# Patient Record
Sex: MALE | Race: WHITE | NOT HISPANIC OR LATINO | Employment: PART TIME | URBAN - METROPOLITAN AREA
[De-identification: names, ages, dates, MRNs, and addresses within clinical notes are randomized per-mention and may not be internally consistent; named-entity substitution may affect disease eponyms.]

---

## 2017-02-23 ENCOUNTER — PATIENT MESSAGE (OUTPATIENT)
Dept: FAMILY MEDICINE | Facility: CLINIC | Age: 67
End: 2017-02-23

## 2017-03-02 ENCOUNTER — LAB VISIT (OUTPATIENT)
Dept: LAB | Facility: HOSPITAL | Age: 67
End: 2017-03-02
Attending: FAMILY MEDICINE
Payer: MEDICARE

## 2017-03-02 DIAGNOSIS — E11.8 CONTROLLED TYPE 2 DIABETES MELLITUS WITH COMPLICATION, WITHOUT LONG-TERM CURRENT USE OF INSULIN: ICD-10-CM

## 2017-03-02 LAB
ANION GAP SERPL CALC-SCNC: 6 MMOL/L
BUN SERPL-MCNC: 20 MG/DL
CALCIUM SERPL-MCNC: 9.6 MG/DL
CHLORIDE SERPL-SCNC: 104 MMOL/L
CO2 SERPL-SCNC: 28 MMOL/L
CREAT SERPL-MCNC: 1.4 MG/DL
EST. GFR  (AFRICAN AMERICAN): 59.7 ML/MIN/1.73 M^2
EST. GFR  (NON AFRICAN AMERICAN): 51.6 ML/MIN/1.73 M^2
GLUCOSE SERPL-MCNC: 124 MG/DL
POTASSIUM SERPL-SCNC: 4.8 MMOL/L
SODIUM SERPL-SCNC: 138 MMOL/L

## 2017-03-02 PROCEDURE — 83036 HEMOGLOBIN GLYCOSYLATED A1C: CPT

## 2017-03-02 PROCEDURE — 80048 BASIC METABOLIC PNL TOTAL CA: CPT

## 2017-03-02 PROCEDURE — 36415 COLL VENOUS BLD VENIPUNCTURE: CPT | Mod: PO

## 2017-03-03 LAB
ESTIMATED AVG GLUCOSE: 131 MG/DL
HBA1C MFR BLD HPLC: 6.2 %

## 2017-03-06 ENCOUNTER — OFFICE VISIT (OUTPATIENT)
Dept: OPTOMETRY | Facility: CLINIC | Age: 67
End: 2017-03-06
Payer: MEDICARE

## 2017-03-06 DIAGNOSIS — E11.9 DIABETES TYPE 2, CONTROLLED: ICD-10-CM

## 2017-03-06 DIAGNOSIS — H40.1132 PRIMARY OPEN ANGLE GLAUCOMA OF BOTH EYES, MODERATE STAGE: Primary | ICD-10-CM

## 2017-03-06 PROCEDURE — 99212 OFFICE O/P EST SF 10 MIN: CPT | Mod: PBBFAC,PO | Performed by: OPTOMETRIST

## 2017-03-06 PROCEDURE — 92020 GONIOSCOPY: CPT | Mod: PBBFAC,PO | Performed by: OPTOMETRIST

## 2017-03-06 PROCEDURE — 99999 PR PBB SHADOW E&M-EST. PATIENT-LVL II: CPT | Mod: PBBFAC,,, | Performed by: OPTOMETRIST

## 2017-03-06 PROCEDURE — 92012 INTRM OPH EXAM EST PATIENT: CPT | Mod: S$PBB,,, | Performed by: OPTOMETRIST

## 2017-03-06 PROCEDURE — 92020 GONIOSCOPY: CPT | Mod: S$PBB,,, | Performed by: OPTOMETRIST

## 2017-03-06 RX ORDER — ATORVASTATIN CALCIUM 40 MG/1
40 TABLET, FILM COATED ORAL DAILY
Refills: 11 | COMMUNITY
Start: 2017-01-11

## 2017-03-06 RX ORDER — PIOGLITAZONEHYDROCHLORIDE 15 MG/1
TABLET ORAL
Qty: 90 TABLET | Refills: 0 | Status: SHIPPED | OUTPATIENT
Start: 2017-03-06 | End: 2017-06-03 | Stop reason: SDUPTHER

## 2017-03-06 RX ORDER — LATANOPROST 50 UG/ML
1 SOLUTION/ DROPS OPHTHALMIC NIGHTLY
Qty: 7.5 ML | Refills: 4 | Status: SHIPPED | OUTPATIENT
Start: 2017-03-06 | End: 2018-02-08 | Stop reason: SDUPTHER

## 2017-03-06 RX ORDER — VALSARTAN 320 MG/1
TABLET ORAL
Refills: 4 | COMMUNITY
Start: 2017-02-09 | End: 2018-12-26 | Stop reason: ALTCHOICE

## 2017-03-06 RX ORDER — MELOXICAM 15 MG/1
TABLET ORAL
Refills: 3 | Status: ON HOLD | COMMUNITY
Start: 2017-01-10 | End: 2018-03-06 | Stop reason: HOSPADM

## 2017-03-06 RX ORDER — BRINZOLAMIDE 10 MG/ML
1 SUSPENSION/ DROPS OPHTHALMIC 2 TIMES DAILY
Qty: 10 ML | Refills: 4 | Status: SHIPPED | OUTPATIENT
Start: 2017-03-06 | End: 2018-06-22 | Stop reason: SDUPTHER

## 2017-03-06 NOTE — MR AVS SNAPSHOT
[facility]  [address]  [facility] [address]  Phone: [phone]  Fax: [phone]                  Rajeev CARLIN Reece VASQUEZ   3/6/2017 8:30 AM   Office Visit    Description:  Male : 1950   Provider:  SHERMAN Smith, OD   Department:  [facility]           Reason for Visit     Glaucoma           Diagnoses this Visit        Comments    Primary open angle glaucoma of both eyes, moderate stage    -  Primary            To Do List           Future Appointments        Provider Department Dept Phone    3/22/2017 9:00 AM Swati Walton MD [facility] [phone]      Goals (5 Years of Data)     None      Follow-Up and Disposition     Return in about 5 months (around 2017) for Annual Eye Exam.       These Medications        Disp Refills Start End    brinzolamide (AZOPT) 1 % ophthalmic suspension 10 mL 4 3/6/2017 3/7/2018    Place 1 drop into both eyes 2 (two) times daily. - Both Eyes    Pharmacy: [facility] [address] [place] [address] [facility] Ph #: [phone]       Notes to Pharmacy: 90 day supply if desired    latanoprost 0.005 % ophthalmic solution 7.5 mL 4 3/6/2017 3/6/2018    Place 1 drop into both eyes nightly. - Both Eyes    Pharmacy: [facility] [id number] [place] [address] [facility] Ph #: [phone]       Notes to Pharmacy: **Patient requests 90 days supply**      [facility]     [facility] Nurse Care Line -  Assistance  Registered nurses in the [facility] provide clinical advisement, health education, appointment booking, and other advisory services.  Call for this free service at [phone].             Medications           Message regarding Medications     Verify the changes and/or additions to your medication regime listed below are the same as discussed with your clinician today.  If any of these changes or additions are  incorrect, please notify your healthcare provider.        CHANGE how you are taking these medications     Start Taking Instead of    brinzolamide (AZOPT) 1 % ophthalmic suspension brinzolamide (AZOPT) 1 % ophthalmic suspension    Dosage:  Place 1 drop into both eyes 2 (two) times daily. Dosage:  Place 1 drop into both eyes every morning.    Reason for Change:  Reorder       STOP taking these medications     celecoxib (CELEBREX) 200 MG capsule TAKE ONE CAPSULE BY MOUTH EVERY DAY WITH FOOD           Verify that the below list of medications is an accurate representation of the medications you are currently taking.  If none reported, the list may be blank. If incorrect, please contact your healthcare provider. Carry this list with you in case of emergency.           Current Medications     atorvastatin (LIPITOR) 40 MG tablet TK 1 T PO  QPM    brinzolamide (AZOPT) 1 % ophthalmic suspension Place 1 drop into both eyes 2 (two) times daily.    ezetimibe (ZETIA) 10 mg tablet     fexofenadine (ALLEGRA) 180 MG tablet Every day PRN    hydrocortisone (ANUSOL-HC) 25 mg suppository Place 1 suppository (25 mg total) rectally 2 (two) times daily as needed for Hemorrhoids. 1 Suppository(ies) Rectal PRN Twice a day.    latanoprost 0.005 % ophthalmic solution Place 1 drop into both eyes nightly.    meloxicam (MOBIC) 15 MG tablet TK 1 T PO  DAILY WF    mupirocin (BACTROBAN) 2 % ointment JAMI AA BID    pantoprazole (PROTONIX) 40 MG tablet Every day    pioglitazone (ACTOS) 15 MG tablet Take 1 tablet (15 mg total) by mouth once daily.    telmisartan (MICARDIS) 80 MG Tab     valsartan (DIOVAN) 320 MG tablet TAKE 1 TABLET BY MOUTH QD           Clinical Reference Information           Allergies as of 3/6/2017     Bactrim [Sulfamethoxazole-trimethoprim]    Darvon [Propoxyphene]      Immunizations Administered on Date of Encounter - 3/6/2017     None      Orders Placed During Today's Visit     Future Labs/Procedures Expected by Expires     "Cleaning Visual Field - OU - Extended - Both Eyes  As directed 3/6/2018    OCT - Optic Nerve  As directed 3/6/2018      Instructions    DRY EYES:  Use Over The Counter artificial tears as needed for dry eye symptoms.  Some common brands include:  Systane, Optive, and Refresh.  These drops can be used as frequently as desired, but may be most helpful use during long periods of concentrated work.  For example, reading / working at the computer.  Avoid drops that "get redness out", as these contain medication that may further irritate the eyes.    ALLERGY EYES / SYMPTOMS:    Over the counter medications include--Zaditor and Alaway  Use as directed 1-2 drops daily for symptoms of itching / watering eyes.  These drops will not help for dry eye or exposure symptoms.           Language Assistance Services     ATTENTION: Language assistance services are available, free of charge. Please call [phone].      ATENCIÓN: Si billla ronnell, tiene a carvajal disposición servicios gratuitos de asistencia lingüística. Llame al [phone].     RAQUEL Ý: N?u b?n nói Ti?ng Vi?t, có các d?ch v? h? tr? ngôn ng? mi?n phí dành cho b?n. G?i s? [phone].         [facility] complies with applicable Federal civil rights laws and does not discriminate on the basis of race, color, national origin, age, disability, or sex.        "

## 2017-03-06 NOTE — PROGRESS NOTES
HPI     Glaucoma    Additional comments: overdue IOP check, gonio --- good compliance w/   Azopt OU qam & Latanoprost OU qhs // pt needs new rx for both gtts           Comments   Agree above  6 months IOP  Recheck gonio  No new issues  Good gtt compliance         Last edited by SHERMAN Smith, OD on 3/6/2017  8:55 AM. (History)            Assessment /Plan     For exam results, see Encounter Report.    Primary open angle glaucoma of both eyes, moderate stage  -     brinzolamide (AZOPT) 1 % ophthalmic suspension; Place 1 drop into both eyes 2 (two) times daily.  Dispense: 10 mL; Refill: 4  -     latanoprost 0.005 % ophthalmic solution; Place 1 drop into both eyes nightly.  Dispense: 7.5 mL; Refill: 4  -     Cleaning Visual Field - OU - Extended - Both Eyes; Future  -     OCT - Optic Nerve; Future      IOP continues to elevate   ? Compliance with gtts, although pt assures is doing well with meds  Angles open on gonio today  Avg CCT      OU  Increase azopt to bid  Continue latanoprost qhs    RTC late July early August 2017---full exam / fields/ oct

## 2017-03-08 ENCOUNTER — PATIENT OUTREACH (OUTPATIENT)
Dept: ADMINISTRATIVE | Facility: HOSPITAL | Age: 67
End: 2017-03-08

## 2017-03-24 DIAGNOSIS — E11.9 TYPE 2 DIABETES MELLITUS WITHOUT COMPLICATION: ICD-10-CM

## 2017-04-12 ENCOUNTER — PATIENT OUTREACH (OUTPATIENT)
Dept: ADMINISTRATIVE | Facility: HOSPITAL | Age: 67
End: 2017-04-12

## 2017-04-12 NOTE — LETTER
April 18, 2017    Rajeev Newell III  [address]  [facility] [address]             [facility]  [address]  [facility] [address]  Phone: [phone] Dear Mr. Newell:    We have tried to reach you by mychart unsuccessfully.    [facility] is committed to your overall health.  To help you get the most out of each of your visits, we will review your information to make sure you are up to date on all of your recommended tests and/or procedures.       Dr. Swati Walton has found that you may be due for your fasting cholesterol labs, a urine test for your kidneys, your annual diabetic foot exam, colon cancer screening, and possibly a pneumonia immunization.     Medicare does not cover all immunizations to be given in the clinic.  Check your benefits to ensure that you do not need to receive your immunizations at the pharmacy.     If you have had any of the above done at another facility, please bring the records or information with you so that your record at [facility] will be complete.  If you would like to schedule any of these, please contact me.     If you are currently taking medication, please bring it with you to your appointment for review.     Also, if you have any type of Advanced Directives, please bring them with you to your office visit so we may scan them into your chart.     If you have any questions or concerns, please don't hesitate to call.    Thank you for letting us care for you,  Isa Moss LPN Clinical Care Coordinator  [facility]  [phone]

## 2017-05-30 ENCOUNTER — PATIENT MESSAGE (OUTPATIENT)
Dept: ADMINISTRATIVE | Facility: HOSPITAL | Age: 67
End: 2017-05-30

## 2017-05-30 ENCOUNTER — PATIENT OUTREACH (OUTPATIENT)
Dept: ADMINISTRATIVE | Facility: HOSPITAL | Age: 67
End: 2017-05-30

## 2017-05-30 DIAGNOSIS — E11.00 TYPE 2 DIABETES MELLITUS WITH HYPEROSMOLARITY WITHOUT COMA, UNSPECIFIED LONG TERM INSULIN USE STATUS: Primary | ICD-10-CM

## 2017-05-30 DIAGNOSIS — R35.0 URINARY FREQUENCY: ICD-10-CM

## 2017-06-03 DIAGNOSIS — E11.9 DIABETES TYPE 2, CONTROLLED: ICD-10-CM

## 2017-06-05 RX ORDER — PIOGLITAZONEHYDROCHLORIDE 15 MG/1
TABLET ORAL
Qty: 90 TABLET | Refills: 0 | Status: SHIPPED | OUTPATIENT
Start: 2017-06-05 | End: 2017-09-05 | Stop reason: SDUPTHER

## 2017-06-07 ENCOUNTER — LAB VISIT (OUTPATIENT)
Dept: LAB | Facility: HOSPITAL | Age: 67
End: 2017-06-07
Attending: FAMILY MEDICINE
Payer: MEDICARE

## 2017-06-07 DIAGNOSIS — R35.0 URINARY FREQUENCY: ICD-10-CM

## 2017-06-07 DIAGNOSIS — E11.00 TYPE 2 DIABETES MELLITUS WITH HYPEROSMOLARITY WITHOUT COMA, UNSPECIFIED LONG TERM INSULIN USE STATUS: ICD-10-CM

## 2017-06-07 DIAGNOSIS — E11.9 TYPE 2 DIABETES MELLITUS WITHOUT COMPLICATION: ICD-10-CM

## 2017-06-07 LAB
ALBUMIN SERPL BCP-MCNC: 4 G/DL
ALP SERPL-CCNC: 38 U/L
ALT SERPL W/O P-5'-P-CCNC: 28 U/L
ANION GAP SERPL CALC-SCNC: 10 MMOL/L
AST SERPL-CCNC: 23 U/L
BILIRUB SERPL-MCNC: 0.6 MG/DL
BUN SERPL-MCNC: 21 MG/DL
CALCIUM SERPL-MCNC: 9.7 MG/DL
CHLORIDE SERPL-SCNC: 107 MMOL/L
CHOLEST/HDLC SERPL: 3.1 {RATIO}
CO2 SERPL-SCNC: 23 MMOL/L
COMPLEXED PSA SERPL-MCNC: 1.1 NG/ML
CREAT SERPL-MCNC: 1.1 MG/DL
EST. GFR  (AFRICAN AMERICAN): >60 ML/MIN/1.73 M^2
EST. GFR  (NON AFRICAN AMERICAN): >60 ML/MIN/1.73 M^2
GLUCOSE SERPL-MCNC: 129 MG/DL
HDL/CHOLESTEROL RATIO: 32.7 %
HDLC SERPL-MCNC: 153 MG/DL
HDLC SERPL-MCNC: 50 MG/DL
LDLC SERPL CALC-MCNC: 81.2 MG/DL
NONHDLC SERPL-MCNC: 103 MG/DL
POTASSIUM SERPL-SCNC: 4.5 MMOL/L
PROT SERPL-MCNC: 7.6 G/DL
SODIUM SERPL-SCNC: 140 MMOL/L
TRIGL SERPL-MCNC: 109 MG/DL

## 2017-06-07 PROCEDURE — 80053 COMPREHEN METABOLIC PANEL: CPT

## 2017-06-07 PROCEDURE — 80061 LIPID PANEL: CPT

## 2017-06-07 PROCEDURE — 36415 COLL VENOUS BLD VENIPUNCTURE: CPT | Mod: PO

## 2017-06-07 PROCEDURE — 83036 HEMOGLOBIN GLYCOSYLATED A1C: CPT

## 2017-06-07 PROCEDURE — 84153 ASSAY OF PSA TOTAL: CPT

## 2017-06-08 LAB
ESTIMATED AVG GLUCOSE: 134 MG/DL
HBA1C MFR BLD HPLC: 6.3 %

## 2017-06-13 ENCOUNTER — OFFICE VISIT (OUTPATIENT)
Dept: FAMILY MEDICINE | Facility: CLINIC | Age: 67
End: 2017-06-13
Payer: MEDICARE

## 2017-06-13 VITALS
HEIGHT: 71 IN | SYSTOLIC BLOOD PRESSURE: 120 MMHG | HEART RATE: 65 BPM | BODY MASS INDEX: 39.81 KG/M2 | OXYGEN SATURATION: 96 % | WEIGHT: 284.38 LBS | TEMPERATURE: 99 F | DIASTOLIC BLOOD PRESSURE: 72 MMHG

## 2017-06-13 DIAGNOSIS — I10 ESSENTIAL HYPERTENSION: ICD-10-CM

## 2017-06-13 DIAGNOSIS — E11.8 CONTROLLED TYPE 2 DIABETES MELLITUS WITH COMPLICATION, WITHOUT LONG-TERM CURRENT USE OF INSULIN: ICD-10-CM

## 2017-06-13 DIAGNOSIS — Z23 IMMUNIZATION DUE: ICD-10-CM

## 2017-06-13 DIAGNOSIS — Z00.00 ROUTINE GENERAL MEDICAL EXAMINATION AT A HEALTH CARE FACILITY: Primary | ICD-10-CM

## 2017-06-13 PROCEDURE — 99397 PER PM REEVAL EST PAT 65+ YR: CPT | Mod: S$PBB,,, | Performed by: FAMILY MEDICINE

## 2017-06-13 PROCEDURE — 90732 PPSV23 VACC 2 YRS+ SUBQ/IM: CPT | Mod: PBBFAC,PO

## 2017-06-13 PROCEDURE — 99999 PR PBB SHADOW E&M-EST. PATIENT-LVL III: CPT | Mod: PBBFAC,,, | Performed by: FAMILY MEDICINE

## 2017-06-13 PROCEDURE — 99213 OFFICE O/P EST LOW 20 MIN: CPT | Mod: PBBFAC,PO | Performed by: FAMILY MEDICINE

## 2017-06-13 NOTE — PROGRESS NOTES
Subjective:       Patient ID: Rajeev Newell III is a 66 y.o. male.    Chief Complaint: Annual Exam    HPI     Mr. Newell is a 65 yo M with a PMH significant for T2DM (A1c 6.3)  who presents for an annual visit. Since his last visit he has had no complaints. He states that his current regimen has kept his sugars stable. He regularly sees opthalmology for glaucoma treatment, per opthal his eyes are currently stable. He denies any new neuropathy or wounds. He was advised to improve diet and exercise to aid in keeping his A1c stable.   Labs 6/2017 rev. Has urol and cards f/u later this year.    Review of Systems   Constitutional: Negative for activity change, fatigue and unexpected weight change.   HENT: Negative for rhinorrhea and trouble swallowing.    Eyes: Negative for discharge and visual disturbance.   Respiratory: Negative for cough, chest tightness, shortness of breath and wheezing.    Cardiovascular: Negative for chest pain and palpitations.   Gastrointestinal: Negative for abdominal pain, blood in stool, constipation, diarrhea and vomiting.   Endocrine: Negative for polydipsia and polyuria.   Genitourinary: Negative for difficulty urinating, hematuria and urgency.   Musculoskeletal: Negative for arthralgias, joint swelling and neck pain.   Skin: Negative for color change, rash and wound.   Neurological: Negative for weakness, numbness and headaches.   Psychiatric/Behavioral: Negative for confusion, dysphoric mood, sleep disturbance and suicidal ideas.       Objective:      Physical Exam   Constitutional: He is oriented to person, place, and time. He appears well-developed and well-nourished. No distress.   HENT:   Head: Normocephalic and atraumatic.   Right Ear: External ear normal.   Left Ear: External ear normal.   Mouth/Throat: Oropharynx is clear and moist.   Eyes: Conjunctivae and EOM are normal. No scleral icterus.   Neck: Neck supple. No thyromegaly present.   Cardiovascular: Normal rate, regular  rhythm, normal heart sounds and intact distal pulses.    No murmur heard.  Pulses:       Dorsalis pedis pulses are 2+ on the right side, and 2+ on the left side.        Posterior tibial pulses are 2+ on the right side, and 2+ on the left side.   Pulmonary/Chest: Effort normal and breath sounds normal. He has no wheezes.   Abdominal: Soft. Bowel sounds are normal. He exhibits no distension.   Exam limited by habitus.   Musculoskeletal: Normal range of motion. He exhibits no edema (mild, nonpitting edema).        Right foot: There is no deformity.        Left foot: There is no deformity.   Feet:   Right Foot:   Protective Sensation: 3 sites tested. 3 sites sensed.   Skin Integrity: Negative for ulcer, blister, skin breakdown, erythema, warmth, callus or dry skin.   Left Foot:   Protective Sensation: 3 sites tested. 3 sites sensed.   Skin Integrity: Negative for ulcer, blister, skin breakdown, erythema, warmth, callus or dry skin.   Lymphadenopathy:     He has no cervical adenopathy.   Neurological: He is alert and oriented to person, place, and time. He has normal reflexes.   Skin: Skin is warm and dry. Capillary refill takes less than 2 seconds.   Psychiatric: He has a normal mood and affect. His behavior is normal. Judgment and thought content normal.   Nursing note and vitals reviewed.        Routine general medical examination at a health care facility  Anticipatory guidance reviewed.  Immunization due  pneumo 23 in office today.  Controlled type 2 diabetes mellitus with complication, without long-term current use of insulin  -     Comprehensive metabolic panel; Future; Expected date: 06/13/2017  -     Hemoglobin A1c; Future; Expected date: 06/13/2017  Controlled, encouraged diet/exercise, recheck 6mos.  Other orders  -     (In Office Administered) Pneumococcal Polysaccharide Vaccine (23 Valent) (SQ/IM)  HTN  Controlled.

## 2017-06-29 ENCOUNTER — OFFICE VISIT (OUTPATIENT)
Dept: FAMILY MEDICINE | Facility: CLINIC | Age: 67
End: 2017-06-29
Payer: MEDICARE

## 2017-06-29 VITALS
HEIGHT: 71 IN | WEIGHT: 286.81 LBS | OXYGEN SATURATION: 95 % | SYSTOLIC BLOOD PRESSURE: 123 MMHG | TEMPERATURE: 99 F | HEART RATE: 65 BPM | DIASTOLIC BLOOD PRESSURE: 82 MMHG | BODY MASS INDEX: 40.15 KG/M2

## 2017-06-29 DIAGNOSIS — J01.00 ACUTE MAXILLARY SINUSITIS, RECURRENCE NOT SPECIFIED: ICD-10-CM

## 2017-06-29 DIAGNOSIS — J40 BRONCHITIS: Primary | ICD-10-CM

## 2017-06-29 PROCEDURE — 1126F AMNT PAIN NOTED NONE PRSNT: CPT | Mod: ,,, | Performed by: PHYSICIAN ASSISTANT

## 2017-06-29 PROCEDURE — 1159F MED LIST DOCD IN RCRD: CPT | Mod: ,,, | Performed by: PHYSICIAN ASSISTANT

## 2017-06-29 PROCEDURE — 99213 OFFICE O/P EST LOW 20 MIN: CPT | Mod: PBBFAC,PO | Performed by: PHYSICIAN ASSISTANT

## 2017-06-29 PROCEDURE — 99213 OFFICE O/P EST LOW 20 MIN: CPT | Mod: S$PBB,,, | Performed by: PHYSICIAN ASSISTANT

## 2017-06-29 PROCEDURE — 99999 PR PBB SHADOW E&M-EST. PATIENT-LVL III: CPT | Mod: PBBFAC,,, | Performed by: PHYSICIAN ASSISTANT

## 2017-06-29 RX ORDER — DOXYCYCLINE 100 MG/1
100 CAPSULE ORAL 2 TIMES DAILY
Qty: 20 CAPSULE | Refills: 0 | Status: SHIPPED | OUTPATIENT
Start: 2017-06-29 | End: 2017-07-09

## 2017-06-29 RX ORDER — ALBUTEROL SULFATE 90 UG/1
2 AEROSOL, METERED RESPIRATORY (INHALATION) EVERY 4 HOURS PRN
Qty: 1 INHALER | Refills: 5 | Status: SHIPPED | OUTPATIENT
Start: 2017-06-29 | End: 2018-02-06 | Stop reason: SDUPTHER

## 2017-06-29 NOTE — PROGRESS NOTES
Subjective:       Patient ID: Rajeev Newell III is a 66 y.o. male.    Chief Complaint: Cough (6 days) and Sore Throat    HPI   Cough, congestion, sore throat x 1 wk  Cloudy mucus  Review of Systems   Constitutional: Negative.  Negative for activity change, appetite change, chills, diaphoresis, fatigue, fever and unexpected weight change.   HENT: Positive for congestion, postnasal drip, sinus pressure and sore throat.    Eyes: Negative.    Respiratory: Positive for cough. Negative for shortness of breath and wheezing.    Cardiovascular: Negative.  Negative for chest pain and leg swelling.   Gastrointestinal: Negative.    Endocrine: Negative.    Genitourinary: Negative.    Musculoskeletal: Negative.    Skin: Negative.  Negative for rash.       Objective:      Physical Exam   Constitutional: He appears well-developed and well-nourished. No distress.   HENT:   Head: Normocephalic and atraumatic.   Right Ear: External ear normal.   Left Ear: External ear normal.   Nose: Nose normal.   Mouth/Throat: Oropharynx is clear and moist. No oropharyngeal exudate.   Max sinus tenderness  Cloudy mucus   Eyes: Conjunctivae are normal. No scleral icterus.   Neck: Normal range of motion. Neck supple. No tracheal deviation present. No thyromegaly present.   Cardiovascular: Normal rate, regular rhythm, normal heart sounds and intact distal pulses.  Exam reveals no gallop and no friction rub.    No murmur heard.  Pulmonary/Chest: Effort normal and breath sounds normal. No respiratory distress. He has no wheezes. He has no rales.   Musculoskeletal: He exhibits no edema.   Lymphadenopathy:     He has no cervical adenopathy.   Skin: Skin is warm and dry. No rash noted.   Vitals reviewed.      Assessment:       1. Bronchitis    2. Acute maxillary sinusitis, recurrence not specified        Plan:       Rajeev was seen today for cough and sore throat.    Diagnoses and all orders for this visit:    Bronchitis  -     doxycycline (MONODOX)  100 MG capsule; Take 1 capsule (100 mg total) by mouth 2 (two) times daily.  -     albuterol 90 mcg/actuation inhaler; Inhale 2 puffs into the lungs every 4 (four) hours as needed for Wheezing.    Acute maxillary sinusitis, recurrence not specified  -     doxycycline (MONODOX) 100 MG capsule; Take 1 capsule (100 mg total) by mouth 2 (two) times daily.  -     albuterol 90 mcg/actuation inhaler; Inhale 2 puffs into the lungs every 4 (four) hours as needed for Wheezing.    discussed otc's

## 2017-07-14 ENCOUNTER — CLINICAL SUPPORT (OUTPATIENT)
Dept: OPHTHALMOLOGY | Facility: CLINIC | Age: 67
End: 2017-07-14
Attending: OPTOMETRIST
Payer: MEDICARE

## 2017-07-14 ENCOUNTER — OFFICE VISIT (OUTPATIENT)
Dept: OPTOMETRY | Facility: CLINIC | Age: 67
End: 2017-07-14
Payer: MEDICARE

## 2017-07-14 DIAGNOSIS — H52.203 MYOPIA WITH ASTIGMATISM AND PRESBYOPIA, BILATERAL: ICD-10-CM

## 2017-07-14 DIAGNOSIS — H52.13 MYOPIA WITH ASTIGMATISM AND PRESBYOPIA, BILATERAL: ICD-10-CM

## 2017-07-14 DIAGNOSIS — H40.1132 PRIMARY OPEN ANGLE GLAUCOMA OF BOTH EYES, MODERATE STAGE: ICD-10-CM

## 2017-07-14 DIAGNOSIS — E11.9 DIABETES MELLITUS TYPE 2 WITHOUT RETINOPATHY: Primary | ICD-10-CM

## 2017-07-14 DIAGNOSIS — H43.393 VITREOUS FLOATERS, BILATERAL: ICD-10-CM

## 2017-07-14 DIAGNOSIS — H52.4 MYOPIA WITH ASTIGMATISM AND PRESBYOPIA, BILATERAL: ICD-10-CM

## 2017-07-14 DIAGNOSIS — H25.13 NUCLEAR SCLEROSIS, BILATERAL: ICD-10-CM

## 2017-07-14 PROCEDURE — 92083 EXTENDED VISUAL FIELD XM: CPT | Mod: 26,S$PBB,, | Performed by: OPTOMETRIST

## 2017-07-14 PROCEDURE — 92133 CPTRZD OPH DX IMG PST SGM ON: CPT | Mod: PBBFAC,PO

## 2017-07-14 PROCEDURE — 92014 COMPRE OPH EXAM EST PT 1/>: CPT | Mod: S$PBB,,, | Performed by: OPTOMETRIST

## 2017-07-14 PROCEDURE — 99999 PR PBB SHADOW E&M-EST. PATIENT-LVL II: CPT | Mod: PBBFAC,,, | Performed by: OPTOMETRIST

## 2017-07-14 PROCEDURE — 92133 CPTRZD OPH DX IMG PST SGM ON: CPT | Mod: 26,S$PBB,, | Performed by: OPTOMETRIST

## 2017-07-14 NOTE — PROGRESS NOTES
HPI     Annual Exam    Additional comments: DLE 3-17 (nichelle)    ocular health exam            Glaucoma    Additional comments: rev hvf & oct // +Azopt OU bid & Latanoprost OU qhs           Diabetic Eye Exam    Additional comments: BSL controlled           Blurred Vision    Additional comments: at distance -- pt lost specs, wearing old pair //   near [facility] good           Spots and/or Floaters    Additional comments: OU -- no light flashes           Comments   Hemoglobin A1C       Date                     Value               Ref Range             Status                06/07/2017               6.3 (H)             4.5 - 6.2 %           Final              Comment:    According to ADA guidelines, hemoglobin A1C <7.0% represents  optimal   control in non-pregnant diabetic patients.  Different  metrics may apply   to specific populations.   Standards of Medical Care in Diabetes -   2016.  For the purpose of screening for the presence of diabetes:  <5.7%       Consistent with the absence of diabetes  5.7-6.4%  Consistent with   increasing risk for diabetes   (prediabetes)  >or=6.5%  Consistent with   diabetes  Currently no consensus exists for use of hemoglobin A1C  for   diagnosis of diabetes for children.         03/02/2017               6.2                 4.5 - 6.2 %           Final              Comment:    According to ADA guidelines, hemoglobin A1C <7.0% represents  optimal   control in non-pregnant diabetic patients.  Different  metrics may apply   to specific populations.   Standards of Medical Care in Diabetes -   2016.  For the purpose of screening for the presence of diabetes:  <5.7%       Consistent with the absence of diabetes  5.7-6.4%  Consistent with   increasing risk for diabetes   (prediabetes)  >or=6.5%  Consistent with   diabetes  Currently no consensus exists for use of hemoglobin A1C  for   diagnosis of diabetes for children.         06/07/2016               6.2                 4.5 - 6.2 %           Final  "           ----------         Last edited by Deanne Wilson on 7/14/2017 10:39 AM. (History)        ROS     Positive for: Eyes    Negative for: Constitutional, Gastrointestinal, Neurological, Skin,   Genitourinary, Musculoskeletal, HENT, Endocrine, Cardiovascular,   Respiratory, Psychiatric, Allergic/Imm, Heme/Lymph    Last edited by SHERMAN Smith, OD on 7/14/2017 10:58 AM. (History)        Assessment /Plan     For exam results, see Encounter Report.    Diabetes mellitus type 2 without retinopathy    Primary open angle glaucoma of both eyes, moderate stage    Nuclear sclerosis, bilateral    Vitreous floaters, bilateral    Myopia with astigmatism and presbyopia, bilateral      1. No ret/ no csme, gave info, control glucose, annual DFE  2. IOP mid teens when compliant  Target not higher than mid teens, low teens ultimately better   Angles open--update gonio next IOP  Avg CCT  Updated fields/ oct   OCT  G 52 onl--defects G/NS/TS/T/TI  G 65 onl--defects G/TS/TI  Stable from previous    PSD  3.71 <0.5% onl with inf arc defect---very slight progression from previous  1.37 "wnl" slight depression in sup arc but no definitive progression  Repeat tests 1 year     Continue OU  Latanoprost qhs  Azopt bid    3. Early changes, not vis sig  4. RD precautions given  5. Updated specs rx gave copy    RTC 4 months IOP              "

## 2017-09-05 DIAGNOSIS — E11.9 DIABETES TYPE 2, CONTROLLED: ICD-10-CM

## 2017-09-06 RX ORDER — PIOGLITAZONEHYDROCHLORIDE 15 MG/1
TABLET ORAL
Qty: 90 TABLET | Refills: 0 | Status: SHIPPED | OUTPATIENT
Start: 2017-09-06 | End: 2017-12-07 | Stop reason: SDUPTHER

## 2017-10-10 ENCOUNTER — IMMUNIZATION (OUTPATIENT)
Dept: FAMILY MEDICINE | Facility: CLINIC | Age: 67
End: 2017-10-10
Payer: MEDICARE

## 2017-10-10 PROCEDURE — G0008 ADMIN INFLUENZA VIRUS VAC: HCPCS | Mod: PBBFAC,PO

## 2017-10-12 DIAGNOSIS — M25.561 PAIN IN BOTH KNEES, UNSPECIFIED CHRONICITY: Primary | ICD-10-CM

## 2017-10-12 DIAGNOSIS — M25.562 PAIN IN BOTH KNEES, UNSPECIFIED CHRONICITY: Primary | ICD-10-CM

## 2017-10-16 ENCOUNTER — HOSPITAL ENCOUNTER (OUTPATIENT)
Dept: RADIOLOGY | Facility: HOSPITAL | Age: 67
Discharge: HOME | End: 2017-10-16
Attending: ORTHOPAEDIC SURGERY
Payer: MEDICARE

## 2017-10-16 ENCOUNTER — OFFICE VISIT (OUTPATIENT)
Dept: ORTHOPEDICS | Facility: CLINIC | Age: 67
End: 2017-10-16
Payer: MEDICARE

## 2017-10-16 VITALS — HEIGHT: 71 IN | BODY MASS INDEX: 39.73 KG/M2 | WEIGHT: 283.75 LBS

## 2017-10-16 DIAGNOSIS — M21.162 ACQUIRED VARUS DEFORMITY KNEE, LEFT: ICD-10-CM

## 2017-10-16 DIAGNOSIS — M25.561 PAIN IN BOTH KNEES, UNSPECIFIED CHRONICITY: ICD-10-CM

## 2017-10-16 DIAGNOSIS — M17.12 PRIMARY OSTEOARTHRITIS OF LEFT KNEE: ICD-10-CM

## 2017-10-16 DIAGNOSIS — M17.11 PRIMARY OSTEOARTHRITIS OF RIGHT KNEE: Primary | ICD-10-CM

## 2017-10-16 DIAGNOSIS — M25.562 PAIN IN BOTH KNEES, UNSPECIFIED CHRONICITY: ICD-10-CM

## 2017-10-16 DIAGNOSIS — M21.161 ACQUIRED VARUS DEFORMITY KNEE, RIGHT: ICD-10-CM

## 2017-10-16 PROCEDURE — 99203 OFFICE O/P NEW LOW 30 MIN: CPT | Mod: S$PBB,,, | Performed by: ORTHOPAEDIC SURGERY

## 2017-10-16 PROCEDURE — 73562 X-RAY EXAM OF KNEE 3: CPT | Mod: TC,50

## 2017-10-16 PROCEDURE — 73562 X-RAY EXAM OF KNEE 3: CPT | Mod: 26,LT,, | Performed by: RADIOLOGY

## 2017-10-16 PROCEDURE — 99213 OFFICE O/P EST LOW 20 MIN: CPT | Mod: PBBFAC,25 | Performed by: ORTHOPAEDIC SURGERY

## 2017-10-16 PROCEDURE — 73562 X-RAY EXAM OF KNEE 3: CPT | Mod: 26,59,RT, | Performed by: RADIOLOGY

## 2017-10-16 PROCEDURE — 99999 PR PBB SHADOW E&M-EST. PATIENT-LVL III: CPT | Mod: PBBFAC,,, | Performed by: ORTHOPAEDIC SURGERY

## 2017-10-16 NOTE — PROGRESS NOTES
Subjective:      Patient ID: Rajeev Newell III is a 66 y.o. male.    Chief Complaint: Pain of the Left Knee and Pain of the Right Knee    HPI   Rajeev Newell III is a 66 year old male here with a several years history of bilateral knee osteoarthritis managed with daily meloxicam and synvisc injections x 4 years. The patient is a   and performs seated office work. He presents to clinic today to inquire about Klever unicompartmental knee arthroplasty. He reports his pain has been well controlled with Synvisc injections and daily meloxicam, no daily pain, he does report some weakness when his injections wear off. His knees do not interfere with his ADL. There was not a history of trauma.  The pain is well controlled. There is is not radiation.  There is not catching or locking.  . The patient has not had prior surgery. It is aggravated by walking. There is not numbness or tingling of the lower extremity.  There is not back pain.  He  has tried medications or injections. They have helped.  He does have difficulty getting in or out of a car, getting dressed, or going up or down stairs.  The patient does not use an assistive device.      Past Medical History:   Diagnosis Date    Cataract     OU--early    DM (diabetes mellitus) 10/29/2012    Glaucoma     POAG-OU    Hypertension     Sinusitis      Past Surgical History:   Procedure Laterality Date    CARPAL TUNNEL RELEASE      CIRCUMCISION      CYSTOSCOPY      HERNIA REPAIR Left 1994    L inguinal - [facility]     Family History   Problem Relation Age of Onset    Hypertension Mother     Stroke Mother     Diabetes Neg Hx     Cancer Neg Hx     Heart disease Neg Hx      Social History     Social History    Marital status:      Spouse name: N/A    Number of children: N/A    Years of education: N/A     Occupational History     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     Current Outpatient Prescriptions on File Prior to Visit   Medication Sig Dispense Refill    albuterol 90 mcg/actuation inhaler Inhale 2 puffs into the lungs every 4 (four) hours as needed for Wheezing. 1 Inhaler 5    atorvastatin (LIPITOR) 40 MG tablet TK 1 T PO  QPM  11    brinzolamide (AZOPT) 1 % ophthalmic suspension Place 1 drop into both eyes 2 (two) times daily. 10 mL 4    fexofenadine (ALLEGRA) 180 MG tablet Every day PRN      hydrocortisone (ANUSOL-HC) 25 mg suppository Place 1 suppository (25 mg total) rectally 2 (two) times daily as needed for Hemorrhoids. 1 Suppository(ies) Rectal PRN Twice a day. 12 suppository 1    latanoprost 0.005 % ophthalmic solution Place 1 drop into both eyes nightly. 7.5 mL 4    meloxicam (MOBIC) 15 MG tablet TK 1 T PO  DAILY WF  3    mupirocin (BACTROBAN) 2 % ointment JAMI AA BID  2    pantoprazole (PROTONIX) 40 MG tablet Every day      pioglitazone (ACTOS) 15 MG tablet TAKE 1 TABLET BY MOUTH DAILY 90 tablet 0    valsartan (DIOVAN) 320 MG tablet TAKE 1 TABLET BY MOUTH QD  4     No current facility-administered medications on file prior to visit.      Review of patient's allergies indicates:   Allergen Reactions    Bactrim [sulfamethoxazole-trimethoprim] Other (See Comments)     Abdominal Pain, Flush, Fever and Chills, Headache and Cough after Pt. took Bactrim    Darvon [propoxyphene]      Hallucinations       Review of Systems   Constitution: Negative for chills, fever and night sweats.   HENT: Negative for hearing loss.    Eyes: Negative for blurred vision and double vision.   Cardiovascular: Negative for chest pain, claudication and leg swelling.   Respiratory: Negative for shortness of breath.    Endocrine:  "Negative for polydipsia, polyphagia and polyuria.   Hematologic/Lymphatic: Negative for adenopathy and bleeding problem. Does not bruise/bleed easily.   Skin: Negative for poor wound healing.   Musculoskeletal: Positive for arthritis.   Gastrointestinal: Negative for diarrhea and heartburn.   Genitourinary: Negative for bladder incontinence.   Neurological: Negative for focal weakness, headaches, numbness, paresthesias and sensory change.   Psychiatric/Behavioral: The patient is not nervous/anxious.    Allergic/Immunologic: Negative for persistent infections.            Objective:      Body mass index is 39.57 kg/m².  Vitals:    10/16/17 1321   Weight: 128.7 kg (283 lb 11.7 oz)   Height: 5' 11" (1.803 m)         General    Constitutional: He is oriented to person, place, and time. He appears well-developed and well-nourished.   HENT:   Head: Normocephalic and atraumatic.   Eyes: EOM are normal.   Cardiovascular: Normal rate.    Pulmonary/Chest: Effort normal.   Neurological: He is alert and oriented to person, place, and time.   Psychiatric: He has a normal mood and affect. His behavior is normal.     General Musculoskeletal Exam   Gait: normal       Right Knee Exam     Inspection   Erythema: absent  Scars: absent  Swelling: present  Effusion: effusion  Deformity: deformity (varus)  Bruising: absent    Tenderness   The patient is tender to palpation of the pes anserinus.    Range of Motion   Extension: 0   Flexion: 130     Tests   Ligament Examination Lachman: normal (-1 to 2mm)   MCL - Valgus: normal (0 to 2mm)  LCL - Varus: normal  Patella   Passive Patellar Tilt: neutral  Patellar Grind: negative    Other   Sensation: normal    Left Knee Exam     Inspection   Erythema: absent  Scars: absent  Swelling: present  Effusion: absent  Deformity: present (varus)  Bruising: absent    Tenderness   The patient is experiencing no tenderness.         Range of Motion   Extension: 0   Flexion: 130     Tests   Stability Lachman: " normal (-1 to 2mm)   MCL - Valgus: normal (0 to 2mm)  LCL - Varus: normal (0 to 2mm)  Patella   Passive Patellar Tilt: neutral  Patellar Grind: negative    Other   Sensation: normal    Muscle Strength   Right Lower Extremity   Hip Abduction: 5/5   Quadriceps:  5/5   Hamstrin/5   Left Lower Extremity   Hip Abduction: 5/5   Quadriceps:  5/5   Hamstrin/5     Reflexes     Left Side  Quadriceps:  2+    Right Side   Quadriceps:  2+    Vascular Exam     Right Pulses  Dorsalis Pedis:      2+          Left Pulses  Dorsalis Pedis:      2+          Edema  Right Lower Leg: absent  Left Lower Leg: absent          Radiographs taken today and reviewed by me demonstrate severe arthritic change of the bilateral KNEE(s).There  is bone destruction.  There is not a fracture. The medial compartment is most involved.  There is a bilateral varus deformity.  The changes are tricompartmental.          Assessment:       Encounter Diagnoses   Name Primary?    Primary osteoarthritis of right knee Yes    Acquired varus deformity knee, right     Primary osteoarthritis of left knee     Acquired varus deformity knee, left           Plan:       Rajeev was seen today for pain and pain.    Diagnoses and all orders for this visit:    Primary osteoarthritis of right knee    Acquired varus deformity knee, right    Primary osteoarthritis of left knee    Acquired varus deformity knee, left      Discussed PACO TOTAL KNEE. Not a candidate for partial knee due to extent of arthritis.  F/U in early January to discuss further

## 2017-12-07 DIAGNOSIS — E11.9 DIABETES TYPE 2, CONTROLLED: ICD-10-CM

## 2017-12-07 RX ORDER — PIOGLITAZONEHYDROCHLORIDE 15 MG/1
15 TABLET ORAL DAILY
Qty: 90 TABLET | Refills: 1 | Status: SHIPPED | OUTPATIENT
Start: 2017-12-07 | End: 2018-06-22 | Stop reason: SDUPTHER

## 2017-12-07 NOTE — TELEPHONE ENCOUNTER
Message sent to pt rambo to ask him if he can go get his labs done before leaving to go out of town.

## 2017-12-11 ENCOUNTER — OFFICE VISIT (OUTPATIENT)
Dept: FAMILY MEDICINE | Facility: CLINIC | Age: 67
End: 2017-12-11
Payer: MEDICARE

## 2017-12-11 VITALS
HEIGHT: 71 IN | RESPIRATION RATE: 17 BRPM | BODY MASS INDEX: 40.25 KG/M2 | WEIGHT: 287.5 LBS | OXYGEN SATURATION: 95 % | HEART RATE: 74 BPM | DIASTOLIC BLOOD PRESSURE: 80 MMHG | TEMPERATURE: 98 F | SYSTOLIC BLOOD PRESSURE: 136 MMHG

## 2017-12-11 DIAGNOSIS — J01.00 SUBACUTE MAXILLARY SINUSITIS: Primary | ICD-10-CM

## 2017-12-11 PROCEDURE — 99214 OFFICE O/P EST MOD 30 MIN: CPT | Mod: PBBFAC,PO | Performed by: PHYSICIAN ASSISTANT

## 2017-12-11 PROCEDURE — 99999 PR PBB SHADOW E&M-EST. PATIENT-LVL IV: CPT | Mod: PBBFAC,,, | Performed by: PHYSICIAN ASSISTANT

## 2017-12-11 PROCEDURE — 99213 OFFICE O/P EST LOW 20 MIN: CPT | Mod: S$PBB,,, | Performed by: PHYSICIAN ASSISTANT

## 2017-12-11 RX ORDER — DOXYCYCLINE 100 MG/1
100 CAPSULE ORAL 2 TIMES DAILY
Qty: 20 CAPSULE | Refills: 0 | Status: SHIPPED | OUTPATIENT
Start: 2017-12-11 | End: 2017-12-21

## 2017-12-11 NOTE — PROGRESS NOTES
Subjective:       Patient ID: Rajeev Newell III is a 67 y.o. male.    Chief Complaint: Nasal Congestion    HPI   Sinus congestion with purulent mucus x 1 wk  Pt leaving town for 2 wks  Review of Systems   Constitutional: Negative.  Negative for activity change, appetite change, chills, diaphoresis, fatigue, fever and unexpected weight change.   HENT: Positive for congestion, postnasal drip, rhinorrhea and sinus pressure. Negative for hearing loss, sinus pain, sore throat and trouble swallowing.    Eyes: Negative.  Negative for discharge and visual disturbance.   Respiratory: Positive for cough. Negative for chest tightness and wheezing.    Cardiovascular: Negative.  Negative for chest pain and palpitations.   Gastrointestinal: Negative.  Negative for blood in stool, constipation, diarrhea and vomiting.   Endocrine: Negative.  Negative for polydipsia and polyuria.   Genitourinary: Negative.  Negative for difficulty urinating, hematuria and urgency.   Musculoskeletal: Negative.  Negative for arthralgias, joint swelling and neck pain.   Skin: Negative.  Negative for rash.   Neurological: Negative.  Negative for weakness and headaches.   Psychiatric/Behavioral: Negative.  Negative for confusion and dysphoric mood.       Objective:      Physical Exam   Constitutional: He appears well-developed and well-nourished. No distress.   HENT:   Head: Normocephalic and atraumatic.   Right Ear: External ear normal.   Left Ear: External ear normal.   Nose: Nose normal.   Mouth/Throat: Oropharynx is clear and moist. No oropharyngeal exudate.   Max sinus fullness and tenderness  Cloudy mucus   Eyes: Conjunctivae are normal. No scleral icterus.   Neck: Normal range of motion. Neck supple. No tracheal deviation present. No thyromegaly present.   Cardiovascular: Normal rate, regular rhythm, normal heart sounds and intact distal pulses.  Exam reveals no gallop and no friction rub.    No murmur heard.  Pulmonary/Chest: Effort normal  and breath sounds normal. No respiratory distress. He has no wheezes. He has no rales.   Musculoskeletal: He exhibits no edema.   Lymphadenopathy:     He has no cervical adenopathy.   Skin: Skin is warm and dry. No rash noted.   Vitals reviewed.      Assessment:       1. Subacute maxillary sinusitis        Plan:       Rajeev was seen today for nasal congestion.    Diagnoses and all orders for this visit:    Subacute maxillary sinusitis  -     doxycycline (MONODOX) 100 MG capsule; Take 1 capsule (100 mg total) by mouth 2 (two) times daily.    discussed otc's

## 2017-12-28 ENCOUNTER — LAB VISIT (OUTPATIENT)
Dept: LAB | Facility: HOSPITAL | Age: 67
End: 2017-12-28
Attending: FAMILY MEDICINE
Payer: MEDICARE

## 2017-12-28 DIAGNOSIS — E11.8 CONTROLLED TYPE 2 DIABETES MELLITUS WITH COMPLICATION, WITHOUT LONG-TERM CURRENT USE OF INSULIN: ICD-10-CM

## 2017-12-28 LAB
ALBUMIN SERPL BCP-MCNC: 3.8 G/DL
ALP SERPL-CCNC: 46 U/L
ALT SERPL W/O P-5'-P-CCNC: 21 U/L
ANION GAP SERPL CALC-SCNC: 8 MMOL/L
AST SERPL-CCNC: 17 U/L
BILIRUB SERPL-MCNC: 0.9 MG/DL
BUN SERPL-MCNC: 16 MG/DL
CALCIUM SERPL-MCNC: 9.3 MG/DL
CHLORIDE SERPL-SCNC: 105 MMOL/L
CO2 SERPL-SCNC: 26 MMOL/L
CREAT SERPL-MCNC: 1.2 MG/DL
EST. GFR  (AFRICAN AMERICAN): >60 ML/MIN/1.73 M^2
EST. GFR  (NON AFRICAN AMERICAN): >60 ML/MIN/1.73 M^2
ESTIMATED AVG GLUCOSE: 120 MG/DL
GLUCOSE SERPL-MCNC: 120 MG/DL
HBA1C MFR BLD HPLC: 5.8 %
POTASSIUM SERPL-SCNC: 4.5 MMOL/L
PROT SERPL-MCNC: 8.1 G/DL
SODIUM SERPL-SCNC: 139 MMOL/L

## 2017-12-28 PROCEDURE — 36415 COLL VENOUS BLD VENIPUNCTURE: CPT | Mod: PN

## 2017-12-28 PROCEDURE — 83036 HEMOGLOBIN GLYCOSYLATED A1C: CPT

## 2017-12-28 PROCEDURE — 80053 COMPREHEN METABOLIC PANEL: CPT

## 2017-12-29 ENCOUNTER — TELEPHONE (OUTPATIENT)
Dept: INTERNAL MEDICINE | Facility: CLINIC | Age: 67
End: 2017-12-29

## 2017-12-29 DIAGNOSIS — J11.1 FLU SYNDROME: Primary | ICD-10-CM

## 2017-12-29 RX ORDER — OSELTAMIVIR PHOSPHATE 75 MG/1
75 CAPSULE ORAL 2 TIMES DAILY
Qty: 10 CAPSULE | Refills: 0 | Status: SHIPPED | OUTPATIENT
Start: 2017-12-29 | End: 2018-01-03

## 2017-12-29 NOTE — TELEPHONE ENCOUNTER
----- Message from Ricky Joy sent at 12/29/2017 11:43 AM CST -----  Contact: Rajeev  Calling to get a new prescription of Tamiflu. States his entire family has the flu..please call [phone]    [facility] [address] [place] [address] AT [facility] [address]  [address] [address] [address]  Phone: [phone] Fax: [phone]    Thank you!

## 2018-01-10 ENCOUNTER — OFFICE VISIT (OUTPATIENT)
Dept: ORTHOPEDICS | Facility: CLINIC | Age: 68
End: 2018-01-10
Payer: MEDICARE

## 2018-01-10 VITALS — BODY MASS INDEX: 39.77 KG/M2 | WEIGHT: 284.06 LBS | HEIGHT: 71 IN

## 2018-01-10 DIAGNOSIS — M21.161 ACQUIRED VARUS DEFORMITY KNEE, RIGHT: ICD-10-CM

## 2018-01-10 DIAGNOSIS — M17.11 PRIMARY OSTEOARTHRITIS OF RIGHT KNEE: Primary | ICD-10-CM

## 2018-01-10 PROCEDURE — 99213 OFFICE O/P EST LOW 20 MIN: CPT | Mod: PBBFAC | Performed by: ORTHOPAEDIC SURGERY

## 2018-01-10 PROCEDURE — 99999 PR PBB SHADOW E&M-EST. PATIENT-LVL III: CPT | Mod: PBBFAC,,, | Performed by: ORTHOPAEDIC SURGERY

## 2018-01-10 PROCEDURE — 99213 OFFICE O/P EST LOW 20 MIN: CPT | Mod: S$PBB,,, | Performed by: ORTHOPAEDIC SURGERY

## 2018-01-10 NOTE — PROGRESS NOTES
"Subjective:      Patient ID: Rajeev Newell III is a 67 y.o. male.    Chief Complaint: Pain of the Left Knee; Pain of the Right Knee; and right knee (has no pain right now, pt states has strength problems, (stability))    HPI  Rajeev Newell III has right knee pain.  The pain is unchanged. The pain is located in the global aspect of the knee.  There  is not radiation. There is associated stiffness.   There is not catching and locking. The pain is described as achy. The pain is aggravated by standing, sitting, walking.  It is alleviated by rest on occassion.  There is associated back pain.  His history, medications and problem list were reviewed. The symptoms have worsened to the point where it is interfering with his activities of daily living.  He has difficulty with stairs, getting dressed and getting in an out of a car.        Review of Systems   Constitution: Negative for chills, fever and night sweats.   HENT: Negative for hearing loss.    Eyes: Negative for blurred vision and double vision.   Cardiovascular: Negative for chest pain, claudication and leg swelling.   Respiratory: Negative for shortness of breath.    Endocrine: Negative for polydipsia, polyphagia and polyuria.   Hematologic/Lymphatic: Negative for adenopathy and bleeding problem. Does not bruise/bleed easily.   Skin: Negative for poor wound healing.   Musculoskeletal: Positive for back pain and joint pain.   Gastrointestinal: Negative for diarrhea and heartburn.   Genitourinary: Negative for bladder incontinence.   Neurological: Negative for focal weakness, headaches, numbness, paresthesias and sensory change.   Psychiatric/Behavioral: The patient is not nervous/anxious.    Allergic/Immunologic: Negative for persistent infections.         Objective:      Body mass index is 39.62 kg/m².  Vitals:    01/10/18 0954   Weight: 128.8 kg (284 lb 1 oz)   Height: 5' 11" (1.803 m)           General    Constitutional: He is oriented to person, place, " and time. He appears well-developed and well-nourished.   HENT:   Head: Normocephalic and atraumatic.   Eyes: EOM are normal.   Cardiovascular: Normal rate.    Pulmonary/Chest: Effort normal.   Neurological: He is alert and oriented to person, place, and time.   Psychiatric: He has a normal mood and affect. His behavior is normal.     General Musculoskeletal Exam   Gait: normal       Right Knee Exam     Inspection   Erythema: absent  Scars: absent  Swelling: present  Effusion: effusion  Deformity: deformity (varus)  Bruising: absent    Tenderness   The patient is tender to palpation of the medial joint line.    Crepitus   The patient has crepitus of the patella.    Range of Motion   Extension: 0   Flexion: 120     Tests   Ligament Examination Lachman: normal (-1 to 2mm)   MCL - Valgus: normal (0 to 2mm)  LCL - Varus: normal  Patella   Passive Patellar Tilt: neutral    Other   Sensation: normal    Left Knee Exam     Inspection   Erythema: absent  Scars: absent  Swelling: absent  Effusion: absent  Deformity: deformity  Bruising: absent    Tenderness   The patient is experiencing no tenderness.         Range of Motion   Extension: 0   Flexion: 130     Tests   Stability Lachman: normal (-1 to 2mm)   MCL - Valgus: normal (0 to 2mm)  LCL - Varus: normal (0 to 2mm)  Patella   Passive Patellar Tilt: neutral    Other   Sensation: normal    Muscle Strength   Right Lower Extremity   Hip Abduction: 5/5   Quadriceps:  5/5   Hamstrin/5   Left Lower Extremity   Hip Abduction: 5/5   Quadriceps:  5/5   Hamstrin/5     Reflexes     Left Side  Quadriceps:  2+    Right Side   Quadriceps:  2+    Vascular Exam     Right Pulses  Dorsalis Pedis:      2+          Left Pulses  Dorsalis Pedis:      2+          Edema  Right Lower Leg: absent  Left Lower Leg: absent              Assessment:       Encounter Diagnoses   Name Primary?    Primary osteoarthritis of right knee Yes    Acquired varus deformity knee, right           Plan:        Rajeev was seen today for right knee, pain and pain.    Diagnoses and all orders for this visit:    Primary osteoarthritis of right knee    Acquired varus deformity knee, right      Treatment options were discussed. The surgical process of right knee replacement was discussed in detail with the patient including a detailed discussion of the procedure itself (including visual model, x-ray review, and literature review). The typical perioperative and post-operative course was discussed and perioperative risks were discussed to the patient's satisfaction.  Risks and complications discussed included but were not limited to the risks of anesthetic complications, infection, bleeding, wound healing complications, stiffness, aseptic loosening, instability, limb length inequality, neurologic dysfunction including numbness and weakness, additional surgery,  DVT, pulmonary embolism, perioperative medical risks (cardiac, pulmonary, renal, neurologic), and death and the patient elects to proceed.  The patient should get medically cleared and attend the joint seminar. Rajeev CARLIN Reece VASQUEZ is aware that morbid obesity carries increased risks with joint replacement surgery.  These include problems with wound healing, alignment of the prosthesis, higher chance of infection and other medical complications, as well as the potential for early implant failure. He is also aware of the risk associated with diabetes His is well controlled  ASA for DVT prophylaxis.  Due to his deformity and body habitus will proceed with manual TKA. Discussed this with him.  Pre-hab ordered.  Out pt PT post op

## 2018-01-11 ENCOUNTER — TELEPHONE (OUTPATIENT)
Dept: ORTHOPEDICS | Facility: CLINIC | Age: 68
End: 2018-01-11

## 2018-01-11 ENCOUNTER — OFFICE VISIT (OUTPATIENT)
Dept: FAMILY MEDICINE | Facility: CLINIC | Age: 68
End: 2018-01-11
Payer: MEDICARE

## 2018-01-11 VITALS
SYSTOLIC BLOOD PRESSURE: 120 MMHG | WEIGHT: 285.06 LBS | HEIGHT: 71 IN | BODY MASS INDEX: 39.91 KG/M2 | OXYGEN SATURATION: 97 % | DIASTOLIC BLOOD PRESSURE: 84 MMHG | HEART RATE: 70 BPM | TEMPERATURE: 99 F

## 2018-01-11 DIAGNOSIS — M17.11 PRIMARY OSTEOARTHRITIS OF RIGHT KNEE: Primary | ICD-10-CM

## 2018-01-11 DIAGNOSIS — I10 ESSENTIAL HYPERTENSION: ICD-10-CM

## 2018-01-11 DIAGNOSIS — R09.81 NASAL CONGESTION: ICD-10-CM

## 2018-01-11 DIAGNOSIS — E66.01 MORBID OBESITY: Primary | ICD-10-CM

## 2018-01-11 DIAGNOSIS — E11.8 TYPE 2 DIABETES MELLITUS WITH COMPLICATION, WITHOUT LONG-TERM CURRENT USE OF INSULIN: ICD-10-CM

## 2018-01-11 DIAGNOSIS — R35.0 URINARY FREQUENCY: ICD-10-CM

## 2018-01-11 PROCEDURE — 99999 PR PBB SHADOW E&M-EST. PATIENT-LVL III: CPT | Mod: PBBFAC,,, | Performed by: FAMILY MEDICINE

## 2018-01-11 PROCEDURE — 99213 OFFICE O/P EST LOW 20 MIN: CPT | Mod: PBBFAC,PN | Performed by: FAMILY MEDICINE

## 2018-01-11 PROCEDURE — 99214 OFFICE O/P EST MOD 30 MIN: CPT | Mod: S$PBB,,, | Performed by: FAMILY MEDICINE

## 2018-01-11 RX ORDER — ASPIRIN 81 MG/1
81 TABLET ORAL DAILY
Status: ON HOLD | COMMUNITY
End: 2018-03-06 | Stop reason: HOSPADM

## 2018-01-11 NOTE — TELEPHONE ENCOUNTER
Spoke to pt, informed pt Dr. Rosas will not be in on 3/8/18 due to a professional conference. Pt chose new sx date of 3/6/18. Scheduled pre-op/Joint and post-op appointments and mailed slips. Understanding verbalized.

## 2018-01-11 NOTE — PROGRESS NOTES
Subjective:       Patient ID: Rajeev Newell III is a 67 y.o. male.    Chief Complaint: Follow-up and Results    HPI   Patient in the office for f/u and lab review.  No primary complaints.   Planning R-TK with Chimento, scheduled for 3/8. Is having more pain, and having concerns that it will affect gait, activities, hips. On mobic daily for pain.  Labs 12/2017 rev.  Recalls remote hx of apnea testing.     Review of Systems   Constitutional: Negative for activity change, fatigue and unexpected weight change.   HENT: Positive for congestion. Negative for hearing loss, rhinorrhea and trouble swallowing.    Eyes: Negative for discharge and visual disturbance.   Respiratory: Negative for apnea, cough, chest tightness and wheezing.    Cardiovascular: Negative for chest pain and palpitations.   Gastrointestinal: Negative for blood in stool, constipation, diarrhea and vomiting.   Endocrine: Negative for polydipsia and polyuria.   Genitourinary: Negative for difficulty urinating, hematuria and urgency.   Musculoskeletal: Positive for arthralgias (R knee pain). Negative for joint swelling and neck pain.   Neurological: Negative for weakness and headaches.   Psychiatric/Behavioral: Negative for confusion, dysphoric mood and sleep disturbance.       Objective:      Physical Exam   Constitutional: He is oriented to person, place, and time. He appears well-developed and well-nourished. No distress.   HENT:   Head: Normocephalic and atraumatic.   Mouth/Throat: No oropharyngeal exudate.   Eyes: Conjunctivae are normal. Right eye exhibits no discharge. Left eye exhibits no discharge. No scleral icterus.   Neck: Normal range of motion. Neck supple. No thyromegaly present.   Cardiovascular: Normal rate and regular rhythm.    Pulmonary/Chest: Effort normal and breath sounds normal. No respiratory distress.   Abdominal: There is no guarding.   obese   Neurological: He is alert and oriented to person, place, and time. No cranial nerve  deficit.   Skin: Skin is warm and dry.   Psychiatric: He has a normal mood and affect. His behavior is normal.   Nursing note and vitals reviewed.        Morbid obesity  Goal weight reviewed as well as need for lifestyle modifications to incl caloric restriction, high protein/low carb, increased water intake, muscle-building exercises as well as cardio.    Type 2 diabetes mellitus with complication, without long-term current use of insulin  Controlled, cont regimen. Routine labs q6mos. Add low-dose asa. Discussed preop precautions with re: to nsaids and asa.  Essential hypertension  Controlled, cont regimen.  Start low-dose asa.  Nasal congestion  Reviewed antihistamine, mucinex, possible flonase if ok with ophtho.    Labs ordered for 6mo f/u.

## 2018-01-12 ENCOUNTER — ANESTHESIA EVENT (OUTPATIENT)
Dept: SURGERY | Facility: HOSPITAL | Age: 68
DRG: 470 | End: 2018-01-12
Payer: MEDICARE

## 2018-01-12 DIAGNOSIS — M79.606 PAIN OF LOWER EXTREMITY, UNSPECIFIED LATERALITY: Primary | ICD-10-CM

## 2018-01-12 DIAGNOSIS — E11.8 TYPE 2 DIABETES MELLITUS WITH COMPLICATION, WITHOUT LONG-TERM CURRENT USE OF INSULIN: ICD-10-CM

## 2018-01-12 NOTE — ANESTHESIA PREPROCEDURE EVALUATION
Anesthesia Assessment: Preoperative EQUATION    Planned Procedure: Procedure(s) (LRB):  REPLACEMENT-KNEE-TOTAL (Right)  Requested Anesthesia Type:Regional  Surgeon: Edmund Rosas MD  Service: Orthopedics  Known or anticipated Date of Surgery:3/6/2018    Other important co-morbidities:  HTN, DM, BPH, glaucoma     Tests already available:  CBC and CMP            Plan:    Testing:  A1C, PT/INR and EKG   Pre-anesthesia  visit       Visit focus: possible regional anesthesia and/or nerve block      Consultation:Patient's PCP for a statement of optimization         Cortney Reyes RN 01/12/2018 01/12/2018  Rajeev Newell III is a 67 y.o., male.    Anesthesia Evaluation         Review of Systems  Anesthesia Hx:  No problems with previous Anesthesia Hx carpal tunnel and hernia sx   Social:  Non-Smoker, Alcohol Use 1-2 beers a week   Hematology/Oncology:     Oncology Normal    -- Anemia:   Cardiovascular:   Hypertension Singh, fish, gym. Can climb a flight of stairs. Denies chest pain or sob   Pulmonary:   Recent URI Patient had Tamiflu and doxycycline at home and had taken that 1-2 weeks ago. Patient reports symptoms are much better.  Lungs clear on exam.     Renal/:  Renal/ Normal     Hepatic/GI:   GERD, well controlled Denies Liver Disease.    Musculoskeletal:  Musculoskeletal General/Symptoms: Functional capacity is ambulatory without assistance.  Joint Disease:  Arthritis, Osteoarthritis, knee    Neurological:   Denies CVA. Denies Seizures.  Pain , onset is chronic , location of knee , quality of aching/dull , severity is a 2 , precipitating factors are standing and walking , alleviating factors are sitting. Osteoarthritis, knee    Endocrine:   Diabetes, well controlled, type 2    Dermatological:   Per office notes -penile irritation   Psych:  Psychiatric Normal           Physical  Exam  General:  Morbid Obesity    Airway/Jaw/Neck:  Airway Findings: Mouth Opening: Normal Tongue: Normal  General Airway Assessment: Adult  Mallampati: III  TM Distance: Normal, at least 6 cm  Jaw/Neck Findings:  Neck ROM: Normal ROM  Neck Findings:  Girth Increased, Short Neck      Dental:  Dental Findings: molar caps   Chest/Lungs:  Chest/Lungs Findings: Clear to auscultation, Normal Respiratory Rate     Heart/Vascular:  Heart Findings: Rate: Normal  Rhythm: Regular Rhythm  Sounds: Quiet        Mental Status:  Mental Status Findings:  Cooperative, Alert and Oriented         Labs reviewed    Discharge disposition: wife Isis [phone]    Medical evaluation with NP, Adrianna 2/16/18    Cortney Reyes RN 02/15/2018            Anesthesia Plan  Type of Anesthesia, risks & benefits discussed:  Anesthesia Type:  CSE, spinal, general  Patient's Preference: Neuraxial vs GA  Intra-op Monitoring Plan: standard ASA monitors  Intra-op Monitoring Plan Comments:   Post Op Pain Control Plan: multimodal analgesia, IV/PO Opiods PRN and peripheral nerve block  Post Op Pain Control Plan Comments:   Induction:   IV  Beta Blocker:  Patient is not currently on a Beta-Blocker (No further documentation required).       Informed Consent: Patient understands risks and agrees with Anesthesia plan.  Questions answered. Anesthesia consent signed with patient.  ASA Score: 3     Day of Surgery Review of History & Physical:    H&P update referred to the surgeon.     Anesthesia Plan Notes: Discussed Risks/Benefits of anesthetic plan  PMH was reviewed and PE was performed  Will plan for neuraxial technique and convert to GA if needed        Ready For Surgery From Anesthesia Perspective.

## 2018-01-12 NOTE — PRE ADMISSION SCREENING
Anesthesia Assessment: Preoperative EQUATION    Planned Procedure: Procedure(s) (LRB):  REPLACEMENT-KNEE-TOTAL (Right)  Requested Anesthesia Type:Regional  Surgeon: Edmund Rosas MD  Service: Orthopedics  Known or anticipated Date of Surgery:3/6/2018    Other important co-morbidities:  HTN, DM, BPH, glaucoma     Tests already available:  CBC and CMP            Plan:    Testing:  A1C, PT/INR and EKG   Pre-anesthesia  visit       Visit focus: possible regional anesthesia and/or nerve block      Consultation:Patient's PCP for a statement of optimization         Cortney Reyes RN 01/12/2018

## 2018-01-15 ENCOUNTER — TELEPHONE (OUTPATIENT)
Dept: FAMILY MEDICINE | Facility: CLINIC | Age: 68
End: 2018-01-15

## 2018-01-15 NOTE — TELEPHONE ENCOUNTER
----- Message from Guillermina Lopes LPN sent at 1/15/2018 11:46 AM CST -----      ----- Message -----  From: Cortney Reyes RN  Sent: 1/12/2018   1:40 PM  To: [facility] Staff    Patient is having sx 3/6 with Dr. Rosas under spinal anesthesia.  Patient was just seen by you.   Can you clear the patient from that visit? If not, please schedule patient as indicated.  We will add an EKG, Pt/INR and another A1c per anesthesia protocol.        Thanks,  Cortney Reyes RN  [facility]  [id number]

## 2018-01-16 ENCOUNTER — CLINICAL SUPPORT (OUTPATIENT)
Dept: REHABILITATION | Facility: HOSPITAL | Age: 68
End: 2018-01-16
Attending: ORTHOPAEDIC SURGERY
Payer: MEDICARE

## 2018-01-16 DIAGNOSIS — R26.2 DIFFICULTY WALKING: ICD-10-CM

## 2018-01-16 DIAGNOSIS — M25.561 CHRONIC PAIN OF RIGHT KNEE: ICD-10-CM

## 2018-01-16 DIAGNOSIS — G89.29 CHRONIC PAIN OF RIGHT KNEE: ICD-10-CM

## 2018-01-16 DIAGNOSIS — R53.1 WEAKNESS: ICD-10-CM

## 2018-01-16 DIAGNOSIS — Z78.9 DIFFICULTY NAVIGATING STAIRS: ICD-10-CM

## 2018-01-16 PROCEDURE — 97110 THERAPEUTIC EXERCISES: CPT | Mod: PO | Performed by: PHYSICAL THERAPIST

## 2018-01-16 PROCEDURE — G8979 MOBILITY GOAL STATUS: HCPCS | Mod: CK,PO | Performed by: PHYSICAL THERAPIST

## 2018-01-16 PROCEDURE — 97161 PT EVAL LOW COMPLEX 20 MIN: CPT | Mod: PO | Performed by: PHYSICAL THERAPIST

## 2018-01-16 PROCEDURE — G8978 MOBILITY CURRENT STATUS: HCPCS | Mod: CK,PO | Performed by: PHYSICAL THERAPIST

## 2018-01-16 NOTE — PATIENT INSTRUCTIONS
"Danuta Flynn, PT, DPT    [facility] Information:  [phone]  [address] [facility], [place] [address]  [email or web address]  Or via [email or web address]        Home Exercise Program: 1/16/18    SLR flex 2x10 bilat  SLR abd 2x10 bilat  Clams 2x10 bilat  Tandem stance 3x30" bilat  "

## 2018-01-16 NOTE — PLAN OF CARE
Patient: Rajeev Newell [facility] #:  [id number]    Date of treatment: 01/16/2018   Time in: 10:00  Time out: 10:50  Billable time: 50 min  # Visits: 1/4  Auth: ([email or web address]) 12/31/18  POC expiration: 2/16/18    Outpatient Physical Therapy   Initial Evaluation    Rajeev is a 67 y.o. male evaluated on 01/16/2018    Physician:  Edmund Rosas MD   Diagnosis:   Encounter Diagnoses   Name Primary?    Weakness     Difficulty walking     Difficulty navigating stairs     Chronic pain of right knee         Treatment ordered: Physical Therapy     History     Medical History:   Past Medical History:   Diagnosis Date    Cataract     OU--early    DM (diabetes mellitus) 10/29/2012    Glaucoma     POAG-OU    Hypertension     Sinusitis         Surgical History:   Past Surgical History:   Procedure Laterality Date    CARPAL TUNNEL RELEASE      CIRCUMCISION      CYSTOSCOPY      HERNIA REPAIR Left 1994    L inguinal - [facility]        Medications:   Current Outpatient Prescriptions   Medication Sig    albuterol 90 mcg/actuation inhaler Inhale 2 puffs into the lungs every 4 (four) hours as needed for Wheezing.    aspirin (ECOTRIN) 81 MG EC tablet Take 81 mg by mouth once daily.    atorvastatin (LIPITOR) 40 MG tablet TK 1 T PO  QPM    brinzolamide (AZOPT) 1 % ophthalmic suspension Place 1 drop into both eyes 2 (two) times daily.    fexofenadine (ALLEGRA) 180 MG tablet Every day PRN    hydrocortisone (ANUSOL-HC) 25 mg suppository Place 1 suppository (25 mg total) rectally 2 (two) times daily as needed for Hemorrhoids. 1 Suppository(ies) Rectal PRN Twice a day.    latanoprost 0.005 % ophthalmic solution Place 1 drop into both eyes nightly.    meloxicam (MOBIC) 15 MG tablet TK 1 T PO  DAILY WF    mupirocin (BACTROBAN) 2 % ointment JAMI AA BID    pantoprazole (PROTONIX) 40 MG tablet Every day    pioglitazone (ACTOS) 15 MG tablet Take 1 tablet (15 mg total) by mouth once daily.    valsartan (DIOVAN) 320 MG tablet TAKE 1  TABLET BY MOUTH QD     No current facility-administered medications for this visit.        Allergies:   Review of patient's allergies indicates:   Allergen Reactions    Bactrim [sulfamethoxazole-trimethoprim] Other (See Comments)     Abdominal Pain, Flush, Fever and Chills, Headache and Cough after Pt. took Bactrim    Darvon [propoxyphene]      Hallucinations        Precautions: universal     Subjective     BRADLEY: Pt with long hx of R knee pain and dysfunction. He is very active - hunting, fishing. Pt feels weak and unstable.     X-ray and MRI was taken and revealed There is moderate to severe tricompartment osteoarthrosis of each knee with severe loss of joint space in the tibiofemoral compartments resulting in bilateral varus angulation. I detect no fracture, dislocation, radiopaque retained foreign body, lytic or blastic lesion, erosion or chondrocalcinosis..     Previous treatment included medical management. No PT this calendar year.      Pain  Current: 0/10 with medication  Worst: 3/10  Best: 0/10  Increases with walking, standing  Decreases with sitting    Pt has a decrease ability to perform ADLs/iADLs such as self care, recreational activities, work duties, home chores.     Occupation: Pt works as a  and job related duties include prolonged sitting, standing, stairs. Pt has to navigate 1 flight of stairs to access office.     Home Environment: Pt lives in a 1 story home with 1 steps to enter and has none AD/medical equipment.     PLOF: Pt was independent with all ADLs and iADLs without pain, ambulating without pain or deviation, driving independently.    Patient Goals: prepare for Sx    Objective     Posture: flexed    SFMA Screen  Squat: DNA  SLS: DNA  Ambulation: glute med gait R LE, increased varus angle R>L, dec heel strike, near equal step length bilat     Strength and Range of Motion (degrees)   Right LE Left LE   Hip flexion 4+/5           4/5             Hip extension DNA DNA   Gluteus  medius 3/5 4-/5   Hip abduction 3+/5           4-/5             Hip adduction 4/5           4/5             Knee flexion 4+/5          114 5/5          118   Knee extension 4+/5          -13 5/5          -5   Extensor digitorum 5/5           5/5            Peroneals 5/5          5/5             Tibialis anterior 4+/5           5/5             Tibialis posterior 5/5          5/5            Great toe extension 5/5         5/5                Reflexes  Patellar: 1+ bilat  Achilles: 1+ bilat  Babinski: DNA    Special Tests  - Varus/Valgus: increased laxity both bilat    Flexibility  - Hip flex: restricted bilat, pt unable to receive true extension   - Hamstring: WFL      Joint Mobility: firm vs hard end feel in ext and flex bilat knee        Functional Limitations Reports - LEFS  Score: 44/80  Current: 45%  Goal: 44/80   <45%  Category: Mobility     G-Code Modifiers  CH  0% Impaired, limited, or restricted    CI  19% - 1%  Impaired, limited, or restricted    CJ  20% - 39% Impaired, limited, or restricted    CK  40% - 59% Impaired, limited, or restricted    CL  60% - 79% Impaired, limited, or restricted    CM  80% - 99% Impaired, limited, or restricted    CN  100% Impaired, limited, or restricted            Education: Instructed on general anatomy/physiology; discussed plan of care with patient; instructed in purpose of physical therapy and the benefits/risks of treatment; instructed in risks of refusing treatment. Pt agreed to treatment plan. Pt demonstrated and verbalized understanding of all instruction and was provided with a handout of HEP (see Patient Instructions).    Assessment     This is a 67 y.o. male referred to outpatient physical therapy and presents with a medical diagnosis of primary OA and acquired varus angle R knee and demonstrates limitations as described in the problem list. Pt presented today with weakness of hip abd/ERs and difficulty walking. Pt will benefit from physcial therapy services in order  to maximize pain free functional independence. Pt's spiritual, cultural and educational needs considered, and pt agreeable to plan of care and goals as stated below.      Prognosis: good    Educational/Spiritual/Cultural needs: none  Barriers to Learning: none  Environmental Barriers: none noted  Abuse/Neglect: no signs  Nutritional Status: obese   Fall Risk: present    Medical necessity is demonstrated by the following IMPAIRMENTS/PROBLEMS     History  Co-morbidities and personal factors that may impact the plan of care Examination  Body Structures and Functions, activity limitations and participation restrictions that may impact the   plan of care   Clinical Presentation Decision Making/ Complexity Score   Co-morbidities:   glaucoma, DM, abdominal Sx, high BMI,    Personal Factors:   none Body Regions/Systems/Functions:              1) Pain limiting function   2) Decreased ROM   3) Weakness   4) Improper joint mobility   5) Decreased motor control/coordination   6) Decreased balance   7) Difficulty navigating stairs   8) Difficulty ambulating    Activity Limitations:  Hunting, fishing, accessing work building    Participation Restrictions:  Work duties, recreational activties   stable   low       GOALS     Short Term Goals:  4 weeks  1. Pt to demonstrate independence with HEP in preparation for R TKA.  2. Pt to verbalize understanding of post-op PT expectations and outcomes with no surgery complications.     Plan     Frequency: 1 time(s) every 1-2 weeks  Duration: 4 weeks    Physical therapy may include patient education, HEP, therapeutic exercises, neuromuscular re-education, therapeutic activity, gait training, manual therapy, and modalities PRN to achieve established goals.     Therapist: Miri Flynn, PT  1/16/2018

## 2018-01-16 NOTE — PROGRESS NOTES
"  Physical Therapy Treatment Note   Name: Rajeev Newell [facility]  Clinic Number: [id number]    Date of start of care: 1/16/18  Date of treatment: 01/16/2018   Time in: 10:00  Time out: 10:50  Billable time: 50 min  # Visits: 1/4  Auth: (20) 12/31/18  POC expiration: 2/16/18    Diagnosis:   Encounter Diagnoses   Name Primary?    Weakness     Difficulty walking     Difficulty navigating stairs     Chronic pain of right knee      Physician: Edmund Rosas MD  Treatment Orders: PT Eval and Treat      Precautions: universal    Subjective     See POC    Pain: Current: 0/10 R knee    Objective     TREATMENT  Therapeutic Exercise x10 min  SLR flex x10 bilat  SLR abd x10 R only  Clams x10 R only    Neuromuscular Re-education x2 min  Tandem stance 1x30" bilat        Education: Discussed progression of plan of care with patient; educated pt in activity modification.     Written Home Exercises Provided: Patient educated to continue with previously issued HEP. Exercises were reviewed and Rajeev was able to demonstrate them prior to the end of the session. Pt was provided with a handout of HEP (see Patient Instructions). Rajeev demonstrated good  understanding of the education provided.     Assessment     Pt tolerated treatment well with no visible adverse affects. See POC.    Rajeev is progressing well towards his goals. Will benefit from con't skilled care.    Anticipated barriers to physical therapy: none  Pt's spiritual, cultural and educational needs considered and pt agreeable to plan of care and goals.    Plan     Continue with established POC, working toward PT goals.       Miri Flynn, PT                "

## 2018-01-24 NOTE — TELEPHONE ENCOUNTER
----- Message from Cortney Reyes RN sent at 1/12/2018  1:40 PM CST -----  Patient is having sx 3/6 with Dr. Rosas under spinal anesthesia.  Patient was just seen by you.   Can you clear the patient from that visit? If not, please schedule patient as indicated.  We will add an EKG, Pt/INR and another A1c per anesthesia protocol.        Thanks,  Cortney Reyes RN  [place] [phone]

## 2018-01-29 ENCOUNTER — TELEPHONE (OUTPATIENT)
Dept: REHABILITATION | Facility: HOSPITAL | Age: 68
End: 2018-01-29

## 2018-02-02 ENCOUNTER — OFFICE VISIT (OUTPATIENT)
Dept: FAMILY MEDICINE | Facility: CLINIC | Age: 68
End: 2018-02-02
Payer: MEDICARE

## 2018-02-02 VITALS
HEART RATE: 82 BPM | HEIGHT: 71 IN | BODY MASS INDEX: 39.94 KG/M2 | TEMPERATURE: 98 F | DIASTOLIC BLOOD PRESSURE: 72 MMHG | WEIGHT: 285.25 LBS | SYSTOLIC BLOOD PRESSURE: 124 MMHG

## 2018-02-02 DIAGNOSIS — J06.9 VIRAL URI WITH COUGH: Primary | ICD-10-CM

## 2018-02-02 PROCEDURE — 99213 OFFICE O/P EST LOW 20 MIN: CPT | Mod: PBBFAC,PN | Performed by: NURSE PRACTITIONER

## 2018-02-02 PROCEDURE — 99213 OFFICE O/P EST LOW 20 MIN: CPT | Mod: S$PBB,,, | Performed by: NURSE PRACTITIONER

## 2018-02-02 PROCEDURE — 1126F AMNT PAIN NOTED NONE PRSNT: CPT | Mod: ,,, | Performed by: NURSE PRACTITIONER

## 2018-02-02 PROCEDURE — 1159F MED LIST DOCD IN RCRD: CPT | Mod: ,,, | Performed by: NURSE PRACTITIONER

## 2018-02-02 PROCEDURE — 99999 PR PBB SHADOW E&M-EST. PATIENT-LVL III: CPT | Mod: PBBFAC,,, | Performed by: NURSE PRACTITIONER

## 2018-02-02 NOTE — PROGRESS NOTES
Subjective:       Patient ID: Rajeev Newell III is a 67 y.o. male.    Chief Complaint: Cough (started about two days ago); Nasal Congestion; and Sore Throat    HPI     Pt presents to clinic with complaints of a cough, nasal congestion, sore throat x 2 days.   Endorses night sweats last night, but is unsure of fever.   He has been taking a prior prescription of Tamiflu and feels as though his sx have started to improve.   Denies hx of asthma, smoking, COPD.     Review of Systems   Constitutional: Positive for diaphoresis. Negative for chills and fever.   HENT: Positive for congestion, postnasal drip, rhinorrhea, sinus pain, sinus pressure and sneezing.    Respiratory: Positive for cough (occasionally productive with clear sputum.). Negative for shortness of breath and wheezing.    Gastrointestinal: Negative for diarrhea, nausea and vomiting.   Musculoskeletal: Positive for arthralgias and myalgias.   Neurological: Positive for headaches. Negative for dizziness and light-headedness.       Objective:      Physical Exam   Constitutional: He is oriented to person, place, and time. He appears well-developed and well-nourished.   HENT:   Head: Normocephalic and atraumatic.   Right Ear: Tympanic membrane is not erythematous. A middle ear effusion is present.   Left Ear: Tympanic membrane is not erythematous. A middle ear effusion is present.   Nose: Mucosal edema and rhinorrhea present. Right sinus exhibits no maxillary sinus tenderness and no frontal sinus tenderness. Left sinus exhibits no maxillary sinus tenderness and no frontal sinus tenderness.   Mouth/Throat: Uvula is midline and mucous membranes are normal. No oropharyngeal exudate or posterior oropharyngeal erythema.   Eyes: Pupils are equal, round, and reactive to light. Right eye exhibits no discharge. Left eye exhibits no discharge.   Neck: Normal range of motion. Neck supple.   Cardiovascular: Normal rate, regular rhythm and normal heart sounds.     Pulmonary/Chest: Effort normal and breath sounds normal. No respiratory distress. He has no wheezes. He has no rales.   Musculoskeletal: Normal range of motion. He exhibits no edema.   Neurological: He is alert and oriented to person, place, and time. No cranial nerve deficit. Coordination normal.   Skin: Skin is warm and dry. No rash noted. No erythema.   Psychiatric: He has a normal mood and affect. His behavior is normal.   Nursing note and vitals reviewed.      Assessment:       1. Viral URI with cough        Plan:   Rajeev was seen today for cough, nasal congestion and sore throat.    Diagnoses and all orders for this visit:    Viral URI with cough  Suspicious for influenza - continue Tamiflu.   Side effects and precautions of medication use reviewed with patient, expressed understanding. No questions or concerns.  Expectant management reviewed: increase fluid intake, otc analgesics prn, mucinex and antihistamines for sx relief. Call if sx persist or worsen.

## 2018-02-05 ENCOUNTER — PATIENT MESSAGE (OUTPATIENT)
Dept: FAMILY MEDICINE | Facility: CLINIC | Age: 68
End: 2018-02-05

## 2018-02-05 ENCOUNTER — TELEPHONE (OUTPATIENT)
Dept: ORTHOPEDICS | Facility: CLINIC | Age: 68
End: 2018-02-05

## 2018-02-05 NOTE — TELEPHONE ENCOUNTER
----- Message from Belkis Martins MA sent at 2/5/2018 10:23 AM CST -----  Contact: pt       ----- Message -----  From: Melissa Alejandro  Sent: 2/5/2018  10:11 AM  To: Alison PETERSON Staff    Pt would like to be called back regarding rescheduling Pre Admit appt with Cortney Reyes RN    Pt can be reached at  [phone]

## 2018-02-05 NOTE — TELEPHONE ENCOUNTER
Spoke to pt, provided pre-op center number and instructed the pt to call and speak with Cortney to reschedule the appointment. Pt verbalized understanding.

## 2018-02-06 ENCOUNTER — PATIENT MESSAGE (OUTPATIENT)
Dept: ORTHOPEDICS | Facility: CLINIC | Age: 68
End: 2018-02-06

## 2018-02-06 ENCOUNTER — HOSPITAL ENCOUNTER (OUTPATIENT)
Dept: PREADMISSION TESTING | Facility: HOSPITAL | Age: 68
Discharge: HOME | End: 2018-02-06

## 2018-02-06 DIAGNOSIS — J40 BRONCHITIS: ICD-10-CM

## 2018-02-06 DIAGNOSIS — J01.00 ACUTE MAXILLARY SINUSITIS, RECURRENCE NOT SPECIFIED: ICD-10-CM

## 2018-02-06 RX ORDER — PROMETHAZINE HYDROCHLORIDE AND DEXTROMETHORPHAN HYDROBROMIDE 6.25; 15 MG/5ML; MG/5ML
5 SYRUP ORAL EVERY 6 HOURS PRN
Qty: 118 ML | Refills: 0 | Status: SHIPPED | OUTPATIENT
Start: 2018-02-06 | End: 2018-02-16

## 2018-02-06 RX ORDER — ALBUTEROL SULFATE 90 UG/1
2 AEROSOL, METERED RESPIRATORY (INHALATION) EVERY 4 HOURS PRN
Qty: 1 INHALER | Refills: 5 | Status: SHIPPED | OUTPATIENT
Start: 2018-02-06 | End: 2021-03-11

## 2018-02-06 NOTE — TELEPHONE ENCOUNTER
Will send cough medication.   Hopefully sx will improve, but he should not undergo an elective procedure with an active infection.   Is he is still having fever or his cough is worsening, I recommend a f/u appt.

## 2018-02-07 ENCOUNTER — PATIENT MESSAGE (OUTPATIENT)
Dept: REHABILITATION | Facility: HOSPITAL | Age: 68
End: 2018-02-07

## 2018-02-07 ENCOUNTER — TELEPHONE (OUTPATIENT)
Dept: ORTHOPEDICS | Facility: CLINIC | Age: 68
End: 2018-02-07

## 2018-02-07 NOTE — TELEPHONE ENCOUNTER
Spoke to pt, pt states he would like to have surgery, he realized he will be recovered from the flu. Informed pt he may have original date back 3/6/18. Rescheduled appointments. Pt pleased and verbalized understanding.

## 2018-02-08 ENCOUNTER — HOSPITAL ENCOUNTER (OUTPATIENT)
Dept: RADIOLOGY | Facility: HOSPITAL | Age: 68
Discharge: HOME | End: 2018-02-08
Attending: NURSE PRACTITIONER
Payer: MEDICARE

## 2018-02-08 ENCOUNTER — OFFICE VISIT (OUTPATIENT)
Dept: FAMILY MEDICINE | Facility: CLINIC | Age: 68
End: 2018-02-08
Payer: MEDICARE

## 2018-02-08 ENCOUNTER — CLINICAL SUPPORT (OUTPATIENT)
Dept: REHABILITATION | Facility: HOSPITAL | Age: 68
DRG: 470 | End: 2018-02-08
Attending: FAMILY MEDICINE
Payer: MEDICARE

## 2018-02-08 ENCOUNTER — OFFICE VISIT (OUTPATIENT)
Dept: OPTOMETRY | Facility: CLINIC | Age: 68
End: 2018-02-08
Payer: MEDICARE

## 2018-02-08 VITALS
SYSTOLIC BLOOD PRESSURE: 120 MMHG | BODY MASS INDEX: 38.98 KG/M2 | HEIGHT: 71 IN | WEIGHT: 278.44 LBS | DIASTOLIC BLOOD PRESSURE: 76 MMHG | OXYGEN SATURATION: 98 % | HEART RATE: 73 BPM | TEMPERATURE: 98 F

## 2018-02-08 DIAGNOSIS — R26.2 DIFFICULTY WALKING: ICD-10-CM

## 2018-02-08 DIAGNOSIS — H40.1132 PRIMARY OPEN ANGLE GLAUCOMA OF BOTH EYES, MODERATE STAGE: ICD-10-CM

## 2018-02-08 DIAGNOSIS — Z01.818 PRE-OP EVALUATION: Primary | ICD-10-CM

## 2018-02-08 DIAGNOSIS — M17.11 PRIMARY OSTEOARTHRITIS OF RIGHT KNEE: ICD-10-CM

## 2018-02-08 DIAGNOSIS — Z78.9 DIFFICULTY NAVIGATING STAIRS: ICD-10-CM

## 2018-02-08 DIAGNOSIS — R53.1 WEAKNESS: ICD-10-CM

## 2018-02-08 DIAGNOSIS — G89.29 CHRONIC PAIN OF RIGHT KNEE: ICD-10-CM

## 2018-02-08 DIAGNOSIS — Z01.818 PRE-OP EVALUATION: ICD-10-CM

## 2018-02-08 DIAGNOSIS — E11.9 DIABETES MELLITUS WITHOUT COMPLICATION: ICD-10-CM

## 2018-02-08 DIAGNOSIS — B35.6 TINEA CRURIS: ICD-10-CM

## 2018-02-08 DIAGNOSIS — I10 BENIGN ESSENTIAL HTN: ICD-10-CM

## 2018-02-08 DIAGNOSIS — M25.561 CHRONIC PAIN OF RIGHT KNEE: ICD-10-CM

## 2018-02-08 PROCEDURE — 99999 PR PBB SHADOW E&M-EST. PATIENT-LVL IV: CPT | Mod: PBBFAC,,, | Performed by: NURSE PRACTITIONER

## 2018-02-08 PROCEDURE — 71046 X-RAY EXAM CHEST 2 VIEWS: CPT | Mod: TC,FY,PO

## 2018-02-08 PROCEDURE — 99214 OFFICE O/P EST MOD 30 MIN: CPT | Mod: S$PBB,,, | Performed by: NURSE PRACTITIONER

## 2018-02-08 PROCEDURE — 1159F MED LIST DOCD IN RCRD: CPT | Mod: ,,, | Performed by: NURSE PRACTITIONER

## 2018-02-08 PROCEDURE — G8979 MOBILITY GOAL STATUS: HCPCS | Mod: CK | Performed by: PHYSICAL THERAPIST

## 2018-02-08 PROCEDURE — 99212 OFFICE O/P EST SF 10 MIN: CPT | Mod: PBBFAC,PO | Performed by: OPTOMETRIST

## 2018-02-08 PROCEDURE — 97110 THERAPEUTIC EXERCISES: CPT | Performed by: PHYSICAL THERAPIST

## 2018-02-08 PROCEDURE — 71046 X-RAY EXAM CHEST 2 VIEWS: CPT | Mod: 26,,, | Performed by: RADIOLOGY

## 2018-02-08 PROCEDURE — G8978 MOBILITY CURRENT STATUS: HCPCS | Mod: CK | Performed by: PHYSICAL THERAPIST

## 2018-02-08 PROCEDURE — 92020 GONIOSCOPY: CPT | Mod: PBBFAC,PO | Performed by: OPTOMETRIST

## 2018-02-08 PROCEDURE — 92012 INTRM OPH EXAM EST PATIENT: CPT | Mod: S$PBB,,, | Performed by: OPTOMETRIST

## 2018-02-08 PROCEDURE — 1126F AMNT PAIN NOTED NONE PRSNT: CPT | Mod: ,,, | Performed by: NURSE PRACTITIONER

## 2018-02-08 PROCEDURE — 92020 GONIOSCOPY: CPT | Mod: S$PBB,,, | Performed by: OPTOMETRIST

## 2018-02-08 PROCEDURE — 99999 PR PBB SHADOW E&M-EST. PATIENT-LVL II: CPT | Mod: PBBFAC,,, | Performed by: OPTOMETRIST

## 2018-02-08 PROCEDURE — 99214 OFFICE O/P EST MOD 30 MIN: CPT | Mod: PBBFAC,25,27,PN | Performed by: NURSE PRACTITIONER

## 2018-02-08 RX ORDER — DOXYCYCLINE 100 MG/1
100 CAPSULE ORAL 2 TIMES DAILY
COMMUNITY
End: 2018-02-15

## 2018-02-08 RX ORDER — LATANOPROST 50 UG/ML
1 SOLUTION/ DROPS OPHTHALMIC NIGHTLY
Qty: 7.5 ML | Refills: 4 | Status: SHIPPED | OUTPATIENT
Start: 2018-02-08 | End: 2018-06-22 | Stop reason: SDUPTHER

## 2018-02-08 RX ORDER — CLOTRIMAZOLE 1 %
CREAM (GRAM) TOPICAL 2 TIMES DAILY
Qty: 12 G | Refills: 0 | Status: SHIPPED | OUTPATIENT
Start: 2018-02-08 | End: 2023-03-02

## 2018-02-08 NOTE — PROGRESS NOTES
"  Physical Therapy Treatment Note   Name: Rajeev Newell [facility]  Clinic Number: [id number]    Date of start of care: 1/16/18 (PN 2/8/18)  Date of treatment: 02/08/2018   Time in: 3:00  Time out: 3:40  Billable time: 35 min  # Visits: 2/4  Auth: (20) 12/31/18  POC expiration: 3/8/18    Diagnosis:   Encounter Diagnoses   Name Primary?    Weakness     Difficulty walking     Difficulty navigating stairs     Chronic pain of right knee      Physician: Edmund Rosas MD  Treatment Orders: PT Eval and Treat      Precautions: universal  Sx scheduled 3/6/18 R TKA    Subjective     Pt reports he was sick for a few weeks with the flu and wasn't able to do his exercises much. Because of the illness, he originally cancelled his Sx, which is why he hasn't been to PT. Then he had a flare up of his R knee OA pain, that has caused him to walk with a single point cane the past couple weeks. He has rescheduled his Sx and is eagerly awaiting it. He still anticipates the L TKA quickly after completing the R TKA rehab. No questions regarding HEP.     Pain: Current: 4/10 R knee    Objective     TREATMENT  Therapeutic Exercise x35 min  SLR flex 3x10 bilat (no rest during set)  SLR abd 2x10 bilat - tactile cues for technique  Clams YTB 2x10 bilat  Ball roll ins 3x10  Mini bridge 3x10  Donkey kick leaning on table 2x10 R only  Tandem stance 3x45" R in front only  Supine hip flex stretch 1x45" bilat      Education: Discussed progression of plan of care with patient; educated pt in activity modification.     Written Home Exercises Provided: Patient educated to continue with previously issued HEP. Exercises were reviewed and Rajeev was able to demonstrate them prior to the end of the session. Pt was provided with a handout of HEP (see Patient Instructions). Rajeev demonstrated good  understanding of the education provided. Pt provided with YTB for compliance with HEP.     Assessment     Pt tolerated treatment well with no visible adverse " affects. Pt demonstrated understanding of entire HEP. Pt is eager to have Sx. No questions or issues in preparation for Sx. Instructed pt to complete full HEP daily in anticipation for Sx. Pt instructed to d/c HEP post-op as he will return to PT for an updated HEP. Will leave pt's POC available for 1 more month in case pt has further questions or issues regarding HEP. Pt verbalized understanding that he is to call to schedule, should he need an appointment with PT.     Rajeev is progressing well towards his goals. Will benefit from con't skilled care.    Anticipated barriers to physical therapy: none  Pt's spiritual, cultural and educational needs considered and pt agreeable to plan of care and goals.     Plan     Continue with established POC, working toward PT goals.       Miri Flynn, PT

## 2018-02-08 NOTE — PROGRESS NOTES
Subjective:       Patient ID: Rajeev Newell III is a 67 y.o. male.    Chief Complaint: Pre-op Exam (knee replacement 3/6-forrest) and penis irritation    HPI     Pt presents to clinic for a pre-op eval. He is scheduled for a knee replacement with Dr. Rosas on 03/06/2018.  Hx of htn - maintained on Diovan 320 mg daily. B/p is well controlled on current regimen. HLD - maintained on Lipitor. Denies complaints of chest pain, SOB, palpitations, hx of heart disease, CVA.   DM - maintained on Actos - most recent A1C (01/2018) was 6.2. Denies any episodes of hypoglycemia.   He does not have a hx of COPD, asthma, smoking, liver disease, kidney disease.   He does not have a personal or family hx of adverse effects to anesthesia.   He routinely goes up 2 flights of stairs for work without complaints. His knee pain has limited his abilities to participate in routine exercise.   EKG (01/2018) was normal.   He was recently treated for influenza last week. He has since started an old Rx of oral doxycyline; he reports significant improvement in sx.     He, also, complains of irritation to the distal portion of his penis. He endorses erythema, tenderness. Denies urinary sx. Sx have improved with otc hydrocortisone.     Review of Systems   Constitutional: Negative for activity change, chills, fever and unexpected weight change.   HENT: Negative for hearing loss, rhinorrhea and trouble swallowing.    Eyes: Negative for discharge and visual disturbance.   Respiratory: Positive for cough (occasional - greatly improved since LOV). Negative for chest tightness, shortness of breath and wheezing.    Cardiovascular: Negative for chest pain, palpitations and leg swelling.   Gastrointestinal: Negative for blood in stool, constipation, diarrhea and vomiting.   Endocrine: Negative for polydipsia and polyuria.   Genitourinary: Positive for penile pain. Negative for difficulty urinating, discharge, dysuria, hematuria, penile swelling,  scrotal swelling, testicular pain and urgency.   Musculoskeletal: Positive for arthralgias. Negative for joint swelling, neck pain and neck stiffness.   Skin: Negative for rash and wound.   Neurological: Negative for dizziness, seizures, syncope, weakness, light-headedness and headaches.   Psychiatric/Behavioral: Negative for confusion, dysphoric mood and sleep disturbance. The patient is not nervous/anxious.        Objective:      Physical Exam   Constitutional: He is oriented to person, place, and time. He appears well-developed and well-nourished.   HENT:   Head: Normocephalic and atraumatic.   Right Ear: External ear normal.   Left Ear: External ear normal.   Nose: Nose normal.   Mouth/Throat: Oropharynx is clear and moist. No oropharyngeal exudate.   Eyes: Conjunctivae and EOM are normal. Pupils are equal, round, and reactive to light. Right eye exhibits no discharge. Left eye exhibits no discharge.   Neck: Normal range of motion. Neck supple. No thyromegaly present.   Cardiovascular: Normal rate, regular rhythm, normal heart sounds and intact distal pulses.    No murmur heard.  Pulmonary/Chest: Effort normal and breath sounds normal. No respiratory distress. He has no wheezes. He has no rales.   Genitourinary:         Musculoskeletal: Normal range of motion. He exhibits no edema, tenderness or deformity.   Lymphadenopathy:     He has no cervical adenopathy.   Neurological: He is alert and oriented to person, place, and time. No cranial nerve deficit.   Skin: Skin is warm and dry. Capillary refill takes less than 2 seconds.   Psychiatric: He has a normal mood and affect. His behavior is normal.   Nursing note and vitals reviewed.      Assessment:       1. Pre-op evaluation    2. Primary osteoarthritis of right knee    3. Tinea cruris    4. Benign essential HTN    5. Diabetes mellitus without complication        Plan:   Rajeev was seen today for pre-op exam and penis irritation.    Diagnoses and all orders for  this visit:    Pre-op evaluation  Primary osteoarthritis of right knee  Patient is considered low risk by [facility]CR[facility].   Patient is optimized for surgery.     Tinea cruris  -     clotrimazole (LOTRIMIN) 1 % cream; Apply topically 2 (two) times daily.  Side effects and precautions of medication use reviewed with patient, expressed understanding. No questions or concerns.    Benign essential HTN  Stable. Continue current regimen. Routine f/u.     Diabetes mellitus without complication  Stable. Continue current regimen. Routine f/u.

## 2018-02-08 NOTE — PATIENT INSTRUCTIONS
STANDING DONKEY KICK      Stand, leaning over something so that your chest is resting on a surface and you are bent at your hips (ie. a bed, a table, a counter top). Keep your hips flat on the resting surface. Slowly kick foot toward ceiling keeping knee bent.     Do not arch your back or straighten your knee. Don't let your hips roll up as you kick. You should feel this more in your buttocks, not in the back of your legs.     Repeat 10 times, 3 sets, once per day      TANDEM STANCE     front of a chair, table or counter top for support. Then place your feet so they are heel-to-toe (one foot in front of the other). Maintain your balance in this position while trying not to hold on.   Switch your feet so the other foot is in front.    Hold for 45 seconds. Repeat 3 times with each foot in front (6 total), 1 set, 1 time per day.        CLAM + BAND    Place the band around your thighs just above the knees. Lay on your side with your knees bent, feet stacked on top of each other (do not prop self up as pictured). Rotate the top knee up while keeping your feet together.    Do not let your pelvis roll back during the lifting movement. Hips stay still!    Repeat 10 times, 3 sets, once per day      SUPINE HIP FLEXOR STRETCH        Lie on your back and let one leg hang off the side of a table/bed. You should feel a stretch in the front of your hip/thigh. (Don't pull your foot back as pictured).    Hold 45 seconds, Repeat 2 times, once per day

## 2018-02-08 NOTE — Clinical Note
Schedule full exam / fields/ oct on a Friday early am---late June or early July, mail appt slip to pt

## 2018-02-12 ENCOUNTER — LAB VISIT (OUTPATIENT)
Dept: LAB | Facility: HOSPITAL | Age: 68
End: 2018-02-12
Attending: PHYSICIAN ASSISTANT
Payer: MEDICARE

## 2018-02-12 ENCOUNTER — OFFICE VISIT (OUTPATIENT)
Dept: FAMILY MEDICINE | Facility: CLINIC | Age: 68
End: 2018-02-12
Payer: MEDICARE

## 2018-02-12 VITALS
SYSTOLIC BLOOD PRESSURE: 122 MMHG | TEMPERATURE: 98 F | RESPIRATION RATE: 18 BRPM | HEIGHT: 71 IN | DIASTOLIC BLOOD PRESSURE: 76 MMHG | WEIGHT: 283.31 LBS | HEART RATE: 78 BPM | OXYGEN SATURATION: 96 % | BODY MASS INDEX: 39.66 KG/M2

## 2018-02-12 DIAGNOSIS — R21 PENILE RASH: ICD-10-CM

## 2018-02-12 DIAGNOSIS — Z20.2 STD EXPOSURE: ICD-10-CM

## 2018-02-12 DIAGNOSIS — R21 PENILE RASH: Primary | ICD-10-CM

## 2018-02-12 LAB
C TRACH DNA SPEC QL NAA+PROBE: NOT DETECTED
N GONORRHOEA DNA SPEC QL NAA+PROBE: NOT DETECTED

## 2018-02-12 PROCEDURE — 99214 OFFICE O/P EST MOD 30 MIN: CPT | Mod: PBBFAC,PO | Performed by: PHYSICIAN ASSISTANT

## 2018-02-12 PROCEDURE — 99999 PR PBB SHADOW E&M-EST. PATIENT-LVL IV: CPT | Mod: PBBFAC,,, | Performed by: PHYSICIAN ASSISTANT

## 2018-02-12 PROCEDURE — 36415 COLL VENOUS BLD VENIPUNCTURE: CPT | Mod: PO

## 2018-02-12 PROCEDURE — 87491 CHLMYD TRACH DNA AMP PROBE: CPT

## 2018-02-12 PROCEDURE — 99213 OFFICE O/P EST LOW 20 MIN: CPT | Mod: S$PBB,,, | Performed by: PHYSICIAN ASSISTANT

## 2018-02-12 PROCEDURE — 86592 SYPHILIS TEST NON-TREP QUAL: CPT

## 2018-02-12 PROCEDURE — 87252 VIRUS INOCULATION TISSUE: CPT

## 2018-02-12 PROCEDURE — 1159F MED LIST DOCD IN RCRD: CPT | Mod: ,,, | Performed by: PHYSICIAN ASSISTANT

## 2018-02-12 PROCEDURE — 1126F AMNT PAIN NOTED NONE PRSNT: CPT | Mod: ,,, | Performed by: PHYSICIAN ASSISTANT

## 2018-02-12 RX ORDER — VALACYCLOVIR HYDROCHLORIDE 1 G/1
1000 TABLET, FILM COATED ORAL 2 TIMES DAILY
Qty: 20 TABLET | Refills: 1 | Status: SHIPPED | OUTPATIENT
Start: 2018-02-12 | End: 2023-03-02

## 2018-02-12 RX ORDER — ACYCLOVIR 50 MG/G
OINTMENT TOPICAL
Qty: 1 TUBE | Refills: 1 | Status: SHIPPED | OUTPATIENT
Start: 2018-02-12 | End: 2019-09-17 | Stop reason: SDUPTHER

## 2018-02-12 NOTE — PROGRESS NOTES
Subjective:       Patient ID: Rajeev Newell III is a 67 y.o. male.    Chief Complaint: Rash    HPI   sensitive rash on tip of penis x 5 or 6 days  Pt had sexual contact 10 days prior to outbreak  Review of Systems   Constitutional: Negative.  Negative for activity change, appetite change, chills, diaphoresis, fatigue, fever and unexpected weight change.   HENT: Negative.  Negative for hearing loss, rhinorrhea and trouble swallowing.    Eyes: Negative for discharge and visual disturbance.   Respiratory: Negative.  Negative for chest tightness and wheezing.    Cardiovascular: Negative.  Negative for chest pain and palpitations.   Gastrointestinal: Negative.  Negative for blood in stool, constipation, diarrhea and vomiting.   Endocrine: Negative.  Negative for polydipsia and polyuria.   Genitourinary: Positive for genital sores and penile pain. Negative for decreased urine volume, difficulty urinating, discharge, dysuria, enuresis, flank pain, frequency, hematuria, penile swelling, scrotal swelling, testicular pain and urgency.   Musculoskeletal: Negative.  Negative for arthralgias, joint swelling and neck pain.   Skin: Positive for rash and wound.   Neurological: Negative.  Negative for weakness and headaches.   Psychiatric/Behavioral: Negative.  Negative for confusion and dysphoric mood.       Objective:      Physical Exam   Constitutional: He appears well-developed and well-nourished. No distress.   HENT:   Head: Normocephalic and atraumatic.   Eyes: Conjunctivae are normal. No scleral icterus.   Neck: Normal range of motion. Neck supple. No tracheal deviation present. No thyromegaly present.   Genitourinary: Penile tenderness present.   Genitourinary Comments: Tender ulcerative lesion near meatus of penis   Musculoskeletal: He exhibits no edema.   Lymphadenopathy:     He has no cervical adenopathy.   Skin: Skin is warm and dry. No rash noted.   Vitals reviewed.      Assessment:       1. Penile rash    2. STD  exposure        Plan:       Rajeev was seen today for rash.    Diagnoses and all orders for this visit:    Penile rash  -     Herpes simplex virus culture [facility]; Skin  -     C. trachomatis/N. gonorrhoeae by AMP DNA Urine; Future  -     RPR; Future  -     valACYclovir (VALTREX) 1000 MG tablet; Take 1 tablet (1,000 mg total) by mouth 2 (two) times daily.  -     acyclovir 5% (ZOVIRAX) 5 % ointment; Apply topically every 3 (three) hours.  -     Herpes simplex virus culture [facility]; Skin    STD exposure  -     Herpes simplex virus culture [facility]; Skin  -     C. trachomatis/N. gonorrhoeae by AMP DNA Urine; Future  -     RPR; Future  -     Herpes simplex virus culture [facility]; Skin    Other orders  -     Cancel: Viral Culture [facility]; Skin    obtained viral culture from penile lesion

## 2018-02-14 LAB — RPR SER QL: NORMAL

## 2018-02-15 ENCOUNTER — HOSPITAL ENCOUNTER (OUTPATIENT)
Dept: PREADMISSION TESTING | Facility: HOSPITAL | Age: 68
Discharge: HOME | End: 2018-02-15
Attending: ANESTHESIOLOGY
Payer: MEDICARE

## 2018-02-15 VITALS
TEMPERATURE: 98 F | HEIGHT: 71 IN | RESPIRATION RATE: 18 BRPM | BODY MASS INDEX: 39.65 KG/M2 | OXYGEN SATURATION: 99 % | SYSTOLIC BLOOD PRESSURE: 119 MMHG | WEIGHT: 283.19 LBS | HEART RATE: 73 BPM | DIASTOLIC BLOOD PRESSURE: 71 MMHG

## 2018-02-15 DIAGNOSIS — M79.606 PAIN OF LOWER EXTREMITY, UNSPECIFIED LATERALITY: Primary | ICD-10-CM

## 2018-02-15 NOTE — DISCHARGE INSTRUCTIONS
Your surgery has been scheduled for:__________________________________________    You should report to:  ____[facility], located on the [place] side of the first floor of the           [facility] ([phone])  ____[facility], located on the [facility] side of the            Second floor of the [facility] ([phone])  ____3rd Floor [facility] located on the [facility] side of the [facility] [phone]  Please Note   - Tell your doctor if you take Aspirin, products containing Aspirin, herbal medications  or blood thinners, such as Coumadin, Ticlid, or Plavix.  (Consult your provider regarding holding or stopping before surgery).  - Arrange for someone to drive you home following surgery.  You will not be allowed to leave the surgical facility alone or drive yourself home following sedation and anesthesia.  Before Surgery  - Stop taking all herbal medications 14days prior to surgery  - No Motrin/Advil (Ibuprofen) 7 days before surgery  - No Aleve (Naproxen) 7 days before surgery  - Stop Taking Asprin, products containing Asprin _____days before surgery  - Stop taking blood thinners_______days before surgery  - Refrain from drinking alcoholic beverages for 24hours before and after surgery  - Stop or limit smoking _________days before surgery  Night before Surgery  - DO NOT EAT OR DRINK ANYTHING AFTER MIDNIGHT, INCLUDING GUM, HARD CANDY, MINTS, OR CHEWING TOBACCO.  - Take a shower or bath (shower is recommended).  Bathe with Hibiclens soap or an antibacterial soap from the neck down.  If not supplied by your surgeon, hibiclens soap will need to be purchased over the counter in pharmacy.  Rinse soap off thoroughly.  - Shampoo your hair with your regular shampoo  The Day of Surgery  - Take another bath or shower with hibiclens or any antibacterial soap, to reduce the chance of infection.  - Take heart and blood  pressure medications with a small sip of water, as advised by the perioperative team.  - Do not take fluid pills  - You may brush your teeth and rinse your mouth, but do not swall any additional water.   - Do not apply perfumes, powder, body lotions or deodorant on the day of surgery.  - Nail polish should be removed.  - Do not wear makeup or moisturizer  - Wear comfortable clothes, such as a button front shirt and loose fitting pants.  - Leave all jewelry, including body piercings, and valuables at home.    - Bring any devices you will neeed after surgery such as crutches or canes.  - If you have sleep apnea, please bring your CPAP machine  In the event that your physical condition changes including the onset of a cold or respiratory illness, or if you have to delay or cancel your surgery, please notify your surgeon.  Anesthesia: Regional Anesthesia  Youre scheduled for surgery. During surgery, youll receive medication called anesthesia to keep you comfortable and pain-free. Your surgeon has decided that youll receive regional anesthesia. This sheet tells you what to expect with this type of anesthesia.  What Is Regional Anesthesia?  Regional anesthesia numbs one region of your body. The anesthesia may be given around nerves or into veins in your arms, neck, or legs (nerve block or [place] block). Or it may be sent into the spinal fluid (spinal anesthesia) or into the space just outside the spinal fluid (epidural anesthesia). Sedatives may also be given to relax you.  Nerve Block or [place] Block  A small area of the body, such as an arm or leg, can be numbed using a nerve block or [place] block.  · Nerve block: During a nerve block, your skin is numbed. A needle is then inserted near nerves that serve the area to be numbed. Anesthetic is sent through the needle.  · IV regional or [place] block: For this type of block, an IV line is put into a vein. The blood flow to the area to be numbed is blocked for a short time.  Anesthetic is sent through the IV.  Spinal Anesthesia  Spinal anesthesia numbs your body from about the waist down.  · Anesthetic is injected into the spinal fluid. This is a substance that surrounds the spinal cord in your spinal column. The anesthetic blocks pain traveling from the body to the brain.  · To receive the anesthetic, your skin is numbed at the injection site.  · A needle is then inserted into the spinal fluid. Anesthetic is sent through the needle.  Anesthesia Tools and Medications  · Local anesthetic given through a needle numbs one region of your body.  · Electrocardiography leads (electrodes) record your heart rate and rhythm.  · A blood pressure cuff monitors your blood pressure.  · A pulse oximeter on the end of the finger measures your blood oxygen level.  · Sedatives may be given through an IV to relax you and keep you comfortable. You may stay awake or sleep slightly.  · Oxygen may be given to you through a facemask.  Risks and Possible Complications  Regional anesthesia carries some risks. These include:  · Nausea and vomiting  · Headache  · Backache  · Decreased blood pressure  · Allergic reaction to the anesthetic  · Ongoing numbness (rare)  · Irregular heartbeat (rare)  · Cardiac arrest (rare)   © 8939-2067 [employer] [facility], [address] [place] [address], [place] [address]. All rights reserved. This information is not intended as a substitute for professional medical care. Always follow your healthcare professional's instructions

## 2018-02-21 ENCOUNTER — PATIENT MESSAGE (OUTPATIENT)
Dept: SURGERY | Facility: HOSPITAL | Age: 68
End: 2018-02-21

## 2018-02-21 ENCOUNTER — HOSPITAL ENCOUNTER (OUTPATIENT)
Dept: RADIOLOGY | Facility: HOSPITAL | Age: 68
Discharge: HOME | End: 2018-02-21
Attending: NURSE PRACTITIONER
Payer: MEDICARE

## 2018-02-21 ENCOUNTER — OFFICE VISIT (OUTPATIENT)
Dept: ORTHOPEDICS | Facility: CLINIC | Age: 68
End: 2018-02-21
Payer: MEDICARE

## 2018-02-21 VITALS — WEIGHT: 287.25 LBS | HEIGHT: 71 IN | BODY MASS INDEX: 40.21 KG/M2

## 2018-02-21 DIAGNOSIS — M25.561 RIGHT KNEE PAIN, UNSPECIFIED CHRONICITY: ICD-10-CM

## 2018-02-21 DIAGNOSIS — Z01.818 PREOP EXAMINATION: ICD-10-CM

## 2018-02-21 DIAGNOSIS — M17.11 PRIMARY OSTEOARTHRITIS OF RIGHT KNEE: ICD-10-CM

## 2018-02-21 DIAGNOSIS — M25.561 RIGHT KNEE PAIN, UNSPECIFIED CHRONICITY: Primary | ICD-10-CM

## 2018-02-21 PROCEDURE — 99999 PR PBB SHADOW E&M-EST. PATIENT-LVL III: CPT | Mod: PBBFAC,,, | Performed by: NURSE PRACTITIONER

## 2018-02-21 PROCEDURE — 73560 X-RAY EXAM OF KNEE 1 OR 2: CPT | Mod: TC,RT

## 2018-02-21 PROCEDURE — 99499 UNLISTED E&M SERVICE: CPT | Mod: S$PBB,,, | Performed by: NURSE PRACTITIONER

## 2018-02-21 PROCEDURE — 73560 X-RAY EXAM OF KNEE 1 OR 2: CPT | Mod: 26,RT,, | Performed by: RADIOLOGY

## 2018-02-21 PROCEDURE — 99213 OFFICE O/P EST LOW 20 MIN: CPT | Mod: PBBFAC,25 | Performed by: NURSE PRACTITIONER

## 2018-02-22 RX ORDER — SODIUM CHLORIDE 0.9 % (FLUSH) 0.9 %
3 SYRINGE (ML) INJECTION EVERY 8 HOURS PRN
Status: CANCELLED | OUTPATIENT
Start: 2018-02-22

## 2018-02-22 RX ORDER — BISACODYL 10 MG
10 SUPPOSITORY, RECTAL RECTAL EVERY 12 HOURS PRN
Status: CANCELLED | OUTPATIENT
Start: 2018-02-22

## 2018-02-22 RX ORDER — PREGABALIN 25 MG/1
75 CAPSULE ORAL NIGHTLY
Status: CANCELLED | OUTPATIENT
Start: 2018-02-22

## 2018-02-22 RX ORDER — OXYCODONE HYDROCHLORIDE 5 MG/1
5 TABLET ORAL
Status: CANCELLED | OUTPATIENT
Start: 2018-02-22

## 2018-02-22 RX ORDER — NALOXONE HCL 0.4 MG/ML
0.02 VIAL (ML) INJECTION
Status: CANCELLED | OUTPATIENT
Start: 2018-02-22

## 2018-02-22 RX ORDER — ASPIRIN 325 MG
325 TABLET, DELAYED RELEASE (ENTERIC COATED) ORAL 2 TIMES DAILY
Status: CANCELLED | OUTPATIENT
Start: 2018-02-22

## 2018-02-22 RX ORDER — ROPIVACAINE HYDROCHLORIDE 2 MG/ML
8 INJECTION, SOLUTION EPIDURAL; INFILTRATION; PERINEURAL CONTINUOUS
Status: CANCELLED | OUTPATIENT
Start: 2018-03-06

## 2018-02-22 RX ORDER — MORPHINE SULFATE 10 MG/ML
2 INJECTION, SOLUTION INTRAMUSCULAR; INTRAVENOUS
Status: CANCELLED | OUTPATIENT
Start: 2018-02-22

## 2018-02-22 RX ORDER — POLYETHYLENE GLYCOL 3350 17 G/17G
17 POWDER, FOR SOLUTION ORAL DAILY
Status: CANCELLED | OUTPATIENT
Start: 2018-02-23

## 2018-02-22 RX ORDER — MIDAZOLAM HYDROCHLORIDE 5 MG/ML
1 INJECTION INTRAMUSCULAR; INTRAVENOUS EVERY 5 MIN PRN
Status: CANCELLED | OUTPATIENT
Start: 2018-02-22

## 2018-02-22 RX ORDER — OXYCODONE HYDROCHLORIDE 5 MG/1
10 TABLET ORAL
Status: CANCELLED | OUTPATIENT
Start: 2018-02-22

## 2018-02-22 RX ORDER — LIDOCAINE HYDROCHLORIDE 10 MG/ML
1 INJECTION, SOLUTION EPIDURAL; INFILTRATION; INTRACAUDAL; PERINEURAL
Status: CANCELLED | OUTPATIENT
Start: 2018-02-22

## 2018-02-22 RX ORDER — ACETAMINOPHEN 500 MG
1000 TABLET ORAL EVERY 6 HOURS
Status: CANCELLED | OUTPATIENT
Start: 2018-02-23 | End: 2018-02-24

## 2018-02-22 RX ORDER — FAMOTIDINE 20 MG/1
20 TABLET, FILM COATED ORAL 2 TIMES DAILY
Status: CANCELLED | OUTPATIENT
Start: 2018-02-22

## 2018-02-22 RX ORDER — OXYCODONE HYDROCHLORIDE 5 MG/1
15 TABLET ORAL
Status: CANCELLED | OUTPATIENT
Start: 2018-02-22

## 2018-02-22 RX ORDER — SODIUM CHLORIDE 9 MG/ML
INJECTION, SOLUTION INTRAVENOUS
Status: CANCELLED | OUTPATIENT
Start: 2018-02-22

## 2018-02-22 RX ORDER — ACETAMINOPHEN 10 MG/ML
1000 INJECTION, SOLUTION INTRAVENOUS ONCE
Status: CANCELLED | OUTPATIENT
Start: 2018-02-22 | End: 2018-02-22

## 2018-02-22 RX ORDER — CELECOXIB 100 MG/1
400 CAPSULE ORAL
Status: CANCELLED | OUTPATIENT
Start: 2018-02-22

## 2018-02-22 RX ORDER — AMOXICILLIN 250 MG
1 CAPSULE ORAL 2 TIMES DAILY
Status: CANCELLED | OUTPATIENT
Start: 2018-02-22

## 2018-02-22 RX ORDER — PREGABALIN 25 MG/1
75 CAPSULE ORAL
Status: CANCELLED | OUTPATIENT
Start: 2018-02-22

## 2018-02-22 RX ORDER — FENTANYL CITRATE 50 UG/ML
25 INJECTION, SOLUTION INTRAMUSCULAR; INTRAVENOUS EVERY 5 MIN PRN
Status: CANCELLED | OUTPATIENT
Start: 2018-02-22

## 2018-02-22 RX ORDER — ONDANSETRON 2 MG/ML
4 INJECTION INTRAMUSCULAR; INTRAVENOUS EVERY 8 HOURS PRN
Status: CANCELLED | OUTPATIENT
Start: 2018-02-22

## 2018-02-22 RX ORDER — RAMELTEON 8 MG/1
8 TABLET ORAL NIGHTLY PRN
Status: CANCELLED | OUTPATIENT
Start: 2018-02-22

## 2018-02-22 RX ORDER — ONDANSETRON 2 MG/ML
4 INJECTION INTRAMUSCULAR; INTRAVENOUS EVERY 12 HOURS PRN
Status: CANCELLED | OUTPATIENT
Start: 2018-02-22

## 2018-02-22 RX ORDER — MUPIROCIN 20 MG/G
1 OINTMENT TOPICAL
Status: CANCELLED | OUTPATIENT
Start: 2018-02-22

## 2018-02-22 RX ORDER — SODIUM CHLORIDE 9 MG/ML
INJECTION, SOLUTION INTRAVENOUS CONTINUOUS
Status: CANCELLED | OUTPATIENT
Start: 2018-02-22 | End: 2018-02-23

## 2018-02-22 RX ORDER — CELECOXIB 100 MG/1
200 CAPSULE ORAL DAILY
Status: CANCELLED | OUTPATIENT
Start: 2018-02-23

## 2018-02-23 NOTE — H&P
CC: Right knee pain    Rajeev Newell III is a 67 y.o. male with a several year history of Right knee pain. Pain is worse with activity and weight bearing.  Patient has experienced interference of activities of daily living due to decreased range of motion and an increase in joint pain and swelling.  Patient has failed non-operative treatment including NSAIDs, corticosteroid injections, viscosupplement injections, and activity modification.  Rajeev Newell III currently ambulates independently.     Past Medical History:   Diagnosis Date    Cataract     OU--early    DM (diabetes mellitus) 10/29/2012    Glaucoma     POAG-OU    Hypertension     Sinusitis        Past Surgical History:   Procedure Laterality Date    CARPAL TUNNEL RELEASE      CIRCUMCISION      CYSTOSCOPY      HERNIA REPAIR Left 1994    L inguinal - [facility]       Family History   Problem Relation Age of Onset    Hypertension Mother     Stroke Mother     Diabetes Neg Hx     Cancer Neg Hx     Heart disease Neg Hx        Review of patient's allergies indicates:   Allergen Reactions    Bactrim [sulfamethoxazole-trimethoprim] Other (See Comments)     Abdominal Pain, Flush, Fever and Chills, Headache and Cough after Pt. took Bactrim    Darvon [propoxyphene]      Hallucinations         Current Outpatient Prescriptions:     acyclovir 5% (ZOVIRAX) 5 % ointment, Apply topically every 3 (three) hours., Disp: 1 Tube, Rfl: 1    albuterol 90 mcg/actuation inhaler, Inhale 2 puffs into the lungs every 4 (four) hours as needed for Wheezing., Disp: 1 Inhaler, Rfl: 5    aspirin (ECOTRIN) 81 MG EC tablet, Take 81 mg by mouth once daily., Disp: , Rfl:     atorvastatin (LIPITOR) 40 MG tablet, TK 1 T PO  QPM, Disp: , Rfl: 11    brinzolamide (AZOPT) 1 % ophthalmic suspension, Place 1 drop into both eyes 2 (two) times daily., Disp: 10 mL, Rfl: 4    clotrimazole (LOTRIMIN) 1 % cream, Apply topically 2 (two) times daily., Disp: 12 g, Rfl: 0    fexofenadine  "(ALLEGRA) 180 MG tablet, Every day PRN, Disp: , Rfl:     hydrocortisone (ANUSOL-HC) 25 mg suppository, Place 1 suppository (25 mg total) rectally 2 (two) times daily as needed for Hemorrhoids. 1 Suppository(ies) Rectal PRN Twice a day., Disp: 12 suppository, Rfl: 1    latanoprost 0.005 % ophthalmic solution, Place 1 drop into both eyes nightly., Disp: 7.5 mL, Rfl: 4    meloxicam (MOBIC) 15 MG tablet, TK 1 T PO  DAILY WF, Disp: , Rfl: 3    pantoprazole (PROTONIX) 40 MG tablet, Every day, Disp: , Rfl:     pioglitazone (ACTOS) 15 MG tablet, Take 1 tablet (15 mg total) by mouth once daily., Disp: 90 tablet, Rfl: 1    valACYclovir (VALTREX) 1000 MG tablet, Take 1 tablet (1,000 mg total) by mouth 2 (two) times daily., Disp: 20 tablet, Rfl: 1    valsartan (DIOVAN) 320 MG tablet, TAKE 1 TABLET BY MOUTH QD, Disp: , Rfl: 4    Review of Systems:  Constitutional: no fever or chills  Eyes: no visual changes  ENT: no nasal congestion or sore throat  Respiratory: no cough or shortness of breath  Cardiovascular: no chest pain or palpitations  Gastrointestinal: no nausea or vomiting, tolerating diet  Genitourinary: no hematuria or dysuria  Integument/Breast: no rash or pruritis  Hematologic/Lymphatic: no easy bruising or lymphadenopathy  Musculoskeletal: positive for c/o right knee pain worse with activity  Neurological: no seizures or tremors  Behavioral/Psych: no auditory or visual hallucinations  Endocrine: no heat or cold intolerance    PE:  Ht 5' 11" (1.803 m)   Wt 130.3 kg (287 lb 4.2 oz)   BMI 40.06 kg/m²   General: Pleasant, cooperative, NAD   Gait: antalgic  HEENT: NCAT, sclera nonicteric   Lungs: Respirations clear bilaterally; equal and unlabored.   CV: S1S2; 2+ bilateral upper and lower extremity pulses.   Skin: Intact throughout with no rashes, erythema, or lesions  Extremities: No LE edema,  no erythema or warmth of the skin in either lower extremity.    Right knee exam:  Knee Range of Motion:normal, pain with " passive range of motion  Effusion:none  Condition of skin:intact  Location of tenderness:medial joint line   Strength:normal  Stability: stable to testing    Hip Examination:normal    Radiographs: Radiographs reveal There is moderate to severe tricompartment osteoarthrosis of each knee with severe loss of joint space in the tibiofemoral compartments resulting in bilateral varus angulation..     Knee Alignment:  Moderate varus    Diagnosis: osteoarthritis Right knee    Plan: Right total knee arthroplasty    Due to the serious nature of total joint infection and the high prevalence of community acquired MRSA, vancomycin will be used perioperatively.

## 2018-02-23 NOTE — PROGRESS NOTES
Rajeev Newell III is a 67 y.o. year old here today for a pre-operative visit in preparation for a Right total knee arthroplasty to be performed by  Dr. Rosas on 3/6/18.  he was last seen and treated in the clinic on 1/10/2018. he will be medically optimized by the pre op center. There has been no significant change in medical status since last visit. No fever, chills, malaise, or unexplained weight change.      Allergies, Medications, past medical and surgical history reviewed.    Focused examination performed.    Patient declined to see Dr. Rosas today in clinic. All questions answered. Patient encouraged to call with questions. Contact information given.     Pre, jameson, and post operative procedures and expectations discussed. Questions were answered. Rajeev Newell III has been educated and is ready to proceed with surgery. Approximately 30 minutes was spent discussing surgical outcomes, plans, procedures pre, jameson, and post operative expections and care.  Surgical consent signed.    Rajeev Newell III will contact us if there are any questions, concerns, or changes in medical status prior to surgery.

## 2018-03-05 ENCOUNTER — TELEPHONE (OUTPATIENT)
Dept: ORTHOPEDICS | Facility: CLINIC | Age: 68
End: 2018-03-05

## 2018-03-05 NOTE — TELEPHONE ENCOUNTER
Attempted to contact pt 3 times left detailed message on patient voicemail informing him that the arrival time for his procedure is 0500 at the second floor sx center and no food or drinks after midnight tonight. Also left contact info in case patient has further questions.   FREE:[LAST:[your],FIRST:[PCP],PHONE:[(   )    -],FAX:[(   )    -]]

## 2018-03-06 ENCOUNTER — ANESTHESIA (OUTPATIENT)
Dept: SURGERY | Facility: HOSPITAL | Age: 68
DRG: 470 | End: 2018-03-06
Payer: MEDICARE

## 2018-03-06 ENCOUNTER — HOSPITAL ENCOUNTER (INPATIENT)
Facility: HOSPITAL | Age: 68
LOS: 1 days | Discharge: HOME | DRG: 470 | End: 2018-03-07
Attending: ORTHOPAEDIC SURGERY | Admitting: ORTHOPAEDIC SURGERY
Payer: MEDICARE

## 2018-03-06 DIAGNOSIS — M17.11 PRIMARY OSTEOARTHRITIS OF RIGHT KNEE: ICD-10-CM

## 2018-03-06 DIAGNOSIS — Z01.818 PREOP EXAMINATION: ICD-10-CM

## 2018-03-06 LAB
ANION GAP SERPL CALC-SCNC: 7 MMOL/L
BUN SERPL-MCNC: 17 MG/DL
CALCIUM SERPL-MCNC: 8.6 MG/DL
CHLORIDE SERPL-SCNC: 108 MMOL/L
CO2 SERPL-SCNC: 22 MMOL/L
CREAT SERPL-MCNC: 1 MG/DL
EST. GFR  (AFRICAN AMERICAN): >60 ML/MIN/1.73 M^2
EST. GFR  (NON AFRICAN AMERICAN): >60 ML/MIN/1.73 M^2
GLUCOSE SERPL-MCNC: 133 MG/DL
POCT GLUCOSE: 126 MG/DL (ref 70–110)
POCT GLUCOSE: 126 MG/DL (ref 70–110)
POCT GLUCOSE: 128 MG/DL (ref 70–110)
POTASSIUM SERPL-SCNC: 5 MMOL/L
SODIUM SERPL-SCNC: 137 MMOL/L

## 2018-03-06 PROCEDURE — 27000221 HC OXYGEN, UP TO 24 HOURS

## 2018-03-06 PROCEDURE — 25000003 PHARM REV CODE 250: Performed by: NURSE PRACTITIONER

## 2018-03-06 PROCEDURE — 25000003 PHARM REV CODE 250: Performed by: NURSE ANESTHETIST, CERTIFIED REGISTERED

## 2018-03-06 PROCEDURE — D9220A PRA ANESTHESIA: Mod: ANES,,, | Performed by: ANESTHESIOLOGY

## 2018-03-06 PROCEDURE — 27201423 OPTIME MED/SURG SUP & DEVICES STERILE SUPPLY: Performed by: ORTHOPAEDIC SURGERY

## 2018-03-06 PROCEDURE — 25000003 PHARM REV CODE 250: Performed by: STUDENT IN AN ORGANIZED HEALTH CARE EDUCATION/TRAINING PROGRAM

## 2018-03-06 PROCEDURE — 0SRC0J9 REPLACEMENT OF RIGHT KNEE JOINT WITH SYNTHETIC SUBSTITUTE, CEMENTED, OPEN APPROACH: ICD-10-PCS | Performed by: ORTHOPAEDIC SURGERY

## 2018-03-06 PROCEDURE — 97161 PT EVAL LOW COMPLEX 20 MIN: CPT

## 2018-03-06 PROCEDURE — G8987 SELF CARE CURRENT STATUS: HCPCS | Mod: CJ

## 2018-03-06 PROCEDURE — 71000033 HC RECOVERY, INTIAL HOUR: Performed by: ORTHOPAEDIC SURGERY

## 2018-03-06 PROCEDURE — 88311 DECALCIFY TISSUE: CPT | Mod: 26,,, | Performed by: PATHOLOGY

## 2018-03-06 PROCEDURE — 97165 OT EVAL LOW COMPLEX 30 MIN: CPT

## 2018-03-06 PROCEDURE — G8988 SELF CARE GOAL STATUS: HCPCS | Mod: CH

## 2018-03-06 PROCEDURE — 63600175 PHARM REV CODE 636 W HCPCS: Performed by: NURSE PRACTITIONER

## 2018-03-06 PROCEDURE — 63600175 PHARM REV CODE 636 W HCPCS: Performed by: NURSE ANESTHETIST, CERTIFIED REGISTERED

## 2018-03-06 PROCEDURE — 64450 NJX AA&/STRD OTHER PN/BRANCH: CPT | Performed by: ANESTHESIOLOGY

## 2018-03-06 PROCEDURE — C1776 JOINT DEVICE (IMPLANTABLE): HCPCS | Performed by: ORTHOPAEDIC SURGERY

## 2018-03-06 PROCEDURE — 71000039 HC RECOVERY, EACH ADD'L HOUR: Performed by: ORTHOPAEDIC SURGERY

## 2018-03-06 PROCEDURE — 11000001 HC ACUTE MED/SURG PRIVATE ROOM

## 2018-03-06 PROCEDURE — 94799 UNLISTED PULMONARY SVC/PX: CPT

## 2018-03-06 PROCEDURE — 82962 GLUCOSE BLOOD TEST: CPT | Performed by: ORTHOPAEDIC SURGERY

## 2018-03-06 PROCEDURE — G8979 MOBILITY GOAL STATUS: HCPCS | Mod: CI

## 2018-03-06 PROCEDURE — D9220A PRA ANESTHESIA: Mod: CRNA,,, | Performed by: NURSE ANESTHETIST, CERTIFIED REGISTERED

## 2018-03-06 PROCEDURE — 37000008 HC ANESTHESIA 1ST 15 MINUTES: Performed by: ORTHOPAEDIC SURGERY

## 2018-03-06 PROCEDURE — 94761 N-INVAS EAR/PLS OXIMETRY MLT: CPT

## 2018-03-06 PROCEDURE — G8978 MOBILITY CURRENT STATUS: HCPCS | Mod: CK

## 2018-03-06 PROCEDURE — 37000009 HC ANESTHESIA EA ADD 15 MINS: Performed by: ORTHOPAEDIC SURGERY

## 2018-03-06 PROCEDURE — 36415 COLL VENOUS BLD VENIPUNCTURE: CPT

## 2018-03-06 PROCEDURE — 36000710: Performed by: ORTHOPAEDIC SURGERY

## 2018-03-06 PROCEDURE — 64448 NJX AA&/STRD FEM NRV NFS IMG: CPT | Mod: 59,RT,, | Performed by: ANESTHESIOLOGY

## 2018-03-06 PROCEDURE — 27447 TOTAL KNEE ARTHROPLASTY: CPT | Mod: RT,GC,, | Performed by: ORTHOPAEDIC SURGERY

## 2018-03-06 PROCEDURE — 97530 THERAPEUTIC ACTIVITIES: CPT

## 2018-03-06 PROCEDURE — 76942 ECHO GUIDE FOR BIOPSY: CPT | Mod: 26,,, | Performed by: ANESTHESIOLOGY

## 2018-03-06 PROCEDURE — 88305 TISSUE EXAM BY PATHOLOGIST: CPT | Performed by: PATHOLOGY

## 2018-03-06 PROCEDURE — C1713 ANCHOR/SCREW BN/BN,TIS/BN: HCPCS | Performed by: ORTHOPAEDIC SURGERY

## 2018-03-06 PROCEDURE — 25000003 PHARM REV CODE 250: Performed by: ORTHOPAEDIC SURGERY

## 2018-03-06 PROCEDURE — 80048 BASIC METABOLIC PNL TOTAL CA: CPT

## 2018-03-06 PROCEDURE — 36000711: Performed by: ORTHOPAEDIC SURGERY

## 2018-03-06 PROCEDURE — 88305 TISSUE EXAM BY PATHOLOGIST: CPT | Mod: 26,,, | Performed by: PATHOLOGY

## 2018-03-06 DEVICE — COMP FEM POST STAB CEM SZ 6 RT: Type: IMPLANTABLE DEVICE | Site: KNEE | Status: FUNCTIONAL

## 2018-03-06 DEVICE — PATELLA TRIATHLON 31X9 SYMTRC: Type: IMPLANTABLE DEVICE | Site: KNEE | Status: FUNCTIONAL

## 2018-03-06 DEVICE — CEMENT BONE SMPLX HV GENTMYCN: Type: IMPLANTABLE DEVICE | Site: KNEE | Status: FUNCTIONAL

## 2018-03-06 DEVICE — BASEPLATE TIB CEM PRIM SZ 6: Type: IMPLANTABLE DEVICE | Site: KNEE | Status: FUNCTIONAL

## 2018-03-06 DEVICE — IMPLANTABLE DEVICE: Type: IMPLANTABLE DEVICE | Site: KNEE | Status: FUNCTIONAL

## 2018-03-06 RX ORDER — NALOXONE HCL 0.4 MG/ML
0.02 VIAL (ML) INJECTION
Status: DISCONTINUED | OUTPATIENT
Start: 2018-03-06 | End: 2018-03-07 | Stop reason: HOSPADM

## 2018-03-06 RX ORDER — VALSARTAN 160 MG/1
320 TABLET ORAL DAILY
Status: DISCONTINUED | OUTPATIENT
Start: 2018-03-07 | End: 2018-03-07 | Stop reason: HOSPADM

## 2018-03-06 RX ORDER — ASPIRIN 325 MG
325 TABLET, DELAYED RELEASE (ENTERIC COATED) ORAL 2 TIMES DAILY
Qty: 60 TABLET | Refills: 0 | Status: SHIPPED | OUTPATIENT
Start: 2018-03-06 | End: 2018-05-31

## 2018-03-06 RX ORDER — IBUPROFEN 200 MG
24 TABLET ORAL
Status: DISCONTINUED | OUTPATIENT
Start: 2018-03-06 | End: 2018-03-07 | Stop reason: HOSPADM

## 2018-03-06 RX ORDER — ONDANSETRON HYDROCHLORIDE 8 MG/1
8 TABLET, FILM COATED ORAL EVERY 8 HOURS PRN
Qty: 30 TABLET | Refills: 0 | Status: SHIPPED | OUTPATIENT
Start: 2018-03-06 | End: 2018-05-28

## 2018-03-06 RX ORDER — OXYCODONE HYDROCHLORIDE 5 MG/1
15 TABLET ORAL
Status: DISCONTINUED | OUTPATIENT
Start: 2018-03-06 | End: 2018-03-07 | Stop reason: HOSPADM

## 2018-03-06 RX ORDER — HYDROCORTISONE ACETATE 25 MG/1
25 SUPPOSITORY RECTAL 2 TIMES DAILY PRN
Status: DISCONTINUED | OUTPATIENT
Start: 2018-03-06 | End: 2018-03-07 | Stop reason: HOSPADM

## 2018-03-06 RX ORDER — FENTANYL CITRATE 50 UG/ML
25 INJECTION, SOLUTION INTRAMUSCULAR; INTRAVENOUS EVERY 5 MIN PRN
Status: DISCONTINUED | OUTPATIENT
Start: 2018-03-06 | End: 2018-03-06 | Stop reason: HOSPADM

## 2018-03-06 RX ORDER — IBUPROFEN 200 MG
16 TABLET ORAL
Status: DISCONTINUED | OUTPATIENT
Start: 2018-03-06 | End: 2018-03-07 | Stop reason: HOSPADM

## 2018-03-06 RX ORDER — SODIUM CHLORIDE 0.9 % (FLUSH) 0.9 %
3 SYRINGE (ML) INJECTION
Status: DISCONTINUED | OUTPATIENT
Start: 2018-03-06 | End: 2018-03-06 | Stop reason: HOSPADM

## 2018-03-06 RX ORDER — ONDANSETRON 2 MG/ML
4 INJECTION INTRAMUSCULAR; INTRAVENOUS EVERY 8 HOURS PRN
Status: DISCONTINUED | OUTPATIENT
Start: 2018-03-06 | End: 2018-03-06 | Stop reason: HOSPADM

## 2018-03-06 RX ORDER — LIDOCAINE HYDROCHLORIDE 10 MG/ML
1 INJECTION, SOLUTION EPIDURAL; INFILTRATION; INTRACAUDAL; PERINEURAL
Status: COMPLETED | OUTPATIENT
Start: 2018-03-06 | End: 2018-03-06

## 2018-03-06 RX ORDER — BISACODYL 10 MG
10 SUPPOSITORY, RECTAL RECTAL EVERY 12 HOURS PRN
Status: DISCONTINUED | OUTPATIENT
Start: 2018-03-06 | End: 2018-03-07 | Stop reason: HOSPADM

## 2018-03-06 RX ORDER — MORPHINE SULFATE 2 MG/ML
2 INJECTION, SOLUTION INTRAMUSCULAR; INTRAVENOUS
Status: DISCONTINUED | OUTPATIENT
Start: 2018-03-06 | End: 2018-03-07 | Stop reason: HOSPADM

## 2018-03-06 RX ORDER — DOCUSATE SODIUM 100 MG/1
100 CAPSULE, LIQUID FILLED ORAL 2 TIMES DAILY
Qty: 60 CAPSULE | Refills: 0 | Status: SHIPPED | OUTPATIENT
Start: 2018-03-06 | End: 2018-05-31

## 2018-03-06 RX ORDER — OXYCODONE AND ACETAMINOPHEN 10; 325 MG/1; MG/1
1 TABLET ORAL EVERY 4 HOURS PRN
Qty: 90 TABLET | Refills: 0 | Status: SHIPPED | OUTPATIENT
Start: 2018-03-06 | End: 2018-05-28

## 2018-03-06 RX ORDER — DEXAMETHASONE SODIUM PHOSPHATE 4 MG/ML
INJECTION, SOLUTION INTRA-ARTICULAR; INTRALESIONAL; INTRAMUSCULAR; INTRAVENOUS; SOFT TISSUE
Status: DISCONTINUED | OUTPATIENT
Start: 2018-03-06 | End: 2018-03-06

## 2018-03-06 RX ORDER — MUPIROCIN 20 MG/G
1 OINTMENT TOPICAL
Status: COMPLETED | OUTPATIENT
Start: 2018-03-06 | End: 2018-03-06

## 2018-03-06 RX ORDER — OXYCODONE HYDROCHLORIDE 5 MG/1
5 TABLET ORAL
Status: DISCONTINUED | OUTPATIENT
Start: 2018-03-06 | End: 2018-03-07 | Stop reason: HOSPADM

## 2018-03-06 RX ORDER — SODIUM CHLORIDE 9 MG/ML
INJECTION, SOLUTION INTRAVENOUS CONTINUOUS PRN
Status: DISCONTINUED | OUTPATIENT
Start: 2018-03-06 | End: 2018-03-06

## 2018-03-06 RX ORDER — SODIUM CHLORIDE 9 MG/ML
INJECTION, SOLUTION INTRAVENOUS CONTINUOUS
Status: ACTIVE | OUTPATIENT
Start: 2018-03-06 | End: 2018-03-07

## 2018-03-06 RX ORDER — SODIUM CHLORIDE 9 MG/ML
INJECTION, SOLUTION INTRAVENOUS
Status: COMPLETED | OUTPATIENT
Start: 2018-03-06 | End: 2018-03-06

## 2018-03-06 RX ORDER — AMOXICILLIN 250 MG
1 CAPSULE ORAL 2 TIMES DAILY
Status: DISCONTINUED | OUTPATIENT
Start: 2018-03-06 | End: 2018-03-07 | Stop reason: HOSPADM

## 2018-03-06 RX ORDER — PANTOPRAZOLE SODIUM 40 MG/1
40 TABLET, DELAYED RELEASE ORAL DAILY
Status: DISCONTINUED | OUTPATIENT
Start: 2018-03-06 | End: 2018-03-07 | Stop reason: HOSPADM

## 2018-03-06 RX ORDER — CELECOXIB 200 MG/1
400 CAPSULE ORAL
Status: COMPLETED | OUTPATIENT
Start: 2018-03-06 | End: 2018-03-06

## 2018-03-06 RX ORDER — KETAMINE HCL IN 0.9 % NACL 50 MG/5 ML
SYRINGE (ML) INTRAVENOUS
Status: DISCONTINUED | OUTPATIENT
Start: 2018-03-06 | End: 2018-03-06

## 2018-03-06 RX ORDER — ALBUTEROL SULFATE 90 UG/1
2 AEROSOL, METERED RESPIRATORY (INHALATION) EVERY 4 HOURS PRN
Status: DISCONTINUED | OUTPATIENT
Start: 2018-03-06 | End: 2018-03-07 | Stop reason: HOSPADM

## 2018-03-06 RX ORDER — ASPIRIN 325 MG
325 TABLET, DELAYED RELEASE (ENTERIC COATED) ORAL 2 TIMES DAILY
Status: DISCONTINUED | OUTPATIENT
Start: 2018-03-06 | End: 2018-03-07 | Stop reason: HOSPADM

## 2018-03-06 RX ORDER — LIDOCAINE HCL/PF 100 MG/5ML
SYRINGE (ML) INTRAVENOUS
Status: DISCONTINUED | OUTPATIENT
Start: 2018-03-06 | End: 2018-03-06

## 2018-03-06 RX ORDER — RAMELTEON 8 MG/1
8 TABLET ORAL NIGHTLY PRN
Status: DISCONTINUED | OUTPATIENT
Start: 2018-03-06 | End: 2018-03-07 | Stop reason: HOSPADM

## 2018-03-06 RX ORDER — SODIUM CHLORIDE 0.9 % (FLUSH) 0.9 %
3 SYRINGE (ML) INJECTION EVERY 8 HOURS PRN
Status: DISCONTINUED | OUTPATIENT
Start: 2018-03-06 | End: 2018-03-07 | Stop reason: HOSPADM

## 2018-03-06 RX ORDER — VALACYCLOVIR HYDROCHLORIDE 500 MG/1
1000 TABLET, FILM COATED ORAL 2 TIMES DAILY
Status: DISCONTINUED | OUTPATIENT
Start: 2018-03-06 | End: 2018-03-06

## 2018-03-06 RX ORDER — ATORVASTATIN CALCIUM 10 MG/1
40 TABLET, FILM COATED ORAL DAILY
Status: DISCONTINUED | OUTPATIENT
Start: 2018-03-06 | End: 2018-03-07 | Stop reason: HOSPADM

## 2018-03-06 RX ORDER — CETIRIZINE HYDROCHLORIDE 10 MG/1
10 TABLET ORAL DAILY PRN
Status: DISCONTINUED | OUTPATIENT
Start: 2018-03-06 | End: 2018-03-07 | Stop reason: HOSPADM

## 2018-03-06 RX ORDER — MIDAZOLAM HYDROCHLORIDE 1 MG/ML
1 INJECTION INTRAMUSCULAR; INTRAVENOUS EVERY 5 MIN PRN
Status: DISCONTINUED | OUTPATIENT
Start: 2018-03-06 | End: 2018-03-06 | Stop reason: HOSPADM

## 2018-03-06 RX ORDER — OXYCODONE HYDROCHLORIDE 5 MG/1
10 TABLET ORAL
Status: DISCONTINUED | OUTPATIENT
Start: 2018-03-06 | End: 2018-03-07 | Stop reason: HOSPADM

## 2018-03-06 RX ORDER — ACETAMINOPHEN 10 MG/ML
1000 INJECTION, SOLUTION INTRAVENOUS ONCE
Status: DISCONTINUED | OUTPATIENT
Start: 2018-03-06 | End: 2018-03-07 | Stop reason: HOSPADM

## 2018-03-06 RX ORDER — GLUCAGON 1 MG
1 KIT INJECTION
Status: DISCONTINUED | OUTPATIENT
Start: 2018-03-06 | End: 2018-03-07 | Stop reason: HOSPADM

## 2018-03-06 RX ORDER — ACETAMINOPHEN 10 MG/ML
INJECTION, SOLUTION INTRAVENOUS
Status: DISCONTINUED | OUTPATIENT
Start: 2018-03-06 | End: 2018-03-06

## 2018-03-06 RX ORDER — ROPIVACAINE HYDROCHLORIDE 2 MG/ML
8 INJECTION, SOLUTION EPIDURAL; INFILTRATION; PERINEURAL CONTINUOUS
Status: DISCONTINUED | OUTPATIENT
Start: 2018-03-06 | End: 2018-03-07 | Stop reason: HOSPADM

## 2018-03-06 RX ORDER — PROPOFOL 10 MG/ML
VIAL (ML) INTRAVENOUS CONTINUOUS PRN
Status: DISCONTINUED | OUTPATIENT
Start: 2018-03-06 | End: 2018-03-06

## 2018-03-06 RX ORDER — INSULIN ASPART 100 [IU]/ML
0-5 INJECTION, SOLUTION INTRAVENOUS; SUBCUTANEOUS
Status: DISCONTINUED | OUTPATIENT
Start: 2018-03-06 | End: 2018-03-07 | Stop reason: HOSPADM

## 2018-03-06 RX ORDER — LATANOPROST 50 UG/ML
1 SOLUTION/ DROPS OPHTHALMIC NIGHTLY
Status: DISCONTINUED | OUTPATIENT
Start: 2018-03-06 | End: 2018-03-07 | Stop reason: HOSPADM

## 2018-03-06 RX ORDER — FAMOTIDINE 20 MG/1
20 TABLET, FILM COATED ORAL 2 TIMES DAILY
Status: DISCONTINUED | OUTPATIENT
Start: 2018-03-06 | End: 2018-03-06

## 2018-03-06 RX ORDER — MELOXICAM 7.5 MG/1
15 TABLET ORAL DAILY
Status: DISCONTINUED | OUTPATIENT
Start: 2018-03-07 | End: 2018-03-07 | Stop reason: HOSPADM

## 2018-03-06 RX ORDER — PROPOFOL 10 MG/ML
VIAL (ML) INTRAVENOUS
Status: DISCONTINUED | OUTPATIENT
Start: 2018-03-06 | End: 2018-03-06

## 2018-03-06 RX ORDER — ONDANSETRON 2 MG/ML
4 INJECTION INTRAMUSCULAR; INTRAVENOUS EVERY 8 HOURS PRN
Status: DISCONTINUED | OUTPATIENT
Start: 2018-03-06 | End: 2018-03-07 | Stop reason: HOSPADM

## 2018-03-06 RX ORDER — BRINZOLAMIDE 10 MG/ML
1 SUSPENSION/ DROPS OPHTHALMIC 2 TIMES DAILY
Status: DISCONTINUED | OUTPATIENT
Start: 2018-03-06 | End: 2018-03-07 | Stop reason: HOSPADM

## 2018-03-06 RX ORDER — POLYETHYLENE GLYCOL 3350 17 G/17G
17 POWDER, FOR SOLUTION ORAL DAILY
Status: DISCONTINUED | OUTPATIENT
Start: 2018-03-06 | End: 2018-03-07 | Stop reason: HOSPADM

## 2018-03-06 RX ORDER — MIDAZOLAM HYDROCHLORIDE 1 MG/ML
0.5 INJECTION INTRAMUSCULAR; INTRAVENOUS EVERY 5 MIN PRN
Status: DISCONTINUED | OUTPATIENT
Start: 2018-03-06 | End: 2018-03-06 | Stop reason: HOSPADM

## 2018-03-06 RX ORDER — CEFAZOLIN SODIUM 1 G/3ML
2 INJECTION, POWDER, FOR SOLUTION INTRAMUSCULAR; INTRAVENOUS
Status: COMPLETED | OUTPATIENT
Start: 2018-03-06 | End: 2018-03-07

## 2018-03-06 RX ORDER — PREGABALIN 75 MG/1
75 CAPSULE ORAL
Status: COMPLETED | OUTPATIENT
Start: 2018-03-06 | End: 2018-03-06

## 2018-03-06 RX ORDER — CELECOXIB 200 MG/1
200 CAPSULE ORAL DAILY
Status: DISCONTINUED | OUTPATIENT
Start: 2018-03-07 | End: 2018-03-07 | Stop reason: HOSPADM

## 2018-03-06 RX ORDER — ONDANSETRON 2 MG/ML
4 INJECTION INTRAMUSCULAR; INTRAVENOUS EVERY 12 HOURS PRN
Status: DISCONTINUED | OUTPATIENT
Start: 2018-03-06 | End: 2018-03-06

## 2018-03-06 RX ORDER — PANTOPRAZOLE SODIUM 40 MG/1
40 TABLET, DELAYED RELEASE ORAL 2 TIMES DAILY
Status: DISCONTINUED | OUTPATIENT
Start: 2018-03-06 | End: 2018-03-06

## 2018-03-06 RX ORDER — ACETAMINOPHEN 500 MG
1000 TABLET ORAL EVERY 6 HOURS
Status: DISCONTINUED | OUTPATIENT
Start: 2018-03-06 | End: 2018-03-07 | Stop reason: HOSPADM

## 2018-03-06 RX ORDER — PREGABALIN 150 MG/1
150 CAPSULE ORAL NIGHTLY
Status: DISCONTINUED | OUTPATIENT
Start: 2018-03-06 | End: 2018-03-07 | Stop reason: HOSPADM

## 2018-03-06 RX ADMIN — ASPIRIN 325 MG: 325 TABLET, DELAYED RELEASE ORAL at 05:03

## 2018-03-06 RX ADMIN — STANDARDIZED SENNA CONCENTRATE AND DOCUSATE SODIUM 1 TABLET: 8.6; 5 TABLET, FILM COATED ORAL at 09:03

## 2018-03-06 RX ADMIN — MUPIROCIN 1 G: 20 OINTMENT TOPICAL at 05:03

## 2018-03-06 RX ADMIN — ACETAMINOPHEN 1000 MG: 10 INJECTION, SOLUTION INTRAVENOUS at 07:03

## 2018-03-06 RX ADMIN — PREGABALIN 150 MG: 150 CAPSULE ORAL at 09:03

## 2018-03-06 RX ADMIN — CEFAZOLIN 2 G: 330 INJECTION, POWDER, FOR SOLUTION INTRAMUSCULAR; INTRAVENOUS at 01:03

## 2018-03-06 RX ADMIN — DEXAMETHASONE SODIUM PHOSPHATE 8 MG: 4 INJECTION, SOLUTION INTRAMUSCULAR; INTRAVENOUS at 07:03

## 2018-03-06 RX ADMIN — ROPIVACAINE HYDROCHLORIDE 8 ML/HR: 2 INJECTION, SOLUTION EPIDURAL; INFILTRATION at 09:03

## 2018-03-06 RX ADMIN — DEXTROSE 3 G: 50 INJECTION, SOLUTION INTRAVENOUS at 07:03

## 2018-03-06 RX ADMIN — OXYCODONE HYDROCHLORIDE 15 MG: 5 TABLET ORAL at 09:03

## 2018-03-06 RX ADMIN — SODIUM CHLORIDE: 0.9 INJECTION, SOLUTION INTRAVENOUS at 06:03

## 2018-03-06 RX ADMIN — VANCOMYCIN HYDROCHLORIDE 1.5 G: 1 INJECTION, POWDER, LYOPHILIZED, FOR SOLUTION INTRAVENOUS at 07:03

## 2018-03-06 RX ADMIN — SODIUM CHLORIDE, SODIUM GLUCONATE, SODIUM ACETATE, POTASSIUM CHLORIDE, MAGNESIUM CHLORIDE, SODIUM PHOSPHATE, DIBASIC, AND POTASSIUM PHOSPHATE: .53; .5; .37; .037; .03; .012; .00082 INJECTION, SOLUTION INTRAVENOUS at 08:03

## 2018-03-06 RX ADMIN — MIDAZOLAM HYDROCHLORIDE 2 MG: 1 INJECTION, SOLUTION INTRAMUSCULAR; INTRAVENOUS at 06:03

## 2018-03-06 RX ADMIN — LIDOCAINE HYDROCHLORIDE 20 MG: 10 INJECTION, SOLUTION EPIDURAL; INFILTRATION; INTRACAUDAL; PERINEURAL at 06:03

## 2018-03-06 RX ADMIN — PROPOFOL 150 MCG/KG/MIN: 10 INJECTION, EMULSION INTRAVENOUS at 07:03

## 2018-03-06 RX ADMIN — PANTOPRAZOLE SODIUM 40 MG: 40 TABLET, DELAYED RELEASE ORAL at 11:03

## 2018-03-06 RX ADMIN — LIDOCAINE HYDROCHLORIDE 75 MG: 20 INJECTION, SOLUTION INTRAVENOUS at 07:03

## 2018-03-06 RX ADMIN — ATORVASTATIN CALCIUM 40 MG: 10 TABLET, FILM COATED ORAL at 09:03

## 2018-03-06 RX ADMIN — PREGABALIN 75 MG: 75 CAPSULE ORAL at 06:03

## 2018-03-06 RX ADMIN — FENTANYL CITRATE 50 MCG: 50 INJECTION, SOLUTION INTRAMUSCULAR; INTRAVENOUS at 06:03

## 2018-03-06 RX ADMIN — CEFAZOLIN 2 G: 330 INJECTION, POWDER, FOR SOLUTION INTRAMUSCULAR; INTRAVENOUS at 09:03

## 2018-03-06 RX ADMIN — VANCOMYCIN HYDROCHLORIDE 1500 MG: 1 INJECTION, POWDER, LYOPHILIZED, FOR SOLUTION INTRAVENOUS at 06:03

## 2018-03-06 RX ADMIN — CELECOXIB 400 MG: 200 CAPSULE ORAL at 06:03

## 2018-03-06 RX ADMIN — SODIUM CHLORIDE: 0.9 INJECTION, SOLUTION INTRAVENOUS at 09:03

## 2018-03-06 RX ADMIN — PROPOFOL 80 MG: 10 INJECTION, EMULSION INTRAVENOUS at 07:03

## 2018-03-06 RX ADMIN — Medication 30 MG: at 07:03

## 2018-03-06 RX ADMIN — ACETAMINOPHEN 1000 MG: 500 TABLET ORAL at 09:03

## 2018-03-06 RX ADMIN — ONDANSETRON 4 MG: 2 INJECTION INTRAMUSCULAR; INTRAVENOUS at 09:03

## 2018-03-06 NOTE — ADDENDUM NOTE
Addendum  created 03/06/18 1143 by Sarah Martino RN    Diagnosis association updated, Order list changed

## 2018-03-06 NOTE — PT/OT/SLP EVAL
Occupational Therapy   Evaluation    Name: Rajeev Newell III  MRN: [id number]  Admitting Diagnosis:  Primary osteoarthritis of right knee Day of Surgery    Recommendations:     Discharge Recommendations: home health OT  Discharge Equipment Recommendations:  bedside commode, shower chair, walker, rolling  Barriers to discharge:  None    History:     Occupational Profile:  Living Environment: Pt lives in a one story house c no KIM.  Previous level of function: I PTA  Roles and Routines: Retired  Equipment Owned:  none  Assistance upon Discharge: Wife    Past Medical History:   Diagnosis Date    Cataract     OU--early    DM (diabetes mellitus) 10/29/2012    Glaucoma     POAG-OU    Hypertension     Sinusitis        Past Surgical History:   Procedure Laterality Date    CARPAL TUNNEL RELEASE      CIRCUMCISION      CYSTOSCOPY      HERNIA REPAIR Left 1994    L inguinal - [facility]       Subjective     Chief Complaint: Pt is s/p R TKA and is WBAT R LE   Patient/Family stated goals: To get better.  Communicated with: RN prior to session.  Pain/Comfort:  · Pain Rating 1: 2/10    Patients cultural, spiritual, Buddhist conflicts given the current situation: None    Objective:     Patient found with: blood pressure cuff, peripheral IV, perineural catheter, pulse ox (continuous), telemetry (Polar ice)    General Precautions: Standard, fall   Orthopedic Precautions:RLE weight bearing as tolerated   Braces: N/A     Occupational Performance:    Bed Mobility:    · Patient completed Supine to Sit with stand by assistance    Functional Mobility/Transfers:  · Patient completed Sit <> Stand Transfer with contact guard assistance  with  standard walker   · Functional Mobility: Pt was able to take 3 steps forward and then 3 steps back in PACU c CGA and SW.    Activities of Daily Living:  · UB Dressing: maximal assistance to don hospital gown 2* IV lines.    Cognitive/Visual Perceptual:  Cognitive/Psychosocial Skills:     -        "Oriented to: Person, Place, Time and Situation   -       Follows Commands/attention:Follows multistep  commands    Physical Exam:  Upper Extremity Range of Motion:  -       Right Upper Extremity: WFL  -       Left Upper Extremity: WFL  Upper Extremity Strength:    -       Right Upper Extremity: WFL  -       Left Upper Extremity: WFL    Patient left supine with all lines intact, call button in reach and RN notified    [facility] 6 Click:  [facility] Total Score: 20      Education:    Assessment:     Rajeev Newell III is a 67 y.o. male with a medical diagnosis of Primary osteoarthritis of right knee.  He was able to perform supine/sit, sit/stand and take steps in PACU c SBA/CGA and SW.  B UE are WFL.  Able to perform UB dressing c max A and set-up.  Performance deficits affecting function are impaired functional mobilty, impaired self care skills, orthopedic precautions.      Rehab Prognosis:  Good; patient would benefit from acute skilled OT services to address these deficits and reach maximum level of function.         Clinical Decision Makin.  OT Low:  "Pt evaluation falls under low complexity for evaluation coding due to performance deficits noted in 1-3 areas as stated above and 0 co-morbities affecting current functional status. Data obtained from problem focused assessments. No modifications or assistance was required for completion of evaluation. Only brief occupational profile and history review completed."     Plan:     Patient to be seen daily to address the above listed problems via self-care/home management, therapeutic activities, therapeutic exercises  · Plan of Care Expires: 18  · Plan of Care Reviewed with: patient    This Plan of care has been discussed with the patient who was involved in its development and understands and is in agreement with the identified goals and treatment plan    GOALS:    Occupational Therapy Goals        Problem: Occupational Therapy Goal    Goal Priority " Disciplines Outcome Interventions   Occupational Therapy Goal     OT, PT/OT     Description:  Goals to be met by: 3/13/18     Patient will increase functional independence with ADLs by performing:    UE Dressing with Modified [place].  LE Dressing with Modified [place] and Assistive Devices as needed.  Grooming while standing at sink with Modified [place].  Toileting from bedside commode with Modified [place] for hygiene and clothing management.   Bathing from  edge of bed with Modified [place].  Toilet transfer to bedside commode with Modified [place].  Increased functional strength to WFL for B UE.  Upper extremity exercise program x15 reps per handout, with independence.                      Time Tracking:     OT Date of Treatment: 03/06/18  OT Start Time: 1125  OT Stop Time: 1140  OT Total Time (min): 15 min    Billable Minutes:Evaluation 15    JESIKA Shirley  3/6/2018

## 2018-03-06 NOTE — INTERVAL H&P NOTE
Rajeev Newell III was interviewed, examined and the H and P reviewed.  There has been no interval change in his History and Physical.

## 2018-03-06 NOTE — ANESTHESIA PROCEDURE NOTES
IPACK Single Injection Block    Patient location during procedure: pre-op   Block not for primary anesthetic.  Reason for block: at surgeon's request and post-op pain management   Post-op Pain Location: Right knee pain  Start time: 3/6/2018 6:35 AM  Timeout: 3/6/2018 6:32 AM   End time: 3/6/2018 6:58 AM  Staffing  Anesthesiologist: BECKY FELICIANO  Resident/CRNA: JENNY MEDINA  Performed: resident/CRNA   Preanesthetic Checklist  Completed: patient identified, site marked, surgical consent, pre-op evaluation, timeout performed, IV checked, risks and benefits discussed and monitors and equipment checked  Peripheral Block  Patient position: supine  Prep: ChloraPrep  Patient monitoring: heart rate, cardiac monitor, continuous pulse ox, continuous capnometry and frequent blood pressure checks  Block type: I PACK  Laterality: right  Injection technique: single shot  Needle  Needle type: Stimuplex   Needle gauge: 21 G  Needle length: 4 in  Needle localization: anatomical landmarks and ultrasound guidance   -ultrasound image captured on disc.  Assessment  Injection assessment: negative aspiration, negative parasthesia and local visualized surrounding nerve  Paresthesia pain: none  Heart rate change: no  Slow fractionated injection: yes  Medications:  Bolus administered: 20 mL of 0.25 ropivacaine  Epinephrine added: 3.75 mcg/mL (1/300,000)  Additional Notes  VSS.  [facility] RN monitoring vitals throughout procedure.  Patient tolerated procedure well.

## 2018-03-06 NOTE — ANESTHESIA PROCEDURE NOTES
Adductor Canal Catheter    Patient location during procedure: pre-op   Block not for primary anesthetic.  Reason for block: at surgeon's request and post-op pain management   Post-op Pain Location: Right knee pain  Start time: 3/6/2018 6:35 AM  Timeout: 3/6/2018 6:32 AM   End time: 3/6/2018 6:58 AM  Staffing  Anesthesiologist: BECKY FELICIANO  Resident/CRNA: JENNY MEDINA  Performed: resident/CRNA   Preanesthetic Checklist  Completed: patient identified, site marked, surgical consent, pre-op evaluation, timeout performed, IV checked, risks and benefits discussed and monitors and equipment checked  Peripheral Block  Patient position: supine  Prep: ChloraPrep and site prepped and draped  Patient monitoring: heart rate, cardiac monitor, continuous pulse ox, continuous capnometry and frequent blood pressure checks  Block type: adductor canal  Laterality: right  Injection technique: continuous  Needle  Needle type: Tuohy   Needle gauge: 17 G  Needle length: 3.5 in  Needle localization: anatomical landmarks and ultrasound guidance  Catheter type: spring wound  Catheter size: 19 G  Test dose: lidocaine 1.5% with Epi 1-to-200,000 and negative   -ultrasound image captured on disc.  Assessment  Injection assessment: negative aspiration, negative parasthesia and local visualized surrounding nerve  Paresthesia pain: none  Heart rate change: no  Slow fractionated injection: yes  Medications:  Bolus administered: 20 mL of 0.25 ropivacaine  Epinephrine added: 3.75 mcg/mL (1/300,000)  Additional Notes  VSS.  [facility] RN monitoring vitals throughout procedure.  Patient tolerated procedure well.

## 2018-03-06 NOTE — NURSING TRANSFER
Nursing Transfer Note      3/6/2018     Transfer To: [id number]    Transfer via stretcher    Transfer with none    Transported by pct    Medicines sent: iv fluids    Chart send with patient: Yes    Notified: spouse    Patient reassessed at: 3/6/18 see flowsheets

## 2018-03-06 NOTE — PLAN OF CARE
Problem: Occupational Therapy Goal  Goal: Occupational Therapy Goal  Goals to be met by: 3/13/18     Patient will increase functional independence with ADLs by performing:    UE Dressing with Modified [place].  LE Dressing with Modified Standard and Assistive Devices as needed.  Grooming while standing at sink with Modified [place].  Toileting from bedside commode with Modified Standard for hygiene and clothing management.   Bathing from  edge of bed with Modified [place].  Toilet transfer to bedside commode with Modified Standard.  Increased functional strength to WFL for B UE.  Upper extremity exercise program x15 reps per handout, with independence.    POC initiated.

## 2018-03-06 NOTE — BRIEF OP NOTE
Comfortable  Vitals Stable  Dressing Dry/Intact  Bilateral lower extremities: Palpable DP, good capillary refill  Motor sensation intact  xrays taken today demonstrate a well fixed and positioned TKA.  Labs reviewed  S/P TKA  Continue current treatment.

## 2018-03-06 NOTE — PT/OT/SLP EVAL
Physical Therapy Evaluation    Patient Name:  Rajeev Newell III   MRN:  [id number]    Recommendations:     Discharge recommendations: Outpatient PT  Barriers to discharge: None  Equipment recommendations: rolling walker, bedside commode and shower chair    Assessment:     Rajeev Newell III is a 67 y.o. male admitted with a medical diagnosis of R TKA.  He presents with the below impairments/functional limitations.  Pt tolerated evaluation well today and is motivated to do well with recent joint replacement.  Pt is a good candidate for skilled PT services to address the below deficits and to increase functional independence.      Rehab Prognosis: good; patient would benefit from acute skilled PT services to address these deficits and reach maximum level of function.      Rehab Identified impairments/functional limitations:   weakness, impaired endurance, impaired sensation, impaired self care skills, impaired functional mobilty, gait instability, impaired balance, decreased coordination, decreased lower extremity function, decreased safety awareness, pain, decreased ROM, impaired skin, edema, orthopedic precautions    Recent Surgery: Procedure(s) (LRB):  REPLACEMENT-KNEE-TOTAL (Right) Day of Surgery    Plan:     During this hospitalization, patient to be seen BID to address the above listed problems via gait training, therapeutic activity, therapeutic exercise, and neuromuscular re-education   Plan of Care Reviewed with: patient    Subjective     Communicated with RN prior to session.  Patient found supine upon PT entry, agreeable to evaluation.      Chief Complaint: none  Patient comments/goals: to return home safely    Pain/Comfort:  Pain level prior: 2/10 in R knee  Pain level post: 2/10 in R knee    Patients cultural, spiritual, Sabianism conflicts given the current situation: none stated    Living Environment:  Pt lives with his with in a 1 story home with 0 KIM.  (I) PTA.      Patient has the following  equipment: none      Objective:     Patient found supine in bed with blood pressure cuff, telemetry, perineural catheter, peripheral IV, pulse ox, and FCD.     General Precautions: Standard, fall  Orthopedic Precautions:  RLE weight bearing as tolerated  Braces: none     Physical Exam:  Postural examination/scapula alignment: WFL    Skin integrity: Visible skin intact  Edema: Mild RLE    Sensation:   Intact    Lower Extremity Range of Motion:  Right Lower Extremity: WFL except knee  Left Lower Extremity: WFL     Lower Extremity Strength:  Right Lower Extremity: WFL except knee  Left Lower Extremity: WFL     Functional Mobility:    Bed Mobility:    Supine to sit: CGA  Sit to supine: CGA    Transfers:    Sit<>Stand: CGA with SW    Ambulation:    Pt ambulated 3 steps forward and backward and 1-2 sidesteps with SW and CGA.  Pt educated on 3 point gait pattern.  Pt able to verbalize and demonstrate understanding.      AM-PAC 6 CLICK MOBILITY  Total Score: 18     Therapeutic Activities and Exercises:  PT evaluation performed.  Pt educated on role of PT, safety with functional mobility.     Patient left supine in bed with all lines intact and RN notified.    GOALS:    Physical Therapy Goals        Problem: Physical Therapy Goal    Goal Priority Disciplines Outcome Goal Variances Interventions   Physical Therapy Goal     PT/OT, PT Ongoing (interventions implemented as appropriate)     Description:  Goals to be met by: 3/18/18     Patient will increase functional independence with mobility by performin. Supine to sit with Supervision  2. Sit to supine with Supervision  3. Sit to stand transfer with Stand-by Assistance  4. Gait  x 150 feet with Stand-by Assistance using Rolling Walker.   5. Lower extremity exercise program x 30 reps per handout, with independence                      History:     Past Medical History:   Diagnosis Date    Cataract     OU--early    DM (diabetes mellitus) 10/29/2012    Glaucoma      POAG-OU    Hypertension     Sinusitis        Past Surgical History:   Procedure Laterality Date    CARPAL TUNNEL RELEASE      CIRCUMCISION      CYSTOSCOPY      HERNIA REPAIR Left 1994    L inguinal - [facility]       Time Tracking:     PT Received On: 03/06/18  PT Start Time: 1124     PT Stop Time: 1140  PT Total Time (min): 16 min     Billable Minutes: Evaluation 8; Therapeutic Activity 8      Sim Reardon, PT, DPT  3/6/2018   [phone]

## 2018-03-06 NOTE — OP NOTE
DATE OF PROCEDURE: 3/6/18  PREOPERATIVE DIAGNOSIS: Arthritis, Right knee. Morbid Obesity, BMI 40.  POSTOPERATIVE DIAGNOSIS: Arthritis, Right knee. Morbid Obesity, BMI 40.  PROCEDURES PERFORMED: Right total knee arthroplasty.   SURGEON: Edmund Rosas M.D.   ASSISTANT: GIANNA Solis,  ANESTHESIA: Regional.   COMPLICATIONS: None.   COUNTS: Correct.   DISPOSITION: Recovery Room, stable.   SPECIMENS: Bone and cartilage.   FINDINGS: Tricompartmental degenerative change.   FLUIDS: 2000 mL.   BLOOD LOSS: Less than 50 mL.   IMPLANTS: Nelida Triathlon, size 6 Right posterior stabilized   cemented femoral component with a 6 cemented tibial tray, a 31 mm 3-peg   patella and a size 6, 13 mm posterior stabilized polyethylene insert.   INDICATIONS FOR PROCEDURE: Rajeev Newell III is a 67-year-old male who has   severe arthritis in the Right knee . He is having continued pain in the   Right knee and did not respond to nonoperative measures, decided to undergo   Right knee replacement. He is aware of reasonable treatment options as   well as risks and benefits. {He did not respond to NSAIDS or injections. He received a course or pre-operative physical therapy.  PROCEDURE IN DETAIL: After appropriate consent was obtained, the patient   Was brought in the Operating Room, anesthesia was administered. He received   antibiotic prophylaxis.  Cast   padding and tourniquet was applied to the proximal Right thigh. Right  lower extremity was prepped and draped in usual sterile fashion. Limb was   elevated, tourniquet was inflated. The knee was flexed. An incision was   made from the tibial tubercle just proximal to the superior pole of the   patella. It was taken down through the skin and retinacular. A medial   parapatellar arthrotomy was performed followed by a standard medial   release. Patellar fat pad was partially excised. ACL was resected.   Femoral punch was used to make the guide hole for the femoral drill. The   distal  cutting guide was set at 6 degrees valgus right. I opted to take 2   extra mm off the distal femur secondary to preoperative flexion   contracture. Distal femoral cut was made.We were pleased with the alignment and quality of the cut.  The PCL retractor was used to bring   the tibia into the field. Meniscal remnants were resected. The   extramedullary tibial guide was pinned in place using the medial side, as most   defective, 2 mm bone was taken off of this. We checked the alignment in   both planes both before and after making the cut. We were satisfied with the   alignment of the cut and the amount of bone removed.The femur was then  sized, found to be a size 6. A size 6 cutting guide was pinned in 3   degrees of external rotation. This was based off the posterior condyle,   checked the transepicondylar axis. Anterior, posterior and chamfer cuts   were made. The box guide was pinned in position. Femoral box cut was   made. Bone fragments were removed. Lamina spreaders were   then used to open up posteriorly. The PCL was resected and some soft   tissue debris was removed.  A 13 mm spacer block gave excellent flexion-extension gap balance.   I was also satisfied with the medial and lateral stability. At this   point, the femoral trial was placed. The tibia was sized, found to be a   Size 6. A size 6 tibial trial was pinned in appropriate alignment and   rotation. This was based on the medial one third of the tibial tubercle.  A stem extension hole was drilled. A keel hole was punched and a   trial reduction was performed with a size 6, 13 mm insert. He  achieved full   extension. There was no recurvatum. He easily had 125 degrees of flexion.   The femoral component ranged symmetrically in the tibial tray. There was   no lift off. There was no instability of full extension, 30 degrees of   flexion, mid flexion and full flexion. Patella was measured and found to   be 24 mm thick, 8 mm of bone removed. The 3-peg holes  were drilled 31 mm   3-peg trial was placed. The knee was brought through range of motion. The   patella tracked extremely well. Therefore, at this point, we were   satisfied with knee range of motion and stability, component position,   sizing and alignment as well as patella tracking. All trial components   were removed. Bone was prepared for cementing by pulsatile lavage and   drying. Components were cemented into place, femur followed by tibia.   Meticulous care was taken to remove excess cement. Tibial insert was   firmly seated in the tibial tray. The knee was inspected. There was no   loose body, foreign body or soft tissue interposition. Knee was then   reduced, brought into extension. Patella button was cemented in place.   Excess cement was removed. The wound was then copiously irrigated with   antibiotic-impregnated solution. Once the cement was dried, tranexamic   acid was applied. The arthrotomy was closed with #1 Vicryl. Once the   arthrotomy was closed, the knee was brought through range of motion, stable   as previously described. Patella tracked very well. Retinacular was   closed with 0 Vicryl, subcutaneous layer with 2-0 Vicryl, skin with   monocryl and dermabond. Sterile dressing was applied. Tourniquet was let down.  He was   transferred to a stretcher, brought to Recovery Room in stable condition,   tolerated the procedure well, no known complications.

## 2018-03-06 NOTE — ANESTHESIA POSTPROCEDURE EVALUATION
"Anesthesia Post Evaluation    Patient: Rajeev Newell III    Procedure(s) Performed: Procedure(s) (LRB):  REPLACEMENT-KNEE-TOTAL (Right)    Final Anesthesia Type: spinal  Patient location during evaluation: PACU  Patient participation: Yes- Able to Participate  Level of consciousness: awake and alert  Post-procedure vital signs: reviewed and stable  Pain management: adequate  Airway patency: patent  PONV status at discharge: No PONV  Anesthetic complications: no      Cardiovascular status: blood pressure returned to baseline  Respiratory status: unassisted, spontaneous ventilation and room air  Hydration status: euvolemic  Follow-up not needed.  Comments: Moving BLE        Visit Vitals  BP (!) 145/82   Pulse 61   Temp 36.8 °C (98.2 °F) (Oral)   Resp 19   Ht 5' 11" (1.803 m)   Wt 130.2 kg (287 lb)   SpO2 98%   BMI 40.03 kg/m²       Pain/Cecy Score: Pain Assessment Performed: Yes (3/6/2018 10:00 AM)  Presence of Pain: complains of pain/discomfort (3/6/2018  9:46 AM)  Pain Rating Prior to Med Admin: 4 (3/6/2018  9:46 AM)  Pain Rating Post Med Admin: 3 (3/6/2018 10:15 AM)  Cecy Score: 10 (3/6/2018 10:00 AM)      "

## 2018-03-06 NOTE — TRANSFER OF CARE
"Anesthesia Transfer of Care Note    Patient: Rajeev Newell III    Procedure(s) Performed: Procedure(s) (LRB):  REPLACEMENT-KNEE-TOTAL (Right)    Patient location: PACU    Anesthesia Type: general    Transport from OR: Transported from OR on 6-10 L/min O2 by face mask with adequate spontaneous ventilation    Post pain: adequate analgesia    Post assessment: no apparent anesthetic complications    Post vital signs: stable    Level of consciousness: awake    Nausea/Vomiting: no nausea/vomiting    Complications: none    Transfer of care protocol was followed      Last vitals:   Visit Vitals  /62 (BP Location: Right arm, Patient Position: Lying)   Pulse 72   Temp 36.5 °C (97.7 °F) (Temporal)   Resp 16   Ht 5' 11" (1.803 m)   Wt 130.2 kg (287 lb)   SpO2 98%   BMI 40.03 kg/m²     "

## 2018-03-06 NOTE — ANESTHESIA PROCEDURE NOTES
Spinal    Diagnosis: Right knee OA  Patient location during procedure: OR  Start time: 3/6/2018 7:13 AM  Timeout: 3/6/2018 7:11 AM  End time: 3/6/2018 7:15 AM  Staffing  Anesthesiologist: SANDY PETERS  Performed: anesthesiologist   Preanesthetic Checklist  Completed: patient identified, site marked, surgical consent, pre-op evaluation, timeout performed, IV checked, risks and benefits discussed and monitors and equipment checked  Spinal Block  Patient position: sitting  Prep: ChloraPrep  Patient monitoring: heart rate and continuous pulse ox  Approach: midline  Location: L3-4  Injection technique: single shot  CSF Fluid: clear free-flowing CSF  Needle  Needle type: Suraj   Needle gauge: 25 G  Needle length: 5 in  Additional Documentation: negative aspiration for heme and left transient paresthesia  Needle localization: anatomical landmarks  Assessment  Sensory level: T10   Dermatomal levels determined by pinch or prick  Ease of block: easy  Patient's tolerance of the procedure: comfortable throughout block  Medications:  Bolus administered: 3 mL of 2.0 mepivacaine  Additional Notes  VSS  Transient left paresthesia upon inserting spinal needle.  No complaints on injection  17g T used as introducer

## 2018-03-06 NOTE — PLAN OF CARE
Problem: Physical Therapy Goal  Goal: Physical Therapy Goal  Goals to be met by: 3/18/18     Patient will increase functional independence with mobility by performin. Supine to sit with Supervision  2. Sit to supine with Supervision  3. Sit to stand transfer with Stand-by Assistance  4. Gait  x 150 feet with Stand-by Assistance using Rolling Walker.   5. Lower extremity exercise program x 30 reps per handout, with independence    Outcome: Ongoing (interventions implemented as appropriate)  PT eval complete and POC initiated.

## 2018-03-06 NOTE — TELEPHONE ENCOUNTER
----- Message from Isaiah Rodriguez sent at 3/5/2018  4:19 PM CST -----  Contact: Pt  Pt would like to be called back regarding chair equipment and pt states he will need it after having surgery tomorrow.    Pt can be reached at [phone].      Thank You.

## 2018-03-07 VITALS
RESPIRATION RATE: 15 BRPM | WEIGHT: 287 LBS | DIASTOLIC BLOOD PRESSURE: 69 MMHG | BODY MASS INDEX: 40.18 KG/M2 | TEMPERATURE: 98 F | HEART RATE: 84 BPM | HEIGHT: 71 IN | OXYGEN SATURATION: 97 % | SYSTOLIC BLOOD PRESSURE: 144 MMHG

## 2018-03-07 LAB
POCT GLUCOSE: 115 MG/DL (ref 70–110)
POCT GLUCOSE: 117 MG/DL (ref 70–110)
POCT GLUCOSE: 129 MG/DL (ref 70–110)

## 2018-03-07 PROCEDURE — 63600175 PHARM REV CODE 636 W HCPCS: Performed by: NURSE PRACTITIONER

## 2018-03-07 PROCEDURE — G8989 SELF CARE D/C STATUS: HCPCS | Mod: CJ

## 2018-03-07 PROCEDURE — 25000003 PHARM REV CODE 250: Performed by: STUDENT IN AN ORGANIZED HEALTH CARE EDUCATION/TRAINING PROGRAM

## 2018-03-07 PROCEDURE — 25000003 PHARM REV CODE 250: Performed by: NURSE PRACTITIONER

## 2018-03-07 PROCEDURE — G8988 SELF CARE GOAL STATUS: HCPCS | Mod: CH

## 2018-03-07 PROCEDURE — 97116 GAIT TRAINING THERAPY: CPT | Mod: 59

## 2018-03-07 PROCEDURE — 99231 SBSQ HOSP IP/OBS SF/LOW 25: CPT | Mod: GC,,, | Performed by: ANESTHESIOLOGY

## 2018-03-07 PROCEDURE — 97110 THERAPEUTIC EXERCISES: CPT

## 2018-03-07 PROCEDURE — G8979 MOBILITY GOAL STATUS: HCPCS | Mod: CI

## 2018-03-07 PROCEDURE — 97535 SELF CARE MNGMENT TRAINING: CPT

## 2018-03-07 PROCEDURE — 97530 THERAPEUTIC ACTIVITIES: CPT

## 2018-03-07 PROCEDURE — G8980 MOBILITY D/C STATUS: HCPCS | Mod: CJ

## 2018-03-07 RX ADMIN — OXYCODONE HYDROCHLORIDE 15 MG: 5 TABLET ORAL at 06:03

## 2018-03-07 RX ADMIN — OXYCODONE HYDROCHLORIDE 15 MG: 5 TABLET ORAL at 01:03

## 2018-03-07 RX ADMIN — MELOXICAM 15 MG: 7.5 TABLET ORAL at 08:03

## 2018-03-07 RX ADMIN — CELECOXIB 200 MG: 200 CAPSULE ORAL at 08:03

## 2018-03-07 RX ADMIN — PANTOPRAZOLE SODIUM 40 MG: 40 TABLET, DELAYED RELEASE ORAL at 08:03

## 2018-03-07 RX ADMIN — POLYETHYLENE GLYCOL 3350 17 G: 17 POWDER, FOR SOLUTION ORAL at 08:03

## 2018-03-07 RX ADMIN — ATORVASTATIN CALCIUM 40 MG: 10 TABLET, FILM COATED ORAL at 08:03

## 2018-03-07 RX ADMIN — ACETAMINOPHEN 1000 MG: 500 TABLET ORAL at 05:03

## 2018-03-07 RX ADMIN — ASPIRIN 325 MG: 325 TABLET, DELAYED RELEASE ORAL at 08:03

## 2018-03-07 RX ADMIN — OXYCODONE HYDROCHLORIDE 10 MG: 5 TABLET ORAL at 11:03

## 2018-03-07 RX ADMIN — STANDARDIZED SENNA CONCENTRATE AND DOCUSATE SODIUM 1 TABLET: 8.6; 5 TABLET, FILM COATED ORAL at 08:03

## 2018-03-07 RX ADMIN — VALSARTAN 320 MG: 160 TABLET ORAL at 08:03

## 2018-03-07 RX ADMIN — ROPIVACAINE HYDROCHLORIDE 8 ML/HR: 2 INJECTION, SOLUTION EPIDURAL; INFILTRATION at 04:03

## 2018-03-07 RX ADMIN — CEFAZOLIN 2 G: 330 INJECTION, POWDER, FOR SOLUTION INTRAMUSCULAR; INTRAVENOUS at 02:03

## 2018-03-07 RX ADMIN — ACETAMINOPHEN 1000 MG: 500 TABLET ORAL at 11:03

## 2018-03-07 NOTE — PROGRESS NOTES
Perioperative Surgical Home Progress Note      Surgery:  Procedure(s) (LRB):  REPLACEMENT-KNEE-TOTAL (Right)    Post Op Day #: 1     HPI  Rajeev Newell III is a 67 y.o. male w/ a significant PMHx of T2DM, HTN, and OA.    Interval Hx: NAEON. Patient working with PT this AM. Reports pain is well controlled. Currently a 3/10.    Catheter type: Perineural Adductor Canal    Infusion type: Ropivacaine 0.2% 8 ml/hr basal    Problem List:  Active Hospital Problems    Diagnosis  POA    *Primary osteoarthritis of right knee [M17.11]  Yes    HBP (high blood pressure) [I10]  Yes    DM (diabetes mellitus) [E11.9]  Yes      Resolved Hospital Problems    Diagnosis Date Resolved POA   No resolved problems to display.       Subjective:  General appearance of alert, oriented, no complaints  Pain with rest: 3    Numbers  Pain with movement: 3    Numbers  Side Effects:   1. Pruritis: No   2. Nausea: No   3. Motor Blockade: No, 0=Ability to raise lower extremities off bed   4. Sedation: No, 1=awake and alert    Schedule Medications:    acetaminophen  1,000 mg Intravenous Once    Followed by    acetaminophen  1,000 mg Oral Q6H    aspirin  325 mg Oral BID    atorvastatin  40 mg Oral Daily    brinzolamide  1 drop Both Eyes BID    celecoxib  200 mg Oral Daily    latanoprost  1 drop Both Eyes Nightly    meloxicam  15 mg Oral Daily    pantoprazole  40 mg Oral Daily    polyethylene glycol  17 g Oral Daily    pregabalin  150 mg Oral QHS    senna-docusate 8.6-50 mg  1 tablet Oral BID    valsartan  320 mg Oral Daily        Continuous Infusions:   sodium chloride 0.9% 150 mL/hr at 03/06/18 0930    ropivacaine (PF) 2 mg/ml (0.2%) 8 mL/hr (03/07/18 0417)    ropivacaine          PRN Medications:  albuterol, bisacodyl, cetirizine, dextrose 50%, dextrose 50%, glucagon (human recombinant), glucose, glucose, hydrocortisone, insulin aspart U-100, morphine, morphine, morphine, naloxone, ondansetron, oxyCODONE, oxyCODONE, oxyCODONE,  ramelteon, ropivacaine, sodium chloride 0.9%       Antibiotics:  Antibiotics     None           Objective:    Catheter site: clean, dry, intact    Vital Signs (Most Recent):  Temp: 36.3 °C (97.3 °F) (03/07/18 0757)  Pulse: 81 (03/07/18 0757)  Resp: 15 (03/07/18 0757)  BP: (!) 140/67 (03/07/18 0757)  SpO2: (!) 93 % (03/07/18 0757) Vital Signs Range (Last 24H):  Temp:  [35.7 °C (96.3 °F)-36.9 °C (98.5 °F)]   Pulse:  [61-94]   Resp:  [15-25]   BP: (109-170)/(62-94)   SpO2:  [93 %-100 %]      I & O (Last 24H):  Intake/Output Summary (Last 24 hours) at 03/07/18 0827  Last data filed at 03/07/18 0400   Gross per 24 hour   Intake          6072.18 ml   Output             2700 ml   Net          3372.18 ml       Physical Exam:  GA: Alert, comfortable, no acute distress.   Pulmonary: Clear to auscultation A/P/L. No wheezing, crackles, or rhonchi.  Cardiac: RRR S1 & S2 w/o rubs/murmurs/gallops.   Abdominal:Bowel sounds present. No tenderness to palpation or distension. No appreciable hepatosplenomegaly.   Skin: No jaundice, rashes, or visible lesions.    Laboratory:  CBC: No results for input(s): WBC, RBC, HGB, HCT, PLT, MCV, MCH, MCHC in the last 72 hours.    BMP: Recent Labs      03/06/18   0920   NA  137   K  5.0   CO2  22*   CL  108   BUN  17   CREATININE  1.0   GLU  133*   CALCIUM  8.6*       No results for input(s): PT, INR, PROTIME, APTT in the last 72 hours.    Anticoagulation:  mg PO BID    Assessment:    Pain control: adequate    Plan:  - Pain: Adductor canal perineural catheter in place and infusing. Good level. Multimodal pain regimen with acetaminophen, Celebrex, Lyrica, and PRN oxycodone given. Will continue to monitor.   - Plan to D/C perineural catheter in AM or home with On-Q if patient discharged today.  - HTN well controlled w/ home BP meds  - POCT glucose checks w/ low dose SSI  - FEN/GI: tolerating full diet, diabetic    Dispo: Pt working well with PT/OT. Case management and SW for placement. Plan  for D/C tomorrow morning or possibly today if patient works well with PT and meets goals.        Krishan Johnson MD  CA-1  [phone]

## 2018-03-07 NOTE — PLAN OF CARE
CM met with patient to discuss DME. Patient requested a rolling walker, bedside commode and shower chair. Patient verbalized understanding of out of pocket costs associated with shower chair.    CM ordered DME from [facility] with [facility]. DME for bedside delivery.    Christine Irwin RN, CM  [facility]  [phone]

## 2018-03-07 NOTE — ADDENDUM NOTE
Addendum  created 03/07/18 0934 by Jamie Fitzpatrick MD    Charge Capture section accepted, Cosign clinical note with attestation

## 2018-03-07 NOTE — SUBJECTIVE & OBJECTIVE
Principal Problem:Primary osteoarthritis of right knee    Principal Orthopedic Problem: same    Interval History: Patient seen and examined at bedside.  No acute events overnight.  Pain controlled.      Review of patient's allergies indicates:   Allergen Reactions    Bactrim [sulfamethoxazole-trimethoprim] Other (See Comments)     Abdominal Pain, Flush, Fever and Chills, Headache and Cough after Pt. took Bactrim    Darvon [propoxyphene]      Hallucinations       Current Facility-Administered Medications   Medication    0.9%  NaCl infusion    acetaminophen (10 mg/mL) injection 1,000 mg    Followed by    acetaminophen tablet 1,000 mg    albuterol inhaler 2 puff    aspirin EC tablet 325 mg    atorvastatin tablet 40 mg    bisacodyl suppository 10 mg    brinzolamide 1 % ophthalmic suspension 1 drop    celecoxib capsule 200 mg    cetirizine tablet 10 mg    dextrose 50% injection 12.5 g    dextrose 50% injection 25 g    glucagon (human recombinant) injection 1 mg    glucose chewable tablet 16 g    glucose chewable tablet 24 g    hydrocortisone suppository 25 mg    insulin aspart U-100 pen 0-5 Units    latanoprost 0.005 % ophthalmic solution 1 drop    meloxicam tablet 15 mg    morphine injection 2 mg    morphine injection 2 mg    morphine injection 2 mg    naloxone 0.4 mg/mL injection 0.02 mg    ondansetron injection 4 mg    oxyCODONE immediate release tablet 10 mg    oxyCODONE immediate release tablet 15 mg    oxyCODONE immediate release tablet 5 mg    pantoprazole EC tablet 40 mg    polyethylene glycol packet 17 g    pregabalin capsule 150 mg    ramelteon tablet 8 mg    ropivacaine (PF) 2 mg/ml (0.2%) infusion    ropivacaine 0.2% ON-Q C-BLOC 400 ML (SELECT A FLOW)    senna-docusate 8.6-50 mg per tablet 1 tablet    sodium chloride 0.9% flush 3 mL    valsartan tablet 320 mg     Objective:     Vital Signs (Most Recent):  Temp: 97 °F (36.1 °C) (03/07/18 0411)  Pulse: 75 (03/07/18  "0411)  Resp: 17 (03/07/18 0411)  BP: 129/71 (03/07/18 0411)  SpO2: 96 % (03/07/18 0411) Vital Signs (24h Range):  Temp:  [96.3 °F (35.7 °C)-98.5 °F (36.9 °C)] 97 °F (36.1 °C)  Pulse:  [61-94] 75  Resp:  [16-25] 17  SpO2:  [95 %-100 %] 96 %  BP: (109-170)/(62-99) 129/71     Weight: 130.2 kg (287 lb)  Height: 5' 11" (180.3 cm)  Body mass index is 40.03 kg/m².      Intake/Output Summary (Last 24 hours) at 03/07/18 0540  Last data filed at 03/07/18 0400   Gross per 24 hour   Intake          6872.18 ml   Output             2700 ml   Net          4172.18 ml       Ortho/SPM Exam  AAOx4  NAD  RRR  No increased WOB  RLE:  Aquacel c/d/i  Polar ice in place  SILT and motor intact T/SP/DP  WWP extremities  FCDs in place and functioning      Significant Labs: All pertinent labs within the past 24 hours have been reviewed.    Significant Imaging: X-Ray: I have reviewed all pertinent results/findings and my personal findings are:  stable appearing prosthesis  "

## 2018-03-07 NOTE — PLAN OF CARE
POD 1 s/p right TKA. PT/OT recommended outpatient rehab and DME. Plans to discharge patient home today with family support and plans for outpatient rehab. Patient verbalized understanding. All questions and concerns addressed. SW and CM will continue to follow for any additional needs. Discharge and follow-up instructions to be completed by the bedside nurse.    Future Appointments  Date Time Provider Department Center   3/20/2018 9:15 AM Janine Talbot NP [facility] [place] [facility]   6/22/2018 9:00 AM VISUAL FIELDS [facility] [place]   6/22/2018 9:30 AM VISUAL FIELDS [facility] [place]   6/22/2018 10:15 AM SHERMAN Smith OD [facility] [place]      03/07/18 1047   Final Note   Assessment Type Final Discharge Note   Discharge Disposition Home  (outpatient rehab)   What phone number can be called within the next 1-3 days to see how you are doing after discharge? ([phone])   Hospital Follow Up  Appt(s) scheduled? Yes   Discharge plans and expectations educations in teach back method with documentation complete? Yes

## 2018-03-07 NOTE — HPI
Rajeev Newell III is a 67 y.o. male with a several year history of Right knee pain. Pain is worse with activity and weight bearing.  Patient has experienced interference of activities of daily living due to decreased range of motion and an increase in joint pain and swelling.  Patient has failed non-operative treatment including NSAIDs, corticosteroid injections, viscosupplement injections, and activity modification.  Rajeev Newell III currently ambulates independently.

## 2018-03-07 NOTE — PT/OT/SLP PROGRESS
Physical Therapy Treatment    Patient Name:  Rajeev Newell III   MRN:  [id number]    Recommendations:     Discharge Recommendations: Outpatient Physical Therapy   Discharge Equipment Recommendations: bedside commode, shower chair, walker, rolling   Barriers to discharge: None    Assessment:     Rajeev Newell III is a 67 y.o. male admitted with a medical diagnosis of Primary osteoarthritis of right knee.  He presents with the following impairments/functional limitations:  weakness, impaired self care skills, impaired functional mobilty, gait instability, impaired balance, pain, decreased ROM, decreased lower extremity function. Patient tolerated increase distance with gait training without difficulty. Patient progressing well toward goals.  Patient would benefit from further IP therapy.    .    Rehab Prognosis:  Good; patient would benefit from acute skilled PT services to address these deficits and reach maximum level of function.      Recent Surgery: Procedure(s) (LRB):  REPLACEMENT-KNEE-TOTAL (Right) 1 Day Post-Op    Plan:     During this hospitalization, patient to be seen BID to address the above listed problems via therapeutic activities, therapeutic exercises, neuromuscular re-education  · Plan of Care Expires:      Plan of Care Reviewed with: patient    Subjective     Communicated with NSG prior to session.  Patient found supine upon PT entry to room, agreeable to treatment.      Chief Complaint: minimal R knee pain  Patient comments/goals: I want to go to OP  Pain/Comfort:  · Pain Rating 1: 3/10  · Location - Side 1: Right  · Location 1: knee    Patients cultural, spiritual, Quaker conflicts given the current situation:      Objective:     Patient found with: perineural catheter, FCD (Polar Ice)     General Precautions: Standard, fall   Orthopedic Precautions:RLE weight bearing as tolerated   Braces:       Functional Mobility:  · Bed Mobility:     · Supine to Sit: supervision  · Sit to Supine:  supervision  · Transfers:     · Sit to Stand:  stand by assistance and contact guard assistance with rolling walker  · Gait: amb 75 feet and 105 feet with RW CGA. No LOB, VC's for walker mgmnt, sequence and safety  Stairs:  Ascend and descend 7inch curb step x2 trials with RW CGA/min aqssistance. VC's for technique and sequence.               Ascend and descend 7inch curb step ( BWD approach) with RW CGA. VC's for technique and sequence.      ·         AM-PAC 6 CLICK MOBILITY  Turning over in bed (including adjusting bedclothes, sheets and blankets)?: 4  Sitting down on and standing up from a chair with arms (e.g., wheelchair, bedside commode, etc.): 4  Moving from lying on back to sitting on the side of the bed?: 4  Moving to and from a bed to a chair (including a wheelchair)?: 4  Need to walk in hospital room?: 3  Climbing 3-5 steps with a railing?: 3  Total Score: 22       Therapeutic Activities and Exercises:  Patient performed TKR protocol exercises x15-20 reps   R knee PROM 93 degrees flexion  Patient educated on car transfers.            Patient left up in chair with all lines intact and call button in reach..    GOALS:    Physical Therapy Goals        Problem: Physical Therapy Goal    Goal Priority Disciplines Outcome Goal Variances Interventions   Physical Therapy Goal     PT/OT, PT Ongoing (interventions implemented as appropriate)     Description:  Goals to be met by: 3/18/18     Patient will increase functional independence with mobility by performin. Supine to sit with Supervision  2. Sit to supine with Supervision  3. Sit to stand transfer with Stand-by Assistance  4. Gait  x 150 feet with Stand-by Assistance using Rolling Walker.   5. Lower extremity exercise program x 30 reps per handout, with independence                      Time Tracking:     PT Received On: 18  PT Start Time: 0650     PT Stop Time: 07  PT Total Time (min): 48 min     Billable Minutes: Gait Training 25, Therapeutic  Activity 15 and Therapeutic Exercise 8    Treatment Type: Treatment  PT/PTA: PTA     PTA Visit Number: 1     Jeyson Rosario II, PTA  03/07/2018

## 2018-03-07 NOTE — PLAN OF CARE
Problem: Physical Therapy Goal  Goal: Physical Therapy Goal  Goals to be met by: 3/18/18     Patient will increase functional independence with mobility by performin. Supine to sit with Supervision met  2. Sit to supine with Supervision met  3. Sit to stand transfer with Stand-by Assistance met  4. Gait  x 150 feet with Stand-by Assistance using Rolling Walker.   5. Lower extremity exercise program x 30 reps per handout, with independence     Patient goals remain appropriate.  Patient will continue to benefit from further PT services.  Jeyson Rosario, PTA

## 2018-03-07 NOTE — PT/OT/SLP PROGRESS
Physical Therapy Treatment    Patient Name:  Rajeev Newell III   MRN:  [id number]    Recommendations:     Discharge Recommendations:  home health PT   Discharge Equipment Recommendations: bedside commode, shower chair, walker, rolling   Barriers to discharge: None    Assessment:     Rajeev Newell III is a 67 y.o. male admitted with a medical diagnosis of Primary osteoarthritis of right knee.  He presents with the following impairments/functional limitations:  weakness, impaired self care skills, impaired functional mobilty, gait instability, impaired balance, decreased lower extremity function, pain, decreased ROM, orthopedic precautions decrease strength, mobility, balance and ROM.  Patient demonstrated good R LE stability with gait.  Patient required less assistance with mobility and transfers.  Patient will require family assistance upon discharge from hospital.  .    Rehab Prognosis:  Good; patient would benefit from acute skilled PT services to address these deficits and reach maximum level of function.      Recent Surgery: Procedure(s) (LRB):  REPLACEMENT-KNEE-TOTAL (Right) 1 Day Post-Op    Plan:     During this hospitalization, patient to be seen BID to address the above listed problems via gait training, therapeutic activities, therapeutic exercises, neuromuscular re-education  · Plan of Care Expires:      Plan of Care Reviewed with: patient, spouse    Subjective     Communicated with NSG prior to session.  Patient found seated in BS chair upon PT entry to room, agreeable to treatment.      Chief Complaint: Minimal R knee pain  Patient comments/goals: Patient agreeable to therapy  Pain/Comfort:  · Pain Rating 1: 3/10  · Location - Side 1: Right  · Location 1: knee    Patients cultural, spiritual, Adventist conflicts given the current situation:      Objective:     Patient found with: perineural catheter, FCD     General Precautions: Standard, fall   Orthopedic Precautions:RLE weight bearing as  tolerated   Braces:       Functional Mobility:  · Bed Mobility:     · Supine to Sit: supervision  · Sit to Supine: supervision  · Transfers:     · Sit to Stand:  stand by assistance with rolling walker  · Gait: amb 80 feet, 40 feet and 20 feet with RW CGA/SBA.  No LOB, occasional VC for walker mgmnt  Stairs:  Ascend and descend 7inch curb step ( FWD and BWD approach) with RW CGA. VC's for technique and sequence.        AM-PAC 6 CLICK MOBILITY  Turning over in bed (including adjusting bedclothes, sheets and blankets)?: 4  Sitting down on and standing up from a chair with arms (e.g., wheelchair, bedside commode, etc.): 4  Moving from lying on back to sitting on the side of the bed?: 4  Moving to and from a bed to a chair (including a wheelchair)?: 4  Need to walk in hospital room?: 4  Climbing 3-5 steps with a railing?: 3  Total Score: 23       Therapeutic Activities and Exercises:   Patient performed TKR protocol exercises x15-20 reps  Patient given another copy of HEP  Patient and Patient's spouse educated on car transfers.  Patient's wife educated on transfers, gait, bed mobility, curb step, HEP and safety with OOB mobility  Patient stated he felt comfortable going home and had no concerns            Patient left up in chair with all lines intact, call button in reach and spouse present..    GOALS:    Physical Therapy Goals        Problem: Physical Therapy Goal    Goal Priority Disciplines Outcome Goal Variances Interventions   Physical Therapy Goal     PT/OT, PT Ongoing (interventions implemented as appropriate)     Description:  Goals to be met by: 3/18/18     Patient will increase functional independence with mobility by performin. Supine to sit with Supervision met  2. Sit to supine with Supervision met  3. Sit to stand transfer with Stand-by Assistance met  4. Gait  x 150 feet with Stand-by Assistance using Rolling Walker.   5. Lower extremity exercise program x 30 reps per handout, with independence                        Time Tracking:     PT Received On: 03/07/18  PT Start Time: 1300     PT Stop Time: 1347  PT Total Time (min): 47 min     Billable Minutes: Gait Training 25, Therapeutic Activity 12 and Therapeutic Exercise 10    Treatment Type: Treatment  PT/PTA: PTA     PTA Visit Number: 2     Jeyson Rosario II, PTA  03/07/2018

## 2018-03-07 NOTE — PLAN OF CARE
Problem: Occupational Therapy Goal  Goal: Occupational Therapy Goal  Goals to be met by: 3/13/18     Patient will increase functional independence with ADLs by performing:    UE Dressing with Modified [place].  LE Dressing with Modified [place] and Assistive Devices as needed.  Grooming while standing at sink with Modified [place].  Toileting from bedside commode with Modified [place] for hygiene and clothing management.   Bathing from  edge of bed with Modified [place].  Toilet transfer to bedside commode with Modified [place].  Increased functional strength to WFL for B UE.  Upper extremity exercise program x15 reps per handout, with independence.     Cont. POC

## 2018-03-07 NOTE — PROGRESS NOTES
Pt and family present, consent to converting adductor PNC to On Q.  Site CDI.  Educated regarding continued pain management, fall risk, signs of complications, continued monitoring, as well as discontinuing catheter on 3/9.  Two numbers obtained for APS to follow up.  Understanding verbalized.

## 2018-03-07 NOTE — PT/OT/SLP PROGRESS
Occupational Therapy   Treatment    Name: Rajeev Newell III  MRN: [id number]  Admitting Diagnosis:  Primary osteoarthritis of right knee  1 Day Post-Op    Recommendations:     Discharge Recommendations: home health OT  Discharge Equipment Recommendations:  bedside commode, shower chair, walker, rolling  Barriers to discharge:  None    Subjective     Communicated with: RN prior to session.  Pain/Comfort:  · Pain Rating 1: 3/10    Patients cultural, spiritual, Scientology conflicts given the current situation: None    Objective:     Patient found with: FCD, perineural catheter    General Precautions: Standard, fall   Orthopedic Precautions:RLE weight bearing as tolerated   Braces: N/A     Occupational Performance:    Bed Mobility:    · Pt found UIC upon arrival.    Functional Mobility/Transfers:  · Patient completed Sit <> Stand Transfer with stand by assistance  with  rolling walker   · Patient completed Toilet Transfer Stand Pivot technique with stand by assistance with  rolling walker  · Patient completed  Shower Transfer Stand Pivot technique with stand by assistance with rolling walker  · Functional Mobility: Pt was able to walk to bathroom c SBA and RW.    Activities of Daily Living:  · UB Dressing: modified independence to don shirt.  · LB Dressing: modified independence to don underwear, shorts, socks, and min A to don shoe on L foot.    Patient left up in chair with all lines intact, call button in reach and RN notified    AMPAC 6 Click:  AMPAC Total Score: 24    Treatment & Education:  Educated pt on bEducation:  athing and car T/F's.  Assessment:     Rajeev Newell III is a 67 y.o. male with a medical diagnosis of Primary osteoarthritis of right knee.  He was able to perform supine/sit, sit/stand and walk to bathroom c SBA and RW.  Able to perform UB/LB dressing c SBA x for R shoe which was min A.  Pt is progressing well.  Performance deficits affecting function are impaired self care skills, impaired  functional mobilty, orthopedic precautions.      Rehab Prognosis:  Good; patient would benefit from acute skilled OT services to address these deficits and reach maximum level of function.       Plan:     Patient to be seen daily to address the above listed problems via self-care/home management, therapeutic activities, therapeutic exercises  · Plan of Care Expires: 03/13/18  · Plan of Care Reviewed with: patient    This Plan of care has been discussed with the patient who was involved in its development and understands and is in agreement with the identified goals and treatment plan    GOALS:    Occupational Therapy Goals        Problem: Occupational Therapy Goal    Goal Priority Disciplines Outcome Interventions   Occupational Therapy Goal     OT, PT/OT     Description:  Goals to be met by: 3/13/18     Patient will increase functional independence with ADLs by performing:    UE Dressing with Modified [place].  LE Dressing with Modified [place] and Assistive Devices as needed.  Grooming while standing at sink with Modified [place].  Toileting from bedside commode with Modified [place] for hygiene and clothing management.   Bathing from  edge of bed with Modified [place].  Toilet transfer to bedside commode with Modified [place].  Increased functional strength to WFL for B UE.  Upper extremity exercise program x15 reps per handout, with independence.                      Time Tracking:     OT Date of Treatment: 03/07/18  OT Start Time: 0950  OT Stop Time: 1015  OT Total Time (min): 25 min    Billable Minutes:Self Care/Home Management JESIKA Arzate  3/7/2018

## 2018-03-07 NOTE — HOSPITAL COURSE
On 3/6/2018, the patient arrived to the [facility] for proper pre-operative management.  Upon completion of pre-operative preparation, the patient was taken back to the operative theatre.  A right TKA was performed without complication and the patient was transported to the post anesthesia care unit in stable condition.  After appropriate recovery from the anaesthetic agents used during the surgery, the patient was then transported to the hospital inpatient floor.  The interim of the hospital stay from arrival on the floor up to discharge has been uncomplicated. The patient has experienced minimal electrolyte imbalances that have been repleated accordingly.  The patient's diet has progressed to a regular diet with no nausea or vomiting.  The patient's pain has been controlled using a multimodal approach with the help of the anesthesia pain service. Currently, the patient's pain is well controlled on an oral regimen.  The patient has been voiding without difficulty ever since surgery. The patient began participation in physical therapy on post-operative day one and has progressed throughout the entire hospital stay.  Currently the patient is ambulating with moderate assistance and the physical therapy team feels that the patient's progress is sufficient to allow the patient to be discharged home safely.  The patient agrees with this assessment and desires a discharge to home today.

## 2018-03-07 NOTE — PLAN OF CARE
Medicare discharge appeals right discussed with patient. IMM signed and placed in patient's chart. Patient verbalized understanding of IMM rights.     03/07/18 0750   Medicare Message   Important Message from Medicare regarding Discharge Appeal Rights Given to patient/caregiver;Explained to patient/caregiver;Signed/date by patient/caregiver   Date IMM was signed 03/07/18   Time IMM was signed 0750

## 2018-03-07 NOTE — PROGRESS NOTES
Pt discharged to home via wheelchair. Pt denies pain at the present time. Condition stable. Follows commands. Pt given prescriptions and copy of discharge home care instructions. Pt verbalized understanding of activity limitations and s/s of when to call the Dr. Pt also given information on followup appointment. All belongings with the pt. Pt verbalized understanding of all discharge instructions.     Iv removed and catheter intact.     Pt waiting for transport.

## 2018-03-07 NOTE — ASSESSMENT & PLAN NOTE
67 y.o. male POD1 s/p R TKA    Pain control: multimodal  PT/OT: WBAT RLE  DVT PPx:  BID, FCDs at all times when not ambulating  Abx: postop Ancef    Dispo: f/u PT recs, likely home today

## 2018-03-07 NOTE — PROGRESS NOTES
[facility]  Orthopedics  Progress Note    Patient Name: Rajeev Newell III  MRN: [id number]  Admission Date: 3/6/2018  Hospital Length of Stay: 1 days  Attending Provider: Edmund Rosas MD  Primary Care Provider: Swati Walton MD  Follow-up For: Procedure(s) (LRB):  REPLACEMENT-KNEE-TOTAL (Right)    Post-Operative Day: 1 Day Post-Op  Subjective:     Principal Problem:Primary osteoarthritis of right knee    Principal Orthopedic Problem: same    Interval History: Patient seen and examined at bedside.  No acute events overnight.  Pain controlled.      Review of patient's allergies indicates:   Allergen Reactions    Bactrim [sulfamethoxazole-trimethoprim] Other (See Comments)     Abdominal Pain, Flush, Fever and Chills, Headache and Cough after Pt. took Bactrim    Darvon [propoxyphene]      Hallucinations       Current Facility-Administered Medications   Medication    0.9%  NaCl infusion    acetaminophen (10 mg/mL) injection 1,000 mg    Followed by    acetaminophen tablet 1,000 mg    albuterol inhaler 2 puff    aspirin EC tablet 325 mg    atorvastatin tablet 40 mg    bisacodyl suppository 10 mg    brinzolamide 1 % ophthalmic suspension 1 drop    celecoxib capsule 200 mg    cetirizine tablet 10 mg    dextrose 50% injection 12.5 g    dextrose 50% injection 25 g    glucagon (human recombinant) injection 1 mg    glucose chewable tablet 16 g    glucose chewable tablet 24 g    hydrocortisone suppository 25 mg    insulin aspart U-100 pen 0-5 Units    latanoprost 0.005 % ophthalmic solution 1 drop    meloxicam tablet 15 mg    morphine injection 2 mg    morphine injection 2 mg    morphine injection 2 mg    naloxone 0.4 mg/mL injection 0.02 mg    ondansetron injection 4 mg    oxyCODONE immediate release tablet 10 mg    oxyCODONE immediate release tablet 15 mg    oxyCODONE immediate release tablet 5 mg    pantoprazole EC tablet 40 mg    polyethylene glycol packet 17 g     "pregabalin capsule 150 mg    ramelteon tablet 8 mg    ropivacaine (PF) 2 mg/ml (0.2%) infusion    ropivacaine 0.2% ON-Q C-BLOC 400 ML (SELECT A FLOW)    senna-docusate 8.6-50 mg per tablet 1 tablet    sodium chloride 0.9% flush 3 mL    valsartan tablet 320 mg     Objective:     Vital Signs (Most Recent):  Temp: 97 °F (36.1 °C) (03/07/18 0411)  Pulse: 75 (03/07/18 0411)  Resp: 17 (03/07/18 0411)  BP: 129/71 (03/07/18 0411)  SpO2: 96 % (03/07/18 0411) Vital Signs (24h Range):  Temp:  [96.3 °F (35.7 °C)-98.5 °F (36.9 °C)] 97 °F (36.1 °C)  Pulse:  [61-94] 75  Resp:  [16-25] 17  SpO2:  [95 %-100 %] 96 %  BP: (109-170)/(62-99) 129/71     Weight: 130.2 kg (287 lb)  Height: 5' 11" (180.3 cm)  Body mass index is 40.03 kg/m².      Intake/Output Summary (Last 24 hours) at 03/07/18 0540  Last data filed at 03/07/18 0400   Gross per 24 hour   Intake          6872.18 ml   Output             2700 ml   Net          4172.18 ml       Ortho/SPM Exam  AAOx4  NAD  RRR  No increased WOB  RLE:  Aquacel c/d/i  Polar ice in place  SILT and motor intact T/SP/DP  WWP extremities  FCDs in place and functioning      Significant Labs: All pertinent labs within the past 24 hours have been reviewed.    Significant Imaging: X-Ray: I have reviewed all pertinent results/findings and my personal findings are:  stable appearing prosthesis    Assessment/Plan:     * Primary osteoarthritis of right knee    67 y.o. male POD1 s/p R TKA    Pain control: multimodal  PT/OT: WBAT RLE  DVT PPx:  BID, FCDs at all times when not ambulating  Abx: postop Ancef    Dispo: f/u PT recs, likely home today          HBP (high blood pressure)    Home meds        DM (diabetes mellitus)    Sliding scale insulin              Lalito E Summarriva, MD  Orthopedics  [facility]  "

## 2018-03-07 NOTE — PLAN OF CARE
Problem: Patient Care Overview  Goal: Plan of Care Review  Outcome: Ongoing (interventions implemented as appropriate)  Pt AAOX4, VSS. Surgical site CDI - ace/ice intact. PNC infusing. No family at bedside. Pt is resting quietly. No s/s infection, skin breakdown/pressure ulcers - pt moves independently well. Pain managed with PRN medications. Deep breathing encouraged. FCD's on and functioning. Blood glucose monitoring ordered and carried out. Fall precautions maintained. Will monitor

## 2018-03-07 NOTE — DISCHARGE SUMMARY
[facility]  Orthopedics  Discharge Summary      Patient Name: Rajeev Newell III  MRN: [id number]  Admission Date: 3/6/2018  Hospital Length of Stay: 1 days  Discharge Date and Time:  03/07/2018  Attending Physician: Edmund Rosas MD   Discharging Provider: Lalito Ramey MD  Primary Care Provider: Swati Walton MD    HPI:   Rajeev Newell III is a 67 y.o. male with a several year history of Right knee pain. Pain is worse with activity and weight bearing.  Patient has experienced interference of activities of daily living due to decreased range of motion and an increase in joint pain and swelling.  Patient has failed non-operative treatment including NSAIDs, corticosteroid injections, viscosupplement injections, and activity modification.  Rajeev Newell III currently ambulates independently.     Procedure(s) (LRB):  REPLACEMENT-KNEE-TOTAL (Right)      Hospital Course:  On 3/6/2018, the patient arrived to the [facility] for proper pre-operative management.  Upon completion of pre-operative preparation, the patient was taken back to the operative theatre.  A right TKA was performed without complication and the patient was transported to the post anesthesia care unit in stable condition.  After appropriate recovery from the anaesthetic agents used during the surgery, the patient was then transported to the hospital inpatient floor.  The interim of the hospital stay from arrival on the floor up to discharge has been uncomplicated. The patient has experienced minimal electrolyte imbalances that have been repleated accordingly.  The patient's diet has progressed to a regular diet with no nausea or vomiting.  The patient's pain has been controlled using a multimodal approach with the help of the anesthesia pain service. Currently, the patient's pain is well controlled on an oral regimen.  The patient has been voiding without difficulty ever since surgery. The patient  "began participation in physical therapy on post-operative day one and has progressed throughout the entire hospital stay.  Currently the patient is ambulating with moderate assistance and the physical therapy team feels that the patient's progress is sufficient to allow the patient to be discharged home safely.  The patient agrees with this assessment and desires a discharge to home today.        Consults         Status Ordering Provider     Inpatient consult to Pain Management  Once     Provider:  NURSE, PAIN MANAGEMENT    Acknowledged MARK KEN     Inpatient consult to Respiratory Care  Once     Provider:  (Not yet assigned)    MARK Strong     Inpatient consult to Social Work  Once     Provider:  (Not yet assigned)    MARK Strong          Significant Diagnostic Studies: Labs:   BMP:   Recent Labs  Lab 03/06/18  0920   *      K 5.0      CO2 22*   BUN 17   CREATININE 1.0   CALCIUM 8.6*     Radiology: X-Ray: right knee    Pending Diagnostic Studies:     None        Final Active Diagnoses:    Diagnosis Date Noted POA    PRINCIPAL PROBLEM:  Primary osteoarthritis of right knee [M17.11] 03/06/2018 Yes    HBP (high blood pressure) [I10] 09/18/2014 Yes    DM (diabetes mellitus) [E11.9] 10/29/2012 Yes      Problems Resolved During this Admission:    Diagnosis Date Noted Date Resolved POA      Discharged Condition: stable    Disposition: Home or Self Care    Follow Up:  Follow-up Information     Mark Ken NP. Go on 3/20/2018.    Specialty:  Orthopedic Surgery  Why:  For wound re-check  Contact information:  [address]  [place] [address]  [phone]                 Patient Instructions:     3 IN 1 COMMODE FOR HOME USE   Order Specific Question Answer Comments   Type: Standard    Height: 5' 11" (1.803 m)    Weight: 130.3 kg (287 lb 4.2 oz)    Does patient have medical equipment at home? none    Length of need (1-99 months): 99    Vendor: " "Other (use comments) Disregard   Expected Date of Delivery: 3/7/2018      TRANSFER TUB BENCH FOR HOME USE   Order Specific Question Answer Comments   Type of Transfer Tub Bench: Unpadded    Height: 5' 11" (1.803 m)    Weight: 130.3 kg (287 lb 4.2 oz)    Does patient have medical equipment at home? none    Length of need (1-99 months): 99    Vendor: Other (use comments) Disregard   Expected Date of Delivery: 3/7/2018      WALKER FOR HOME USE   Order Specific Question Answer Comments   Type of Walker: Adult (5'4"-6'6")    With wheels? No    Height: 5' 11" (1.803 m)    Weight: 130.3 kg (287 lb 4.2 oz)    Length of need (1-99 months): 99    Does patient have medical equipment at home? none    Please check all that apply: Walker will be used for gait training.    Vendor: Other (use comments) Disregard   Expected Date of Delivery: 3/7/2018      WALKER FOR HOME USE   Order Specific Question Answer Comments   Type of Walker: Adult (5'4"-6'6")    With wheels? Yes    Height: 5' 11" (1.803 m)    Weight: 130.2 kg (287 lb)    Length of need (1-99 months): 99    Does patient have medical equipment at home? none    Please check all that apply: Patient is unable to safely ambulate without equipment.    Please check all that apply: Walker will be used for gait training.    Vendor: Other (use comments) [employer]    Expected Date of Delivery: 3/7/2018      COMMODE FOR HOME USE   Order Specific Question Answer Comments   Type: Standard    Height: 5' 11" (1.803 m)    Weight: 130.2 kg (287 lb)    Does patient have medical equipment at home? none    Length of need (1-99 months): 99    Vendor: Other (use comments) [employer]   Expected Date of Delivery: 3/7/2018      BATH/SHOWER CHAIR FOR HOME USE   Order Specific Question Answer Comments   Height: 5' 11" (1.803 m)    Weight: 130.2 kg (287 lb)    Does patient have medical equipment at home? none    Length of need (1-99 months): 99    Type: Without back    Vendor: Other (use " comments) [employer]   Expected Date of Delivery: 3/7/2018      Ambulatory Referral to Physical/Occupational Therapy   Referral Priority: Emergency Referral Type: Physical Medicine   Referral Reason: Specialty Services Required    Number of Visits Requested: 1      Activity as tolerated     Call MD for:  difficulty breathing, headache or visual disturbances     Call MD for:  extreme fatigue     Call MD for:  hives     Call MD for:  persistent dizziness or light-headedness     Call MD for:  persistent nausea and vomiting     Call MD for:  redness, tenderness, or signs of infection (pain, swelling, redness, odor or green/yellow discharge around incision site)     Call MD for:  severe uncontrolled pain     Call MD for:  temperature >100.4     Keep surgical extremity elevated     Leave dressing on - Keep it clean, dry, and intact until clinic visit   Order Comments: Do not remove surgical dressing for 2 weeks post-op. This will be done only by MD at initial post-op visit. If dressing is completely saturated, replace with identical dressing - silver-impregnated hydrocolloid dressing.     Do not get dressings wet. Do not shower.     If dressing continues to be saturated or there are signs of infection, please call [facility] [phone] for further instructions and to make appt to be seen.     Lifting restrictions   Order Comments: No strenuous exercise or lifting of > 10 lbs     No driving, operating heavy equipment or signing legal documents while taking pain medication     Sponge bath only until clinic visit     Weight bearing as tolerated       Medications:  Reconciled Home Medications:   Discharge Medication List as of 3/7/2018  2:03 PM      START taking these medications    Details   docusate sodium (COLACE) 100 MG capsule Take 1 capsule (100 mg total) by mouth 2 (two) times daily., Starting Tue 3/6/2018, Print      ondansetron (ZOFRAN) 8 MG tablet Take 1 tablet (8 mg total) by mouth every 8 (eight) hours  as needed for Nausea., Starting Tue 3/6/2018, Print      oxyCODONE-acetaminophen (PERCOCET)  mg per tablet Take 1 tablet by mouth every 4 (four) hours as needed for Pain., Starting Tue 3/6/2018, Print         CONTINUE these medications which have CHANGED    Details   aspirin (ECOTRIN) 325 MG EC tablet Take 1 tablet (325 mg total) by mouth 2 (two) times daily., Starting Tue 3/6/2018, Until Thu 4/5/2018, Print         CONTINUE these medications which have NOT CHANGED    Details   acyclovir 5% (ZOVIRAX) 5 % ointment Apply topically every 3 (three) hours., Starting Mon 2/12/2018, Normal      albuterol 90 mcg/actuation inhaler Inhale 2 puffs into the lungs every 4 (four) hours as needed for Wheezing., Starting Tue 2/6/2018, Normal      atorvastatin (LIPITOR) 40 MG tablet TK 1 T PO  QPM, Historical Med      brinzolamide (AZOPT) 1 % ophthalmic suspension Place 1 drop into both eyes 2 (two) times daily., Starting 3/6/2017, Until Wed 3/7/18, Normal      clotrimazole (LOTRIMIN) 1 % cream Apply topically 2 (two) times daily., Starting Thu 2/8/2018, Normal      fexofenadine (ALLEGRA) 180 MG tablet Every day PRN, Starting 1/12/2012, Until Discontinued, Historical Med      hydrocortisone (ANUSOL-HC) 25 mg suppository Place 1 suppository (25 mg total) rectally 2 (two) times daily as needed for Hemorrhoids. 1 Suppository(ies) Rectal PRN Twice a day., Starting 5/24/2016, Until Discontinued, Normal      latanoprost 0.005 % ophthalmic solution Place 1 drop into both eyes nightly., Starting Thu 2/8/2018, Until Fri 2/8/2019, Normal      pantoprazole (PROTONIX) 40 MG tablet Every day, Starting 1/12/2012, Until Discontinued, Historical Med      pioglitazone (ACTOS) 15 MG tablet Take 1 tablet (15 mg total) by mouth once daily., Starting Thu 12/7/2017, Normal      valACYclovir (VALTREX) 1000 MG tablet Take 1 tablet (1,000 mg total) by mouth 2 (two) times daily., Starting Mon 2/12/2018, Until Tue 2/12/2019, Normal      valsartan  (DIOVAN) 320 MG tablet TAKE 1 TABLET BY MOUTH QD, Historical Med         STOP taking these medications       meloxicam (MOBIC) 15 MG tablet Comments:   Reason for Stopping:               Lalito Ramey MD  Orthopedics  [facility]

## 2018-03-07 NOTE — ADDENDUM NOTE
Addendum  created 03/07/18 2038 by Sarah Martino RN    Sign clinical note, Visit Navigator SmartForm Flowsheet section accepted

## 2018-03-07 NOTE — PLAN OF CARE
Problem: Physical Therapy Goal  Goal: Physical Therapy Goal  Goals to be met by: 3/18/18     Patient will increase functional independence with mobility by performin. Supine to sit with Supervision  2. Sit to supine with Supervision  3. Sit to stand transfer with Stand-by Assistance  4. Gait  x 150 feet with Stand-by Assistance using Rolling Walker.   5. Lower extremity exercise program x 30 reps per handout, with independence     Patient goals remain appropriate.  Patient will continue to benefit from further PT services.  Jeyson Rosario, PTA

## 2018-03-07 NOTE — PLAN OF CARE
POD 1 s/p right TKA. PT/OT ordered to eval and treat. PT/OT recommended outpatient rehab and DME. Patient currently lives with his spouse. Patient has good family support. CM completed discharge assessment and planning with patient. Patient verbalized understanding. All questions and concerns addressed. SW and CM will continue to follow for any additional needs. Plan A to discharge home with family support and plans for outpatient rehab as soon as medically stable. Plan B to discharge home with home health.     [facility].    PCP: Swati Walton MD    Pharmacy:   [facility] [address] [place] [address] AT [facility]  [address] [address] [address]  Phone: [phone] Fax: [phone]    Payor: MEDICARE / Plan: MEDICARE PART A & B / Product Type: [facility] /      03/07/18 0750   Discharge Assessment   Assessment Type Discharge Planning Assessment   Confirmed/corrected address and phone number on facesheet? Yes   Assessment information obtained from? Patient;Medical Record   Expected Length of Stay (days) 2   Communicated expected length of stay with patient/caregiver yes   Prior to hospitilization cognitive status: Alert/Oriented   Prior to hospitalization functional status: Independent   Current cognitive status: Alert/Oriented   Current Functional Status: Assistive Equipment   Lives With spouse   Able to Return to Prior Arrangements yes   Is patient able to care for self after discharge? Yes   Who are your caregiver(s) and their phone number(s)? spouse- Isis Newell [phone]   Patient's perception of discharge disposition other (comments)  (outpatient rehab)   Readmission Within The Last 30 Days no previous admission in last 30 days   Patient currently being followed by outpatient case management? No   Patient currently receives any other outside agency services? No   Equipment Currently Used at Home none   Do  you have any problems affording any of your prescribed medications? No   Is the patient taking medications as prescribed? yes   Does the patient have transportation home? Yes   Transportation Available family or friend will provide   Does the patient receive services at the [facility]? No   Discharge Plan A Home with family;Other  (outpatient rehab)   Discharge Plan B Home Health;Home with family   Patient/Family In Agreement With Plan yes

## 2018-03-08 ENCOUNTER — CLINICAL SUPPORT (OUTPATIENT)
Dept: REHABILITATION | Facility: HOSPITAL | Age: 68
End: 2018-03-08
Attending: ORTHOPAEDIC SURGERY
Payer: MEDICARE

## 2018-03-08 DIAGNOSIS — M17.11 PRIMARY OSTEOARTHRITIS OF RIGHT KNEE: Primary | ICD-10-CM

## 2018-03-08 PROBLEM — R53.1 WEAKNESS: Status: RESOLVED | Noted: 2018-01-16 | Resolved: 2018-03-08

## 2018-03-08 PROBLEM — M25.561 CHRONIC PAIN OF RIGHT KNEE: Status: RESOLVED | Noted: 2018-01-16 | Resolved: 2018-03-08

## 2018-03-08 PROBLEM — G89.29 CHRONIC PAIN OF RIGHT KNEE: Status: RESOLVED | Noted: 2018-01-16 | Resolved: 2018-03-08

## 2018-03-08 PROBLEM — R26.2 DIFFICULTY WALKING: Status: RESOLVED | Noted: 2018-01-16 | Resolved: 2018-03-08

## 2018-03-08 PROBLEM — Z78.9 DIFFICULTY NAVIGATING STAIRS: Status: RESOLVED | Noted: 2018-01-16 | Resolved: 2018-03-08

## 2018-03-08 PROCEDURE — 97161 PT EVAL LOW COMPLEX 20 MIN: CPT | Mod: PO | Performed by: PHYSICAL THERAPIST

## 2018-03-08 PROCEDURE — 97110 THERAPEUTIC EXERCISES: CPT | Mod: PO | Performed by: PHYSICAL THERAPIST

## 2018-03-08 PROCEDURE — G8979 MOBILITY GOAL STATUS: HCPCS | Mod: CJ,PO | Performed by: PHYSICAL THERAPIST

## 2018-03-08 PROCEDURE — G8978 MOBILITY CURRENT STATUS: HCPCS | Mod: CL,PO | Performed by: PHYSICAL THERAPIST

## 2018-03-08 NOTE — PROGRESS NOTES
Pt called to report a fever of >102. Denies any other s/s of infection at PNC site, at joint replacement site. Denies worsening erythema or increased drainage. Denies SOB, calf pain or swelling. Denies CP. States pain is still well controlled. Advised pt to take tylenol 650mg q6-8 for his fevers and that if they persisted or he developed any of the additional above mentioned symptoms to contact the ortho clinic in the AM. All other questions answered.    Geoff Cervantes, CA-2

## 2018-03-08 NOTE — PLAN OF CARE
"  TIME RECORD    Date: 03/08/2018    Start Time:  1215  Stop Time:  100        OUTPATIENT PHYSICAL THERAPY   PATIENT EVALUATION  Onset Date: DOS 3/7/18  Primary Diagnosis:   1. Primary osteoarthritis of right knee       Treatment Diagnosis: R knee pain s/p TKA  Past Medical History:   Diagnosis Date    Cataract     OU--early    DM (diabetes mellitus) 10/29/2012    Glaucoma     POAG-OU    Hypertension     Sinusitis      Precautions: fall risk, DM, HTN  Prior Therapy: yes, came here for prehab  Medications: Rajeev Newell III has a current medication list which includes the following prescription(s): acyclovir 5%, albuterol, aspirin, atorvastatin, brinzolamide, clotrimazole, docusate sodium, fexofenadine, hydrocortisone, latanoprost, ondansetron, oxycodone-acetaminophen, pantoprazole, pioglitazone, valacyclovir, and valsartan.  Nutrition:  Obese  History of Present Illness: Pt presents 2 days s/p R TKA. Wife, Isis, is present during the evaluation. Pt reports minimal pain since surgery, however nerve block still in place. Pt does report no bowel movement in 3 days. No falls, no parasthesia, no fever since surgery.   Prior Level of Function: Independent, however did occasionally walk with cane due to pain  Social History: lives at home with wife, works as an . Hobbies include, but not limited to, hunting.  Place of Residence (Steps/Adaptations): No concerns, no stairs. Pt does have to negotiate stairs at home.   Functional Deficits Leading to Referral/Nature of Injury: was unable to go to his camp due to stairs and ambulation on uneven ground, difficulty with walking  Patient Therapy Goals: go back to work in 2-4 weeks, hunting    Subjective     Rajeev Newell III states "I'm feeling pretty good".    Pain:  Location: knee, right   Description: Tight  Activities Which Increase Pain: bending too far, but really cannot feel that right now  Activities Which Decrease Pain: rest  Pain Scale: 0/10 at best " 0/10 now  NA/10 at worst    Objective     Posture: genu varus B  Palpation: TTP post knee at hamstring tendons. No tenderness in gastroc/soleus, post-knee. TTP medial knee, palpable edema.  Sensation: Typical post-op parasthesia    Range of Motion/Strength:   Knee  Right   Left  Pain/Dysfunction with Movement    AROM PROM MMT AROM PROM MMT    Flexion 82* 86* * NT 5/5    Extension -10* -5* NT -5* NT 5/5      MMT RIGHT LEFT   Hip flex 4+/5 5/5   Hip ext 4+/5 5/5   Hip abd 5/5 5/5   Hip add 5/5 5/5        Flexibility: mod restriction in R hamstrings and R gastroc/soleus  Gait: With AD.  Device Used -  Rolling walker  Analysis: SBA - Cuing for heel strike  Bed Mobility:Independent  Transfers: Independent  Special Tests: Neg Thomas's RLE  Other: LEFS: 20/80  (75% disability, Gcodes current CL, goal CJ)   CH 0 percent impaired, limited or restricted  CI At least 1 percent but less than 20 percent impaired, limited or restricted  CJ At least 20 percent but less than 40 percent impaired, limited or restricted  CK At least 40 percent but less than 60 percent impaired, limited or restricted  CL At least 60 percent but less than 80 percent impaired, limited or restricted  CM At least 80 percent but less than 100 percent impaired, limited or restricted   percent impaired, limited or restricted    Treatment: Instruction in therapeutic exercises for HEP including HSS seated 3x30s, gastroc st with strap in long sitting, 3x30s and heel slides 20x. Ice x 10 min to ant and post knee.     Assessment       Initial Assessment (Pertinent finding, problem list and factors affecting outcome): Pt is a 68 y/o male who presents s/p R TKA x 2 days ago. Nerve block is still in place, therefore pain is well under control at this time. Pt demonstrates typical post-op impairments of edema, decreased AROM and decreased strength of the RLE, leading to gait, transfer deficits. Pt will benefit from PT to address his impairments to progress  pt back to ambulation without AD and help him resume his PLOF and activity.   Rehab Potiential: good    Short Term Goals (4 Weeks):   1. Pt will be able to walk 500 feet without AD, demonstrating minimal antalgic gait pattern.  2. Pt will demonstrate R knee AROM -5*-110* to improve ability to perform transfers and ambulation with minimal difficulty.  Long Term Goals (8 Weeks):   1. Pt will be able to ascend/descend one flight of stairs with U UE support, reciprocal gait pattern.   2. Pt will demonstrate normalized gait pattern on various surfaces.  3. Pt will demonstrate R knee flexion of >/=117* to improve tolerance for squatting and sitting activities.    History  Co-morbidities and personal factors that may impact the plan of care Examination  Body Structures and Functions, activity limitations and participation restrictions that may impact the plan of care    Clinical Presentation   Co-morbidities:   advanced age and obesity        Personal Factors:   age Body Regions:   lower extremities    Body Systems:    ROM  strength  balance  gait  transfers            Participation Restrictions:   Walking, stairs     Activity limitations:   Learning and applying knowledge  no deficits    General Tasks and Commands  no deficits    Communication  no deficits    Mobility  walking    Self care  no deficits    Domestic Life  no deficits    Interactions/Relationships  no deficits    Life Areas  employment    Community and Social Life  no deficits         stable and uncomplicated                      low   low  moderate Decision Making/ Complexity Score:  low             Plan     Certification Period: 3/8/18 to 5/3/18  Recommended Treatment Plan: 2-3 times per week for 8 weeks: Electrical Stimulation IFC for pain control, Russian stim to improve quad recruitment, Gait Training, Manual Therapy, Moist Heat/ Ice, Neuromuscular Re-ed, Therapeutic Activites and Therapeutic Exercise  Other Recommendations: linda      Therapist: Gracie  J Bruno, PT    I CERTIFY THE NEED FOR THESE SERVICES FURNISHED UNDER THIS PLAN OF TREATMENT AND WHILE UNDER MY CARE    Physician's comments: ________________________________________________________________________________________________________________________________________________      Physician's Name: ___________________________________

## 2018-03-09 NOTE — PROGRESS NOTES
Contacted patient at home regarding their OnQ ball. Patient reports tolerable pain level, well controlled w/ supplemental PO meds. Discussed date and time of removal of OnQ ball and reinforced instructions regarding removal. Will continue to follow while OnQ ball remains in place. All questions answered, all concerns addressed during telephone conversation.     Discussed patient's fever during phone conversation. Patient states his most recent temperature was 99.1F. Continues to deny any additional symptoms. Reinforced instructions to call the Anesthesia number provided or Dr. Rosas's office if patient continued to spike fevers.      Krishan Johnson MD  CA-5 [phone]

## 2018-03-12 ENCOUNTER — CLINICAL SUPPORT (OUTPATIENT)
Dept: REHABILITATION | Facility: HOSPITAL | Age: 68
End: 2018-03-12
Attending: ORTHOPAEDIC SURGERY
Payer: MEDICARE

## 2018-03-12 DIAGNOSIS — M17.11 PRIMARY OSTEOARTHRITIS OF RIGHT KNEE: ICD-10-CM

## 2018-03-12 PROCEDURE — 97110 THERAPEUTIC EXERCISES: CPT | Mod: PO | Performed by: PHYSICAL THERAPIST

## 2018-03-12 PROCEDURE — 97112 NEUROMUSCULAR REEDUCATION: CPT | Mod: PO | Performed by: PHYSICAL THERAPIST

## 2018-03-12 NOTE — PROGRESS NOTES
"                                                    Physical Therapy Daily Note   Name: Rajeev Newell [facility]  Clinic Number: [id number]    3/12/2018  In 1106  Out 1204    Diagnosis:   Encounter Diagnosis   Name Primary?    Primary osteoarthritis of right knee      Physician: Edmund Rosas MD  Treatment Orders: PT Eval and Treat    Subjective   Pt reports: He has not had to take a pain pill Since Friday, but did take one this morning before coming to PT. At rest, he has no pain. Does c/o "pressure".    Pain Scale:  0/10      Objective     Instruction in therapeutic exercises to improve strength, ROM, flexibility x 35 min:    Bike, 1/2 revolutions, 10 min, to improve ROM  HSS, seated, 3x30s  Calf stretch with strap, 3x30s  Heel slides,   SLR, with PT assist,     Russian stim during quad sets, 10 sec on/off, to improve MM recruitment    Ice pack to post and ant knee x 10 min at end of session.    AROM R Knee -5*-107*    Written Home Exercises Provided: Patient educated to continue with previously issued HEP.  Exercises were reviewed and Jared was able to demonstrate them prior to the end of the session. Pt received a written copy of exercises to perform at home. Jared demonstrated good  understanding of the education provided.     Assessment   Pt demonstrates antalgic limp when attempting to walk short distances without AD.   Jared is progressing well towards his goals and no updates to goals at this time. Will benefit from con't skilled care.    Anticipated barriers to physical therapy: none  Pt's spiritual, cultural and educational needs considered and pt agreeable to plan of care and goals.    Plan   Continue with established Plan of Care towards Physical Therapy goals.       Gracie Carr, PT    "

## 2018-03-13 LAB
SPECIMEN SOURCE: NORMAL
VIRUS SPEC CULT: NORMAL

## 2018-03-15 ENCOUNTER — CLINICAL SUPPORT (OUTPATIENT)
Dept: REHABILITATION | Facility: HOSPITAL | Age: 68
End: 2018-03-15
Attending: ORTHOPAEDIC SURGERY
Payer: MEDICARE

## 2018-03-15 DIAGNOSIS — M17.11 PRIMARY OSTEOARTHRITIS OF RIGHT KNEE: ICD-10-CM

## 2018-03-15 PROCEDURE — 97110 THERAPEUTIC EXERCISES: CPT | Mod: PO | Performed by: PHYSICAL THERAPIST

## 2018-03-15 PROCEDURE — 97112 NEUROMUSCULAR REEDUCATION: CPT | Mod: PO | Performed by: PHYSICAL THERAPIST

## 2018-03-15 PROCEDURE — 97140 MANUAL THERAPY 1/> REGIONS: CPT | Mod: PO | Performed by: PHYSICAL THERAPIST

## 2018-03-15 NOTE — PROGRESS NOTES
"                                                    Physical Therapy Daily Note   Name: Rajeev Newell [facility]  Clinic Number: [id number]    3/15/2018  In 1108  Out 1210    Diagnosis:   Encounter Diagnosis   Name Primary?    Primary osteoarthritis of right knee      Physician: Edmund Rosas MD  Treatment Orders: PT Eval and Treat    Subjective   Pt reports: He has not had to take a pain pill Since Friday, but did take one this morning before coming to PT. At rest, he has no pain. Does c/o "pressure".    Pain Scale:  0/10      Objective     Instruction in therapeutic exercises to improve strength, ROM, flexibility x 35 min:    Bike, 1/2 revolutions, 10 min, to improve ROM  HSS, seated, 3x30s  Standing TKE, no resistance, 20x5s  Calf stretch incline board, 3x30s  Heel slides, 10x  SLR, 3x10  Standing hip abd, 2x10, L/R    Manual therapy of tibiofemoral AP glides to improve extension, grade III, and patellar mobilizations all planes, grade IV, x 8 min.     Russian stim during quad sets, 10 sec on/off, to improve MM recruitment    Ice pack to post and ant knee x 10 min at end of session.    AROM R Knee -5*-107*    Written Home Exercises Provided: Patient educated to continue with previously issued HEP.  Exercises were reviewed and Jared was able to demonstrate them prior to the end of the session. Pt received a written copy of exercises to perform at home. Jared demonstrated good  understanding of the education provided.     Assessment   Pt able to accept full weight on RLE without knee buckling, without UE support, but is only able to maintain x 1 second due to pain and balance deficits. Pt progressing nicely.     Jared is progressing well towards his goals and no updates to goals at this time. Will benefit from con't skilled care.    Anticipated barriers to physical therapy: none  Pt's spiritual, cultural and educational needs considered and pt agreeable to plan of care and goals.    Plan   Continue with " established Plan of Care towards Physical Therapy goals.       Gracie Carr, PT

## 2018-03-16 ENCOUNTER — CLINICAL SUPPORT (OUTPATIENT)
Dept: REHABILITATION | Facility: HOSPITAL | Age: 68
End: 2018-03-16
Attending: ORTHOPAEDIC SURGERY
Payer: MEDICARE

## 2018-03-16 DIAGNOSIS — M17.11 PRIMARY OSTEOARTHRITIS OF RIGHT KNEE: ICD-10-CM

## 2018-03-16 PROCEDURE — 97110 THERAPEUTIC EXERCISES: CPT | Mod: PO | Performed by: PHYSICAL THERAPIST

## 2018-03-16 PROCEDURE — 97112 NEUROMUSCULAR REEDUCATION: CPT | Mod: PO | Performed by: PHYSICAL THERAPIST

## 2018-03-16 NOTE — PROGRESS NOTES
Physical Therapy Daily Note   Name: Rajeev Newell [facility]  Clinic Number: [id number]    3/16/2018  In 1005  Out 1110    Diagnosis:   Encounter Diagnosis   Name Primary?    Primary osteoarthritis of right knee      Physician: Edmund Rosas MD  Treatment Orders: PT Eval and Treat    Subjective   Pt reports: He continues to have mild pain at the medial knee joint with some motions. Mostly feels it at night in bed.     Pain Scale:  0/10      Objective     Instruction in therapeutic exercises to improve strength, ROM, flexibility x 43 min:    Bike, 1/2 revolutions, 10 min, to improve ROM  HSS, seated, 3x30s  Incline board stretch, 3x30s  Knee flex stretch on 2nd step, 10x5s  Standing hip abd, 2x10  SLR, 3x10  PROM extension stretch with overpressure, 3 min    Gait training with SPC, 8 min, focusing on neutral hip and heel/toe    Russian stim during quad sets, 10 sec on/off, to improve MM recruitment    Ice pack to post and ant knee x 10 min at end of session.    A/PROM R Knee -2*/0*-107*/110*    Written Home Exercises Provided: Patient educated to continue with previously issued HEP.  Exercises were reviewed and Jared was able to demonstrate them prior to the end of the session. Pt received a written copy of exercises to perform at home. Jared demonstrated good  understanding of the education provided.     Assessment   Pt demonstrates safe gait pattern with use of SPC. Has been advised to begin using the SPC in home, continue with walker as needed for outdoor ambulation.  Main factor limiting knee flexion ROM at this time appears to be post-op dressing, which is not to be removed until pt sees MD on next Tuesday.    Jared is progressing well towards his goals and no updates to goals at this time. Will benefit from con't skilled care.    Anticipated barriers to physical therapy: none  Pt's spiritual, cultural and educational needs considered and pt agreeable to plan  of care and goals.    Plan   Continue with established Plan of Care towards Physical Therapy goals.       Gracie Carr, PT

## 2018-03-19 ENCOUNTER — CLINICAL SUPPORT (OUTPATIENT)
Dept: REHABILITATION | Facility: HOSPITAL | Age: 68
End: 2018-03-19
Attending: ORTHOPAEDIC SURGERY
Payer: MEDICARE

## 2018-03-19 DIAGNOSIS — M17.11 PRIMARY OSTEOARTHRITIS OF RIGHT KNEE: ICD-10-CM

## 2018-03-19 PROCEDURE — 97140 MANUAL THERAPY 1/> REGIONS: CPT | Mod: PO | Performed by: PHYSICAL THERAPIST

## 2018-03-19 PROCEDURE — 97110 THERAPEUTIC EXERCISES: CPT | Mod: PO | Performed by: PHYSICAL THERAPIST

## 2018-03-19 PROCEDURE — 97112 NEUROMUSCULAR REEDUCATION: CPT | Mod: PO | Performed by: PHYSICAL THERAPIST

## 2018-03-19 NOTE — PROGRESS NOTES
"                                                    Physical Therapy Daily Note   Name: Rajeev Newell [facility]  Clinic Number: [id number]    3/19/2018  In 1208  Out 115    Diagnosis:   Encounter Diagnosis   Name Primary?    Primary osteoarthritis of right knee      Physician: Edmund Rosas MD  Treatment Orders: PT Eval and Treat    Subjective   Pt reports: He has been having some increased pain in the L knee for the last few days, so "took it easy" over the weekend. Sees Dr. Rosas for 2 week f//u tomorrow.     Pain Scale:  0/10      Objective     Instruction in therapeutic exercises to improve strength, ROM, flexibility x 36 min:    Bike, 1/2 revolutions to full, 10 min, to improve ROM  HSS, seated, 3x30s  Incline board stretch, 3x30s  Knee flex stretch on 2nd step, 10x10s  Standing hip abd, 3x10  Standing TKE, 20x5s  SLR, 3x10  Walk x 1 around gym with SPC, to improve endurance for ambulation with SPC    Russian stim during quad sets, 10 sec on/off, to improve MM recruitment    STM to hamstrings x 8 min    Ice pack to post and ant knee x 10 min at end of session.    A/PROM R Knee -2*/0*-102*/110*  Circumfrence at mid-joint line 49.7 cm    Written Home Exercises Provided: Patient educated to continue with previously issued HEP.  Exercises were reviewed and Jared was able to demonstrate them prior to the end of the session. Pt received a written copy of exercises to perform at home. Jared demonstrated good  understanding of the education provided.     Assessment   Stance time with ambulation on RLE is becoming more equal to LLE, and demonstrates good form with use of SPC. Continues to demonstrate some extension lag with SLR RLE.     Jared is progressing well towards his goals and no updates to goals at this time. Will benefit from con't skilled care.    Anticipated barriers to physical therapy: none  Pt's spiritual, cultural and educational needs considered and pt agreeable to plan of care and goals.    Plan "   Continue with established Plan of Care towards Physical Therapy goals.       Gracie Carr, PT

## 2018-03-20 ENCOUNTER — CLINICAL SUPPORT (OUTPATIENT)
Dept: REHABILITATION | Facility: HOSPITAL | Age: 68
End: 2018-03-20
Attending: ORTHOPAEDIC SURGERY
Payer: MEDICARE

## 2018-03-20 ENCOUNTER — OFFICE VISIT (OUTPATIENT)
Dept: ORTHOPEDICS | Facility: CLINIC | Age: 68
End: 2018-03-20
Payer: MEDICARE

## 2018-03-20 VITALS
BODY MASS INDEX: 38.7 KG/M2 | HEIGHT: 71 IN | HEART RATE: 78 BPM | DIASTOLIC BLOOD PRESSURE: 70 MMHG | WEIGHT: 276.44 LBS | TEMPERATURE: 98 F | SYSTOLIC BLOOD PRESSURE: 112 MMHG

## 2018-03-20 DIAGNOSIS — Z96.659 STATUS POST KNEE REPLACEMENT, UNSPECIFIED LATERALITY: Primary | ICD-10-CM

## 2018-03-20 DIAGNOSIS — M17.11 PRIMARY OSTEOARTHRITIS OF RIGHT KNEE: ICD-10-CM

## 2018-03-20 PROCEDURE — 99214 OFFICE O/P EST MOD 30 MIN: CPT | Mod: PBBFAC | Performed by: NURSE PRACTITIONER

## 2018-03-20 PROCEDURE — 99024 POSTOP FOLLOW-UP VISIT: CPT | Mod: POP,,, | Performed by: NURSE PRACTITIONER

## 2018-03-20 PROCEDURE — 97110 THERAPEUTIC EXERCISES: CPT | Mod: PO | Performed by: PHYSICAL THERAPIST

## 2018-03-20 PROCEDURE — 99999 PR PBB SHADOW E&M-EST. PATIENT-LVL IV: CPT | Mod: PBBFAC,,, | Performed by: NURSE PRACTITIONER

## 2018-03-20 PROCEDURE — 97140 MANUAL THERAPY 1/> REGIONS: CPT | Mod: PO | Performed by: PHYSICAL THERAPIST

## 2018-03-20 NOTE — PROGRESS NOTES
"Rajeev Newell III presents for initial post-operative visit following a right total knee arthroplasty performed by Dr. Rosas on 3/6/2018. Tolerating pain medication well.      Exam:   Blood pressure 112/70, pulse 78, temperature 97.7 °F (36.5 °C), height 5' 11" (1.803 m), weight 125.4 kg (276 lb 7.3 oz).   Ambulating well with assistive device.  Incision is clean and dry without drainage or erythema. ROM:0-110    Initial post-operative radiographs reviewed today revealing a well fixed and aligned prosthesis.    A/P:  2 weeks s/p right total knee replacement  - The patient was advised to keep the incision clean and dry for the next 24 hours after which he may wash the area with antibacterial soap in the shower. Will not submerge until the incision is completely healed.   - Outpatient PT: [facility]  - Continue aspirin for 1 month from surgery.  - Pain medication: no longer taking  - Follow up in 4 weeks with me. Pt will call clinic with problems/concerns.     "

## 2018-03-23 ENCOUNTER — CLINICAL SUPPORT (OUTPATIENT)
Dept: REHABILITATION | Facility: HOSPITAL | Age: 68
End: 2018-03-23
Attending: ORTHOPAEDIC SURGERY
Payer: MEDICARE

## 2018-03-23 DIAGNOSIS — M17.11 PRIMARY OSTEOARTHRITIS OF RIGHT KNEE: ICD-10-CM

## 2018-03-23 PROCEDURE — 97110 THERAPEUTIC EXERCISES: CPT | Mod: PO | Performed by: PHYSICAL THERAPIST

## 2018-03-23 NOTE — PROGRESS NOTES
Physical Therapy Daily Note   Name: Rajeev Newell [facility]  Clinic Number: [id number]    3/20/2018  In 205  Out 310    Diagnosis:   Encounter Diagnosis   Name Primary?    Primary osteoarthritis of right knee      Physician: Edmund Rosas MD  Treatment Orders: PT Eval and Treat    Subjective   Pt reports: He saw Janine at Dr. Rosas's office, who told him incision looks good and that he does not need to push flexion anymore that what it currently is and that 90* flexion is adequate for this time post-op.    Pain Scale:  0/10      Objective     Instruction in therapeutic exercises to improve strength, ROM, flexibility x 40 min:    Bike, 1/2 revolutions to full, 10 min, to improve ROM  HSS, seated, 3x30s  Incline board stretch, 3x30s  Knee flex stretch on 2nd step, 10x10s  Standing hip abd, 3x10  Standing TKE, 30x5s  SLR, 3x10  HSC 2x10  SAQ, 2x10    STM to soft tissues at medial joint x 8 min to reduce pain/tension with flexion activities    Ice pack to post and ant knee x 10 min at end of session.    PROM R Knee 0*-112*    Written Home Exercises Provided: Patient educated to continue with previously issued HEP.  Exercises were reviewed and Jared was able to demonstrate them prior to the end of the session. Pt received a written copy of exercises to perform at home. Jared demonstrated good  understanding of the education provided.     Assessment   Wound is well approximately and shows no signs of infection or loss of integrity/opening. Jared is progressing very well with current POC.     Jared is progressing well towards his goals and no updates to goals at this time. Will benefit from con't skilled care.    Anticipated barriers to physical therapy: none  Pt's spiritual, cultural and educational needs considered and pt agreeable to plan of care and goals.    Plan   Continue with established Plan of Care towards Physical Therapy goals.       Gracie Carr,  PT

## 2018-03-23 NOTE — PROGRESS NOTES
Physical Therapy Daily Note   Name: Rajeev Newell [facility]  Clinic Number: [id number]    3/23/2018  In 1003  Out 1106    Diagnosis:   Encounter Diagnosis   Name Primary?    Primary osteoarthritis of right knee      Physician: Edmund Rosas MD  Treatment Orders: PT Eval and Treat    Subjective   Pt reports: His knee is feeling good today.     Pain Scale: 1/10      Objective     Instruction in therapeutic exercises to improve strength, ROM, flexibility x 53 min:    Bike, 1/2 revolutions to full, 10 min, to improve ROM  HSS, seated, 3x30s  Incline board stretch, 3x30s  Knee flex stretch on 2nd step, 10x10s  Standing hip abd, 3x10  Standing TKE, 30x5s  SLR, 3x10  HSC 2x10  SAQ, 2x10  Minisquats, 2x10  HR, 2x10      Ice pack to post and ant knee x 10 min at end of session.    PROM R Knee 0*-113*    Written Home Exercises Provided: Patient educated to continue with previously issued HEP.  Exercises were reviewed and Jared was able to demonstrate them prior to the end of the session. Pt received a written copy of exercises to perform at home. Jared demonstrated good  understanding of the education provided.     Assessment   Pt tolerated progression of closed chained exercises well without c/o increased pain. Demonstrates excessive pronation in R foot, would likely benefit from arch support (pt states he has one at home that he will insert in shoe).     Jared is progressing well towards his goals and no updates to goals at this time. Will benefit from con't skilled care.    Anticipated barriers to physical therapy: none  Pt's spiritual, cultural and educational needs considered and pt agreeable to plan of care and goals.    Plan   Continue with established Plan of Care towards Physical Therapy goals.       Gracie Carr, PT

## 2018-03-26 ENCOUNTER — CLINICAL SUPPORT (OUTPATIENT)
Dept: REHABILITATION | Facility: HOSPITAL | Age: 68
End: 2018-03-26
Attending: ORTHOPAEDIC SURGERY
Payer: MEDICARE

## 2018-03-26 DIAGNOSIS — M17.11 PRIMARY OSTEOARTHRITIS OF RIGHT KNEE: ICD-10-CM

## 2018-03-26 PROCEDURE — 97110 THERAPEUTIC EXERCISES: CPT | Mod: PO | Performed by: PHYSICAL THERAPIST

## 2018-03-26 NOTE — PROGRESS NOTES
Physical Therapy Daily Note   Name: Rajeev Newell [facility]  Clinic Number: [id number]    3/26/2018  In 1200  Out 100    Diagnosis:   Encounter Diagnosis   Name Primary?    Primary osteoarthritis of right knee      Physician: Edmund Rosas MD  Treatment Orders: PT Eval and Treat    Subjective   Pt reports: Increased soreness and swelling today, thinks he overdid it by driving to/from his camp this weekend.   Pain Scale: 3/10      Objective     Instruction in therapeutic exercises to improve strength, ROM, flexibility x 50 min:    Bike, 1/2 revolutions to full, 10 min, to improve ROM  HSS, seated, 3x30s  Incline board stretch, 3x30s  Knee flex stretch on 2nd step, 10x10s  SLR, 3x10  HSC 2x10  SAQ, 2x10      Ice pack to post and ant knee x 10 min at end of session.    PROM R Knee 0*-113*    Did not perform today:  Standing hip abd, 3x10  Standing TKE, 30x5s  Minisquats, 2x10  HR, 2x10    Written Home Exercises Provided: Patient educated to continue with previously issued HEP.  Exercises were reviewed and Jared was able to demonstrate them prior to the end of the session. Pt received a written copy of exercises to perform at home. Jared demonstrated good  understanding of the education provided.     Assessment   Several exercises held today due to increased pain and exacerbation over weekend, but did tolerate session well. Was able to walk approximately 10 feet without AD with minimal antalgic gait.     Jared is progressing well towards his goals and no updates to goals at this time. Will benefit from con't skilled care.    Anticipated barriers to physical therapy: none  Pt's spiritual, cultural and educational needs considered and pt agreeable to plan of care and goals.    Plan   Continue with established Plan of Care towards Physical Therapy goals.       Gracie Carr, PT

## 2018-03-28 ENCOUNTER — CLINICAL SUPPORT (OUTPATIENT)
Dept: REHABILITATION | Facility: HOSPITAL | Age: 68
End: 2018-03-28
Attending: ORTHOPAEDIC SURGERY
Payer: MEDICARE

## 2018-03-28 DIAGNOSIS — M17.11 PRIMARY OSTEOARTHRITIS OF RIGHT KNEE: ICD-10-CM

## 2018-03-28 PROCEDURE — 97110 THERAPEUTIC EXERCISES: CPT | Mod: PO

## 2018-03-28 NOTE — PROGRESS NOTES
Physical Therapy Daily Note   Name: Rajeev Newell [facility]  Clinic Number: [id number]    3/28/2018  Time In: 11:05  Time Out: 12:15    Diagnosis:   Encounter Diagnosis   Name Primary?    Primary osteoarthritis of right knee      Physician: Edmund Rosas MD  Treatment Orders: PT Eval and Treat    Subjective   Pt reports: states he has been doing his home exercises, still discomfort at night with sleeping. Has not been attempting and yard or house work.   Pain Scale: 1/10, more stiffness than pain      Objective     Instruction in therapeutic exercises to improve strength, ROM, flexibility x 55 min:    Bike, full  revolutions, 10 min, to improve ROM  HSS, seated, 3x30s  Incline board stretch, 3x30s  Knee flex stretch on 2nd step, 10x10s  Heel slides with strap 10 x 5s   Quad sets with towel roll under heel and 4# weight on distal thigh 20 x 5s  SLR, 3x10  HSC 3x10  SAQ, 3x10 (verbal cues for emphasis on full knee extension)    Pt received manual techniques including patellar and scar mobilizations to the R knee to improve R knee extension.     Ice pack to post and ant knee x 10 min at end of session.    PROM R Knee 0*-115*    Did not perform today:  Standing hip abd, 3x10  Standing TKE, 30x5s  Minisquats, 2x10  HR, 2x10    Written Home Exercises Provided: Patient educated to continue with previously issued HEP.  Exercises were reviewed and Jared was able to demonstrate them prior to the end of the session. Pt received a written copy of exercises to perform at home. Jared demonstrated good  understanding of the education provided.     Assessment   Pt tolerated treatment well this date with decreased pain and edema in the R LE. Improved R knee extension and flexion this date post tx however HS tightness continues. Mild limitation of quad activation, improved following manual techniques. Pt will benefit from continued treatment.  Jared is progressing well towards his  goals and no updates to goals at this time. Will benefit from con't skilled care.    Anticipated barriers to physical therapy: none  Pt's spiritual, cultural and educational needs considered and pt agreeable to plan of care and goals.    Plan   Continue with established Plan of Care towards Physical Therapy goals.       Leah Fajardo, PTA

## 2018-03-29 ENCOUNTER — CLINICAL SUPPORT (OUTPATIENT)
Dept: REHABILITATION | Facility: HOSPITAL | Age: 68
End: 2018-03-29
Attending: ORTHOPAEDIC SURGERY
Payer: MEDICARE

## 2018-03-29 DIAGNOSIS — M17.11 PRIMARY OSTEOARTHRITIS OF RIGHT KNEE: ICD-10-CM

## 2018-03-29 PROCEDURE — 97110 THERAPEUTIC EXERCISES: CPT | Mod: PO | Performed by: PHYSICAL THERAPIST

## 2018-03-29 NOTE — PROGRESS NOTES
Physical Therapy Daily Note   Name: Rajeev Newell [facility]  Clinic Number: [id number]    3/29/2018  Time In: 1210  Time Out: 100    Diagnosis:   Encounter Diagnosis   Name Primary?    Primary osteoarthritis of right knee      Physician: Edmund Rosas MD  Treatment Orders: PT Eval and Treat    Subjective   Pt reports: states he has been trying to walk more at home without the cane. Has returned to work, where he must go up/down 2 flights of stairs.  Pain Scale: 2/10      Objective     Instruction in therapeutic exercises to improve strength, ROM, flexibility x 40 min:    Bike, full  revolutions, 10 min, to improve ROM  HSS, seated, 3x30s  Incline board stretch, 3x30s  Knee flex stretch on 2nd step, 10x10s  Heel slides with strap 5 x 5s   Minisquats, 2x10  HR, 2x10  SLR, 3x10  HSC 3x10  SAQ, 3x10 (verbal cues for emphasis on full knee extension)      Ice pack to post and ant knee x 10 min at end of session.    AROM R Knee -5*-115*    Did not perform today:  Quad sets with towel roll under heel and 4# weight on distal thigh 20 x 5s  Standing hip abd, 3x10  Standing TKE, 30x5s      Written Home Exercises Provided: Patient educated to continue with previously issued HEP.  Exercises were reviewed and Jared was able to demonstrate them prior to the end of the session. Pt received a written copy of exercises to perform at home. Jared demonstrated good  understanding of the education provided.     Assessment   Jared demonstrated minimal antalgic limp during ambulation without AD today. I have advised him to wean himself off of cane at home and focus on heel toe gait pattern.     Jared is progressing well towards his goals and no updates to goals at this time. Will benefit from con't skilled care.    Anticipated barriers to physical therapy: none  Pt's spiritual, cultural and educational needs considered and pt agreeable to plan of care and goals.    Plan   Continue with  established Plan of Care towards Physical Therapy goals.       Gracie Carr, PT

## 2018-04-02 ENCOUNTER — CLINICAL SUPPORT (OUTPATIENT)
Dept: REHABILITATION | Facility: HOSPITAL | Age: 68
End: 2018-04-02
Attending: ORTHOPAEDIC SURGERY
Payer: MEDICARE

## 2018-04-02 DIAGNOSIS — M17.11 PRIMARY OSTEOARTHRITIS OF RIGHT KNEE: ICD-10-CM

## 2018-04-02 PROCEDURE — 97110 THERAPEUTIC EXERCISES: CPT | Mod: PO

## 2018-04-02 NOTE — PROGRESS NOTES
Physical Therapy Daily Note   Name: Rajeev Newell [facility]  Clinic Number: [id number]    4/2/2018  Time In: 11:00  Time Out: 12:10    Diagnosis:   Encounter Diagnosis   Name Primary?    Primary osteoarthritis of right knee      Physician: Edmund Rosas MD  Treatment Orders: PT Eval and Treat    Subjective   Pt reports: states he has been getting better at going up and down stairs and was able to go step-over-step using a rail.  Pain Scale: 2/10      Objective     Instruction in therapeutic exercises to improve strength, ROM, flexibility x 55 min:    Bike, full  revolutions, 10 min, to improve ROM  Incline board stretch, 3x30s  Knee flex stretch on 2nd step, 10x10s  HSS, seated, 3x30s  Heel slides with strap 2x5 x 5s   SLR, 3x10  SAQ, 3x10 (verbal cues for emphasis on full knee extension)  HSC 3x10  HR, 2x10  Minisquats, 2x10     Ice pack to post and ant knee x 10 min at end of session.    AROM R Knee -5*-118*    Did not perform today:  Quad sets with towel roll under heel and 4# weight on distal thigh 20 x 5s  Standing hip abd, 3x10  Standing TKE, 30x5s      Written Home Exercises Provided: Patient educated to continue with previously issued HEP.  Exercises were reviewed and Jared was able to demonstrate them prior to the end of the session. Pt received a written copy of exercises to perform at home. Jared demonstrated good  understanding of the education provided.     Assessment   Jared demonstrated minimal antalgic limp during ambulation without AD today. I have advised him to wean himself off of cane at home and focus on heel toe gait pattern. Was able to achieve 118 flexion after 2 sets of heel slides today.    Jared is progressing well towards his goals and no updates to goals at this time. Will benefit from con't skilled care.    Anticipated barriers to physical therapy: none  Pt's spiritual, cultural and educational needs considered and pt agreeable to  plan of care and goals.    Plan   Continue with established Plan of Care towards Physical Therapy goals.       Bethany Dumont, PT

## 2018-04-04 ENCOUNTER — CLINICAL SUPPORT (OUTPATIENT)
Dept: REHABILITATION | Facility: HOSPITAL | Age: 68
End: 2018-04-04
Attending: ORTHOPAEDIC SURGERY
Payer: MEDICARE

## 2018-04-04 DIAGNOSIS — M17.11 PRIMARY OSTEOARTHRITIS OF RIGHT KNEE: ICD-10-CM

## 2018-04-04 PROCEDURE — 97110 THERAPEUTIC EXERCISES: CPT | Mod: PO | Performed by: PHYSICAL THERAPIST

## 2018-04-04 NOTE — PROGRESS NOTES
"                                                    Physical Therapy Daily Treatment Note   Name: Rajeev Newell [facility]  Clinic Number: [id number]     Evaluation Date: 3/8/18    Diagnosis:   Encounter Diagnosis   Name Primary?    Primary osteoarthritis of right knee      Physician: Edmund Rosas MD  Treatment Orders: PT Eval and Treat    Subjective   Pt reports: feeling better with < discomfort. Has been using cane intermittently. No falls. Patient reported he will be having his other knee procedure later on in the year.    Pain Scale:  1/10       Objective     Patient received individual therapy to increase strength, endurance, mobility, gait, balance with 0 patients with activities as follows:     Bike, full  revolutions, 10 min, to improve ROM  Incline board stretch, 3x30s  Knee flex stretch on 2nd step, 5x10s  Right knee extension stretch with ankle towel roll, strap: 5/30"  SLR, 2x10  [facility]one walking x 3 with emphasis on knee extension, ankle mobility    HSS, seated, 3x30s  Heel slides with strap 2x5 x 5s    Demonstration on prone knee hangs (HEP)    Minisquats, 2x10   Shuttle: bilateral 3B 3/10    Previous exercises  SAQ, 3x10 (verbal cues for emphasis on full knee extension)   HSC 3x10  HR, 2x10    Ice pack to post and ant knee x 10 min at end of session.     AROM R Knee extension: -3 degrees after stretch and prone knee hang     Written Home Exercises Provided: Patient educated to continue with previously issued HEP. Added prone knee hang 5-10 minutes.  Exercises were reviewed and Jared was able to demonstrate them prior to the end of the session. Pt received a written copy of exercises to perform at home. Jared demonstrated good  understanding of the education provided.     Assessment   Patient given verbal cueing on quad activation. No increase in s/s.  Jared is progressing well towards his goals and no updates to goals at this time. Will benefit from con't skilled care.    Anticipated barriers to " physical therapy: none  Pt's spiritual, cultural and educational needs considered and pt agreeable to plan of care and goals.    Plan   Continue with established Plan of Care towards Physical Therapy goals.       Raudel Juarez, PT

## 2018-04-04 NOTE — PROGRESS NOTES
Physical Therapy Daily Note   Name: Rajeev Newell [facility]  Clinic Number: [id number]    4/4/2018  Time In: 11:00  Time Out: 12:10    Diagnosis:   No diagnosis found.  Physician: Edmund Rosas MD  Treatment Orders: PT Eval and Treat    Subjective   Pt reports: states he has been getting better at going up and down stairs and was able to go step-over-step using a rail.  Pain Scale: 2/10      Objective     Instruction in therapeutic exercises to improve strength, ROM, flexibility x 55 min:    Bike, full  revolutions, 10 min, to improve ROM  Incline board stretch, 3x30s  Knee flex stretch on 2nd step, 10x10s  HSS, seated, 3x30s  Heel slides with strap 2x5 x 5s   SLR, 3x10  SAQ, 3x10 (verbal cues for emphasis on full knee extension)  HSC 3x10  HR, 2x10  Minisquats, 2x10     Ice pack to post and ant knee x 10 min at end of session.    AROM R Knee -5*-118*    Did not perform today:  Quad sets with towel roll under heel and 4# weight on distal thigh 20 x 5s  Standing hip abd, 3x10  Standing TKE, 30x5s      Written Home Exercises Provided: Patient educated to continue with previously issued HEP.  Exercises were reviewed and Jared was able to demonstrate them prior to the end of the session. Pt received a written copy of exercises to perform at home. Jared demonstrated good  understanding of the education provided.     Assessment   Jared demonstrated minimal antalgic limp during ambulation without AD today. I have advised him to wean himself off of cane at home and focus on heel toe gait pattern. Was able to achieve 118 flexion after 2 sets of heel slides today.    Jared is progressing well towards his goals and no updates to goals at this time. Will benefit from con't skilled care.    Anticipated barriers to physical therapy: none  Pt's spiritual, cultural and educational needs considered and pt agreeable to plan of care and goals.    Plan   Continue with established  Plan of Care towards Physical Therapy goals.       Bethany Dumont, PT

## 2018-04-06 ENCOUNTER — CLINICAL SUPPORT (OUTPATIENT)
Dept: REHABILITATION | Facility: HOSPITAL | Age: 68
End: 2018-04-06
Attending: ORTHOPAEDIC SURGERY
Payer: MEDICARE

## 2018-04-06 DIAGNOSIS — M17.11 PRIMARY OSTEOARTHRITIS OF RIGHT KNEE: ICD-10-CM

## 2018-04-06 PROCEDURE — 97110 THERAPEUTIC EXERCISES: CPT | Mod: PO

## 2018-04-06 NOTE — PROGRESS NOTES
"                                                    Physical Therapy Daily Treatment Note   Name: Rajeev Newell [facility]  Clinic Number: [id number]     Evaluation Date: 3/8/18    Diagnosis:   Encounter Diagnosis   Name Primary?    Primary osteoarthritis of right knee      Physician: Edmund Rosas MD  Treatment Orders: PT Eval and Treat    Subjective   Pt reports: felt ok after last session, but getting pain in his left knee lately. Wants to stay away from squatting due to pain.    Pain Scale:  1/10       Objective     Patient received individual therapy to increase strength, endurance, mobility, gait, balance with 1 patients with activities as follows:     Bike, full  revolutions, 10 min, to improve ROM  Incline board stretch, 3x30s  Knee flex stretch on 2nd step, 5x10s  Right knee extension stretch with ankle towel roll, strap: 5/30"  SLR, 2x10  Cone walking x 3 with emphasis on knee extension, ankle mobility    HSS, seated, 3x30s  Heel slides with strap 2x5 x 5s      Shuttle: RLE 32.5# 2x10, and 1x5    Kinesiotaping applied to inside of R knee to decrease swelling in the pes anserine.    Ice pack to post and ant knee x 10 min at end of session.     AROM R Knee extension: -3 degrees after stretch and prone knee hang     Written Home Exercises Provided: Patient educated to continue with previously issued HEP. Added prone knee hang 5-10 minutes.  Exercises were reviewed and Jared was able to demonstrate them prior to the end of the session. Pt received a written copy of exercises to perform at home. Jared demonstrated good  understanding of the education provided.     Assessment   Patient given verbal cueing on quad activation. No increase in s/s.  Jared is progressing well towards his goals and no updates to goals at this time. Will benefit from con't skilled care.    Anticipated barriers to physical therapy: none  Pt's spiritual, cultural and educational needs considered and pt agreeable to plan of care and " goals.    Plan   Continue with established Plan of Care towards Physical Therapy goals.       Bethany Dumont, PT

## 2018-04-10 ENCOUNTER — PATIENT MESSAGE (OUTPATIENT)
Dept: ORTHOPEDICS | Facility: CLINIC | Age: 68
End: 2018-04-10

## 2018-04-11 ENCOUNTER — CLINICAL SUPPORT (OUTPATIENT)
Dept: REHABILITATION | Facility: HOSPITAL | Age: 68
End: 2018-04-11
Attending: ORTHOPAEDIC SURGERY
Payer: MEDICARE

## 2018-04-11 DIAGNOSIS — M17.12 PRIMARY OSTEOARTHRITIS OF LEFT KNEE: Primary | ICD-10-CM

## 2018-04-11 DIAGNOSIS — M17.11 PRIMARY OSTEOARTHRITIS OF RIGHT KNEE: ICD-10-CM

## 2018-04-11 PROCEDURE — 97140 MANUAL THERAPY 1/> REGIONS: CPT | Mod: PO

## 2018-04-11 PROCEDURE — 97110 THERAPEUTIC EXERCISES: CPT | Mod: PO

## 2018-04-11 NOTE — PROGRESS NOTES
"                                                    Physical Therapy Daily Treatment Note   Name: Rajeev Newell [facility]  Clinic Number: [id number]     Evaluation Date: 3/8/18    Diagnosis:   Encounter Diagnosis   Name Primary?    Primary osteoarthritis of right knee      Physician: Edmund Rosas MD  Treatment Orders: PT Eval and Treat    Subjective   Pt reports: felt ok after last session, but getting pain in his left knee lately. Wants to stay away from squatting due to pain.    Pain Scale:  1/10       Objective     Patient received individual therapy to increase strength, endurance, mobility, gait, balance with 1 patients with activities as follows:     Bike, full  revolutions, 10 min, to improve ROM  Incline board stretch, 3x30s  Knee flex stretch on 2nd step, 5x10s  Right knee extension stretch with ankle towel roll, strap: 5/30"  SLR, 2x10  Cone walking x 3 with emphasis on knee extension, ankle mobility    HSS, seated, 3x30s  Heel slides with strap 2x5 x 5s      Shuttle: RLE 32.5# 2x10, and 1x5    Kinesiotaping applied to inside of R knee to decrease swelling in the pes anserine.    Ice pack to post and ant knee x 10 min at end of session.     AROM R Knee extension: -3 degrees after stretch and prone knee hang     Written Home Exercises Provided: Patient educated to continue with previously issued HEP. Added prone knee hang 5-10 minutes.  Exercises were reviewed and Jared was able to demonstrate them prior to the end of the session. Pt received a written copy of exercises to perform at home. Jared demonstrated good  understanding of the education provided.     Assessment   Patient given verbal cueing on quad activation. No increase in s/s.  Jared is progressing well towards his goals and no updates to goals at this time. Will benefit from con't skilled care.    Anticipated barriers to physical therapy: none  Pt's spiritual, cultural and educational needs considered and pt agreeable to plan of care and " goals.    Plan   Continue with established Plan of Care towards Physical Therapy goals.       Gracie Carr, PT

## 2018-04-11 NOTE — PATIENT INSTRUCTIONS
Abduction: Clam (Eccentric) - Side-Lying        Lie on side with knees bent. Lift top knee, keeping feet together. Keep trunk steady. Slowly lower for 3-5 seconds. 10 reps per set, 2 sets per day, 7 days per week. Perform on both sides.  Add ___ lbs when you achieve ___ repetitions.   https://[employer].us/64   Copyright © [employer]. All rights reserved.     HIP: Flexion / KNEE: Extension, Straight Leg Raise        Lay on back with leg straight. Tighten quad and raise leg, no higher than opposite leg. Do not allow knee to bend. Perform slowly. 10 reps per set, 2 sets per day, 7 days per week. Perform on both legs.  Copyright © [employer]. All rights reserved.     Abduction        Lay on side with pillow under head for support. Tighten quad to keep knee straight and lift leg up toward ceiling and slightly backward squeezing buttocks. Return to start, repeat. Perform on both sides.  Repeat 10 times each leg. Do 2 sessions per day.   https://[employer]/385   Copyright © [employer]. All rights reserved.

## 2018-04-11 NOTE — PROGRESS NOTES
"                                                    Physical Therapy Daily Treatment Note   Name: Rajeev Newell [facility]  Clinic Number: [id number]     Evaluation Date: 3/8/18    Diagnosis:   Encounter Diagnosis   Name Primary?    Primary osteoarthritis of right knee      Physician: Edmund Rosas MD  Treatment Orders: PT Eval and Treat    Start Time: 9:00  End Time: 10:07  Total Billable Time: 55    Subjective     Pt reports: continued improvement of the R knee but has continued difficulty sleeping occasionally. States the L knee has been acting up a little if he squats or bends the L knee too much.    Pain Scale:  1/10     Objective     Patient received individual therapy to increase strength, endurance, mobility, gait, balance for 45 minutes with activities as follows:     Bike, full  revolutions, 10 min, to improve ROM  Incline board stretch, 3x30s  Knee flex stretch on 2nd step, 5x10s   Fwd step up with contralateral hip flexion x 15 (cues for R knee ext)   Quad stretch with towel roll under ankle 20 x 5 sec  Right knee extension stretch with ankle towel roll, strap: 5/30"  SLR, 2x10   SL clamshells 2x10 each side  Cone walking x 3 with emphasis on knee extension, ankle mobility    HSS, seated, 3x30s  Heel slides with strap 2x5 x 5s      Shuttle: RLE 32.5# 2x15    Not performed - Kinesiotaping applied to inside of R knee to decrease swelling in the pes anserine.  Patient received manual techniques to the R knee for 10 minutes including: STM to medial R distal hamstring and pes anserine area, scar mobilization, and patellar mobilization gr II    Ice pack to post and ant knee x 10 min at end of session.     AROM R Knee extension: -1 degrees after stretch and prone knee hang     Written Home Exercises Provided: Patient to continue with current written HEP; given additional SL clamshells SL hip abd for HEP to strengthen B LEs, emphasis on heel props, knee hangs, and HSS to improve end range knee " extension.  Exercises were reviewed and Jared was able to demonstrate them prior to the end of the session. Pt received a written copy of exercises to perform at home. Jared demonstrated good  understanding of the education provided.     Assessment   Patient demonstrated overall good tolerance to treatment this date without onset of L knee pain. Decreased R hamstring tightness following manual techniques this date which improved R knee extension. Good understanding of rationale to increase hamstring stretches at home to reduce muscle tightness and improve R knee extension. R LE strength and mobility continues to improve. Mild stiffness to the R knee reported post tx.  Jared is progressing well towards his goals and no updates to goals at this time. Will benefit from con't skilled care.    Anticipated barriers to physical therapy: none  Pt's spiritual, cultural and educational needs considered and pt agreeable to plan of care and goals.    Plan   Continue with established Plan of Care towards Physical Therapy goals.       Leah Fajardo, PTA

## 2018-04-11 NOTE — PROGRESS NOTES
Pt had right knee replacement 6 weeks ago and is ready to restart the left knee synvisc injections. Orders entered as requested.

## 2018-04-13 ENCOUNTER — CLINICAL SUPPORT (OUTPATIENT)
Dept: REHABILITATION | Facility: HOSPITAL | Age: 68
End: 2018-04-13
Attending: ORTHOPAEDIC SURGERY
Payer: MEDICARE

## 2018-04-13 DIAGNOSIS — M17.11 PRIMARY OSTEOARTHRITIS OF RIGHT KNEE: ICD-10-CM

## 2018-04-13 PROCEDURE — 97110 THERAPEUTIC EXERCISES: CPT | Mod: PO | Performed by: PHYSICAL THERAPIST

## 2018-04-13 NOTE — PROGRESS NOTES
Physical Therapy Daily Treatment Note   Name: Rajeev Newell [facility]  Clinic Number: [id number]     Evaluation Date: 3/8/18    Diagnosis:   Encounter Diagnosis   Name Primary?    Primary osteoarthritis of right knee      Physician: Edmund Rosas MD  Treatment Orders: PT Eval and Treat    Start Time: 1100  End Time: 1200  Total Billable Time: 30    Subjective     Pt reports: R knee has been feeling good. Has been consistently going for 15 minute walks. Doing well with stairs at work.     Pain Scale:  1/10     Objective     Patient received individual therapy to increase strength, endurance, mobility, gait, balance for 45 minutes with activities as follows:     Bike, full  revolutions, 10 min, to improve ROM  Incline board stretch, 3x30s  Knee flex stretch on 2nd step, 5x10s  PRecor LE ext, 2x10, 30#  Precor HSC, 2x10, 30#  SLR, 3x10 B  SL clamshells 3x10 each side  Cone walking x 3 with emphasis on knee extension, ankle mobility  HSS, seated, 3x30s  Shuttle: RLE 37# 3x10, HR at 37# B, 3x10        Ice pack to post and ant knee x 10 min at end of session.        Written Home Exercises Provided: Patient to continue with current written HEP; given additional SL clamshells SL hip abd for HEP to strengthen B LEs, emphasis on heel props, knee hangs, and HSS to improve end range knee extension.  Exercises were reviewed and Jared was able to demonstrate them prior to the end of the session. Pt received a written copy of exercises to perform at home. Jared demonstrated good  understanding of the education provided.     Assessment   Pt demonstrated significant lateral LOB during cone walking initially, however with practice demonstrated good control without compensation or antalgic limp.     Jared is progressing well towards his goals and no updates to goals at this time. Will benefit from con't skilled care.    Anticipated barriers to physical therapy: none  Pt's  spiritual, cultural and educational needs considered and pt agreeable to plan of care and goals.    Plan   Continue with established Plan of Care towards Physical Therapy goals.       Gracie Carr, PT

## 2018-04-16 ENCOUNTER — CLINICAL SUPPORT (OUTPATIENT)
Dept: REHABILITATION | Facility: HOSPITAL | Age: 68
End: 2018-04-16
Attending: ORTHOPAEDIC SURGERY
Payer: MEDICARE

## 2018-04-16 DIAGNOSIS — M17.11 PRIMARY OSTEOARTHRITIS OF RIGHT KNEE: ICD-10-CM

## 2018-04-16 PROCEDURE — 97110 THERAPEUTIC EXERCISES: CPT | Mod: PO | Performed by: PHYSICAL THERAPIST

## 2018-04-16 NOTE — PROGRESS NOTES
Physical Therapy Daily Treatment Note   Name: Rajeev Newell III  Clinic Number: [id number]     Evaluation Date: 3/8/18    Diagnosis:   Encounter Diagnosis   Name Primary?    Primary osteoarthritis of right knee      Physician: Edmund Rosas MD  Treatment Orders: PT Eval and Treat    Start Time: 10:10  End Time: 11:10  Total Billable Time: 50     Subjective     Pt reports: R knee has been feeling good. He feels like he's almost ready for d/c and feels comfortable continuing his exercises at the [facility].     Pain Scale:  1/10     Objective     TREATMENT  Therapeutic Exercise x50 min  Recumbent bike, full revolutions x10 min  Incline board stretch, 3x30s  Knee flex stretch on 2nd step, 5x10s  Precor LE ext, 3x10, 30#  SLR, 3x10 B  HS ball roll ins 3x10  SL clamshells 3x10 each side  Cone walking 15 feet x5 with emphasis on knee extension & ankle mobility  HSS, seated, 3x30s  Shuttle RLE single leg squat 37# 3x10  Shuttle heel raise 37# 3x10  Lateral walking 40 feet x1 bilat    Did not perform:  Precor HSC, 2x10, 30# - unable to adjust machine appropriately to accommodate pt, pt knows to perform independently at [facility]      Modalities x10 min  Ice pack to post and ant knee in long sitting x10 min at end of session.        Written Home Exercises Provided: Patient to continue with current written HEP. Recommended pt amb in front of mirror at gym to correct poor gait technique habits.   Exercises were reviewed and Jared was able to demonstrate them prior to the end of the session. Pt did not receive a written copy of exercises to perform at home. Jared demonstrated good  understanding of the education provided.     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 Discussed the importance of HEP compliance and continued exercise at the [facility] to improve pt's strength and maintain the success of his Sx. Pt verbalized understanding. Pt requesting full updated HEP  at time of d/c to continue independently at the gym.     Jared is progressing well towards his goals and no updates to goals at this time. Will benefit from con't skilled care.    Anticipated barriers to physical therapy: none  Pt's spiritual, cultural and educational needs considered and pt agreeable to plan of care and goals.    Plan   Continue with established Plan of Care towards Physical Therapy goals.       Miri Flynn, PT

## 2018-04-17 ENCOUNTER — HOSPITAL ENCOUNTER (OUTPATIENT)
Dept: RADIOLOGY | Facility: HOSPITAL | Age: 68
Discharge: HOME | End: 2018-04-17
Attending: NURSE PRACTITIONER
Payer: MEDICARE

## 2018-04-17 ENCOUNTER — OFFICE VISIT (OUTPATIENT)
Dept: ORTHOPEDICS | Facility: CLINIC | Age: 68
End: 2018-04-17
Payer: MEDICARE

## 2018-04-17 VITALS
BODY MASS INDEX: 38.89 KG/M2 | SYSTOLIC BLOOD PRESSURE: 137 MMHG | HEART RATE: 70 BPM | HEIGHT: 71 IN | DIASTOLIC BLOOD PRESSURE: 91 MMHG | WEIGHT: 277.75 LBS

## 2018-04-17 DIAGNOSIS — M25.561 RIGHT KNEE PAIN, UNSPECIFIED CHRONICITY: ICD-10-CM

## 2018-04-17 DIAGNOSIS — M25.561 RIGHT KNEE PAIN, UNSPECIFIED CHRONICITY: Primary | ICD-10-CM

## 2018-04-17 DIAGNOSIS — M17.12 PRIMARY OSTEOARTHRITIS OF LEFT KNEE: ICD-10-CM

## 2018-04-17 DIAGNOSIS — Z96.651 STATUS POST RIGHT KNEE REPLACEMENT: ICD-10-CM

## 2018-04-17 PROCEDURE — 73562 X-RAY EXAM OF KNEE 3: CPT | Mod: 26,RT,, | Performed by: RADIOLOGY

## 2018-04-17 PROCEDURE — 99214 OFFICE O/P EST MOD 30 MIN: CPT | Mod: PBBFAC,25 | Performed by: NURSE PRACTITIONER

## 2018-04-17 PROCEDURE — 73560 X-RAY EXAM OF KNEE 1 OR 2: CPT | Mod: 26,59,LT, | Performed by: RADIOLOGY

## 2018-04-17 PROCEDURE — 73562 X-RAY EXAM OF KNEE 3: CPT | Mod: TC,RT

## 2018-04-17 PROCEDURE — 99024 POSTOP FOLLOW-UP VISIT: CPT | Mod: POP,,, | Performed by: NURSE PRACTITIONER

## 2018-04-17 PROCEDURE — 99999 PR PBB SHADOW E&M-EST. PATIENT-LVL IV: CPT | Mod: PBBFAC,,, | Performed by: NURSE PRACTITIONER

## 2018-04-17 PROCEDURE — 20610 DRAIN/INJ JOINT/BURSA W/O US: CPT | Mod: 79,S$PBB,LT, | Performed by: NURSE PRACTITIONER

## 2018-04-17 PROCEDURE — 73560 X-RAY EXAM OF KNEE 1 OR 2: CPT | Mod: TC,LT,59

## 2018-04-17 PROCEDURE — 20610 DRAIN/INJ JOINT/BURSA W/O US: CPT | Mod: PBBFAC | Performed by: NURSE PRACTITIONER

## 2018-04-17 RX ADMIN — Medication 16 MG: at 09:04

## 2018-04-17 NOTE — PROGRESS NOTES
"Rajeev Newell III presents for 6 week post-operative visit following a right total knee arthroplasty performed by Dr. Rosas on 3/6/2018. Tolerating pain medication well.      Exam:   Blood pressure (!) 137/91, pulse 70, height 5' 11" (1.803 m), weight 126 kg (277 lb 12.5 oz).   Ambulating well without assistive device.  Incision is completely healed. ROM:0-120    Initial post-operative radiographs reviewed today revealing a well fixed and aligned prosthesis.    A/P:  6 weeks s/p right total knee replacement.   - Outpatient PT: completed  - Synvisc 1st one left knee today  - Follow up in 6 weeks with Dr. Rosas. Pt will call clinic with problems/concerns.    Rajeev Newell III presents to clinic today for the first left knee Synvisc injection.    Exam demonstrates the no effusion in the left knee, and the skin is intact.    Diagnosis: osteoarthritis left knee    After time out was performed and patient ID, side, and site were verified, the right knee and the left knee were sterilly prepped in the standard fashion.  A 22-gauge needle was introduced into the left knee joint from an johnna-lateral site without complication. The left knee was then injected with 2 ml of Synvisc. Sterile dressing was applied.  The patient was instructed to resume activities as tolerated and to call with any problems.     We will see Rajeev Crawleyura PEDRO back next week for the second injection.         "

## 2018-04-18 ENCOUNTER — CLINICAL SUPPORT (OUTPATIENT)
Dept: REHABILITATION | Facility: HOSPITAL | Age: 68
End: 2018-04-18
Attending: ORTHOPAEDIC SURGERY
Payer: MEDICARE

## 2018-04-18 DIAGNOSIS — M17.11 PRIMARY OSTEOARTHRITIS OF RIGHT KNEE: ICD-10-CM

## 2018-04-18 PROCEDURE — 97110 THERAPEUTIC EXERCISES: CPT | Mod: PO | Performed by: PHYSICAL THERAPIST

## 2018-04-18 NOTE — PROGRESS NOTES
Physical Therapy Daily Treatment Note   Name: Rajeev Newell [facility]  Clinic Number: [id number]     Evaluation Date: 3/8/18    Diagnosis:   Encounter Diagnosis   Name Primary?    Primary osteoarthritis of right knee      Physician: Edmund Rosas MD  Treatment Orders: PT Eval and Treat    Start Time: 11:00  End Time: 11:55  Total Billable Time: 45 min    Subjective     Pt reports: he got injections in his non-surgical knee yesterday so he is stiff and sore all over today. It usually takes a few days for this to wear off.     Pain Scale:  1/10     Objective     TREATMENT  Therapeutic Exercise x50 min  Recumbent bike seat 19 L3 x10 min  Incline board stretch, 3x30s  Knee flex stretch on 2nd step, 5x10s  Precor LE ext, 3x10, 30#  SLR, 3x10 B   HS ball roll ins 3x10  SL clamshells 3x10 each side  Cone walking 25 feet x5 with emphasis on knee extension & ankle mobility  HSS, seated, 3x30s  Shuttle RLE single leg squat 1.5 bands (37#) 3x10  Shuttle DL heel raises 1.5 bands (37#) 3x10  Lateral walking OTB 30 feet x2 bilat    Did not perform:  Precor HSC, 2x10, 30# - unable to adjust machine appropriately to accommodate pt, pt knows to perform independently at [facility] system      Modalities x10 min  Ice pack to post and ant knee in long sitting x10 min at end of session.        Written Home Exercises Provided: Patient to continue with current written HEP.  Exercises were reviewed and Jared was able to demonstrate them prior to the end of the session. Pt did not receive a written copy of exercises to perform at home. Jared demonstrated good  understanding of the education provided.     Assessment   Attempted to increase resistance on shuttle SL squat, however pt unable to prevent hip IR and valgus knee angle, so weight was decreased. Pt requesting full updated HEP at time of d/c per primary PT to continue independently at the gym.     Jared is progressing well towards his goals and  no updates to goals at this time. Will benefit from con't skilled care.    Anticipated barriers to physical therapy: none  Pt's spiritual, cultural and educational needs considered and pt agreeable to plan of care and goals.    Plan   Continue with established Plan of Care towards Physical Therapy goals.       Miri Flynn, PT

## 2018-04-20 ENCOUNTER — CLINICAL SUPPORT (OUTPATIENT)
Dept: REHABILITATION | Facility: HOSPITAL | Age: 68
End: 2018-04-20
Attending: ORTHOPAEDIC SURGERY
Payer: MEDICARE

## 2018-04-20 DIAGNOSIS — M17.11 PRIMARY OSTEOARTHRITIS OF RIGHT KNEE: ICD-10-CM

## 2018-04-20 PROCEDURE — G8979 MOBILITY GOAL STATUS: HCPCS | Mod: CJ,PO | Performed by: PHYSICAL THERAPIST

## 2018-04-20 PROCEDURE — G8978 MOBILITY CURRENT STATUS: HCPCS | Mod: CJ,PO | Performed by: PHYSICAL THERAPIST

## 2018-04-20 PROCEDURE — 97110 THERAPEUTIC EXERCISES: CPT | Mod: PO | Performed by: PHYSICAL THERAPIST

## 2018-04-20 PROCEDURE — G8980 MOBILITY D/C STATUS: HCPCS | Mod: CJ,PO | Performed by: PHYSICAL THERAPIST

## 2018-04-20 NOTE — PROGRESS NOTES
Physical Therapy Daily Treatment Note   Name: Rajeev Newell [facility]  Clinic Number: [id number]     Evaluation Date: 3/8/18    Diagnosis:   Encounter Diagnosis   Name Primary?    Primary osteoarthritis of right knee      Physician: Edmund Rosas MD  Treatment Orders: PT Eval and Treat    4/20/2018    Start Time: 1055  End Time: 1155  Total Billable Time: 30 min    Subjective     Pt reports: he is still a bit sore from injection on Tuesday. Feel confident in progress of right knee and is ready for DC to HEP.     Pain Scale:  1/10     Objective     TREATMENT  Therapeutic Exercise x 50 min  Recumbent bike seat 19 L3 x10 min  Incline board stretch, 3x30s  Knee flex stretch on 2nd step, 5x10s  Precor LE ext, 3x10, 30#  SLR, 3x10 B   SL clamshells 3x10 each side  Cone walking 25 feet x5 with emphasis on knee extension & ankle mobility  HSS, seated, 3x30s  Shuttle RLE single leg squat 1.5 bands (37#) 3x10  Shuttle DL heel raises 1.5 bands (37#) 3x10  Lateral walking OTB 30 feet x2 bilat    Modalities x 10 min  Ice pack to post and ant knee in long sitting x10 min at end of session.        Written Home Exercises Provided: Patient given additional HEP.   Exercises were reviewed and Jared was able to demonstrate them prior to the end of the session. Pt did not receive a written copy of exercises to perform at home. Jared demonstrated good  understanding of the education provided.     Assessment   See DC    Plan   DC      Gracie Carr, PT

## 2018-04-24 ENCOUNTER — OFFICE VISIT (OUTPATIENT)
Dept: ORTHOPEDICS | Facility: CLINIC | Age: 68
End: 2018-04-24
Payer: MEDICARE

## 2018-04-24 VITALS — BODY MASS INDEX: 38.74 KG/M2 | WEIGHT: 277.75 LBS

## 2018-04-24 DIAGNOSIS — M17.12 PRIMARY OSTEOARTHRITIS OF LEFT KNEE: Primary | ICD-10-CM

## 2018-04-24 PROCEDURE — 99999 PR PBB SHADOW E&M-EST. PATIENT-LVL III: CPT | Mod: PBBFAC,,, | Performed by: NURSE PRACTITIONER

## 2018-04-24 PROCEDURE — 20610 DRAIN/INJ JOINT/BURSA W/O US: CPT | Mod: PBBFAC | Performed by: NURSE PRACTITIONER

## 2018-04-24 PROCEDURE — 99213 OFFICE O/P EST LOW 20 MIN: CPT | Mod: PBBFAC,25 | Performed by: NURSE PRACTITIONER

## 2018-04-24 PROCEDURE — 99499 UNLISTED E&M SERVICE: CPT | Mod: S$PBB,,, | Performed by: NURSE PRACTITIONER

## 2018-04-24 PROCEDURE — 20610 DRAIN/INJ JOINT/BURSA W/O US: CPT | Mod: 79,S$PBB,LT, | Performed by: NURSE PRACTITIONER

## 2018-04-24 RX ADMIN — Medication 16 MG: at 04:04

## 2018-04-24 NOTE — PROGRESS NOTES
Rajeev CARLIN Newell PEDRO presents to clinic today for the second left knee Synvisc injection.    Exam demonstrates the no effusion in the left knee, and the skin is intact.    Diagnosis: osteoarthritis left knee    After time out was performed and patient ID, side, and site were verified, the left knee was sterilly prepped in the standard fashion.  A 22-gauge needle was introduced into the left knee joint from an johnna-lateral site without complication. The left knee was then injected with 2 ml of Synvisc. Sterile dressing was applied.  The patient was instructed to resume activities as tolerated and to call with any problems.     We will see Rajeev CARLIN Reece VASQUEZ back next week for the third injection.

## 2018-04-30 ENCOUNTER — PATIENT MESSAGE (OUTPATIENT)
Dept: RESEARCH | Facility: HOSPITAL | Age: 68
End: 2018-04-30

## 2018-05-01 ENCOUNTER — OFFICE VISIT (OUTPATIENT)
Dept: ORTHOPEDICS | Facility: CLINIC | Age: 68
End: 2018-05-01
Payer: MEDICARE

## 2018-05-01 VITALS
BODY MASS INDEX: 39.2 KG/M2 | SYSTOLIC BLOOD PRESSURE: 119 MMHG | HEIGHT: 71 IN | WEIGHT: 280 LBS | HEART RATE: 73 BPM | DIASTOLIC BLOOD PRESSURE: 78 MMHG

## 2018-05-01 DIAGNOSIS — M17.12 PRIMARY OSTEOARTHRITIS OF LEFT KNEE: ICD-10-CM

## 2018-05-01 DIAGNOSIS — M17.12 PRIMARY OSTEOARTHRITIS OF LEFT KNEE: Primary | ICD-10-CM

## 2018-05-01 PROCEDURE — 20610 DRAIN/INJ JOINT/BURSA W/O US: CPT | Mod: PBBFAC | Performed by: NURSE PRACTITIONER

## 2018-05-01 PROCEDURE — 20610 DRAIN/INJ JOINT/BURSA W/O US: CPT | Mod: S$PBB,58,LT, | Performed by: NURSE PRACTITIONER

## 2018-05-01 PROCEDURE — 99999 PR PBB SHADOW E&M-EST. PATIENT-LVL IV: CPT | Mod: PBBFAC,,, | Performed by: NURSE PRACTITIONER

## 2018-05-01 PROCEDURE — 99499 UNLISTED E&M SERVICE: CPT | Mod: S$PBB,,, | Performed by: NURSE PRACTITIONER

## 2018-05-01 PROCEDURE — 99214 OFFICE O/P EST MOD 30 MIN: CPT | Mod: PBBFAC,25 | Performed by: NURSE PRACTITIONER

## 2018-05-01 RX ORDER — FLUOCINONIDE 1 MG/G
CREAM TOPICAL
COMMUNITY
End: 2021-03-11

## 2018-05-01 RX ORDER — MELOXICAM 15 MG/1
15 TABLET ORAL DAILY
Qty: 30 TABLET | Refills: 3 | Status: SHIPPED | OUTPATIENT
Start: 2018-05-01 | End: 2018-05-01 | Stop reason: SDUPTHER

## 2018-05-01 RX ORDER — MELOXICAM 15 MG/1
TABLET ORAL
Qty: 90 TABLET | Refills: 3 | Status: ON HOLD | OUTPATIENT
Start: 2018-05-01 | End: 2019-03-21 | Stop reason: HOSPADM

## 2018-05-01 RX ADMIN — Medication 16 MG: at 04:05

## 2018-05-01 NOTE — PROGRESS NOTES
Rajeev Newell III presents to clinic today for the third left knee Synvisc injection.    Exam demonstrates the no effusion in the left knee, and the skin is intact.    Diagnosis: osteoarthritis knee    After time out was performed and patient ID, side, and site were verified, the left knee was sterilly prepped in the standard fashion.  A 22-gauge needle was introduced into theleft knee joint from an johnna-lateral site without complication. The left knee was then injected with 2 ml of Synvisc. Sterile dressing was applied.  The patient was instructed to resume activities as tolerated and to call with any problems.     He reports good relief and will f/u as needed.

## 2018-05-28 ENCOUNTER — OFFICE VISIT (OUTPATIENT)
Dept: PRIMARY CARE CLINIC | Facility: CLINIC | Age: 68
End: 2018-05-28
Payer: MEDICARE

## 2018-05-28 ENCOUNTER — HOSPITAL ENCOUNTER (EMERGENCY)
Facility: HOSPITAL | Age: 68
Discharge: HOME | End: 2018-05-28
Attending: EMERGENCY MEDICINE
Payer: MEDICARE

## 2018-05-28 VITALS
TEMPERATURE: 99 F | HEIGHT: 71 IN | HEART RATE: 79 BPM | BODY MASS INDEX: 38.5 KG/M2 | SYSTOLIC BLOOD PRESSURE: 134 MMHG | OXYGEN SATURATION: 96 % | RESPIRATION RATE: 18 BRPM | WEIGHT: 275 LBS | DIASTOLIC BLOOD PRESSURE: 76 MMHG

## 2018-05-28 VITALS
HEART RATE: 88 BPM | TEMPERATURE: 98 F | SYSTOLIC BLOOD PRESSURE: 120 MMHG | BODY MASS INDEX: 38.88 KG/M2 | DIASTOLIC BLOOD PRESSURE: 82 MMHG | HEIGHT: 71 IN | WEIGHT: 277.69 LBS

## 2018-05-28 DIAGNOSIS — S81.811A LACERATION OF RIGHT LOWER EXTREMITY, INITIAL ENCOUNTER: Primary | ICD-10-CM

## 2018-05-28 DIAGNOSIS — S81.011A LACERATION OF RIGHT KNEE, INITIAL ENCOUNTER: Primary | ICD-10-CM

## 2018-05-28 DIAGNOSIS — Z96.651 S/P TKR (TOTAL KNEE REPLACEMENT), RIGHT: ICD-10-CM

## 2018-05-28 PROCEDURE — 99213 OFFICE O/P EST LOW 20 MIN: CPT | Mod: S$PBB,,, | Performed by: NURSE PRACTITIONER

## 2018-05-28 PROCEDURE — 99283 EMERGENCY DEPT VISIT LOW MDM: CPT | Mod: ,,, | Performed by: PHYSICIAN ASSISTANT

## 2018-05-28 PROCEDURE — 99215 OFFICE O/P EST HI 40 MIN: CPT | Mod: PBBFAC,PO | Performed by: NURSE PRACTITIONER

## 2018-05-28 PROCEDURE — 12002 RPR S/N/AX/GEN/TRNK2.6-7.5CM: CPT

## 2018-05-28 PROCEDURE — 99283 EMERGENCY DEPT VISIT LOW MDM: CPT | Mod: 27

## 2018-05-28 PROCEDURE — 25000003 PHARM REV CODE 250: Performed by: STUDENT IN AN ORGANIZED HEALTH CARE EDUCATION/TRAINING PROGRAM

## 2018-05-28 PROCEDURE — 99999 PR PBB SHADOW E&M-EST. PATIENT-LVL V: CPT | Mod: PBBFAC,,, | Performed by: NURSE PRACTITIONER

## 2018-05-28 RX ORDER — LIDOCAINE HYDROCHLORIDE 10 MG/ML
5 INJECTION, SOLUTION EPIDURAL; INFILTRATION; INTRACAUDAL; PERINEURAL
Status: DISCONTINUED | OUTPATIENT
Start: 2018-05-28 | End: 2018-05-28

## 2018-05-28 RX ORDER — CEPHALEXIN 500 MG/1
500 CAPSULE ORAL 4 TIMES DAILY
Qty: 20 CAPSULE | Refills: 0 | Status: SHIPPED | OUTPATIENT
Start: 2018-05-28 | End: 2018-06-02

## 2018-05-28 RX ORDER — LIDOCAINE HYDROCHLORIDE 10 MG/ML
20 INJECTION INFILTRATION; PERINEURAL ONCE
Status: COMPLETED | OUTPATIENT
Start: 2018-05-28 | End: 2018-05-28

## 2018-05-28 RX ADMIN — LIDOCAINE HYDROCHLORIDE 20 ML: 10 INJECTION, SOLUTION INFILTRATION; PERINEURAL at 09:05

## 2018-05-28 NOTE — PROGRESS NOTES
Rajeev Newell III is a 67 y.o. male patient of Swati Walton MD who presents to the clinic today for   Chief Complaint   Patient presents with    Knee Injury     cut to right knee   .    HPI    Pt, who is not known to me, reports a new problem to me:  Laceration to the right knee when he moved his barbeque grill approx 40 minutes ago.   He cleaned the wound with hydrogen peroxide and bandaged it  He had a knee replacement on that side approx 12 weeks ago by Dr. Rosas and is due for f/u soon.  He is UTD on his tetanus shot. .    These symptoms began 40 minutes ago and status is unchanged.     Pt denies the following symptoms:  Severe pain    Aggravating factors include laceration with metal grill .    Relieving factors include taping tightly to control the bleeding .    OTC Medications tried are hydrogen peroxide.    Prescription medications taken for symptoms are none.    Pertinent medical history:  Nearly 12 weeks ago he had a knee replacement by Dr. Rosas in the affected knee    ROS    Skin:  See chief complaint/HPI.    MS:  Recent knee replacement in the affected knee.    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       FAMILY HISTORY:  Family History   Problem Relation Age of Onset    Hypertension Mother     Stroke Mother     Diabetes Neg Hx     Cancer Neg Hx     Heart disease Neg Hx        ALLERGIES AND MEDICATIONS: updated and reviewed.  Review of patient's allergies indicates:   Allergen Reactions    Bactrim [sulfamethoxazole-trimethoprim] Other (See Comments)     Abdominal Pain, Flush, Fever and Chills, Headache and Cough after Pt. took Bactrim    Darvon [propoxyphene]      Hallucinations     Current Outpatient Prescriptions   Medication Sig Dispense Refill    acyclovir 5% (ZOVIRAX) 5 % ointment Apply topically every 3 (three) hours. 1 Tube 1    albuterol 90 mcg/actuation inhaler Inhale 2 puffs into the lungs every 4 (four) hours as needed for Wheezing. 1 Inhaler 5    aspirin (ECOTRIN) 325 MG EC tablet Take 1 tablet (325 mg total) by mouth 2 (two) times daily. 60 tablet 0    atorvastatin (LIPITOR) 40 MG tablet TK 1 T PO  QPM  11    brinzolamide (AZOPT) 1 % ophthalmic suspension Place 1 drop into both eyes 2 (two) times daily. 10 mL 4    clotrimazole (LOTRIMIN) 1 % cream Apply topically 2 (two) times daily. 12 g 0    docusate sodium (COLACE) 100 MG capsule Take 1 capsule (100 mg total) by mouth 2 (two) times daily. 60 capsule 0    fexofenadine (ALLEGRA) 180 MG tablet Every day PRN      fluocinonide 0.1 % Crea fluocinonide 0.1 % topical cream      hydrocortisone (ANUSOL-HC) 25 mg suppository Place 1 suppository (25 mg total) rectally 2 (two) times daily as needed for Hemorrhoids. 1 Suppository(ies) Rectal PRN Twice a day. 12 suppository 1    latanoprost 0.005 % ophthalmic solution Place 1 drop into both eyes nightly. 7.5 mL 4    meloxicam (MOBIC) 15 MG tablet TAKE 1 TABLET BY MOUTH DAILY 90 tablet 3    pantoprazole (PROTONIX) 40 MG tablet Every day      pioglitazone (ACTOS) 15 MG  tablet Take 1 tablet (15 mg total) by mouth once daily. 90 tablet 1    valACYclovir (VALTREX) 1000 MG tablet Take 1 tablet (1,000 mg total) by mouth 2 (two) times daily. 20 tablet 1    valsartan (DIOVAN) 320 MG tablet TAKE 1 TABLET BY MOUTH QD  4     No current facility-administered medications for this visit.          PHYSICAL EXAM    Alert, coop 67 y.o. male patient in no acute distress, is not ill-appearing.    Vitals:    05/28/18 1652   BP: 120/82   Pulse: 88   Temp: 98.4 °F (36.9 °C)     VS reviewed.  VS stable.  CC, nursing note, medications & PMH all reviewed today.    Head:  Normocephalic, atraumatic.    EENT:  Ext nose/ears normal.               Eyes with normal lids, not injected.     Resp:  Respirations even, unlabored    Heart:  Regular rate.    MS:  Ambulates holding the right knee straight to prevent the wound from opening and bleeding.     NEURO:  Alert and oriented x 4.  Responds appropriately during interaction.          Skin:  Warm, dry, color good.            A/an 4.5 cm gaping horizontal lac is noted across knee below the level of the patella.             Minima swelling is present.           No pustules or vesicles noted.    Psych:  Responds appropriately throughout the visit.               Relaxed.  Well-groomed    Laceration of right knee, initial encounter    S/P TKR (total knee replacement), right  Comments:  3/6/18      Pt today presents with lac of the right knee, in which he had a total knee replacement on 3/6/18, on the BBQ grill approx 40 min prior to arrival.  There is a deep would below the level of the patella across the surgical incision that is healing well..    This is a new problem to me.  No work up is planned.        The pt is advised to go to the ER for evaluation and treatment.    Explained exam findings, diagnosis and treatment plan to patient.  Questions answered and patient states understanding.

## 2018-05-28 NOTE — Clinical Note
Swati Walton MD,  I saw your patient today in the [facility].  If you have any questions, please do not hesitate to contact me.  Thank you!  TAMARA Beltran

## 2018-05-29 ENCOUNTER — TELEPHONE (OUTPATIENT)
Dept: ORTHOPEDICS | Facility: CLINIC | Age: 68
End: 2018-05-29

## 2018-05-29 NOTE — ED NOTES
HEENT: WDL  Cardiac: WDL, apical pulse regular. Denies chest pain.   Respiratory: Respirations even and unlabored, denies SOB.   Skin: Warm and dry, approximate 3in horizontal laceration to right knee, no bleeding currently.   Neuro: AAOx4, no abnormalities noted.   Musculoskeletal: No mobility deficits, active ROM in all extremities, denies pain.   GI: Abdomen soft and nontender, denies GI symptoms.   : Voids spontaneously without difficulty.   Psychosocial: No abnormalities noted in behavior, speech, thought process and appearance.

## 2018-05-29 NOTE — DISCHARGE INSTRUCTIONS
Take antibiotic as prescribed. Follow up with orthopedic surgeon as indicated for 12-week appointment. Return to the ED immediately if knee pain worsens, incision becomes warm, red or drains purulent fluid, or if you develop fever or chills.    Our goal in the emergency department is to always give you outstanding care and exceptional service. You may receive a survey by mail or e-mail in the next week regarding your experience in our ED. We would greatly appreciate your completing and returning the survey. Your feedback provides us with a way to recognize our staff who give very good care and it helps us learn how to improve when your experience was below our aspiration of excellence.

## 2018-05-29 NOTE — TELEPHONE ENCOUNTER
Spoke to pt, he states he had an accident/fall at his house yesterday, sustained a laceration to his surgical leg across his incision below the knee cap. He went to [facility] ER, Ortho saw him, cleaned and stitched it up. They prescribed Keflex for 7 days. He has a previously scheduled f/u appointment tomorrow with Dr. Rosas. Pt states he is doing well today. Message forwarded to Dr. Rosas to notify. Understanding verbalized.

## 2018-05-29 NOTE — ED PROVIDER NOTES
Encounter Date: 5/28/2018       History     Chief Complaint   Patient presents with    Laceration     Right knee injury today over knee replacement scar; pt went to urgent clinic on [facility] and told to come here.     Mr Newell is a 67YOWM who presents with right knee laceration from tripping over grill earlier today, pertinent PHMx R TKA 03/2018, DM 2, HTN. Patient was seen in [facility] and was instructed to return to [facility] for surgeon evaluation of laceration. Horizontal laceration present over R knee inferior to patella, with hemostasis achieved upon arrival to ED. Laceration crosses well-healed surgical incision. Patient is concerned for infectious risk d/t recent surgery. Denies syncope, dizziness, decreased ROM in knee, trouble with ambulation, paresthesias in RLE. Patient UTD on Tdap.          Review of patient's allergies indicates:   Allergen Reactions    Bactrim [sulfamethoxazole-trimethoprim] Other (See Comments)     Abdominal Pain, Flush, Fever and Chills, Headache and Cough after Pt. took Bactrim    Darvon [propoxyphene]      Hallucinations     Past Medical History:   Diagnosis Date    Cataract     OU--early    DM (diabetes mellitus) 10/29/2012    Glaucoma     POAG-OU    Hypertension     Sinusitis      Past Surgical History:   Procedure Laterality Date    CARPAL TUNNEL RELEASE      CIRCUMCISION      CYSTOSCOPY      HERNIA REPAIR Left 1994    [facility] inguinal - [facility]    KNEE SURGERY       Family History   Problem Relation Age of Onset    Hypertension Mother     Stroke Mother     Diabetes Neg Hx     Cancer Neg Hx     Heart disease Neg Hx      Social History   Substance Use Topics    Smoking status: Never Smoker    Smokeless tobacco: Never Used    Alcohol use Yes      Comment: occasionally     Review of Systems   Skin: Positive for wound (R knee inferior to patella).   All other systems reviewed and are negative.      Physical Exam     Initial Vitals [05/28/18 1920]   BP Pulse Resp  Temp SpO2   (!) 157/76 77 18 98.6 °F (37 °C) 95 %      MAP       103         Physical Exam    Nursing note and vitals reviewed.  Constitutional: He appears well-developed and well-nourished. He is not diaphoretic. No distress.   Well-appearing male in NAD, VSS, afebrile.    HENT:   Head: Normocephalic and atraumatic.   Right Ear: External ear normal.   Left Ear: External ear normal.   Mouth/Throat: Oropharynx is clear and moist. No oropharyngeal exudate.   Eyes: Conjunctivae and EOM are normal. Pupils are equal, round, and reactive to light. No scleral icterus.   Neck: Normal range of motion. Neck supple.   Cardiovascular: Normal rate, regular rhythm, normal heart sounds and intact distal pulses.   RLE pedal pulse intact.   Pulmonary/Chest: Breath sounds normal. No respiratory distress. He has no wheezes.   Abdominal: Soft. There is no tenderness.   Musculoskeletal: He exhibits no edema or tenderness.   R knee ROM as expected for 12-week s/p TKA. No pain with AROM of knee.   Neurological: He is alert and oriented to person, place, and time. He has normal strength. No cranial nerve deficit or sensory deficit.   Skin: Skin is warm and dry. Capillary refill takes less than 2 seconds. Laceration noted.        Approx 4 cm horizontal laceration that intersects well-healed surgical incision. Hemostasis achieved upon presentation to ED. Laceration is linear with isolated dermal involvement suspected. No foreign body appreciated on rikki exam. No TTP over patella, proximal tibia, nor patellar tendon.    Psychiatric: He has a normal mood and affect. His behavior is normal. Thought content normal.         ED Course   Procedures  Labs Reviewed - No data to display          Medical Decision Making:   Initial Assessment:   Patient presents with knee laceration from mechanical fall, he is 11 weeks s/p R TKA. Laceration intersects well-healed surgical incision. Patient reports he was sent here to have surgeon evaluate lac. RLE  neurovascularly intact. Full ROM in R knee.  Differential Diagnosis:   DDX laceration with dermal involvement. Physical exam and history taking decrease clinical suspicion for patellar tendon rupture, patellar fracture, wound dehiscence, vascular injury.   ED Management:  Ortho consulted and will see patient in ED. Plain films of knee ordered. Patient care resumed by Swati Berry PA-C at shift change.    Ani Burgos PA-C  05/28/2018          I discussed the following case, diagnosis and plan of care with attending physician.                        Clinical Impression:   The encounter diagnosis was Laceration of right lower extremity, initial encounter.    Disposition:   Disposition: Discharged  Condition: Stable                        Ani Burgos PA-C  05/31/18 0016

## 2018-05-29 NOTE — TELEPHONE ENCOUNTER
----- Message from Crystla Bernardo sent at 5/28/2018  5:54 PM CDT -----  Contact: pt  Pt would like to be called back regarding care on his way to ER, was advised to contact Dr. Don to get knee stitch     Pt can be reached at [phone]

## 2018-05-29 NOTE — ED TRIAGE NOTES
Horizontal approximate 2 in laceration across right knee on top of vertical knee replacement scar from surgery on March 6th, happened around 1630 when pt fell on his barbeque, no other injuries. Bleeding controlled, no issues with surgical site.

## 2018-05-30 ENCOUNTER — OFFICE VISIT (OUTPATIENT)
Dept: ORTHOPEDICS | Facility: CLINIC | Age: 68
End: 2018-05-30
Payer: MEDICARE

## 2018-05-30 VITALS
WEIGHT: 277.25 LBS | HEART RATE: 80 BPM | DIASTOLIC BLOOD PRESSURE: 78 MMHG | HEIGHT: 71 IN | SYSTOLIC BLOOD PRESSURE: 132 MMHG | BODY MASS INDEX: 38.81 KG/M2

## 2018-05-30 DIAGNOSIS — S81.011S KNEE LACERATION, RIGHT, SEQUELA: ICD-10-CM

## 2018-05-30 DIAGNOSIS — Z96.651 STATUS POST RIGHT KNEE REPLACEMENT: Primary | ICD-10-CM

## 2018-05-30 PROCEDURE — 99213 OFFICE O/P EST LOW 20 MIN: CPT | Mod: PBBFAC | Performed by: ORTHOPAEDIC SURGERY

## 2018-05-30 PROCEDURE — 99024 POSTOP FOLLOW-UP VISIT: CPT | Mod: POP,,, | Performed by: ORTHOPAEDIC SURGERY

## 2018-05-30 PROCEDURE — 99999 PR PBB SHADOW E&M-EST. PATIENT-LVL III: CPT | Mod: PBBFAC,,, | Performed by: ORTHOPAEDIC SURGERY

## 2018-05-30 NOTE — PROGRESS NOTES
Rajeev CARLIN Reece III is in for 3 month follow up for a  rightTKA.  He is doing  well.  Minimal pain in the knee.  He has resumed activities of daily living. He sustained a laceration at the base of his incision from a barbeque grill  Exam demonstrates  A well developed male in no distress.  Alert and oriented.  Mood and affect are appropriate.    Knee incision is well healed. Transverswe laceration with sutures.  Distal aspect incision. Sutures intact.  No drainage, erythema.  ROM is 0-120.  The patella tracks well and there is no instability. The extremity is neurovascularly intact.    Xrays demonstrate a well fixed and positioned prosthesis.      Imp:Doing well    F/u in one week for suture removal with JAMI.  6 weeks with me.

## 2018-05-31 ENCOUNTER — OFFICE VISIT (OUTPATIENT)
Dept: UROLOGY | Facility: CLINIC | Age: 68
End: 2018-05-31
Payer: MEDICARE

## 2018-05-31 VITALS
BODY MASS INDEX: 39.1 KG/M2 | DIASTOLIC BLOOD PRESSURE: 80 MMHG | SYSTOLIC BLOOD PRESSURE: 117 MMHG | WEIGHT: 279.31 LBS | HEART RATE: 71 BPM | HEIGHT: 71 IN

## 2018-05-31 DIAGNOSIS — R35.1 NOCTURIA: ICD-10-CM

## 2018-05-31 DIAGNOSIS — N40.0 BENIGN PROSTATIC HYPERPLASIA WITHOUT LOWER URINARY TRACT SYMPTOMS: Primary | ICD-10-CM

## 2018-05-31 LAB
BILIRUB SERPL-MCNC: NORMAL MG/DL
BLOOD URINE, POC: NORMAL
COLOR, POC UA: YELLOW
GLUCOSE UR QL STRIP: NORMAL
KETONES UR QL STRIP: NORMAL
LEUKOCYTE ESTERASE URINE, POC: NORMAL
NITRITE, POC UA: NORMAL
PH, POC UA: 5.5
PROTEIN, POC: NORMAL
SPECIFIC GRAVITY, POC UA: 1.01
UROBILINOGEN, POC UA: NORMAL

## 2018-05-31 PROCEDURE — 99999 PR PBB SHADOW E&M-EST. PATIENT-LVL III: CPT | Mod: PBBFAC,,, | Performed by: UROLOGY

## 2018-05-31 PROCEDURE — 81002 URINALYSIS NONAUTO W/O SCOPE: CPT | Mod: PBBFAC,PO | Performed by: UROLOGY

## 2018-05-31 PROCEDURE — 99214 OFFICE O/P EST MOD 30 MIN: CPT | Mod: S$PBB,,, | Performed by: UROLOGY

## 2018-05-31 PROCEDURE — 99213 OFFICE O/P EST LOW 20 MIN: CPT | Mod: PBBFAC,PO | Performed by: UROLOGY

## 2018-05-31 NOTE — PROGRESS NOTES
UROLOGY [place]  5 31 18    Cc yearly check     Age 67, has no new complaints. Voids well, with no pains or burning. Has nocturia x 0-1, no pain or burning. Says he drinks a lot of fluid in the evening. Has some urgency but has not had incontinence.     psa was 1.1 one year ago           PMH     Surgical:  has past surgical history that includes Cystoscopy; Circumcision; Carpal tunnel release; and Hernia repair (Left, 1994).  Medical:  has a past medical history of Cataract; Glaucoma; Hypertension; DM (diabetes mellitus); and Sinusitis.  Familial: no fh of renal disease  Social:  in oil rigs, , lives in [place]            Current Outpatient Prescriptions on File Prior to Visit   Medication Sig Dispense Refill    celecoxib (CELEBREX) 200 MG capsule Take 200 mg by mouth daily as needed.         ezetimibe (ZETIA) 10 mg tablet          fexofenadine (ALLEGRA) 180 MG tablet Every day PRN        hydrocortisone (ANUSOL-HC) 25 mg suppository Place 1 suppository (25 mg total) rectally 2 ( 12 suppository 1    latanoprost 0.005 % ophthalmic solution PLACE 1 DROP IN BOTH EYES E 7.5 mL 0    mupirocin (BACTROBAN) 2 % JAMI AA BID   2    pantoprazole (PROTONIX) Every day        pioglitazone (ACTOS) 15 MG tablet Take 1 tablet (15 mg total) by mout 90 tablet 3    telmisartan (MICARDIS)      0    brinzolamide (AZOPT) 1 % ophthalmic suspension Place 1 drop into both eyes every morning. 10 mL 4   ROS:  Denies malaise, headaches, eye symptoms, difficulty swallowing or breathing problems.   No chest pains or palpitations.   No change in bowel habits, no tarry stools or hematochezia. Has acid reflux.  No genital lesions.  No bleeding disorders, no seizures.  Psych normal affect, normal mood     Pt alert, oriented, no distress  HEENT:  wnl.  Neck: supple, no JVD, no lymphadenopathy  Chest: CV NSR  Lungs: normal chest expansion  Abdomen prominent, obese, nontender, no organomegaly, no masses.  No hernias  Penis  nl, meatus nl  Testes nl, epi nl, scrotum nl  WALDEMAR: anus nl, sphincter nl tone, mucosa without lesions, prostate 40 gm, symmetric, no nodules or indurations  Extremities: no edema, peripheral pulses nl  Neuro: preserved     IMP  bph can continue on observation  RTC yearly

## 2018-06-06 NOTE — CONSULTS
Consult Note  Orthopaedics    SUBJECTIVE:     History of Present Illness:  Patient is a 67 y.o. male who is s/p R TKA on 3/6/18 by Dr. Rosas who presents to the ED after he sustained a horizontal laceration to his anterior knee.  He was seen at an urgent care center on the [place] where he was told he should come to [facility] to be evaluated by orthopaedics.  The laceration is across his healed TKA incision.  Patient denies numbness, tingling, erythema or any problems thus far with his TKA.  He is up to date on Tdap.      Review of patient's allergies indicates:   Allergen Reactions    Bactrim [sulfamethoxazole-trimethoprim] Other (See Comments)     Abdominal Pain, Flush, Fever and Chills, Headache and Cough after Pt. took Bactrim    Darvon [propoxyphene]      Hallucinations       Past Medical History:   Diagnosis Date    Cataract     OU--early    DM (diabetes mellitus) 10/29/2012    Glaucoma     POAG-OU    Hypertension     Sinusitis      Past Surgical History:   Procedure Laterality Date    CARPAL TUNNEL RELEASE      CIRCUMCISION      CYSTOSCOPY      HERNIA REPAIR Left 1994    L inguinal - [facility]    KNEE SURGERY       Family History   Problem Relation Age of Onset    Hypertension Mother     Stroke Mother     Diabetes Neg Hx     Cancer Neg Hx     Heart disease Neg Hx      Social History   Substance Use Topics    Smoking status: Never Smoker    Smokeless tobacco: Never Used    Alcohol use Yes      Comment: occasionally        Review of Systems:  Patient denies constitutional symptoms, cardiac symptoms, respiratory symptoms, GI symptoms.  The remainder of the musculoskeletal ROS is included in the HPI.      OBJECTIVE:     Vital Signs (Most Recent)  Temp: 98.6 °F (37 °C) (05/28/18 1920)  Pulse: 79 (05/28/18 2235)  Resp: 18 (05/28/18 2235)  BP: 134/76 (05/28/18 2235)  SpO2: 96 % (05/28/18 2235)    Physical Exam:  Gen:  No acute distress  CV:  Peripherally well-perfused.  Pulses 2+ bilaterally.  Lungs:   Normal respiratory effort.  Abdomen:  Soft, non-tender, non-distended  Head/Neck:  Normocephalic.  Atraumatic. No TTP, AROM and PROM intact without pain  Neuro:  CN intact without deficit, SILT throughout B/L Upper & Lower Extremities  Pelvis: No TTP, Stable to direct anterior pressure over ASIS.    MSK:    RLE:  - There is a 5 cm horizontal laceration across the anterior knee across his well healed surgical incision from his TKA.  This is a superficial laceration that does not extend beneath the dermis.  Clean with no contamination.  No TTP at the knee.  - AROM and PROM hip, knee and ankle is intact and without pain  - TA/EHL/Gastroc/FHL assessed in isolation without deficit  - SILT throughout  - DP and PT palpated  2+  - Capillary Refill <3s    Laboratory:  No results found for this or any previous visit (from the past 72 hour(s)).    Diagnostic Results:  X-Ray: Reviewed   R TKA in place without evidence of loosening     ASSESSMENT/PLAN:     A/P: Rajeev Newell III is a 67 y.o.  s/p R TKA on 3/6/18 by Dr. Rosas who presents to the ED after he sustained a horizontal laceration to his anterior knee.        Plan:  - Laceration is superficial and was thoroughly irrigated with saline and cleaned with betadine  -Wound was closed with horizontal mattress sutures using 2-0 nylon  -Patient was given PO abx   -He is already scheduled follow up with Dr. Rosas next week      After time out was performed and patient ID, side, and site were verified, the RIGHT knee was sterilly prepped in the standard fashion.  A 22-gauge needle was introduced into the laceration without complication and 15cc of 1% lidocaine was then injected without difficulty.  After adequate analgesia was obtained, the laceration was thoroughly irrigated with 3L of normal saline. At this point, the wound was primarily closed using horizontal mattress sutures using 2-0 nylon. The patient tolerated the procedure well with no complications.  Blood loss  was minimal.      Akin Mo M.D. PGY1  Orthopedic Surgery Resident

## 2018-06-07 ENCOUNTER — OFFICE VISIT (OUTPATIENT)
Dept: ORTHOPEDICS | Facility: CLINIC | Age: 68
End: 2018-06-07
Payer: MEDICARE

## 2018-06-07 VITALS
DIASTOLIC BLOOD PRESSURE: 80 MMHG | HEIGHT: 71 IN | HEART RATE: 73 BPM | BODY MASS INDEX: 39.91 KG/M2 | SYSTOLIC BLOOD PRESSURE: 124 MMHG | WEIGHT: 285.06 LBS

## 2018-06-07 DIAGNOSIS — E11.9 DIABETES TYPE 2, CONTROLLED: ICD-10-CM

## 2018-06-07 DIAGNOSIS — Z96.651 STATUS POST RIGHT KNEE REPLACEMENT: Primary | ICD-10-CM

## 2018-06-07 DIAGNOSIS — S81.011S KNEE LACERATION, RIGHT, SEQUELA: ICD-10-CM

## 2018-06-07 PROCEDURE — 99999 PR PBB SHADOW E&M-EST. PATIENT-LVL III: CPT | Mod: PBBFAC,,, | Performed by: PHYSICIAN ASSISTANT

## 2018-06-07 PROCEDURE — 99024 POSTOP FOLLOW-UP VISIT: CPT | Mod: POP,,, | Performed by: PHYSICIAN ASSISTANT

## 2018-06-07 PROCEDURE — 99213 OFFICE O/P EST LOW 20 MIN: CPT | Mod: PBBFAC | Performed by: PHYSICIAN ASSISTANT

## 2018-06-07 RX ORDER — PIOGLITAZONEHYDROCHLORIDE 15 MG/1
15 TABLET ORAL DAILY
Qty: 90 TABLET | Refills: 1 | Status: SHIPPED | OUTPATIENT
Start: 2018-06-07 | End: 2019-08-30 | Stop reason: SDUPTHER

## 2018-06-07 NOTE — PROGRESS NOTES
Rajeev RAEGAN Newell III is here for his  post operative visit following a right Total Knee Arthroplasty  preformed by Dr Rosas on @03/06/2018@.  He severed traumatic laceration to the inferior portion of his incision 5/28/2018 was seen and treated emergency room and the incision was cleaned and sutured is here today for evaluation and suture removal.  His incision is clean and dry without evidence of drainage, discharge, erythema or echemyosis. Sutures were removed, steri strips were placed; he was advised to keep the incision clean and dry for the next 24 hours after which he  may wash the area with antibacterial soap in the shower. Do not submerge until the incision is completely healed.     He is completed his physical therapy and has an active range of motion of 0-110° in clinic today having no pain in her about the knee and is very happy with his a progress to this point.    I will see him back in 1 year with new x-rays. If he has any problems at all he is to call the clinic immediately.

## 2018-06-21 DIAGNOSIS — E11.9 DIABETES TYPE 2, CONTROLLED: ICD-10-CM

## 2018-06-22 ENCOUNTER — OFFICE VISIT (OUTPATIENT)
Dept: OPTOMETRY | Facility: CLINIC | Age: 68
End: 2018-06-22
Payer: MEDICARE

## 2018-06-22 ENCOUNTER — CLINICAL SUPPORT (OUTPATIENT)
Dept: OPHTHALMOLOGY | Facility: CLINIC | Age: 68
End: 2018-06-22
Payer: MEDICARE

## 2018-06-22 DIAGNOSIS — H40.1132 PRIMARY OPEN ANGLE GLAUCOMA OF BOTH EYES, MODERATE STAGE: ICD-10-CM

## 2018-06-22 DIAGNOSIS — H43.393 VITREOUS FLOATERS, BILATERAL: ICD-10-CM

## 2018-06-22 DIAGNOSIS — H25.13 NUCLEAR SCLEROSIS, BILATERAL: ICD-10-CM

## 2018-06-22 DIAGNOSIS — E11.9 DIABETES MELLITUS TYPE 2 WITHOUT RETINOPATHY: Primary | ICD-10-CM

## 2018-06-22 PROCEDURE — 99212 OFFICE O/P EST SF 10 MIN: CPT | Mod: PBBFAC,PO,25 | Performed by: OPTOMETRIST

## 2018-06-22 PROCEDURE — 99999 PR PBB SHADOW E&M-EST. PATIENT-LVL II: CPT | Mod: PBBFAC,,, | Performed by: OPTOMETRIST

## 2018-06-22 PROCEDURE — 92014 COMPRE OPH EXAM EST PT 1/>: CPT | Mod: S$PBB,,, | Performed by: OPTOMETRIST

## 2018-06-22 PROCEDURE — 92083 EXTENDED VISUAL FIELD XM: CPT | Mod: PBBFAC,PO | Performed by: OPTOMETRIST

## 2018-06-22 PROCEDURE — 92133 CPTRZD OPH DX IMG PST SGM ON: CPT | Mod: PBBFAC,PO | Performed by: OPTOMETRIST

## 2018-06-22 RX ORDER — PIOGLITAZONEHYDROCHLORIDE 15 MG/1
TABLET ORAL
Qty: 90 TABLET | Refills: 0 | Status: SHIPPED | OUTPATIENT
Start: 2018-06-22 | End: 2018-11-20 | Stop reason: SDUPTHER

## 2018-06-22 RX ORDER — LATANOPROST 50 UG/ML
1 SOLUTION/ DROPS OPHTHALMIC NIGHTLY
Qty: 7.5 ML | Refills: 4 | Status: SHIPPED | OUTPATIENT
Start: 2018-06-22 | End: 2019-07-02 | Stop reason: SDUPTHER

## 2018-06-22 RX ORDER — BRINZOLAMIDE 10 MG/ML
1 SUSPENSION/ DROPS OPHTHALMIC 2 TIMES DAILY
Qty: 10 ML | Refills: 4 | Status: SHIPPED | OUTPATIENT
Start: 2018-06-22 | End: 2019-06-23

## 2018-06-22 NOTE — PROGRESS NOTES
HPI     Annual Exam    Additional comments: DLE 2-18 (nichelle)   ocular health exam  // pt states   no visual complaints           Glaucoma    Additional comments: POAG - OU // +Azopt bid & Latanoprost qhs -- rev hvf   & oct           Diabetic Eye Exam    Additional comments: BSL controlled           Spots and/or Floaters    Additional comments: OU -- no light flashes           Comments   Hemoglobin A1C       Date                     Value               Ref Range             Status                01/30/2018               6.2 (H)             0.0 - 5.6 %           Final              Comment:    Reference Interval:  5.0 - 5.6 Normal   5.7 - 6.4 High Risk   > 6.5   Diabetic    Hgb A1c results are standardized based on the (NGSP) National     Glycohemoglobin Standardization Program.    Hemoglobin A1C levels are   related to mean serum/plasma glucose   during the preceding 2-3 months.              12/28/2017               5.8 (H)             4.0 - 5.6 %           Final              Comment:    According to ADA guidelines, hemoglobin A1c <7.0% represents  optimal   control in non-pregnant diabetic patients. Different  metrics may apply to   specific patient populations.   Standards of Medical Care in   Diabetes-2016.  For the purpose of screening for the presence of   diabetes:  <5.7%     Consistent with the absence of diabetes  5.7-6.4%    Consistent with increasing risk for diabetes   (prediabetes)  >or=6.5%    Consistent with diabetes  Currently, no consensus exists for use of   hemoglobin A1c  for diagnosis of diabetes for children.  This Hemoglobin   A1c assay has significant interference with fetal   hemoglobin   (HbF).   The results are invalid for patients with abnormal amounts of   HbF,     including those with known Hereditary Persistence   of Fetal Hemoglobin.   Heterozygous hemoglobin variants (HbAS, HbAC,   HbAD, HbAE, HbA2) do not   significantly interfere with this assay;   however, presence of multiple    variants in a sample may impact the %   interference.         06/07/2017               6.3 (H)             4.5 - 6.2 %           Final              Comment:    According to ADA guidelines, hemoglobin A1C <7.0% represents  optimal   control in non-pregnant diabetic patients.  Different  metrics may apply   to specific populations.   Standards of Medical Care in Diabetes -   2016.  For the purpose of screening for the presence of diabetes:  <5.7%       Consistent with the absence of diabetes  5.7-6.4%  Consistent with   increasing risk for diabetes   (prediabetes)  >or=6.5%  Consistent with   diabetes  Currently no consensus exists for use of hemoglobin A1C  for   diagnosis of diabetes for children.    ----------`c       Last edited by Deanne Wilson on 6/22/2018  9:40 AM. (History)        ROS     Positive for: Eyes    Negative for: Constitutional, Gastrointestinal, Neurological, Skin,   Genitourinary, Musculoskeletal, HENT, Endocrine, Cardiovascular,   Respiratory, Psychiatric, Allergic/Imm, Heme/Lymph    Last edited by SHERMAN Smith, OD on 6/22/2018  9:52 AM. (History)        Assessment /Plan     For exam results, see Encounter Report.    Diabetes mellitus type 2 without retinopathy    Primary open angle glaucoma of both eyes, moderate stage  -     latanoprost 0.005 % ophthalmic solution; Place 1 drop into both eyes nightly.  Dispense: 7.5 mL; Refill: 4  -     brinzolamide (AZOPT) 1 % ophthalmic suspension; Place 1 drop into both eyes 2 (two) times daily.  Dispense: 10 mL; Refill: 4  -     Cleaning Visual Field - OU - Extended - Both Eyes  -     Posterior Segment OCT Optic Nerve- Both eyes    Nuclear sclerosis, bilateral    Vitreous floaters, bilateral      1. No ret/ no csme, gave info, control glucose, annual DFE  2. IOP mid/ upper teens , target mid teens  Angles open  Avg / good CCT    Updated fields / OCT today  PSD  3.24 <1% onl w/ inf arc defect  1.59 wnl  Appears stable OU    G 50 onl w/ defects 360,  thinning only NI--very slight progression from 2017   G 63 onl w/ def G/TS /TI    Continue OU  azopt bid  Latanoprost qhs    Refills sent for both today, stressed compliance    IOP in 4 months, repeat fields 12 months    3. Early changes, not vis sig for consult  4. RD precautions given, reviewed    Gave copy specs rx, ok continue with current   Call/message if any issues    Discussed and educated patient on current findings /plan.  RTC 4 months IOP, fields/ oct after 1 year, prn if any changes / issues

## 2018-07-11 ENCOUNTER — OFFICE VISIT (OUTPATIENT)
Dept: ORTHOPEDICS | Facility: CLINIC | Age: 68
End: 2018-07-11
Payer: MEDICARE

## 2018-07-11 VITALS — WEIGHT: 286.25 LBS | HEIGHT: 71 IN | BODY MASS INDEX: 40.07 KG/M2

## 2018-07-11 DIAGNOSIS — Z96.651 STATUS POST RIGHT KNEE REPLACEMENT: Primary | ICD-10-CM

## 2018-07-11 DIAGNOSIS — S81.011S KNEE LACERATION, RIGHT, SEQUELA: ICD-10-CM

## 2018-07-11 PROCEDURE — 99212 OFFICE O/P EST SF 10 MIN: CPT | Mod: PBBFAC | Performed by: ORTHOPAEDIC SURGERY

## 2018-07-11 PROCEDURE — 99212 OFFICE O/P EST SF 10 MIN: CPT | Mod: S$PBB,,, | Performed by: ORTHOPAEDIC SURGERY

## 2018-07-11 PROCEDURE — 99999 PR PBB SHADOW E&M-EST. PATIENT-LVL II: CPT | Mod: PBBFAC,,, | Performed by: ORTHOPAEDIC SURGERY

## 2018-08-10 DIAGNOSIS — E11.9 TYPE 2 DIABETES MELLITUS WITHOUT COMPLICATION: ICD-10-CM

## 2018-10-09 ENCOUNTER — IMMUNIZATION (OUTPATIENT)
Dept: FAMILY MEDICINE | Facility: CLINIC | Age: 68
End: 2018-10-09
Payer: MEDICARE

## 2018-10-09 PROCEDURE — 90662 IIV NO PRSV INCREASED AG IM: CPT | Mod: PBBFAC,PO

## 2018-10-31 ENCOUNTER — PES CALL (OUTPATIENT)
Dept: ADMINISTRATIVE | Facility: CLINIC | Age: 68
End: 2018-10-31

## 2018-11-06 ENCOUNTER — LAB VISIT (OUTPATIENT)
Dept: LAB | Facility: HOSPITAL | Age: 68
End: 2018-11-06
Attending: FAMILY MEDICINE
Payer: MEDICARE

## 2018-11-06 DIAGNOSIS — E11.8 TYPE 2 DIABETES MELLITUS WITH COMPLICATION, WITHOUT LONG-TERM CURRENT USE OF INSULIN: ICD-10-CM

## 2018-11-06 DIAGNOSIS — I10 ESSENTIAL HYPERTENSION: ICD-10-CM

## 2018-11-06 DIAGNOSIS — R09.81 NASAL CONGESTION: ICD-10-CM

## 2018-11-06 DIAGNOSIS — R35.0 URINARY FREQUENCY: ICD-10-CM

## 2018-11-06 DIAGNOSIS — E66.01 MORBID OBESITY: ICD-10-CM

## 2018-11-06 DIAGNOSIS — N40.0 BENIGN PROSTATIC HYPERPLASIA WITHOUT LOWER URINARY TRACT SYMPTOMS: ICD-10-CM

## 2018-11-06 DIAGNOSIS — R35.1 NOCTURIA: ICD-10-CM

## 2018-11-06 LAB
ALBUMIN SERPL BCP-MCNC: 3.8 G/DL
ALP SERPL-CCNC: 39 U/L
ALT SERPL W/O P-5'-P-CCNC: 29 U/L
ANION GAP SERPL CALC-SCNC: 6 MMOL/L
AST SERPL-CCNC: 23 U/L
BASOPHILS # BLD AUTO: 0.12 K/UL
BASOPHILS NFR BLD: 1.9 %
BILIRUB SERPL-MCNC: 0.7 MG/DL
BUN SERPL-MCNC: 19 MG/DL
CALCIUM SERPL-MCNC: 9.7 MG/DL
CHLORIDE SERPL-SCNC: 107 MMOL/L
CHOLEST SERPL-MCNC: 141 MG/DL
CHOLEST/HDLC SERPL: 2.6 {RATIO}
CO2 SERPL-SCNC: 26 MMOL/L
COMPLEXED PSA SERPL-MCNC: 1.2 NG/ML
CREAT SERPL-MCNC: 1.2 MG/DL
DIFFERENTIAL METHOD: ABNORMAL
EOSINOPHIL # BLD AUTO: 0.3 K/UL
EOSINOPHIL NFR BLD: 4.8 %
ERYTHROCYTE [DISTWIDTH] IN BLOOD BY AUTOMATED COUNT: 13.8 %
EST. GFR  (AFRICAN AMERICAN): >60 ML/MIN/1.73 M^2
EST. GFR  (NON AFRICAN AMERICAN): >60 ML/MIN/1.73 M^2
ESTIMATED AVG GLUCOSE: 126 MG/DL
GLUCOSE SERPL-MCNC: 132 MG/DL
HBA1C MFR BLD HPLC: 6 %
HCT VFR BLD AUTO: 43.1 %
HDLC SERPL-MCNC: 54 MG/DL
HDLC SERPL: 38.3 %
HGB BLD-MCNC: 13.8 G/DL
IMM GRANULOCYTES # BLD AUTO: 0.01 K/UL
IMM GRANULOCYTES NFR BLD AUTO: 0.2 %
LDLC SERPL CALC-MCNC: 70.6 MG/DL
LYMPHOCYTES # BLD AUTO: 1.3 K/UL
LYMPHOCYTES NFR BLD: 21.4 %
MCH RBC QN AUTO: 28.2 PG
MCHC RBC AUTO-ENTMCNC: 32 G/DL
MCV RBC AUTO: 88 FL
MONOCYTES # BLD AUTO: 0.7 K/UL
MONOCYTES NFR BLD: 10.6 %
NEUTROPHILS # BLD AUTO: 3.8 K/UL
NEUTROPHILS NFR BLD: 61.1 %
NONHDLC SERPL-MCNC: 87 MG/DL
NRBC BLD-RTO: 0 /100 WBC
PLATELET # BLD AUTO: 288 K/UL
PMV BLD AUTO: 9.5 FL
POTASSIUM SERPL-SCNC: 4.4 MMOL/L
PROT SERPL-MCNC: 7.4 G/DL
RBC # BLD AUTO: 4.89 M/UL
SODIUM SERPL-SCNC: 139 MMOL/L
TRIGL SERPL-MCNC: 82 MG/DL
TSH SERPL DL<=0.005 MIU/L-ACNC: 3.57 UIU/ML
WBC # BLD AUTO: 6.22 K/UL

## 2018-11-06 PROCEDURE — 80053 COMPREHEN METABOLIC PANEL: CPT

## 2018-11-06 PROCEDURE — 83036 HEMOGLOBIN GLYCOSYLATED A1C: CPT

## 2018-11-06 PROCEDURE — 84153 ASSAY OF PSA TOTAL: CPT

## 2018-11-06 PROCEDURE — 85025 COMPLETE CBC W/AUTO DIFF WBC: CPT

## 2018-11-06 PROCEDURE — 36415 COLL VENOUS BLD VENIPUNCTURE: CPT | Mod: PO

## 2018-11-06 PROCEDURE — 84443 ASSAY THYROID STIM HORMONE: CPT

## 2018-11-06 PROCEDURE — 80061 LIPID PANEL: CPT

## 2018-11-20 DIAGNOSIS — E11.9 DIABETES TYPE 2, CONTROLLED: ICD-10-CM

## 2018-11-20 RX ORDER — PIOGLITAZONEHYDROCHLORIDE 15 MG/1
TABLET ORAL
Qty: 90 TABLET | Refills: 1 | Status: SHIPPED | OUTPATIENT
Start: 2018-11-20 | End: 2019-02-25 | Stop reason: SDUPTHER

## 2018-11-26 ENCOUNTER — TELEPHONE (OUTPATIENT)
Dept: ORTHOPEDICS | Facility: CLINIC | Age: 68
End: 2018-11-26

## 2018-11-26 NOTE — TELEPHONE ENCOUNTER
Left message for pt stating I scheduled the appointment request he sent. Provided date and time and provided name and number for questions/concerns. Appointment slip mailed.

## 2018-12-07 DIAGNOSIS — G89.29 CHRONIC PAIN OF BOTH KNEES: Primary | ICD-10-CM

## 2018-12-07 DIAGNOSIS — M25.562 CHRONIC PAIN OF BOTH KNEES: Primary | ICD-10-CM

## 2018-12-07 DIAGNOSIS — M25.561 CHRONIC PAIN OF BOTH KNEES: Primary | ICD-10-CM

## 2018-12-19 ENCOUNTER — PATIENT MESSAGE (OUTPATIENT)
Dept: ORTHOPEDICS | Facility: CLINIC | Age: 68
End: 2018-12-19

## 2018-12-26 ENCOUNTER — OFFICE VISIT (OUTPATIENT)
Dept: FAMILY MEDICINE | Facility: CLINIC | Age: 68
End: 2018-12-26
Payer: MEDICARE

## 2018-12-26 VITALS
OXYGEN SATURATION: 98 % | SYSTOLIC BLOOD PRESSURE: 122 MMHG | DIASTOLIC BLOOD PRESSURE: 84 MMHG | BODY MASS INDEX: 40.19 KG/M2 | WEIGHT: 287.06 LBS | HEART RATE: 71 BPM | TEMPERATURE: 98 F | HEIGHT: 71 IN

## 2018-12-26 DIAGNOSIS — R09.81 NASAL CONGESTION: ICD-10-CM

## 2018-12-26 DIAGNOSIS — J01.01 ACUTE RECURRENT MAXILLARY SINUSITIS: ICD-10-CM

## 2018-12-26 DIAGNOSIS — E66.01 CLASS 3 SEVERE OBESITY DUE TO EXCESS CALORIES WITHOUT SERIOUS COMORBIDITY WITH BODY MASS INDEX (BMI) OF 40.0 TO 44.9 IN ADULT: ICD-10-CM

## 2018-12-26 DIAGNOSIS — J01.01 ACUTE RECURRENT MAXILLARY SINUSITIS: Primary | ICD-10-CM

## 2018-12-26 PROCEDURE — 99999 PR PBB SHADOW E&M-EST. PATIENT-LVL V: CPT | Mod: PBBFAC,,, | Performed by: NURSE PRACTITIONER

## 2018-12-26 PROCEDURE — 99214 OFFICE O/P EST MOD 30 MIN: CPT | Mod: S$PBB,,, | Performed by: NURSE PRACTITIONER

## 2018-12-26 PROCEDURE — 99215 OFFICE O/P EST HI 40 MIN: CPT | Mod: PBBFAC,PO | Performed by: NURSE PRACTITIONER

## 2018-12-26 RX ORDER — FLUTICASONE PROPIONATE 50 MCG
1 SPRAY, SUSPENSION (ML) NASAL DAILY
Qty: 1 BOTTLE | Refills: 0 | Status: SHIPPED | OUTPATIENT
Start: 2018-12-26 | End: 2018-12-26 | Stop reason: SDUPTHER

## 2018-12-26 RX ORDER — AMOXICILLIN 875 MG/1
875 TABLET, FILM COATED ORAL 2 TIMES DAILY
Qty: 14 TABLET | Refills: 0 | Status: SHIPPED | OUTPATIENT
Start: 2018-12-26 | End: 2019-01-02

## 2018-12-26 RX ORDER — TELMISARTAN 80 MG/1
TABLET ORAL
Refills: 7 | COMMUNITY
Start: 2018-12-06 | End: 2020-02-19

## 2018-12-26 RX ORDER — FLUTICASONE PROPIONATE 50 MCG
SPRAY, SUSPENSION (ML) NASAL
Qty: 48 ML | Refills: 0 | Status: SHIPPED | OUTPATIENT
Start: 2018-12-26 | End: 2021-03-11

## 2018-12-26 NOTE — PATIENT INSTRUCTIONS
Sinusitis (Antibiotic Treatment)    The sinuses are air-filled spaces within the bones of the face. They connect to the inside of the nose. Sinusitis is an inflammation of the tissue lining the sinus cavity. Sinus inflammation can occur during a cold. It can also be due to allergies to pollens and other particles in the air. Sinusitis can cause symptoms of sinus congestion and fullness. A sinus infection causes fever, headache and facial pain. There is often green or yellow drainage from the nose or into the back of the throat (post-nasal drip). You have been given antibiotics to treat this condition.  Home care:  · Take the full course of antibiotics as instructed. Do not stop taking them, even if you feel better.  · Drink plenty of water, hot tea, and other liquids. This may help thin mucus. It also may promote sinus drainage.  · Heat may help soothe painful areas of the face. Use a towel soaked in hot water. Or,  the shower and direct the hot spray onto your face. Using a vaporizer along with a menthol rub at night may also help.   · An expectorant containing guaifenesin may help thin the mucus and promote drainage from the sinuses.  · Over-the-counter decongestants may be used unless a similar medicine was prescribed. Nasal sprays work the fastest. Use one that contains phenylephrine or oxymetazoline. First blow the nose gently. Then use the spray. Do not use these medicines more often than directed on the label or symptoms may get worse. You may also use tablets containing pseudoephedrine. Avoid products that combine ingredients, because side effects may be increased. Read labels. You can also ask the pharmacist for help. (NOTE: Persons with high blood pressure should not use decongestants. They can raise blood pressure.)  · Over-the-counter antihistamines may help if allergies contributed to your sinusitis.    · Do not use nasal rinses or irrigation during an acute sinus infection, unless told to by  your health care provider. Rinsing may spread the infection to other sinuses.  · Use acetaminophen or ibuprofen to control pain, unless another pain medicine was prescribed. (If you have chronic liver or kidney disease or ever had a stomach ulcer, talk with your doctor before using these medicines. Aspirin should never be used in anyone under 18 years of age who is ill with a fever. It may cause severe liver damage.)  · Don't smoke. This can worsen symptoms.  Follow-up care  Follow up with your healthcare provider or our staff if you are not improving within the next week.  When to seek medical advice  Call your healthcare provider if any of these occur:  · Facial pain or headache becoming more severe  · Stiff neck  · Unusual drowsiness or confusion  · Swelling of the forehead or eyelids  · Vision problems, including blurred or double vision  · Fever of 100.4ºF (38ºC) or higher, or as directed by your healthcare provider  · Seizure  · Breathing problems  · Symptoms not resolving within 10 days  Date Last Reviewed: 4/13/2015  © 0689-3479 [employer]. [address] [place] [address], [place] [address]. All rights reserved. This information is not intended as a substitute for professional medical care. Always follow your healthcare professional's instructions.

## 2018-12-26 NOTE — PROGRESS NOTES
Subjective:       Patient ID: Rajeev Newell III is a 68 y.o. male.    Chief Complaint: Recurrent Sinusitis ([place] of [place]; is usually prescribed augmentin; Z-pack doesn't help him ) and Sore Throat (nasal drip has dried up with use of mucinex )    Patient who is new to me presents with sinusitis. He has recurrent sinus infections.       Sinusitis   This is a new problem. The current episode started in the past 7 days. The problem has been rapidly worsening since onset. There has been no fever. The pain is moderate. Associated symptoms include congestion and sinus pressure. Pertinent negatives include no headaches or neck pain. Treatments tried: allegra, mucinex, and sudafed. The treatment provided mild relief.     Review of Systems   Constitutional: Negative for activity change and unexpected weight change.   HENT: Positive for congestion, postnasal drip, sinus pressure and sinus pain. Negative for hearing loss, rhinorrhea and trouble swallowing.    Eyes: Negative for discharge and visual disturbance.   Respiratory: Negative for chest tightness and wheezing.    Cardiovascular: Negative for chest pain and palpitations.   Gastrointestinal: Negative for blood in stool, constipation, diarrhea and vomiting.   Endocrine: Negative for polydipsia and polyuria.   Genitourinary: Negative for difficulty urinating, hematuria and urgency.   Musculoskeletal: Negative for arthralgias, joint swelling and neck pain.   Neurological: Negative for weakness and headaches.   Psychiatric/Behavioral: Negative for confusion and dysphoric mood.       Objective:      Physical Exam   Constitutional: He is oriented to person, place, and time. Vital signs are normal. He appears well-developed and well-nourished.   HENT:   Head: Normocephalic and atraumatic.   Right Ear: Hearing, tympanic membrane, external ear and ear canal normal.   Left Ear: Hearing, tympanic membrane, external ear and ear canal normal.   Nose: Mucosal  edema and rhinorrhea present. Right sinus exhibits maxillary sinus tenderness. Right sinus exhibits no frontal sinus tenderness. Left sinus exhibits maxillary sinus tenderness. Left sinus exhibits no frontal sinus tenderness.   Mouth/Throat: Posterior oropharyngeal erythema present.   Eyes: Conjunctivae and EOM are normal. Pupils are equal, round, and reactive to light.   Neck: Normal range of motion. Neck supple.   Cardiovascular: Normal rate and regular rhythm.   Pulmonary/Chest: Effort normal and breath sounds normal.   Abdominal: Soft. Bowel sounds are normal.   Musculoskeletal: Normal range of motion.   Neurological: He is alert and oriented to person, place, and time.   Skin: Skin is warm and dry.   Nursing note and vitals reviewed.      Assessment:       1. Acute recurrent maxillary sinusitis    2. Nasal congestion    3. Class 3 severe obesity due to excess calories without serious comorbidity with body mass index (BMI) of 40.0 to 44.9 in adult        Plan:       Acute recurrent maxillary sinusitis  -     amoxicillin (AMOXIL) 875 MG tablet; Take 1 tablet (875 mg total) by mouth 2 (two) times daily. for 7 days  Dispense: 14 tablet; Refill: 0  -     fluticasone (FLONASE) 50 mcg/actuation nasal spray; 1 spray (50 mcg total) by Each Nare route once daily.  Dispense: 1 Bottle; Refill: 0    Nasal congestion  -     fluticasone (FLONASE) 50 mcg/actuation nasal spray; 1 spray (50 mcg total) by Each Nare route once daily.  Dispense: 1 Bottle; Refill: 0    Class 3 severe obesity due to excess calories without serious comorbidity with body mass index (BMI) of 40.0 to 44.9 in adult  Weight loss recommended with well balanced diet and portion controlled meals and increased activity.     Advised OTCs and symptomatic treatment. Follow up as needed.

## 2019-01-04 ENCOUNTER — OFFICE VISIT (OUTPATIENT)
Dept: FAMILY MEDICINE | Facility: CLINIC | Age: 69
End: 2019-01-04
Payer: MEDICARE

## 2019-01-04 VITALS
SYSTOLIC BLOOD PRESSURE: 130 MMHG | BODY MASS INDEX: 40.56 KG/M2 | HEIGHT: 71 IN | OXYGEN SATURATION: 95 % | DIASTOLIC BLOOD PRESSURE: 84 MMHG | WEIGHT: 289.69 LBS | TEMPERATURE: 98 F | HEART RATE: 76 BPM

## 2019-01-04 DIAGNOSIS — J32.9 BACTERIAL SINUSITIS: Primary | ICD-10-CM

## 2019-01-04 DIAGNOSIS — B96.89 BACTERIAL SINUSITIS: Primary | ICD-10-CM

## 2019-01-04 PROCEDURE — 99999 PR PBB SHADOW E&M-EST. PATIENT-LVL IV: ICD-10-PCS | Mod: PBBFAC,,, | Performed by: PHYSICIAN ASSISTANT

## 2019-01-04 PROCEDURE — 99213 OFFICE O/P EST LOW 20 MIN: CPT | Mod: S$PBB,,, | Performed by: PHYSICIAN ASSISTANT

## 2019-01-04 PROCEDURE — 99214 OFFICE O/P EST MOD 30 MIN: CPT | Mod: PBBFAC,PO | Performed by: PHYSICIAN ASSISTANT

## 2019-01-04 PROCEDURE — 99213 PR OFFICE/OUTPT VISIT, EST, LEVL III, 20-29 MIN: ICD-10-PCS | Mod: S$PBB,,, | Performed by: PHYSICIAN ASSISTANT

## 2019-01-04 PROCEDURE — 99999 PR PBB SHADOW E&M-EST. PATIENT-LVL IV: CPT | Mod: PBBFAC,,, | Performed by: PHYSICIAN ASSISTANT

## 2019-01-04 RX ORDER — DOXYCYCLINE 100 MG/1
100 CAPSULE ORAL 2 TIMES DAILY
Qty: 20 CAPSULE | Refills: 0 | Status: SHIPPED | OUTPATIENT
Start: 2019-01-04 | End: 2019-01-14

## 2019-01-04 NOTE — PROGRESS NOTES
Subjective:       Patient ID: Rajeev Newell III is a 68 y.o. male.    Chief Complaint: Sinus issues    HPI   Sx x 2 wks  amox has not helped   Purulent mucus  Sx worsening  Review of Systems   Constitutional: Negative.  Negative for activity change, appetite change, chills, diaphoresis, fatigue, fever and unexpected weight change.   HENT: Positive for congestion, postnasal drip and rhinorrhea. Negative for sinus pressure, sneezing and trouble swallowing.    Eyes: Negative.  Negative for discharge and visual disturbance.   Respiratory: Positive for cough. Negative for chest tightness and wheezing.    Cardiovascular: Negative.  Negative for chest pain, palpitations and leg swelling.   Gastrointestinal: Negative.  Negative for blood in stool, constipation, diarrhea and vomiting.   Endocrine: Negative.  Negative for polydipsia and polyuria.   Genitourinary: Negative.  Negative for difficulty urinating, hematuria and urgency.   Musculoskeletal: Negative.  Negative for arthralgias, joint swelling and neck pain.   Skin: Negative.  Negative for rash.   Neurological: Negative.  Negative for weakness and headaches.   Psychiatric/Behavioral: Negative.  Negative for confusion and dysphoric mood.       Objective:      Physical Exam   Constitutional: He appears well-developed and well-nourished. No distress.   HENT:   Head: Normocephalic and atraumatic.   Right Ear: External ear normal.   Left Ear: External ear normal.   Nose: Nose normal.   Mouth/Throat: Oropharynx is clear and moist. No oropharyngeal exudate.   Max sinus tenderness and fullness  Cloudy mucus   Eyes: Conjunctivae are normal. No scleral icterus.   Neck: Normal range of motion. Neck supple. No tracheal deviation present. No thyromegaly present.   Cardiovascular: Normal rate, regular rhythm, normal heart sounds and intact distal pulses. Exam reveals no gallop and no friction rub.   No murmur heard.  Pulmonary/Chest: Effort normal and breath sounds normal. No  stridor. No respiratory distress. He has no wheezes. He has no rales.   Musculoskeletal: He exhibits no edema.   Lymphadenopathy:     He has no cervical adenopathy.   Skin: Skin is warm and dry. No rash noted.   Vitals reviewed.      Assessment:       1. Bacterial sinusitis        Plan:       Bacterial sinusitis  -     doxycycline (MONODOX) 100 MG capsule; Take 1 capsule (100 mg total) by mouth 2 (two) times daily. for 10 days  Dispense: 20 capsule; Refill: 0    discussed otc's  vaporizer

## 2019-01-10 ENCOUNTER — OFFICE VISIT (OUTPATIENT)
Dept: FAMILY MEDICINE | Facility: CLINIC | Age: 69
End: 2019-01-10
Payer: MEDICARE

## 2019-01-10 VITALS
TEMPERATURE: 98 F | WEIGHT: 284.63 LBS | SYSTOLIC BLOOD PRESSURE: 126 MMHG | HEART RATE: 60 BPM | HEIGHT: 71 IN | RESPIRATION RATE: 18 BRPM | OXYGEN SATURATION: 98 % | DIASTOLIC BLOOD PRESSURE: 82 MMHG | BODY MASS INDEX: 39.85 KG/M2

## 2019-01-10 DIAGNOSIS — E66.01 MORBID OBESITY: ICD-10-CM

## 2019-01-10 DIAGNOSIS — M17.12 PRIMARY OSTEOARTHRITIS OF LEFT KNEE: ICD-10-CM

## 2019-01-10 DIAGNOSIS — E11.8 CONTROLLED TYPE 2 DIABETES MELLITUS WITH COMPLICATION, WITHOUT LONG-TERM CURRENT USE OF INSULIN: Primary | ICD-10-CM

## 2019-01-10 DIAGNOSIS — I10 ESSENTIAL HYPERTENSION: ICD-10-CM

## 2019-01-10 PROCEDURE — 99214 PR OFFICE/OUTPT VISIT, EST, LEVL IV, 30-39 MIN: ICD-10-PCS | Mod: S$PBB,,, | Performed by: FAMILY MEDICINE

## 2019-01-10 PROCEDURE — 99999 PR PBB SHADOW E&M-EST. PATIENT-LVL III: ICD-10-PCS | Mod: PBBFAC,,, | Performed by: FAMILY MEDICINE

## 2019-01-10 PROCEDURE — 99214 OFFICE O/P EST MOD 30 MIN: CPT | Mod: S$PBB,,, | Performed by: FAMILY MEDICINE

## 2019-01-10 PROCEDURE — 99999 PR PBB SHADOW E&M-EST. PATIENT-LVL III: CPT | Mod: PBBFAC,,, | Performed by: FAMILY MEDICINE

## 2019-01-10 PROCEDURE — 99213 OFFICE O/P EST LOW 20 MIN: CPT | Mod: PBBFAC,PN | Performed by: FAMILY MEDICINE

## 2019-01-10 RX ORDER — ASPIRIN 81 MG/1
81 TABLET ORAL DAILY
Status: ON HOLD | COMMUNITY
End: 2019-03-21 | Stop reason: HOSPADM

## 2019-01-10 NOTE — PROGRESS NOTES
Subjective:       Patient ID: Rajeev Newell III is a 68 y.o. male.    Chief Complaint: Diabetes (follow up)      Rajeev Newell III is in the office for 6mo f/u and review.    HPI  Currently on abx for resp inf.   Past Medical History:   Diagnosis Date    Cataract     OU--early    DM (diabetes mellitus) 10/29/2012    Glaucoma     POAG-OU    Hypertension     Sinusitis      Healing well from R total knee. Currently, planning L total knee this spring.  No hypoglycemia. He checks bs at least once weekly.   Eye exams every 6mos.  Foot exam today, no neuropathy related to DM. Has some lower back issues that contribute to sx if any.    Current Outpatient Medications:     acyclovir 5% (ZOVIRAX) 5 % ointment, Apply topically every 3 (three) hours., Disp: 1 Tube, Rfl: 1    albuterol 90 mcg/actuation inhaler, Inhale 2 puffs into the lungs every 4 (four) hours as needed for Wheezing., Disp: 1 Inhaler, Rfl: 5    atorvastatin (LIPITOR) 40 MG tablet, TK 1 T PO  QPM, Disp: , Rfl: 11    brinzolamide (AZOPT) 1 % ophthalmic suspension, Place 1 drop into both eyes 2 (two) times daily., Disp: 10 mL, Rfl: 4    clotrimazole (LOTRIMIN) 1 % cream, Apply topically 2 (two) times daily. (Patient taking differently: Apply topically 2 (two) times daily as needed. ), Disp: 12 g, Rfl: 0    doxycycline (MONODOX) 100 MG capsule, Take 1 capsule (100 mg total) by mouth 2 (two) times daily. for 10 days, Disp: 20 capsule, Rfl: 0    fexofenadine (ALLEGRA) 180 MG tablet, Every day PRN, Disp: , Rfl:     fluocinonide 0.1 % Crea, fluocinonide 0.1 % topical cream, Disp: , Rfl:     fluticasone (FLONASE) 50 mcg/actuation nasal spray, SHAKE LIQUID AND USE 1 SPRAY(50 MCG) IN EACH NOSTRIL EVERY DAY, Disp: 48 mL, Rfl: 0    hydrocortisone (ANUSOL-HC) 25 mg suppository, Place 1 suppository (25 mg total) rectally 2 (two) times daily as needed for Hemorrhoids. 1 Suppository(ies) Rectal PRN Twice a day., Disp: 12 suppository, Rfl: 1    latanoprost  0.005 % ophthalmic solution, Place 1 drop into both eyes nightly., Disp: 7.5 mL, Rfl: 4    meloxicam (MOBIC) 15 MG tablet, TAKE 1 TABLET BY MOUTH DAILY, Disp: 90 tablet, Rfl: 3    pantoprazole (PROTONIX) 40 MG tablet, Every day, Disp: , Rfl:     pioglitazone (ACTOS) 15 MG tablet, Take 1 tablet (15 mg total) by mouth once daily., Disp: 90 tablet, Rfl: 1    pioglitazone (ACTOS) 15 MG tablet, TAKE 1 TABLET BY MOUTH ONCE DAILY, Disp: 90 tablet, Rfl: 1    telmisartan (MICARDIS) 80 MG Tab, TK 1 T PO  QD, Disp: , Rfl: 7    valACYclovir (VALTREX) 1000 MG tablet, Take 1 tablet (1,000 mg total) by mouth 2 (two) times daily., Disp: 20 tablet, Rfl: 1    The 10-year ASCVD risk score (Jeannine CORONEL Jr., et al., 2013) is: 27.5%    Values used to calculate the score:      Age: 68 years      Sex: Male      Is Non- : No      Diabetic: Yes      Tobacco smoker: No      Systolic Blood Pressure: 126 mmHg      Is BP treated: Yes      HDL Cholesterol: 54 mg/dL      Total Cholesterol: 141 mg/dL     Lab Results   Component Value Date    HGBA1C 6.0 (H) 11/06/2018    HGBA1C 6.2 (H) 01/30/2018    HGBA1C 5.8 (H) 12/28/2017     Lab Results   Component Value Date    LDLCALC 70.6 11/06/2018    CREATININE 1.2 11/06/2018   labs 11/2018 rev. a1c controlled.    Review of Systems   Constitutional: Negative for activity change and unexpected weight change.   HENT: Negative for hearing loss, rhinorrhea and trouble swallowing.    Eyes: Negative for discharge and visual disturbance.   Respiratory: Negative for chest tightness and wheezing.    Cardiovascular: Negative for chest pain and palpitations.   Gastrointestinal: Negative for blood in stool, constipation, diarrhea and vomiting.   Endocrine: Negative for polydipsia and polyuria.   Genitourinary: Negative for difficulty urinating, hematuria and urgency.   Musculoskeletal: Negative for arthralgias, joint swelling and neck pain.   Neurological: Negative for weakness and headaches.    Psychiatric/Behavioral: Negative for confusion and dysphoric mood.           Objective:      Physical Exam   Constitutional: He is oriented to person, place, and time. He appears well-developed and well-nourished. No distress.   HENT:   Head: Normocephalic and atraumatic.   Mouth/Throat: Oropharynx is clear and moist.   Eyes: Conjunctivae are normal. Right eye exhibits no discharge. Left eye exhibits no discharge. No scleral icterus.   Neck: Neck supple.   Cardiovascular: Normal rate and regular rhythm.   Pulses:       Dorsalis pedis pulses are 2+ on the right side, and 2+ on the left side.        Posterior tibial pulses are 1+ on the right side, and 1+ on the left side.   Pulmonary/Chest: Effort normal and breath sounds normal. No respiratory distress.   Abdominal: Soft. There is no tenderness.   Truncal obesity   Musculoskeletal: He exhibits no edema (trace edema at ankles) or tenderness.        Right foot: There is normal range of motion and no deformity.        Left foot: There is normal range of motion and no deformity.   Feet:   Right Foot:   Protective Sensation: 6 sites tested. 6 sites sensed.   Skin Integrity: Negative for ulcer, blister, skin breakdown, erythema, warmth, callus or dry skin.   Left Foot:   Protective Sensation: 6 sites tested. 6 sites sensed.   Skin Integrity: Negative for ulcer, blister, skin breakdown, erythema, warmth, callus or dry skin.   Lymphadenopathy:     He has no cervical adenopathy.   Neurological: He is alert and oriented to person, place, and time. No cranial nerve deficit.   Skin: Skin is warm and dry.   Psychiatric: He has a normal mood and affect. His behavior is normal.   Nursing note and vitals reviewed.          Screening recommendations appropriate to age and health status were reviewed.    Assessment & Plan:    Controlled type 2 diabetes mellitus with complication, without long-term current use of insulin  -     Comprehensive metabolic panel; Future; Expected date:  01/10/2019  -     Hemoglobin A1c; Future; Expected date: 01/10/2019  -     Lipid panel; Future; Expected date: 01/10/2019  -     Microalbumin/creatinine urine ratio; Future  Well-controlled. Cont regimen. Recommend regular BS checks at home. Avoid hypoglycemia. Eye exam up to date. Lab due in 6mos.  Morbid obesity  Goal weight reviewed as well as need for lifestyle modifications to incl caloric restriction, high protein/low carb, increased water intake, muscle-building exercises as well as cardio.    Essential hypertension  Controlled, cont regimen.  Primary osteoarthritis of left knee  Per surgery. Anticipates knee replacement in Feb. Will let us know of exact date for preop lab. Sees outside cards/walker every 6 months.

## 2019-01-16 ENCOUNTER — HOSPITAL ENCOUNTER (OUTPATIENT)
Dept: RADIOLOGY | Facility: HOSPITAL | Age: 69
Discharge: HOME | End: 2019-01-16
Attending: ORTHOPAEDIC SURGERY
Payer: MEDICARE

## 2019-01-16 ENCOUNTER — OFFICE VISIT (OUTPATIENT)
Dept: ORTHOPEDICS | Facility: CLINIC | Age: 69
End: 2019-01-16
Payer: MEDICARE

## 2019-01-16 ENCOUNTER — PATIENT OUTREACH (OUTPATIENT)
Dept: ORTHOPEDICS | Facility: CLINIC | Age: 69
End: 2019-01-16

## 2019-01-16 VITALS — BODY MASS INDEX: 39.81 KG/M2 | HEIGHT: 71 IN | WEIGHT: 284.38 LBS

## 2019-01-16 DIAGNOSIS — G89.29 CHRONIC PAIN OF BOTH KNEES: ICD-10-CM

## 2019-01-16 DIAGNOSIS — M25.561 CHRONIC PAIN OF BOTH KNEES: ICD-10-CM

## 2019-01-16 DIAGNOSIS — M25.562 CHRONIC PAIN OF BOTH KNEES: ICD-10-CM

## 2019-01-16 DIAGNOSIS — M17.12 PRIMARY OSTEOARTHRITIS OF LEFT KNEE: Primary | ICD-10-CM

## 2019-01-16 PROCEDURE — 99213 OFFICE O/P EST LOW 20 MIN: CPT | Mod: S$PBB,,, | Performed by: ORTHOPAEDIC SURGERY

## 2019-01-16 PROCEDURE — 73562 XR KNEE ORTHO BILAT: ICD-10-PCS | Mod: 26,50,, | Performed by: RADIOLOGY

## 2019-01-16 PROCEDURE — 99999 PR PBB SHADOW E&M-EST. PATIENT-LVL III: CPT | Mod: PBBFAC,,, | Performed by: ORTHOPAEDIC SURGERY

## 2019-01-16 PROCEDURE — 73562 X-RAY EXAM OF KNEE 3: CPT | Mod: TC,50

## 2019-01-16 PROCEDURE — 99213 PR OFFICE/OUTPT VISIT, EST, LEVL III, 20-29 MIN: ICD-10-PCS | Mod: S$PBB,,, | Performed by: ORTHOPAEDIC SURGERY

## 2019-01-16 PROCEDURE — 99999 PR PBB SHADOW E&M-EST. PATIENT-LVL III: ICD-10-PCS | Mod: PBBFAC,,, | Performed by: ORTHOPAEDIC SURGERY

## 2019-01-16 PROCEDURE — 99213 OFFICE O/P EST LOW 20 MIN: CPT | Mod: PBBFAC,25 | Performed by: ORTHOPAEDIC SURGERY

## 2019-01-16 PROCEDURE — 73562 X-RAY EXAM OF KNEE 3: CPT | Mod: 26,50,, | Performed by: RADIOLOGY

## 2019-01-16 NOTE — PROGRESS NOTES
"Subjective:      Patient ID: Rajeev Newell III is a 68 y.o. male.    Chief Complaint: Pain of the Left Knee and Pain of the Right Knee    HPI  Rajeev Newell III has left knee pain. Right knee doing very well. The left knee pain has worsened. The pain is located in the global aspect of the knee.  There  is not radiation.   There is associated stiffness.   There is not catching and locking. The pain is described as achy. The pain is aggravated by activity.  It is alleviated by nothing consistently. The symptoms have worsened to the point where it is interfering with his activities of daily living.  He has difficulty with stairs, getting dressed and getting in an out of a car.   There is not associated back pain.  His history, medications and problem list were reviewed.    Review of Systems   Constitution: Negative for chills, fever and night sweats.   HENT: Negative for hearing loss.    Eyes: Negative for blurred vision and double vision.   Cardiovascular: Negative for chest pain, claudication and leg swelling.   Respiratory: Negative for shortness of breath.    Endocrine: Negative for polydipsia, polyphagia and polyuria.   Hematologic/Lymphatic: Negative for adenopathy and bleeding problem. Does not bruise/bleed easily.   Skin: Negative for poor wound healing.   Musculoskeletal: Positive for joint pain.   Gastrointestinal: Negative for diarrhea and heartburn.   Genitourinary: Negative for bladder incontinence.   Neurological: Negative for focal weakness, headaches, numbness, paresthesias and sensory change.   Psychiatric/Behavioral: The patient is not nervous/anxious.    Allergic/Immunologic: Negative for persistent infections.         Objective:      Body mass index is 39.66 kg/m².  Vitals:    01/16/19 1443   Weight: 129 kg (284 lb 6.3 oz)   Height: 5' 11" (1.803 m)           General    Constitutional: He is oriented to person, place, and time. He appears well-developed and well-nourished.   HENT:   Head: " Normocephalic and atraumatic.   Eyes: EOM are normal.   Cardiovascular: Normal rate.    Pulmonary/Chest: Effort normal.   Neurological: He is alert and oriented to person, place, and time.   Psychiatric: He has a normal mood and affect. His behavior is normal.     General Musculoskeletal Exam   Gait: abnormal       Right Knee Exam     Inspection   Erythema: absent  Scars: present  Swelling: absent  Effusion: absent  Deformity: absent  Bruising: absent    Tenderness   The patient is experiencing no tenderness.     Range of Motion   Extension: 0   Flexion: 130     Tests   Ligament Examination Lachman: normal (-1 to 2mm)   MCL - Valgus: normal (0 to 2mm)  LCL - Varus: normal  Patella   Passive Patellar Tilt: neutral    Other   Sensation: normal    Left Knee Exam     Inspection   Erythema: absent  Scars: absent  Swelling: present  Effusion: absent  Deformity: present (varus)  Bruising: absent    Tenderness   The patient tender to palpation of the medial joint line.    Crepitus   The patient has crepitus of the patella.    Range of Motion   Extension: 0   Flexion: 120     Tests   Stability Lachman: normal (-1 to 2mm)   MCL - Valgus: normal (0 to 2mm)  LCL - Varus: normal (0 to 2mm)  Patella   Passive Patellar Tilt: neutral    Other   Sensation: normal    Muscle Strength   Right Lower Extremity   Hip Abduction: 5/5   Quadriceps:  5/5   Hamstrin/5   Left Lower Extremity   Hip Abduction: 5/5   Quadriceps:  5/5   Hamstrin/5     Reflexes     Left Side  Quadriceps:  2+    Right Side   Quadriceps:  2+    Vascular Exam     Right Pulses  Dorsalis Pedis:      2+          Left Pulses  Dorsalis Pedis:      2+          Edema  Right Lower Leg: absent  Left Lower Leg: absent    Radiographs taken today were reviewed by me.  There is a prosthetic replacement of the right knee(s).  The prosthesis is well positioned.  There is not evidence of bone loss, osteolysis, or loosening. There is not a fracture.      Radiographs taken  today and reviewed by me demonstrate severe arthritic change of the left KNEE(s).Subchondral cysts, loss of articular space, osteophytes and sclerosis.    There is not a fracture. The medial compartment is most involved.  There is a varus deformity.  The changes are tricompartmental.              Assessment:       Encounter Diagnosis   Name Primary?    Primary osteoarthritis of left knee Yes          Plan:       Rajeev was seen today for pain and pain.    Diagnoses and all orders for this visit:    Primary osteoarthritis of left knee      Treatment options were discussed. The surgical process of left knee replacement was discussed in detail with the patient including a detailed discussion of the procedure itself (including visual model, x-ray review, and literature review). The typical perioperative and post-operative course was discussed and perioperative risks were discussed to the patient's satisfaction.  Risks and complications discussed included but were not limited to the risks of anesthetic complications, infection, bleeding, wound healing complications, stiffness, aseptic loosening, instability, limb length inequality, neurologic dysfunction including numbness and weakness, additional surgery,  DVT, pulmonary embolism, perioperative medical risks (cardiac, pulmonary, renal, neurologic), and death and the patient elects to proceed.  The patient should get medically cleared and attend the joint seminar. Rajeev RAEGAN Newell III is aware that morbid obesity carries increased risks with joint replacement surgery.  These include problems with wound healing, alignment of the prosthesis, higher chance of infection and other medical complications, as well as the potential for early implant failure. He is also aware of the risk associated with diabetes His is well controlled  ASA for DVT prophylaxis.    Pre-hab ordered.  Out pt PT post op

## 2019-01-22 DIAGNOSIS — M17.12 PRIMARY OSTEOARTHRITIS OF LEFT KNEE: Primary | ICD-10-CM

## 2019-01-31 ENCOUNTER — TELEPHONE (OUTPATIENT)
Dept: OPTOMETRY | Facility: CLINIC | Age: 69
End: 2019-01-31

## 2019-02-08 ENCOUNTER — TELEPHONE (OUTPATIENT)
Dept: ORTHOPEDICS | Facility: CLINIC | Age: 69
End: 2019-02-08

## 2019-02-08 NOTE — TELEPHONE ENCOUNTER
Left message stating his pre-op appointment was rescheduled to 2/14, it is too far away from the surgery date. The pre-op appt has to be on a Monday or Wednesday. Rescheduled appointment to 3/11. Provided name and number for questions/concerns.

## 2019-02-18 ENCOUNTER — PATIENT MESSAGE (OUTPATIENT)
Dept: FAMILY MEDICINE | Facility: CLINIC | Age: 69
End: 2019-02-18

## 2019-02-18 ENCOUNTER — TELEPHONE (OUTPATIENT)
Dept: FAMILY MEDICINE | Facility: CLINIC | Age: 69
End: 2019-02-18

## 2019-02-18 ENCOUNTER — ANESTHESIA EVENT (OUTPATIENT)
Dept: SURGERY | Facility: HOSPITAL | Age: 69
DRG: 470 | End: 2019-02-18
Payer: MEDICARE

## 2019-02-18 DIAGNOSIS — Z01.818 PRE-OP EVALUATION: Primary | ICD-10-CM

## 2019-02-18 DIAGNOSIS — M79.606 PAIN OF LOWER EXTREMITY, UNSPECIFIED LATERALITY: ICD-10-CM

## 2019-02-18 DIAGNOSIS — I10 HYPERTENSION, UNSPECIFIED TYPE: Primary | ICD-10-CM

## 2019-02-18 DIAGNOSIS — E11.9 DM TYPE 2, GOAL A1C TO BE DETERMINED: ICD-10-CM

## 2019-02-18 DIAGNOSIS — Z00.00 ENCOUNTER FOR SCREENING AND PREVENTATIVE CARE: ICD-10-CM

## 2019-02-18 DIAGNOSIS — N40.0 BENIGN NON-NODULAR PROSTATIC HYPERPLASIA WITHOUT LOWER URINARY TRACT SYMPTOMS: ICD-10-CM

## 2019-02-18 NOTE — TELEPHONE ENCOUNTER
----- Message from Andrea Dave sent at 2/18/2019  3:23 PM CST -----  Contact: same  Patient called in and stated he needs a surgical clearance appt next week with Dr. Walton.  Patient stated he is having knee replacement surgery in mid March but will be out of town for [place].  Patient would like a call back at [phone]

## 2019-02-18 NOTE — PRE ADMISSION SCREENING
Anesthesia Assessment: Preoperative EQUATION    Planned Procedure: Procedure(s) (LRB):  ARTHROPLASTY, KNEE, TOTAL-VERO (Left)  Requested Anesthesia Type:Regional  Surgeon: Edmund Rosas MD  Service: Orthopedics  Known or anticipated Date of Surgery:3/21/2019    Plan:    Testing:  Hematology Profile, BMP, A1C and PT/INR   Pre-anesthesia  visit       Visit focus: possible regional anesthesia and/or nerve block      Consultation:Patient's PCP for a statement of optimization . Get outside cardiology information     Cortney Reyes RN 02/18/2019

## 2019-02-18 NOTE — TELEPHONE ENCOUNTER
Patient is scheduled for 2/25, as this seems to be only day feasible. Please advise if you would like to change

## 2019-02-18 NOTE — ANESTHESIA PREPROCEDURE EVALUATION
Anesthesia Assessment: Preoperative EQUATION    Planned Procedure: Procedure(s) (LRB):  ARTHROPLASTY, KNEE, TOTAL-VERO (Left)  Requested Anesthesia Type:Regional  Surgeon: Edmund Rosas MD  Service: Orthopedics  Known or anticipated Date of Surgery:3/21/2019    Plan:    Testing:  Hematology Profile, BMP, A1C and PT/INR   Pre-anesthesia  visit       Visit focus: possible regional anesthesia and/or nerve block      Consultation:Patient's PCP for a statement of optimization . Will try to get outside cardiology information     Cortney Reyes RN 02/18/2019 02/18/2019  Rajeev Newell III is a 68 y.o., male.    Anesthesia Evaluation    I have reviewed the Patient Summary Reports.    I have reviewed the Nursing Notes.   I have reviewed the Medications.     Review of Systems  Anesthesia Hx:  No problems with previous Anesthesia S/p R-TKR w/spinal d/c pod#1 History of prior surgery of interest to airway management or planning: Previous anesthesia: General Denies Family Hx of Anesthesia complications.   Denies Personal Hx of Anesthesia complications.   Social:  Non-Smoker, Social Alcohol Use 1-2 beers a week   Hematology/Oncology:  Hematology Normal   Oncology Normal     EENT/Dental:  EENT/Dental Normal Eyes: Eye Disease: Glaucoma:      Cardiovascular:   Exercise tolerance: good Hypertension Sinhg, fish, gym.  Can climb a flight of stairs. Denies chest pain or sob   Pulmonary:  Pulmonary Normal Patient had Tamiflu and doxycycline at home and had taken that 1-2 weeks ago.  Patient reports symptoms are much better.  Lungs clear on exam.   Possible Obstructive Sleep Apnea , (STOP/BANG) Symptoms A - Age > 50, N - Neck circumference > 40 cms, G - Gender (Male > Female), P - Pressure being treated for high BP and B - BMI > 35    Renal/:  Renal/ Normal BPH    Hepatic/GI:   Denies PUD. GERD (on protonix. can  lie flat) Denies Liver Disease.    Musculoskeletal:   Arthritis   Musculoskeletal General/Symptoms: Functional capacity is ambulatory without assistance.  Joint Disease:  Arthritis, Osteoarthritis, knee    Neurological:   Denies CVA. Denies Seizures.  Pain , onset is chronic , location of knee , quality of aching/dull , severity is a 2 , precipitating factors are standing and walking , alleviating factors are sitting. Osteoarthritis, knee    Endocrine:   Diabetes, well controlled, type 2    Dermatological:  Skin Normal Per office notes -penile irritation   Psych:  Psychiatric Normal           Physical Exam  General:  Morbid Obesity, Well nourished    Airway/Jaw/Neck:  Airway Findings: Mouth Opening: Normal Tongue: Normal  General Airway Assessment: Adult  Mallampati: III  Improves to II with phonation.  TM Distance: Normal, at least 6 cm  Jaw/Neck Findings:  Neck ROM: Normal ROM  Neck Findings:  Girth Increased, Short Neck     Eyes/Ears/Nose:  EYES/EARS/NOSE FINDINGS: Normal   Dental:  Dental Findings: molar caps, In tact   Chest/Lungs:  Chest/Lungs Findings: Clear to auscultation, Normal Respiratory Rate     Heart/Vascular:  Heart Findings: Rate: Normal  Rhythm: Regular Rhythm  Sounds: Normal, Quiet  Heart murmur: negative Vascular Findings: Normal    Abdomen:  Abdomen Findings: Normal    Musculoskeletal:  Musculoskeletal Findings: Normal   Skin:  Skin Findings: Normal    Mental Status:  Mental Status Findings:  Cooperative, Alert and Oriented           Labs reviewed    Discharge plans: home with wife Isis [phone]    PCP, Dr. Walton 2/25:   Overall this patient can be considered low risk for this moderate risk procedure. No further cardiac testing is recommended at this time.                I recommend use of standard pre-op and post-op precautions for this patient. In my opinion, pt  is medically optimized for this procedure, and can proceed without further evaluation.      Cortney Ryees RN  03/13/2019                    Anesthesia Plan  Type of Anesthesia, risks & benefits discussed:  Anesthesia Type:  general, spinal  Patient's Preference:   Intra-op Monitoring Plan: standard ASA monitors  Intra-op Monitoring Plan Comments:   Post Op Pain Control Plan: peripheral nerve block  Post Op Pain Control Plan Comments:   Induction:   IV  Beta Blocker:  Patient is not currently on a Beta-Blocker (No further documentation required).       Informed Consent: Patient understands risks and agrees with Anesthesia plan.  Questions answered. Anesthesia consent signed with patient.  ASA Score: 2     Day of Surgery Review of History & Physical:            Ready For Surgery From Anesthesia Perspective.

## 2019-02-18 NOTE — TELEPHONE ENCOUNTER
----- Message from Cortney Reyes RN sent at 2/18/2019  3:34 PM CST -----  Patient is having a TKR 3/21 with Dr. Rosas under spinal anesthesia.  Anesthesia ordered labs for next week.  Anesthesia is requesting medical optimization/clearance.  Please advise.      Cortney Reyes RN  [facility]  [id number]

## 2019-02-19 ENCOUNTER — PATIENT MESSAGE (OUTPATIENT)
Dept: FAMILY MEDICINE | Facility: CLINIC | Age: 69
End: 2019-02-19

## 2019-02-19 NOTE — TELEPHONE ENCOUNTER
Can you please order CBC cmp Ua with culture ekg and cxr     Patient already has labs scheduled called to move up. Pended additional orders

## 2019-02-20 ENCOUNTER — PATIENT MESSAGE (OUTPATIENT)
Dept: FAMILY MEDICINE | Facility: CLINIC | Age: 69
End: 2019-02-20

## 2019-02-20 ENCOUNTER — TELEPHONE (OUTPATIENT)
Dept: FAMILY MEDICINE | Facility: CLINIC | Age: 69
End: 2019-02-20

## 2019-02-20 NOTE — TELEPHONE ENCOUNTER
----- Message from Gracie Montenegro sent at 2/20/2019  8:27 AM CST -----  Type: Needs Medical Advice    Who Called:  Patient  Best Call Back Number: [phone]  Additional Information: Patient received a text message from office this morning stating that he needed a EKG and xray. Please place orders and call to schedule.

## 2019-02-21 ENCOUNTER — HOSPITAL ENCOUNTER (OUTPATIENT)
Dept: RADIOLOGY | Facility: HOSPITAL | Age: 69
Discharge: HOME | End: 2019-02-21
Attending: FAMILY MEDICINE
Payer: MEDICARE

## 2019-02-21 ENCOUNTER — CLINICAL SUPPORT (OUTPATIENT)
Dept: FAMILY MEDICINE | Facility: CLINIC | Age: 69
End: 2019-02-21
Payer: MEDICARE

## 2019-02-21 DIAGNOSIS — Z01.818 PRE-OP EVALUATION: Primary | ICD-10-CM

## 2019-02-21 DIAGNOSIS — Z00.00 ENCOUNTER FOR SCREENING AND PREVENTATIVE CARE: ICD-10-CM

## 2019-02-21 DIAGNOSIS — Z01.818 PRE-OP EVALUATION: ICD-10-CM

## 2019-02-21 PROCEDURE — 93010 ELECTROCARDIOGRAM REPORT: CPT | Mod: S$PBB,,, | Performed by: INTERNAL MEDICINE

## 2019-02-21 PROCEDURE — 93005 ELECTROCARDIOGRAM TRACING: CPT | Mod: PBBFAC,PN | Performed by: INTERNAL MEDICINE

## 2019-02-21 PROCEDURE — 71046 X-RAY EXAM CHEST 2 VIEWS: CPT | Mod: 26,,, | Performed by: RADIOLOGY

## 2019-02-21 PROCEDURE — 93010 EKG 12-LEAD: ICD-10-PCS | Mod: S$PBB,,, | Performed by: INTERNAL MEDICINE

## 2019-02-21 PROCEDURE — 71046 X-RAY EXAM CHEST 2 VIEWS: CPT | Mod: TC,PN

## 2019-02-21 PROCEDURE — 71046 XR CHEST PA AND LATERAL: ICD-10-PCS | Mod: 26,,, | Performed by: RADIOLOGY

## 2019-02-21 NOTE — PROGRESS NOTES
Patient seen in clinic for pre op EKG. EKG performed, and results placed on Dr. Walton's desk for review.

## 2019-02-22 ENCOUNTER — CLINICAL SUPPORT (OUTPATIENT)
Dept: REHABILITATION | Facility: HOSPITAL | Age: 69
End: 2019-02-22
Attending: ORTHOPAEDIC SURGERY
Payer: MEDICARE

## 2019-02-22 DIAGNOSIS — G89.29 CHRONIC PAIN OF LEFT KNEE: ICD-10-CM

## 2019-02-22 DIAGNOSIS — M25.562 CHRONIC PAIN OF LEFT KNEE: ICD-10-CM

## 2019-02-22 PROBLEM — M17.11 PRIMARY OSTEOARTHRITIS OF RIGHT KNEE: Status: RESOLVED | Noted: 2018-03-06 | Resolved: 2019-02-22

## 2019-02-22 PROCEDURE — 97161 PT EVAL LOW COMPLEX 20 MIN: CPT | Mod: PO | Performed by: PHYSICAL THERAPIST

## 2019-02-22 PROCEDURE — 97110 THERAPEUTIC EXERCISES: CPT | Mod: PO | Performed by: PHYSICAL THERAPIST

## 2019-02-22 NOTE — PLAN OF CARE
[facility]  Physical Therapy Initial Evaluation    Name: Rajeev Newell III  Clinic Number: [id number]    Therapy Diagnosis:   Encounter Diagnosis   Name Primary?    Chronic pain of left knee      Physician: Edmund Rosas MD    Physician Orders: PT Eval and Treat   Medical Diagnosis from Referral: M17.12 (ICD-10-CM) - Primary osteoarthritis of left knee  Evaluation Date: 2/22/2019  Authorization Period Expiration: 1/16/2020  Plan of Care Expiration: 3/21/2019  Visit # / Visits authorized: 1/ 1 (of initial referral)    Time In: 1110a  Time Out: 1150a  Total Billable Time: 40 minutes    Precautions: Standard    Subjective   Date of onset: 10 years  History of current condition - Jared reports: His knee has continued to get progressively worse since his R TKA last year. He has started to go back to the gym to work on strength. L TKA is scheduled for 3/21/2019.      Medical History:   Past Medical History:   Diagnosis Date    Cataract     OU--early    DM (diabetes mellitus) 10/29/2012    Glaucoma     POAG-OU    Hypertension     Sinusitis        Surgical History:   Rajeev Newell III  has a past surgical history that includes Cystoscopy; Circumcision; Carpal tunnel release; Hernia repair (Left, 1994); and Knee surgery.    Medications:   Rajeev has a current medication list which includes the following prescription(s): acyclovir 5%, albuterol, aspirin, atorvastatin, brinzolamide, clotrimazole, fexofenadine, fluocinonide, fluticasone, hydrocortisone, latanoprost, meloxicam, pantoprazole, pioglitazone, pioglitazone, telmisartan, and valacyclovir.    Allergies:   Review of patient's allergies indicates:   Allergen Reactions    Bactrim [sulfamethoxazole-trimethoprim] Other (See Comments)     Abdominal Pain, Flush, Fever and Chills, Headache and Cough after Pt. took Bactrim    Darvon [propoxyphene]      Hallucinations        Imaging, xray: No acute abnormality.  Right knee  arthroplasty.  Left knee DJD.    Prior Therapy: yes, for opp knee  Social History: lives w/ wife, no stairs at home, has stairs at work  Occupation:   Prior Level of Function: active, hunting, fishing  Current Level of Function: difficulty with stairs, prolonged standing/sitting    Pain:  Current 0/10, worst 3/10, best 0/10   Location: left knee   Description: Aching  Aggravating Factors: Standing and Walking  Easing Factors: rest and elevation     Pts goals: get stronger prior to surgery    Objective     Posture: genu valgus L  Palpation: No TTP noted  Sensation: WNL in LEs  DTR: NT  Range of Motion/Strength:   MMT RIGHT LEFT   Hip flex 4/5 4+/5   Hip ext 4+/5 4/5   Hip abd 4+/5 4+/5   Hip add 4+/5 4/5   Knee flex 4+/5 4+/5   Knee ext 4+/5 4/5   DF       L KNEE AROM: -4*-116*    R KNEE AROM: -8*-113*        CMS Impairment/Limitation/Restriction for FOTO knee Survey    Therapist reviewed FOTO scores for Rajeev Newell III on 2/22/2019.   FOTO documents entered into [employer] - see Media section.    Limitation Score: 51%%  Category: Mobility           TREATMENT   Treatment Time In: 1130a  Treatment Time Out: 1145a  Total Treatment time separate from Evaluation: 15 minutes    Jared received therapeutic exercises to develop strength, ROM and flexibility for 15 minutes including:    SLR 3x10  HSS, 3x30s  Gastroc st on incline, 3x30s  Heel slides 20x3s  SL clamshells, 20x    Pt also advised to continue low weight, high rep gym workouts    Home Exercises and Patient Education Provided    Education provided:   - role of PT, purpose and importance of Pre-hab prior to L TKA and need for compliance    Written Home Exercises Provided: yes.  Exercises were reviewed and Jared was able to demonstrate them prior to the end of the session.  Jared demonstrated good  understanding of the education provided.     See EMR under Media for exercises provided 2/22/2019.    Assessment   Rajeev is a 68 y.o. male referred to  outpatient Physical Therapy with a medical diagnosis of L knee osteoarthritis. Pt presents with decreased AROM of the L knee, decreased strength and pain which is limiting his tolerance for ambulation. Pt presents to PT for prehab in preparation for L TKA on 3/21/19.    Pt prognosis is Excellent.   Pt will benefit from skilled outpatient Physical Therapy to address the deficits stated above and in the chart below, provide pt/family education, and to maximize pt's level of independence.     Plan of care discussed with patient: Yes  Pt's spiritual, cultural and educational needs considered and patient is agreeable to the plan of care and goals as stated below:     Anticipated Barriers for therapy: none    Medical Necessity is demonstrated by the following  History  Co-morbidities and personal factors that may impact the plan of care Co-morbidities:   advanced age, high BMI and HTN    Personal Factors:   lifestyle     low   Examination  Body Structures and Functions, activity limitations and participation restrictions that may impact the plan of care Body Regions:   lower extremities    Body Systems:    ROM  strength  gait  motor control    Participation Restrictions:   ambulatory    Activity limitations:   Learning and applying knowledge  no deficits    General Tasks and Commands  no deficits    Communication  no deficits    Mobility  walking    Self care  no deficits    Domestic Life  doing house work (cleaning house, washing dishes, laundry)    Interactions/Relationships  no deficits    Life Areas  no deficits    Community and Social Life  community life  recreation and leisure         low   Clinical Presentation stable and uncomplicated low   Decision Making/ Complexity Score: low         Long Term Goals: 4 weeks   1. Pt will be compliant with HEP for improving strength and ROM in preparation for L TKA.   2. Pt will demonstrate improved AROM of L knee to -2*-120*    Plan   Plan of care Certification: 2/22/2019 to  3/21/19.    Outpatient Physical Therapy 1 times weekly for every other week to include the following interventions: Manual Therapy, Moist Heat/ Ice, Neuromuscular Re-ed, Therapeutic Activites and Therapeutic Exercise. PTA may be involved in care for this patient under direction of PT.        Gracie Carr, PT

## 2019-02-25 ENCOUNTER — OFFICE VISIT (OUTPATIENT)
Dept: FAMILY MEDICINE | Facility: CLINIC | Age: 69
End: 2019-02-25
Payer: MEDICARE

## 2019-02-25 VITALS
WEIGHT: 283.63 LBS | BODY MASS INDEX: 39.71 KG/M2 | DIASTOLIC BLOOD PRESSURE: 72 MMHG | OXYGEN SATURATION: 99 % | TEMPERATURE: 98 F | HEART RATE: 62 BPM | SYSTOLIC BLOOD PRESSURE: 120 MMHG | HEIGHT: 71 IN | RESPIRATION RATE: 18 BRPM

## 2019-02-25 DIAGNOSIS — G89.29 CHRONIC PAIN OF LEFT KNEE: ICD-10-CM

## 2019-02-25 DIAGNOSIS — Z01.818 PREOP EXAMINATION: Primary | ICD-10-CM

## 2019-02-25 DIAGNOSIS — E66.01 MORBID OBESITY: ICD-10-CM

## 2019-02-25 DIAGNOSIS — M25.562 CHRONIC PAIN OF LEFT KNEE: ICD-10-CM

## 2019-02-25 DIAGNOSIS — E11.8 CONTROLLED TYPE 2 DIABETES MELLITUS WITH COMPLICATION, WITHOUT LONG-TERM CURRENT USE OF INSULIN: ICD-10-CM

## 2019-02-25 PROCEDURE — 99999 PR PBB SHADOW E&M-EST. PATIENT-LVL III: CPT | Mod: PBBFAC,,, | Performed by: FAMILY MEDICINE

## 2019-02-25 PROCEDURE — 99214 OFFICE O/P EST MOD 30 MIN: CPT | Mod: S$PBB,,, | Performed by: FAMILY MEDICINE

## 2019-02-25 PROCEDURE — 99213 OFFICE O/P EST LOW 20 MIN: CPT | Mod: PBBFAC,PN | Performed by: FAMILY MEDICINE

## 2019-02-25 PROCEDURE — 99214 PR OFFICE/OUTPT VISIT, EST, LEVL IV, 30-39 MIN: ICD-10-PCS | Mod: S$PBB,,, | Performed by: FAMILY MEDICINE

## 2019-02-25 PROCEDURE — 99999 PR PBB SHADOW E&M-EST. PATIENT-LVL III: ICD-10-PCS | Mod: PBBFAC,,, | Performed by: FAMILY MEDICINE

## 2019-02-25 NOTE — PROGRESS NOTES
Subjective:       Patient ID: Rajeev Newell III is a 68 y.o. male.    Chief Complaint: Pre-op Exam      Rajeev Newell III is in the office for preop exam.    HPI  Scheduled for L total knee repl with [facility] on 3/21. Has started pre-rehab, working on BS control. FBS today 92.  Past Medical History:   Diagnosis Date    Cataract     OU--early    DM (diabetes mellitus) 10/29/2012    Glaucoma     POAG-OU    Hypertension     Sinusitis      CXR rev.  EKG rev. Pt notes good exertional tolerance, no cp or palp.   Labs/UA rev. Working on dietary controls for BS. A1c still <6.5%.     Current Outpatient Medications:     albuterol 90 mcg/actuation inhaler, Inhale 2 puffs into the lungs every 4 (four) hours as needed for Wheezing., Disp: 1 Inhaler, Rfl: 5    aspirin (ECOTRIN) 81 MG EC tablet, Take 81 mg by mouth once daily., Disp: , Rfl:     atorvastatin (LIPITOR) 40 MG tablet, TK 1 T PO  QPM, Disp: , Rfl: 11    brinzolamide (AZOPT) 1 % ophthalmic suspension, Place 1 drop into both eyes 2 (two) times daily., Disp: 10 mL, Rfl: 4    fexofenadine (ALLEGRA) 180 MG tablet, Every day PRN, Disp: , Rfl:     fluocinonide 0.1 % Crea, fluocinonide 0.1 % topical cream, Disp: , Rfl:     fluticasone (FLONASE) 50 mcg/actuation nasal spray, SHAKE LIQUID AND USE 1 SPRAY(50 MCG) IN EACH NOSTRIL EVERY DAY, Disp: 48 mL, Rfl: 0    hydrocortisone (ANUSOL-HC) 25 mg suppository, Place 1 suppository (25 mg total) rectally 2 (two) times daily as needed for Hemorrhoids. 1 Suppository(ies) Rectal PRN Twice a day., Disp: 12 suppository, Rfl: 1    latanoprost 0.005 % ophthalmic solution, Place 1 drop into both eyes nightly., Disp: 7.5 mL, Rfl: 4    meloxicam (MOBIC) 15 MG tablet, TAKE 1 TABLET BY MOUTH DAILY, Disp: 90 tablet, Rfl: 3    pantoprazole (PROTONIX) 40 MG tablet, Every day, Disp: , Rfl:     pioglitazone (ACTOS) 15 MG tablet, Take 1 tablet (15 mg total) by mouth once daily., Disp: 90 tablet, Rfl: 1    telmisartan  (MICARDIS) 80 MG Tab, TK 1 T PO  QD, Disp: , Rfl: 7    acyclovir 5% (ZOVIRAX) 5 % ointment, Apply topically every 3 (three) hours., Disp: 1 Tube, Rfl: 1    clotrimazole (LOTRIMIN) 1 % cream, Apply topically 2 (two) times daily. (Patient taking differently: Apply topically 2 (two) times daily as needed. ), Disp: 12 g, Rfl: 0    valACYclovir (VALTREX) 1000 MG tablet, Take 1 tablet (1,000 mg total) by mouth 2 (two) times daily., Disp: 20 tablet, Rfl: 1    The 10-year ASCVD risk score (Jeanninecynthia CORONEL JrDanica, et al., 2013) is: 27.8%    Values used to calculate the score:      Age: 68 years      Sex: Male      Is Non- : No      Diabetic: Yes      Tobacco smoker: No      Systolic Blood Pressure: 120 mmHg      Is BP treated: Yes      HDL Cholesterol: 45 mg/dL      Total Cholesterol: 144 mg/dL   On asa/statin.    Lab Results   Component Value Date    HGBA1C 6.3 (H) 02/21/2019    HGBA1C 6.3 (H) 02/21/2019    HGBA1C 6.0 (H) 11/06/2018     Lab Results   Component Value Date    LDLCALC 82.2 02/21/2019    CREATININE 1.2 02/21/2019    CREATININE 1.2 02/21/2019   Labs 2019 rev.    Review of Systems   Constitutional: Negative for activity change, fatigue and unexpected weight change.   HENT: Negative for congestion, dental problem, hearing loss, rhinorrhea, sore throat and trouble swallowing.    Eyes: Negative for discharge and visual disturbance.   Respiratory: Negative for cough, chest tightness, shortness of breath and wheezing.    Cardiovascular: Negative for chest pain, palpitations and leg swelling.   Gastrointestinal: Negative for blood in stool, constipation, diarrhea and vomiting.   Endocrine: Negative for polydipsia and polyuria.   Genitourinary: Negative for difficulty urinating, frequency, hematuria and urgency.   Musculoskeletal: Positive for arthralgias (L knee pain). Negative for joint swelling and neck pain.   Skin: Negative for color change and rash.   Neurological: Negative for dizziness, weakness,  light-headedness and headaches.   Psychiatric/Behavioral: Negative for confusion, dysphoric mood and sleep disturbance.           Objective:      Physical Exam   Constitutional: He is oriented to person, place, and time. He appears well-developed and well-nourished.   HENT:   Head: Normocephalic and atraumatic.   Mouth/Throat: Oropharynx is clear and moist.   Eyes: Conjunctivae and EOM are normal. Pupils are equal, round, and reactive to light. No scleral icterus.   Neck: Normal range of motion. Neck supple. No thyromegaly present.   Cardiovascular: Normal rate, regular rhythm and normal heart sounds. Exam reveals no gallop and no friction rub.   No murmur heard.  Pulmonary/Chest: Effort normal and breath sounds normal. No respiratory distress. He has no wheezes. He has no rales.   Abdominal: Soft. Bowel sounds are normal. He exhibits no distension. There is no tenderness. There is no guarding.   Musculoskeletal: Normal range of motion.   Neurological: He is alert and oriented to person, place, and time.   Skin: Skin is warm and dry.   Psychiatric: He has a normal mood and affect. His behavior is normal.   Nursing note and vitals reviewed.          Screening recommendations appropriate to age and health status were reviewed.    Assessment & Plan:    Preop examination  RCRI risk factors include: No known RCRI risk factors.     Overall this patient can be considered low risk for this moderate risk procedure. No further cardiac testing is recommended at this time.    We discussed the benefits of early mobilization and deep breathing after surgery.  Screened patient for alcohol misuse, use of illicit drugs, and personal or family history of anesthetic complications or bleeding diathesis and no substantial concerns were identified.     All current medications were reviewed and at this time no changes to medications are recommended prior to surgery. Pt aware to hold asa for 7 days before surgery.     I recommend use of  standard pre-op and post-op precautions for this patient. In my opinion, pt  is medically optimized for this procedure, and can proceed without further evaluation.    Controlled type 2 diabetes mellitus with complication, without long-term current use of insulin  Cont current BS monitroing. Encouraged improved control to minimize risk to healing postop.  Morbid obesity  Pt aware of need to lose weight. Has resumed exercise, dietary controls.  Chronic pain of left knee  Per ortho.

## 2019-02-25 NOTE — Clinical Note
Hi, pt preop done. No concerns noted, pt considered medically optimized, and can proceed with surgery as scheduled. Thanks, Swati Walton MD

## 2019-02-28 ENCOUNTER — CLINICAL SUPPORT (OUTPATIENT)
Dept: REHABILITATION | Facility: HOSPITAL | Age: 69
End: 2019-02-28
Payer: MEDICARE

## 2019-02-28 DIAGNOSIS — M25.562 CHRONIC PAIN OF LEFT KNEE: ICD-10-CM

## 2019-02-28 DIAGNOSIS — G89.29 CHRONIC PAIN OF LEFT KNEE: ICD-10-CM

## 2019-02-28 PROCEDURE — 97140 MANUAL THERAPY 1/> REGIONS: CPT | Mod: PO | Performed by: PHYSICAL THERAPIST

## 2019-02-28 PROCEDURE — 97110 THERAPEUTIC EXERCISES: CPT | Mod: PO | Performed by: PHYSICAL THERAPIST

## 2019-02-28 NOTE — PROGRESS NOTES
Physical Therapy Daily Treatment Note     Name: Rajeev Newell [facility]  Clinic Number: [id number]    Therapy Diagnosis:   Encounter Diagnosis   Name Primary?    Chronic pain of left knee      Physician: Edmund Rosas MD    Visit Date: 2/28/2019  Physician Orders: PT Eval and Treat   Medical Diagnosis from Referral: M17.12 (ICD-10-CM) - Primary osteoarthritis of left knee  Evaluation Date: 2/22/2019  Authorization Period Expiration: 1/16/2020  Plan of Care Expiration: 3/21/2019  Visit # / Visits authorized: 2/ 20    Time In: 900a  Time Out: 1003a  Total Billable Time: 53 minutes    Precautions: Standard and HTN    Subjective     Pt reports: His knees are very stiff today.   He was compliant with home exercise program.  Response to previous treatment: no increased pain  Functional change: none at this time    Pain: 0/10  Location: left knee      Objective     Jared received therapeutic exercises to develop strength, ROM and flexibility for 43 minutes including:    Recumbent bike, L3, 10 min  Gastroc st on incline, 3x30s  HSS, 3x30s on stairs B  SLR 3x10 B  Heel slides 30x5s  SL clamshells with green TB, 20x  Precor HSC, 30#, 30x  Precor LE ext, 30#, 30x  Precor leg press, 40#, 30x  SLS, 3x15s, L/R        Jared received the following manual therapy techniques x 10 min:     PA and AP glides of tibiofemoral joint, grade III  Patellar mob, all planes, grade III      Jared received cold pack for 10 minutes to to decrease circulation, pain, and swelling.        Home Exercises Provided and Patient Education Provided     Education provided:   - continue HEP    Written Home Exercises Provided: Patient instructed to cont prior HEP.  Exercises were reviewed and Jared was able to demonstrate them prior to the end of the session.  Jared demonstrated good  understanding of the education provided.     See EMR under Media for exercises provided prior visit.    Assessment     Jared tolerated session well. He is compliant with his  HEP and working out in gym, leading to improved mobility in B knees.   Jared is progressing well towards his goals.   Pt prognosis is Good.     Pt will continue to benefit from skilled outpatient physical therapy to address the deficits listed in the problem list box on initial evaluation, provide pt/family education and to maximize pt's level of independence in the home and community environment.     Pt's spiritual, cultural and educational needs considered and pt agreeable to plan of care and goals.    Anticipated barriers to physical therapy: none    Goals:   Long Term Goals: 4 weeks   1. Pt will be compliant with HEP for improving strength and ROM in preparation for L TKA.   2. Pt will demonstrate improved AROM of L knee to -2*-120*    Plan     DC next visit.     Gracie Carr, PT

## 2019-03-11 ENCOUNTER — PATIENT MESSAGE (OUTPATIENT)
Dept: OPTOMETRY | Facility: CLINIC | Age: 69
End: 2019-03-11

## 2019-03-12 ENCOUNTER — OFFICE VISIT (OUTPATIENT)
Dept: OPTOMETRY | Facility: CLINIC | Age: 69
End: 2019-03-12
Payer: MEDICARE

## 2019-03-12 DIAGNOSIS — H40.1132 PRIMARY OPEN ANGLE GLAUCOMA OF BOTH EYES, MODERATE STAGE: Primary | ICD-10-CM

## 2019-03-12 PROCEDURE — 99999 PR PBB SHADOW E&M-EST. PATIENT-LVL III: ICD-10-PCS | Mod: PBBFAC,,, | Performed by: OPTOMETRIST

## 2019-03-12 PROCEDURE — 99213 OFFICE O/P EST LOW 20 MIN: CPT | Mod: PBBFAC,PO | Performed by: OPTOMETRIST

## 2019-03-12 PROCEDURE — 92012 PR EYE EXAM, EST PATIENT,INTERMED: ICD-10-PCS | Mod: S$PBB,,, | Performed by: OPTOMETRIST

## 2019-03-12 PROCEDURE — 92012 INTRM OPH EXAM EST PATIENT: CPT | Mod: S$PBB,,, | Performed by: OPTOMETRIST

## 2019-03-12 PROCEDURE — 99999 PR PBB SHADOW E&M-EST. PATIENT-LVL III: CPT | Mod: PBBFAC,,, | Performed by: OPTOMETRIST

## 2019-03-12 NOTE — PROGRESS NOTES
HPI     Glaucoma      Additional comments: POAG - OU  //  overdue IOP check -- good compliance   w/ Azopt bid & Latanoprost qhs  // pt states no visual complaints          Last edited by Deanne Wilson on 3/12/2019  1:00 PM. (History)        ROS     Positive for: Eyes    Negative for: Constitutional, Gastrointestinal, Neurological, Skin,   Genitourinary, Musculoskeletal, HENT, Endocrine, Cardiovascular,   Respiratory, Psychiatric, Allergic/Imm, Heme/Lymph    Last edited by SHERMAN Smith, OD on 3/12/2019  1:13 PM. (History)        Assessment /Plan     For exam results, see Encounter Report.    There are no diagnoses linked to this encounter.  ***

## 2019-03-12 NOTE — PROGRESS NOTES
HPI     Glaucoma      Additional comments: POAG - OU  //  overdue IOP check -- good compliance   w/ Azopt bid & Latanoprost qhs  // pt states no visual complaints          Last edited by Deanne Wilson on 3/12/2019  1:00 PM. (History)        ROS     Positive for: Eyes    Negative for: Constitutional, Gastrointestinal, Neurological, Skin,   Genitourinary, Musculoskeletal, HENT, Endocrine, Cardiovascular,   Respiratory, Psychiatric, Allergic/Imm, Heme/Lymph    Last edited by SHERMAN Smith, OD on 3/12/2019  1:13 PM. (History)        Assessment /Plan     For exam results, see Encounter Report.    Primary open angle glaucoma of both eyes, moderate stage      IOP stable mid teens, target mid / low teens OU  /525  Angles open  Last fields and OCT 6/2018    Continue OU  Azpot bid  Latanoprost qhs    RTC 6/2019 for annual exam and update fields / OCT

## 2019-03-13 ENCOUNTER — OFFICE VISIT (OUTPATIENT)
Dept: ORTHOPEDICS | Facility: CLINIC | Age: 69
End: 2019-03-13
Payer: MEDICARE

## 2019-03-13 ENCOUNTER — HOSPITAL ENCOUNTER (OUTPATIENT)
Dept: PREADMISSION TESTING | Facility: HOSPITAL | Age: 69
Discharge: HOME | End: 2019-03-13
Attending: ANESTHESIOLOGY
Payer: MEDICARE

## 2019-03-13 ENCOUNTER — PATIENT MESSAGE (OUTPATIENT)
Dept: MEDSURG UNIT | Facility: HOSPITAL | Age: 69
End: 2019-03-13

## 2019-03-13 ENCOUNTER — HOSPITAL ENCOUNTER (OUTPATIENT)
Dept: RADIOLOGY | Facility: HOSPITAL | Age: 69
Discharge: HOME | End: 2019-03-13
Attending: NURSE PRACTITIONER
Payer: MEDICARE

## 2019-03-13 VITALS
WEIGHT: 281.31 LBS | SYSTOLIC BLOOD PRESSURE: 136 MMHG | TEMPERATURE: 96 F | OXYGEN SATURATION: 98 % | WEIGHT: 281.63 LBS | DIASTOLIC BLOOD PRESSURE: 83 MMHG | BODY MASS INDEX: 39.43 KG/M2 | HEART RATE: 78 BPM | HEIGHT: 71 IN | BODY MASS INDEX: 39.38 KG/M2 | HEIGHT: 71 IN

## 2019-03-13 DIAGNOSIS — M25.562 LEFT KNEE PAIN, UNSPECIFIED CHRONICITY: ICD-10-CM

## 2019-03-13 DIAGNOSIS — M17.12 PRIMARY OSTEOARTHRITIS OF LEFT KNEE: ICD-10-CM

## 2019-03-13 DIAGNOSIS — M25.562 LEFT KNEE PAIN, UNSPECIFIED CHRONICITY: Primary | ICD-10-CM

## 2019-03-13 PROCEDURE — 99999 PR PBB SHADOW E&M-EST. PATIENT-LVL IV: ICD-10-PCS | Mod: PBBFAC,,, | Performed by: NURSE PRACTITIONER

## 2019-03-13 PROCEDURE — 73560 X-RAY EXAM OF KNEE 1 OR 2: CPT | Mod: TC,LT

## 2019-03-13 PROCEDURE — 99499 UNLISTED E&M SERVICE: CPT | Mod: S$PBB,,, | Performed by: NURSE PRACTITIONER

## 2019-03-13 PROCEDURE — 99999 PR PBB SHADOW E&M-EST. PATIENT-LVL IV: CPT | Mod: PBBFAC,,, | Performed by: NURSE PRACTITIONER

## 2019-03-13 PROCEDURE — 73560 XR KNEE 1 OR 2 VIEW LEFT: ICD-10-PCS | Mod: 26,LT,, | Performed by: RADIOLOGY

## 2019-03-13 PROCEDURE — 99499 NO LOS: ICD-10-PCS | Mod: S$PBB,,, | Performed by: NURSE PRACTITIONER

## 2019-03-13 PROCEDURE — 99214 OFFICE O/P EST MOD 30 MIN: CPT | Mod: PBBFAC,25 | Performed by: NURSE PRACTITIONER

## 2019-03-13 PROCEDURE — 73560 X-RAY EXAM OF KNEE 1 OR 2: CPT | Mod: 26,LT,, | Performed by: RADIOLOGY

## 2019-03-13 RX ORDER — AMOXICILLIN 250 MG
1 CAPSULE ORAL 2 TIMES DAILY
Status: CANCELLED | OUTPATIENT
Start: 2019-03-13

## 2019-03-13 RX ORDER — OXYCODONE HYDROCHLORIDE 5 MG/1
10 TABLET ORAL
Status: CANCELLED | OUTPATIENT
Start: 2019-03-13

## 2019-03-13 RX ORDER — FENTANYL CITRATE 50 UG/ML
25 INJECTION, SOLUTION INTRAMUSCULAR; INTRAVENOUS EVERY 5 MIN PRN
Status: CANCELLED | OUTPATIENT
Start: 2019-03-13

## 2019-03-13 RX ORDER — OXYCODONE HYDROCHLORIDE 5 MG/1
5 TABLET ORAL
Status: CANCELLED | OUTPATIENT
Start: 2019-03-13

## 2019-03-13 RX ORDER — CELECOXIB 100 MG/1
400 CAPSULE ORAL
Status: CANCELLED | OUTPATIENT
Start: 2019-03-13

## 2019-03-13 RX ORDER — MORPHINE SULFATE 10 MG/ML
2 INJECTION, SOLUTION INTRAMUSCULAR; INTRAVENOUS
Status: CANCELLED | OUTPATIENT
Start: 2019-03-13

## 2019-03-13 RX ORDER — POLYETHYLENE GLYCOL 3350 17 G/17G
17 POWDER, FOR SOLUTION ORAL DAILY
Status: CANCELLED | OUTPATIENT
Start: 2019-03-14

## 2019-03-13 RX ORDER — LIDOCAINE HYDROCHLORIDE 10 MG/ML
1 INJECTION, SOLUTION EPIDURAL; INFILTRATION; INTRACAUDAL; PERINEURAL
Status: CANCELLED | OUTPATIENT
Start: 2019-03-13

## 2019-03-13 RX ORDER — NALOXONE HCL 0.4 MG/ML
0.02 VIAL (ML) INJECTION
Status: CANCELLED | OUTPATIENT
Start: 2019-03-13

## 2019-03-13 RX ORDER — FAMOTIDINE 20 MG/1
20 TABLET, FILM COATED ORAL 2 TIMES DAILY
Status: CANCELLED | OUTPATIENT
Start: 2019-03-13

## 2019-03-13 RX ORDER — SODIUM CHLORIDE 9 MG/ML
INJECTION, SOLUTION INTRAVENOUS
Status: CANCELLED | OUTPATIENT
Start: 2019-03-13

## 2019-03-13 RX ORDER — OXYCODONE HYDROCHLORIDE 5 MG/1
15 TABLET ORAL
Status: CANCELLED | OUTPATIENT
Start: 2019-03-13

## 2019-03-13 RX ORDER — PREGABALIN 25 MG/1
75 CAPSULE ORAL
Status: CANCELLED | OUTPATIENT
Start: 2019-03-13

## 2019-03-13 RX ORDER — CELECOXIB 100 MG/1
200 CAPSULE ORAL DAILY
Status: CANCELLED | OUTPATIENT
Start: 2019-03-14

## 2019-03-13 RX ORDER — MUPIROCIN 20 MG/G
1 OINTMENT TOPICAL 2 TIMES DAILY
Status: CANCELLED | OUTPATIENT
Start: 2019-03-13 | End: 2019-03-18

## 2019-03-13 RX ORDER — RAMELTEON 8 MG/1
8 TABLET ORAL NIGHTLY PRN
Status: CANCELLED | OUTPATIENT
Start: 2019-03-13

## 2019-03-13 RX ORDER — SODIUM CHLORIDE 0.9 % (FLUSH) 0.9 %
3 SYRINGE (ML) INJECTION EVERY 8 HOURS PRN
Status: CANCELLED | OUTPATIENT
Start: 2019-03-13

## 2019-03-13 RX ORDER — MIDAZOLAM HYDROCHLORIDE 5 MG/ML
1 INJECTION INTRAMUSCULAR; INTRAVENOUS EVERY 5 MIN PRN
Status: CANCELLED | OUTPATIENT
Start: 2019-03-13

## 2019-03-13 RX ORDER — BISACODYL 10 MG
10 SUPPOSITORY, RECTAL RECTAL EVERY 12 HOURS PRN
Status: CANCELLED | OUTPATIENT
Start: 2019-03-13

## 2019-03-13 RX ORDER — ACETAMINOPHEN 10 MG/ML
1000 INJECTION, SOLUTION INTRAVENOUS ONCE
Status: CANCELLED | OUTPATIENT
Start: 2019-03-13 | End: 2019-03-13

## 2019-03-13 RX ORDER — SODIUM CHLORIDE 9 MG/ML
INJECTION, SOLUTION INTRAVENOUS CONTINUOUS
Status: CANCELLED | OUTPATIENT
Start: 2019-03-13 | End: 2019-03-14

## 2019-03-13 RX ORDER — ONDANSETRON 2 MG/ML
4 INJECTION INTRAMUSCULAR; INTRAVENOUS EVERY 8 HOURS PRN
Status: CANCELLED | OUTPATIENT
Start: 2019-03-13

## 2019-03-13 RX ORDER — MUPIROCIN 20 MG/G
1 OINTMENT TOPICAL
Status: CANCELLED | OUTPATIENT
Start: 2019-03-13

## 2019-03-13 RX ORDER — PREGABALIN 25 MG/1
75 CAPSULE ORAL NIGHTLY
Status: CANCELLED | OUTPATIENT
Start: 2019-03-13

## 2019-03-13 RX ORDER — ASPIRIN 81 MG/1
81 TABLET ORAL 2 TIMES DAILY
Status: CANCELLED | OUTPATIENT
Start: 2019-03-13

## 2019-03-13 RX ORDER — ACETAMINOPHEN 500 MG
1000 TABLET ORAL EVERY 6 HOURS
Status: CANCELLED | OUTPATIENT
Start: 2019-03-13 | End: 2019-03-15

## 2019-03-13 RX ORDER — ROPIVACAINE HYDROCHLORIDE 2 MG/ML
8 INJECTION, SOLUTION EPIDURAL; INFILTRATION; PERINEURAL CONTINUOUS
Status: CANCELLED | OUTPATIENT
Start: 2019-03-13

## 2019-03-13 NOTE — H&P
CC: Left knee pain    Rajeev Newell III is a 68 y.o. male with a history of Left knee pain. Pain is worse with activity and weight bearing.  Patient has experienced interference of activities of daily living due to decreased range of motion and an increase in joint pain and swelling.  Patient has failed non-operative treatment including NSAIDs, corticosteroid injections, viscosupplement injections, and activity modification.  Rajeev Newell III currently ambulates independently.     Relevant medical conditions of significance in perioperative period:  DM- on actos managed by pcp  HTN- on medication managed by pcp    Past Medical History:   Diagnosis Date    Cataract     OU--early    DM (diabetes mellitus) 10/29/2012    Glaucoma     POAG-OU    Hypertension     Sinusitis        Past Surgical History:   Procedure Laterality Date    CARPAL TUNNEL RELEASE      CIRCUMCISION      CYSTOSCOPY      HERNIA REPAIR Left 1994    L inguinal - [facility]    KNEE SURGERY      REPLACEMENT-KNEE-TOTAL Right 3/6/2018    Performed by Edmund Rosas MD at [facility] OR [address] [place] [address]       Family History   Problem Relation Age of Onset    Hypertension Mother     Stroke Mother     Diabetes Neg Hx     Cancer Neg Hx     Heart disease Neg Hx        Review of patient's allergies indicates:   Allergen Reactions    Bactrim [sulfamethoxazole-trimethoprim] Other (See Comments)     Abdominal Pain, Flush, Fever and Chills, Headache and Cough after Pt. took Bactrim    Darvon [propoxyphene] Other (See Comments)     Hallucinations         Current Outpatient Medications:     acyclovir 5% (ZOVIRAX) 5 % ointment, Apply topically every 3 (three) hours., Disp: 1 Tube, Rfl: 1    albuterol 90 mcg/actuation inhaler, Inhale 2 puffs into the lungs every 4 (four) hours as needed for Wheezing., Disp: 1 Inhaler, Rfl: 5    aspirin (ECOTRIN) 81 MG EC tablet, Take 81 mg by mouth once daily., Disp: , Rfl:     atorvastatin (LIPITOR) 40 MG tablet, TK 1 T PO   QPM, Disp: , Rfl: 11    brinzolamide (AZOPT) 1 % ophthalmic suspension, Place 1 drop into both eyes 2 (two) times daily., Disp: 10 mL, Rfl: 4    clotrimazole (LOTRIMIN) 1 % cream, Apply topically 2 (two) times daily. (Patient taking differently: Apply topically 2 (two) times daily as needed. ), Disp: 12 g, Rfl: 0    fexofenadine (ALLEGRA) 180 MG tablet, Every day PRN, Disp: , Rfl:     fluocinonide 0.1 % Crea, fluocinonide 0.1 % topical cream, Disp: , Rfl:     fluticasone (FLONASE) 50 mcg/actuation nasal spray, SHAKE LIQUID AND USE 1 SPRAY(50 MCG) IN EACH NOSTRIL EVERY DAY, Disp: 48 mL, Rfl: 0    hydrocortisone (ANUSOL-HC) 25 mg suppository, Place 1 suppository (25 mg total) rectally 2 (two) times daily as needed for Hemorrhoids. 1 Suppository(ies) Rectal PRN Twice a day., Disp: 12 suppository, Rfl: 1    latanoprost 0.005 % ophthalmic solution, Place 1 drop into both eyes nightly., Disp: 7.5 mL, Rfl: 4    meloxicam (MOBIC) 15 MG tablet, TAKE 1 TABLET BY MOUTH DAILY, Disp: 90 tablet, Rfl: 3    pantoprazole (PROTONIX) 40 MG tablet, Every day, Disp: , Rfl:     pioglitazone (ACTOS) 15 MG tablet, Take 1 tablet (15 mg total) by mouth once daily., Disp: 90 tablet, Rfl: 1    telmisartan (MICARDIS) 80 MG Tab, TK 1 T PO  QD, Disp: , Rfl: 7    valACYclovir (VALTREX) 1000 MG tablet, Take 1 tablet (1,000 mg total) by mouth 2 (two) times daily., Disp: 20 tablet, Rfl: 1    Review of Systems:  Constitutional: no fever or chills  Eyes: no visual changes  ENT: no nasal congestion or sore throat  Respiratory: no cough or shortness of breath  Cardiovascular: no chest pain or palpitations  Gastrointestinal: no nausea or vomiting, tolerating diet  Genitourinary: no hematuria or dysuria  Integument/Breast: no rash or pruritis  Hematologic/Lymphatic: no easy bruising or lymphadenopathy  Musculoskeletal: positive for c/o left knee pain worse with activity  Neurological: no seizures or tremors  Behavioral/Psych: no auditory or  "visual hallucinations  Endocrine: no heat or cold intolerance    PE:  Ht 5' 11" (1.803 m)   Wt 127.6 kg (281 lb 4.9 oz)   BMI 39.23 kg/m²   General: Pleasant, cooperative, NAD   Gait: antalgic  HEENT: NCAT, sclera nonicteric   Lungs: Respirations clear bilaterally; equal and unlabored.   CV: S1S2; 2+ bilateral upper and lower extremity pulses.   Skin: Intact throughout with no rashes, erythema, or lesions  Extremities: No LE edema,  no erythema or warmth of the skin in either lower extremity.    Left knee exam:  Knee Range of Motion:normal, pain with passive range of motion  Effusion:none  Condition of skin:intact  Location of tenderness:Medial joint line   Strength:normal  Stability: stable to testing    Hip Examination:normal    Radiographs: Radiographs reveal Skeletal structures are intact without fracture or dislocation.  Right knee shows stable findings of total knee arthroplasty with prostheses in satisfactory position and alignment.  Small soft tissue calcifications are above the patella.  Left knee again shows degenerative joint disease with marginal spurring at multiple positions and severe narrowing of the medial tibiofemoral joint space with bone-on-bone and associated genu varus position.  No joint effusion is detected    Knee Alignment: normal    Diagnosis: osteoarthritis Left knee    Plan: Left total knee arthroplasty    Due to the serious nature of total joint infection and the high prevalence of community acquired MRSA, vancomycin will be used perioperatively.            "

## 2019-03-13 NOTE — DISCHARGE INSTRUCTIONS
Your surgery has been scheduled for:__________________________________________    You should report to:  ____[facility], located on the [place] side of the first floor of the           [facility] ([phone])  ____[facility], located on the [facility] side of the            Second floor of the [facility] [phone])  ____3rd Floor [facility] located on the [facility] side of the [facility] [phone]  Please Note   - Tell your doctor if you take Aspirin, products containing Aspirin, herbal medications  or blood thinners, such as Coumadin, Ticlid, or Plavix.  (Consult your provider regarding holding or stopping before surgery).  - Arrange for someone to drive you home following surgery.  You will not be allowed to leave the surgical facility alone or drive yourself home following sedation and anesthesia.  Before Surgery  - Stop taking all herbal medications 14days prior to surgery  - No Motrin/Advil (Ibuprofen) 7 days before surgery  - No Aleve (Naproxen) 7 days before surgery  - No Goody's/BC powder 7 days before surgery  - Stop Taking Asprin, products containing Asprin _____days before surgery  - Stop taking blood thinners_______days before surgery  - Refrain from drinking alcoholic beverages for 24hours before and after surgery  - Stop or limit smoking _________days before surgery  - You may take Tylenol for pain  Night before Surgery  - Take a shower or bath (shower is recommended).  Bathe with Hibiclens soap or an antibacterial soap from the neck down.  If not supplied by your surgeon, hibiclens soap will need to be purchased over the counter in pharmacy.  Rinse soap off thoroughly.  - Shampoo your hair with your regular shampoo                           Food and Beverage Instructions  1. Stop ALL solid food, gum, candy (including vitamins) 8 hours before surgery/procedure time.  2. The patient should be  ENCOURAGED to drink carbohydrate-rich clear liquids (sports drinks, clear juices) until 2 hours prior to surgery/procedure time.  3. CLEAR liquids include only water, black coffee NO creamer, clear oral rehydration drinks, clear sports drinks or clear fruit juices (no orange juice, no pulpy juices, no apple cider). Advise patients if they can read newsprint through the liquid, it qualifies as clear liquid.   4. IF IN DOUBT, drink water instead.   5. NOTHING  TO DRINK 2 hours before to arrival for surgery/procedure time. If you are told to take medication on the morning of surgery, it may be taken with a sip of water.     FOLLOW YOUR SURGEON'S INSTRUCTIONS REGARDING FOOD AND BEVERAGES IF DIFFERENT THAN ABOVE INSTRUCTIONS.    The Day of Surgery  - Take another bath or shower with hibiclens or any antibacterial soap, to reduce the chance of infection.  - Take heart and blood pressure medications with a small sip of water, as advised by the perioperative team.  - Do not take fluid pills  - You may brush your teeth and rinse your mouth, but do not swall any additional water.   - Do not apply perfumes, powder, body lotions or deodorant on the day of surgery.  - Nail polish should be removed.  - Do not wear makeup or moisturizer  - Wear comfortable clothes, such as a button front shirt and loose fitting pants.  - Leave all jewelry, including body piercings, and valuables at home.    - Bring any devices you will neeed after surgery such as crutches or canes.  - If you have sleep apnea, please bring your CPAP machine  In the event that your physical condition changes including the onset of a cold or respiratory illness, or if you have to delay or cancel your surgery, please notify your surgeon.    Anesthesia: Regional Anesthesia    Youre scheduled for surgery. During surgery, youll receive medicine called anesthesia to keep you comfortable and pain-free. Your surgeon has decided that youll receive regional anesthesia.  This sheet tells you what to expect with this type of anesthesia.  What is regional anesthesia?  Regional anesthesia numbs one region of your body. The anesthesia may be given around nerves or into veins in your arms, neck, or legs (nerve block or [place] block). Or it may be sent into the spinal fluid (spinal anesthesia) or into the space just outside the spinal fluid (epidural anesthesia). You may also be given sedatives to help you relax.  Nerve block or [place] block  A small area of the body, such as an arm or leg, can be numbed using a nerve block or [place] block.  · Nerve block. During a nerve block, your skin is numbed. A needle is then inserted near nerves that serve the area to be numbed. Anesthetic is sent through the needle.  · IV regional or [place] block. For this type of block, an IV line is put into a vein. The blood flow to the area to be numbed is blocked for a short time. Anesthetic is sent through the IV.  Spinal anesthesia  Spinal anesthesia numbs your body from about the waist down.  · Anesthetic is injected into the spinal fluid. This is a substance that surrounds the spinal cord in your spinal column. The anesthetic blocks pain traveling from the body to the brain.  · To receive the anesthetic, your skin is numbed at the injection site on your back.  · A needle is then inserted into the spinal space. Anesthetic is sent into the spinal fluid through the needle.  Epidural anesthesia  Epidural anesthesia is most commonly used during childbirth and may also be used after surgical procedures of the chest, belly, and legs.  · Anesthetic is injected into the epidural space. This is just outside the dural sac which contains the spinal fluid.  · To receive the anesthetic, your skin is numbed at the injection site on your back.  · A needle is then inserted into the epidural space. Anesthetic is sent into the epidural space through the needle.  · A small flexible catheter may be attached to the needle and left in  place. This allows for continuous injections or infusions of anesthetic.  Anesthesia tools and medicines that might be near you during your procedure  · Local anesthetic. This medicine is given through a needle numbs one region of your body.  · Electrocardiography leads (electrodes). These are used to record your heart rate and rhythm.  · Blood pressure cuff. A cuff is placed on your arm to keep track of your blood pressure.  · Pulse oximeter. This small clip is placed on the end of the finger. It measures your blood oxygen level.  · Sedatives. These medicines may be given through an IV. They help to relax you and keep you comfortable. You may stay awake or sleep lightly.  · Oxygen. You may be given oxygen through a facemask.  Risks and possible complications  Regional anesthesia carries some risks. These include:  · Nausea and vomiting  · Headache  · Backache  · Decreased blood pressure  · Allergic reaction to the anesthetic  · Ongoing numbness (rare)  · Irregular heartbeat (rare)  · Cardiac arrest (rare)   Date Last Reviewed: 12/1/2016  © 4020-3575 [employer]. [address] [place] [address]. All rights reserved. This information is not intended as a substitute for professional medical care. Always follow your healthcare professional's instructions.

## 2019-03-13 NOTE — H&P (VIEW-ONLY)
CC: Left knee pain    Rajeev Newell III is a 68 y.o. male with a history of Left knee pain. Pain is worse with activity and weight bearing.  Patient has experienced interference of activities of daily living due to decreased range of motion and an increase in joint pain and swelling.  Patient has failed non-operative treatment including NSAIDs, corticosteroid injections, viscosupplement injections, and activity modification.  Rajeev Newell III currently ambulates independently.     Relevant medical conditions of significance in perioperative period:  DM- on actos managed by pcp  HTN- on medication managed by pcp    Past Medical History:   Diagnosis Date    Cataract     OU--early    DM (diabetes mellitus) 10/29/2012    Glaucoma     POAG-OU    Hypertension     Sinusitis        Past Surgical History:   Procedure Laterality Date    CARPAL TUNNEL RELEASE      CIRCUMCISION      CYSTOSCOPY      HERNIA REPAIR Left 1994    L inguinal - [facility]    KNEE SURGERY      REPLACEMENT-KNEE-TOTAL Right 3/6/2018    Performed by Edmund Rosas MD at [facility] OR [address] [place] [address]       Family History   Problem Relation Age of Onset    Hypertension Mother     Stroke Mother     Diabetes Neg Hx     Cancer Neg Hx     Heart disease Neg Hx        Review of patient's allergies indicates:   Allergen Reactions    Bactrim [sulfamethoxazole-trimethoprim] Other (See Comments)     Abdominal Pain, Flush, Fever and Chills, Headache and Cough after Pt. took Bactrim    Darvon [propoxyphene] Other (See Comments)     Hallucinations         Current Outpatient Medications:     acyclovir 5% (ZOVIRAX) 5 % ointment, Apply topically every 3 (three) hours., Disp: 1 Tube, Rfl: 1    albuterol 90 mcg/actuation inhaler, Inhale 2 puffs into the lungs every 4 (four) hours as needed for Wheezing., Disp: 1 Inhaler, Rfl: 5    aspirin (ECOTRIN) 81 MG EC tablet, Take 81 mg by mouth once daily., Disp: , Rfl:     atorvastatin (LIPITOR) 40 MG tablet, TK 1 T PO   QPM, Disp: , Rfl: 11    brinzolamide (AZOPT) 1 % ophthalmic suspension, Place 1 drop into both eyes 2 (two) times daily., Disp: 10 mL, Rfl: 4    clotrimazole (LOTRIMIN) 1 % cream, Apply topically 2 (two) times daily. (Patient taking differently: Apply topically 2 (two) times daily as needed. ), Disp: 12 g, Rfl: 0    fexofenadine (ALLEGRA) 180 MG tablet, Every day PRN, Disp: , Rfl:     fluocinonide 0.1 % Crea, fluocinonide 0.1 % topical cream, Disp: , Rfl:     fluticasone (FLONASE) 50 mcg/actuation nasal spray, SHAKE LIQUID AND USE 1 SPRAY(50 MCG) IN EACH NOSTRIL EVERY DAY, Disp: 48 mL, Rfl: 0    hydrocortisone (ANUSOL-HC) 25 mg suppository, Place 1 suppository (25 mg total) rectally 2 (two) times daily as needed for Hemorrhoids. 1 Suppository(ies) Rectal PRN Twice a day., Disp: 12 suppository, Rfl: 1    latanoprost 0.005 % ophthalmic solution, Place 1 drop into both eyes nightly., Disp: 7.5 mL, Rfl: 4    meloxicam (MOBIC) 15 MG tablet, TAKE 1 TABLET BY MOUTH DAILY, Disp: 90 tablet, Rfl: 3    pantoprazole (PROTONIX) 40 MG tablet, Every day, Disp: , Rfl:     pioglitazone (ACTOS) 15 MG tablet, Take 1 tablet (15 mg total) by mouth once daily., Disp: 90 tablet, Rfl: 1    telmisartan (MICARDIS) 80 MG Tab, TK 1 T PO  QD, Disp: , Rfl: 7    valACYclovir (VALTREX) 1000 MG tablet, Take 1 tablet (1,000 mg total) by mouth 2 (two) times daily., Disp: 20 tablet, Rfl: 1    Review of Systems:  Constitutional: no fever or chills  Eyes: no visual changes  ENT: no nasal congestion or sore throat  Respiratory: no cough or shortness of breath  Cardiovascular: no chest pain or palpitations  Gastrointestinal: no nausea or vomiting, tolerating diet  Genitourinary: no hematuria or dysuria  Integument/Breast: no rash or pruritis  Hematologic/Lymphatic: no easy bruising or lymphadenopathy  Musculoskeletal: positive for c/o left knee pain worse with activity  Neurological: no seizures or tremors  Behavioral/Psych: no auditory or  "visual hallucinations  Endocrine: no heat or cold intolerance    PE:  Ht 5' 11" (1.803 m)   Wt 127.6 kg (281 lb 4.9 oz)   BMI 39.23 kg/m²   General: Pleasant, cooperative, NAD   Gait: antalgic  HEENT: NCAT, sclera nonicteric   Lungs: Respirations clear bilaterally; equal and unlabored.   CV: S1S2; 2+ bilateral upper and lower extremity pulses.   Skin: Intact throughout with no rashes, erythema, or lesions  Extremities: No LE edema,  no erythema or warmth of the skin in either lower extremity.    Left knee exam:  Knee Range of Motion:normal, pain with passive range of motion  Effusion:none  Condition of skin:intact  Location of tenderness:Medial joint line   Strength:normal  Stability: stable to testing    Hip Examination:normal    Radiographs: Radiographs reveal Skeletal structures are intact without fracture or dislocation.  Right knee shows stable findings of total knee arthroplasty with prostheses in satisfactory position and alignment.  Small soft tissue calcifications are above the patella.  Left knee again shows degenerative joint disease with marginal spurring at multiple positions and severe narrowing of the medial tibiofemoral joint space with bone-on-bone and associated genu varus position.  No joint effusion is detected    Knee Alignment: normal    Diagnosis: osteoarthritis Left knee    Plan: Left total knee arthroplasty    Due to the serious nature of total joint infection and the high prevalence of community acquired MRSA, vancomycin will be used perioperatively.            "

## 2019-03-13 NOTE — PROGRESS NOTES
Rajeev Newell III is a 68 y.o. year old here today for a pre-operative visit in preparation for a Left total knee arthroplasty to be performed by  Dr. Rosas on 3/21/19.  he was last seen and treated in the clinic on 1/16/2019. he will be medically optimized by the pre op center. There has been no significant change in medical status since last visit. No fever, chills, malaise, or unexplained weight change.      Allergies, Medications, past medical and surgical history reviewed.    Focused examination performed.    Patient declined to see Dr. Rosas today in clinic. All questions answered. Patient encouraged to call with questions. Contact information given.     Pre, jameson, and post operative procedures and expectations discussed. Questions were answered. Rajeev Newell III has been educated and is ready to proceed with surgery. Approximately 30 minutes was spent discussing surgical outcomes, plans, procedures pre, jameson, and post operative expections and care.  Surgical consent signed.    Rajeev Newell III will contact us if there are any questions, concerns, or changes in medical status prior to surgery.

## 2019-03-14 ENCOUNTER — CLINICAL SUPPORT (OUTPATIENT)
Dept: REHABILITATION | Facility: HOSPITAL | Age: 69
End: 2019-03-14
Attending: ORTHOPAEDIC SURGERY
Payer: MEDICARE

## 2019-03-14 DIAGNOSIS — M25.562 CHRONIC PAIN OF LEFT KNEE: ICD-10-CM

## 2019-03-14 DIAGNOSIS — G89.29 CHRONIC PAIN OF LEFT KNEE: ICD-10-CM

## 2019-03-14 PROCEDURE — 97110 THERAPEUTIC EXERCISES: CPT | Mod: PO

## 2019-03-14 PROCEDURE — 97140 MANUAL THERAPY 1/> REGIONS: CPT | Mod: PO

## 2019-03-14 NOTE — PROGRESS NOTES
"  Physical Therapy Daily Treatment Note     Name: Rajeev Newell [facility]  Clinic Number: [id number]    Therapy Diagnosis:   Encounter Diagnosis   Name Primary?    Chronic pain of left knee      Physician: Edmund Rosas MD    Visit Date: 3/14/2019  Physician Orders: PT Eval and Treat   Medical Diagnosis from Referral: M17.12 (ICD-10-CM) - Primary osteoarthritis of left knee  Evaluation Date: 2/22/2019  Authorization Period Expiration: 1/16/2020  Plan of Care Expiration: 3/21/2019  Visit # / Visits authorized: 3/ 20    Time In: 900a  Time Out: 1003a  Total Billable Time: 53 minutes    Precautions: Standard and HTN    Subjective     Pt reports: that he denies pn states that his knees are very stiff today.   He was compliant with home exercise program.  Response to previous treatment: no increased pain  Functional change: none at this time    Pain: 0/10  Location: left knee      Objective     Jared received therapeutic exercises to develop strength, ROM and flexibility for 45 minutes including:    Recumbent bike, L3, 10 min  Gastroc st on incline, 3x30s  HSS, 3x30s on stairs B  SLR 3x10 B  LLR 3x10 B  prone hip ext 3x10 B  Lat step-up 4"3x10  Heel slides 30x5s  SL clamshells with green TB, 20x  Precor HSC, 30#, 30x  Precor LE ext, 30#, 30x  Precor leg press, 40#, 30x  SLS, 3x15s, L/R        Jared received the following manual therapy techniques x 10 min:     PA and AP glides of tibiofemoral joint, grade III  Patellar mob, all planes, grade III      Jared received cold pack for 10 minutes to to decrease circulation, pain, and swelling.        Home Exercises Provided and Patient Education Provided     Education provided:   - continue HEP    Written Home Exercises Provided: Patient instructed to cont prior HEP.  Exercises were reviewed and Jared was able to demonstrate them prior to the end of the session.  Jared demonstrated good  understanding of the education provided.     See EMR under Media for exercises provided " prior visit.    Assessment     Jared tolerated tx session with progression of ther ex well. He is compliant with his HEP and working out in gym, leading to improved mobility in B knees. Some L LE weakness noted with lat step-ups  Jared is progressing well towards his goals.   Pt prognosis is Good.     Pt will continue to benefit from skilled outpatient physical therapy to address the deficits listed in the problem list box on initial evaluation, provide pt/family education and to maximize pt's level of independence in the home and community environment.     Pt's spiritual, cultural and educational needs considered and pt agreeable to plan of care and goals.    Anticipated barriers to physical therapy: none    Goals:   Long Term Goals: 4 weeks   1. Pt will be compliant with HEP for improving strength and ROM in preparation for L TKA.   2. Pt will demonstrate improved AROM of L knee to -2*-120*    Plan     DC next visit.     Dante Castañeda, PTA

## 2019-03-18 PROBLEM — M17.12 PRIMARY OSTEOARTHRITIS OF LEFT KNEE: Status: ACTIVE | Noted: 2019-03-18

## 2019-03-20 ENCOUNTER — TELEPHONE (OUTPATIENT)
Dept: ORTHOPEDICS | Facility: CLINIC | Age: 69
End: 2019-03-20

## 2019-03-20 NOTE — TELEPHONE ENCOUNTER
Spoke to pt, informed his arrival time for surgery tomorrow is 0830 am, 2nd floor surgery center, no food or drink after midnight. Pt verbalized understanding.

## 2019-03-21 ENCOUNTER — ANESTHESIA (OUTPATIENT)
Dept: SURGERY | Facility: HOSPITAL | Age: 69
DRG: 470 | End: 2019-03-21
Payer: MEDICARE

## 2019-03-21 ENCOUNTER — HOSPITAL ENCOUNTER (INPATIENT)
Facility: HOSPITAL | Age: 69
LOS: 1 days | Discharge: HOME | DRG: 470 | End: 2019-03-22
Attending: ORTHOPAEDIC SURGERY | Admitting: ORTHOPAEDIC SURGERY
Payer: MEDICARE

## 2019-03-21 DIAGNOSIS — M17.12 PRIMARY OSTEOARTHRITIS OF LEFT KNEE: ICD-10-CM

## 2019-03-21 LAB
ANION GAP SERPL CALC-SCNC: 7 MMOL/L
BUN SERPL-MCNC: 16 MG/DL
CALCIUM SERPL-MCNC: 8.8 MG/DL
CHLORIDE SERPL-SCNC: 110 MMOL/L
CO2 SERPL-SCNC: 22 MMOL/L
CREAT SERPL-MCNC: 1.1 MG/DL
EST. GFR  (AFRICAN AMERICAN): >60 ML/MIN/1.73 M^2
EST. GFR  (NON AFRICAN AMERICAN): >60 ML/MIN/1.73 M^2
GLUCOSE SERPL-MCNC: 122 MG/DL
POCT GLUCOSE: 119 MG/DL (ref 70–110)
POCT GLUCOSE: 124 MG/DL (ref 70–110)
POCT GLUCOSE: 145 MG/DL (ref 70–110)
POCT GLUCOSE: 167 MG/DL (ref 70–110)
POTASSIUM SERPL-SCNC: 4.5 MMOL/L
SODIUM SERPL-SCNC: 139 MMOL/L

## 2019-03-21 PROCEDURE — 80048 BASIC METABOLIC PNL TOTAL CA: CPT

## 2019-03-21 PROCEDURE — 11000001 HC ACUTE MED/SURG PRIVATE ROOM

## 2019-03-21 PROCEDURE — C1713 ANCHOR/SCREW BN/BN,TIS/BN: HCPCS | Performed by: ORTHOPAEDIC SURGERY

## 2019-03-21 PROCEDURE — D9220A PRA ANESTHESIA: Mod: ANES,,, | Performed by: ANESTHESIOLOGY

## 2019-03-21 PROCEDURE — 76942 ECHO GUIDE FOR BIOPSY: CPT | Performed by: ANESTHESIOLOGY

## 2019-03-21 PROCEDURE — 25000003 PHARM REV CODE 250: Performed by: NURSE ANESTHETIST, CERTIFIED REGISTERED

## 2019-03-21 PROCEDURE — 88305 TISSUE EXAM BY PATHOLOGIST: CPT | Performed by: PATHOLOGY

## 2019-03-21 PROCEDURE — 63600175 PHARM REV CODE 636 W HCPCS: Performed by: STUDENT IN AN ORGANIZED HEALTH CARE EDUCATION/TRAINING PROGRAM

## 2019-03-21 PROCEDURE — 25000003 PHARM REV CODE 250: Performed by: ORTHOPAEDIC SURGERY

## 2019-03-21 PROCEDURE — 64448 NJX AA&/STRD FEM NRV NFS IMG: CPT | Mod: 59,LT,, | Performed by: ANESTHESIOLOGY

## 2019-03-21 PROCEDURE — 36000711: Performed by: ORTHOPAEDIC SURGERY

## 2019-03-21 PROCEDURE — 27447 PR TOTAL KNEE ARTHROPLASTY: ICD-10-PCS | Mod: LT,GC,, | Performed by: ORTHOPAEDIC SURGERY

## 2019-03-21 PROCEDURE — 25000003 PHARM REV CODE 250: Performed by: STUDENT IN AN ORGANIZED HEALTH CARE EDUCATION/TRAINING PROGRAM

## 2019-03-21 PROCEDURE — 88311 TISSUE SPECIMEN TO PATHOLOGY - SURGERY: ICD-10-PCS | Mod: 26,,, | Performed by: PATHOLOGY

## 2019-03-21 PROCEDURE — 63600175 PHARM REV CODE 636 W HCPCS: Performed by: ANESTHESIOLOGY

## 2019-03-21 PROCEDURE — D9220A PRA ANESTHESIA: ICD-10-PCS | Mod: ANES,,, | Performed by: ANESTHESIOLOGY

## 2019-03-21 PROCEDURE — 36000710: Performed by: ORTHOPAEDIC SURGERY

## 2019-03-21 PROCEDURE — 76942 ADDUCTOR CANAL CATHETER: ICD-10-PCS | Mod: 26,,, | Performed by: ANESTHESIOLOGY

## 2019-03-21 PROCEDURE — 37000008 HC ANESTHESIA 1ST 15 MINUTES: Performed by: ORTHOPAEDIC SURGERY

## 2019-03-21 PROCEDURE — 97116 GAIT TRAINING THERAPY: CPT

## 2019-03-21 PROCEDURE — 97165 OT EVAL LOW COMPLEX 30 MIN: CPT

## 2019-03-21 PROCEDURE — C1776 JOINT DEVICE (IMPLANTABLE): HCPCS | Performed by: ORTHOPAEDIC SURGERY

## 2019-03-21 PROCEDURE — 88311 DECALCIFY TISSUE: CPT | Mod: 26,,, | Performed by: PATHOLOGY

## 2019-03-21 PROCEDURE — 63600175 PHARM REV CODE 636 W HCPCS

## 2019-03-21 PROCEDURE — D9220A PRA ANESTHESIA: Mod: CRNA,,, | Performed by: NURSE ANESTHETIST, CERTIFIED REGISTERED

## 2019-03-21 PROCEDURE — 25000003 PHARM REV CODE 250: Performed by: NURSE PRACTITIONER

## 2019-03-21 PROCEDURE — 63600175 PHARM REV CODE 636 W HCPCS: Performed by: NURSE PRACTITIONER

## 2019-03-21 PROCEDURE — 63600175 PHARM REV CODE 636 W HCPCS: Performed by: NURSE ANESTHETIST, CERTIFIED REGISTERED

## 2019-03-21 PROCEDURE — 71000033 HC RECOVERY, INTIAL HOUR: Performed by: ORTHOPAEDIC SURGERY

## 2019-03-21 PROCEDURE — 27447 TOTAL KNEE ARTHROPLASTY: CPT | Mod: LT,GC,, | Performed by: ORTHOPAEDIC SURGERY

## 2019-03-21 PROCEDURE — 82962 GLUCOSE BLOOD TEST: CPT | Mod: 91 | Performed by: ORTHOPAEDIC SURGERY

## 2019-03-21 PROCEDURE — 27100025 HC TUBING, SET FLUID WARMER: Performed by: NURSE ANESTHETIST, CERTIFIED REGISTERED

## 2019-03-21 PROCEDURE — 97530 THERAPEUTIC ACTIVITIES: CPT

## 2019-03-21 PROCEDURE — 64448 ADDUCTOR CANAL CATHETER: ICD-10-PCS | Mod: 59,LT,, | Performed by: ANESTHESIOLOGY

## 2019-03-21 PROCEDURE — 71000039 HC RECOVERY, EACH ADD'L HOUR: Performed by: ORTHOPAEDIC SURGERY

## 2019-03-21 PROCEDURE — D9220A PRA ANESTHESIA: ICD-10-PCS | Mod: CRNA,,, | Performed by: NURSE ANESTHETIST, CERTIFIED REGISTERED

## 2019-03-21 PROCEDURE — 37000009 HC ANESTHESIA EA ADD 15 MINS: Performed by: ORTHOPAEDIC SURGERY

## 2019-03-21 PROCEDURE — 97161 PT EVAL LOW COMPLEX 20 MIN: CPT

## 2019-03-21 PROCEDURE — 88305 TISSUE SPECIMEN TO PATHOLOGY - SURGERY: ICD-10-PCS | Mod: 26,,, | Performed by: PATHOLOGY

## 2019-03-21 PROCEDURE — 82962 GLUCOSE BLOOD TEST: CPT | Performed by: ORTHOPAEDIC SURGERY

## 2019-03-21 PROCEDURE — 27200664 HC NERVE BLOCK COMPLETE KIT: Performed by: STUDENT IN AN ORGANIZED HEALTH CARE EDUCATION/TRAINING PROGRAM

## 2019-03-21 PROCEDURE — 27201423 OPTIME MED/SURG SUP & DEVICES STERILE SUPPLY: Performed by: ORTHOPAEDIC SURGERY

## 2019-03-21 PROCEDURE — 36415 COLL VENOUS BLD VENIPUNCTURE: CPT

## 2019-03-21 PROCEDURE — 94799 UNLISTED PULMONARY SVC/PX: CPT

## 2019-03-21 DEVICE — IMPLANTABLE DEVICE: Type: IMPLANTABLE DEVICE | Site: KNEE | Status: FUNCTIONAL

## 2019-03-21 DEVICE — CEMENT BONE SMPLX HV GENTMYCN: Type: IMPLANTABLE DEVICE | Site: KNEE | Status: FUNCTIONAL

## 2019-03-21 DEVICE — BASEPLATE TIB CEM PRIM SZ 6: Type: IMPLANTABLE DEVICE | Site: KNEE | Status: FUNCTIONAL

## 2019-03-21 DEVICE — PATELLA TRIATHLON 31X9 SYMTRC: Type: IMPLANTABLE DEVICE | Site: KNEE | Status: FUNCTIONAL

## 2019-03-21 DEVICE — COMP FEM POST STAB CEM SZ 6 LT: Type: IMPLANTABLE DEVICE | Site: KNEE | Status: FUNCTIONAL

## 2019-03-21 RX ORDER — FAMOTIDINE 20 MG/1
20 TABLET, FILM COATED ORAL 2 TIMES DAILY
Status: DISCONTINUED | OUTPATIENT
Start: 2019-03-21 | End: 2019-03-22 | Stop reason: HOSPADM

## 2019-03-21 RX ORDER — RAMELTEON 8 MG/1
8 TABLET ORAL NIGHTLY PRN
Status: DISCONTINUED | OUTPATIENT
Start: 2019-03-21 | End: 2019-03-22 | Stop reason: HOSPADM

## 2019-03-21 RX ORDER — MORPHINE SULFATE 2 MG/ML
2 INJECTION, SOLUTION INTRAMUSCULAR; INTRAVENOUS
Status: DISCONTINUED | OUTPATIENT
Start: 2019-03-21 | End: 2019-03-22 | Stop reason: HOSPADM

## 2019-03-21 RX ORDER — FENTANYL CITRATE 50 UG/ML
25 INJECTION, SOLUTION INTRAMUSCULAR; INTRAVENOUS EVERY 5 MIN PRN
Status: DISCONTINUED | OUTPATIENT
Start: 2019-03-21 | End: 2019-03-21 | Stop reason: HOSPADM

## 2019-03-21 RX ORDER — GLUCAGON 1 MG
1 KIT INJECTION
Status: DISCONTINUED | OUTPATIENT
Start: 2019-03-21 | End: 2019-03-22 | Stop reason: HOSPADM

## 2019-03-21 RX ORDER — ATORVASTATIN CALCIUM 10 MG/1
40 TABLET, FILM COATED ORAL DAILY
Status: DISCONTINUED | OUTPATIENT
Start: 2019-03-21 | End: 2019-03-22 | Stop reason: HOSPADM

## 2019-03-21 RX ORDER — INSULIN ASPART 100 [IU]/ML
0-5 INJECTION, SOLUTION INTRAVENOUS; SUBCUTANEOUS
Status: DISCONTINUED | OUTPATIENT
Start: 2019-03-21 | End: 2019-03-22 | Stop reason: HOSPADM

## 2019-03-21 RX ORDER — IBUPROFEN 200 MG
24 TABLET ORAL
Status: DISCONTINUED | OUTPATIENT
Start: 2019-03-21 | End: 2019-03-22 | Stop reason: HOSPADM

## 2019-03-21 RX ORDER — SODIUM CHLORIDE 0.9 % (FLUSH) 0.9 %
3 SYRINGE (ML) INJECTION EVERY 8 HOURS PRN
Status: DISCONTINUED | OUTPATIENT
Start: 2019-03-21 | End: 2019-03-22 | Stop reason: HOSPADM

## 2019-03-21 RX ORDER — DEXAMETHASONE SODIUM PHOSPHATE 4 MG/ML
INJECTION, SOLUTION INTRA-ARTICULAR; INTRALESIONAL; INTRAMUSCULAR; INTRAVENOUS; SOFT TISSUE
Status: DISCONTINUED | OUTPATIENT
Start: 2019-03-21 | End: 2019-03-21

## 2019-03-21 RX ORDER — PREGABALIN 75 MG/1
75 CAPSULE ORAL
Status: COMPLETED | OUTPATIENT
Start: 2019-03-21 | End: 2019-03-21

## 2019-03-21 RX ORDER — PANTOPRAZOLE SODIUM 40 MG/1
40 TABLET, DELAYED RELEASE ORAL DAILY
Status: DISCONTINUED | OUTPATIENT
Start: 2019-03-22 | End: 2019-03-22 | Stop reason: HOSPADM

## 2019-03-21 RX ORDER — ASPIRIN 81 MG/1
81 TABLET ORAL 2 TIMES DAILY
Status: DISCONTINUED | OUTPATIENT
Start: 2019-03-21 | End: 2019-03-22 | Stop reason: HOSPADM

## 2019-03-21 RX ORDER — AMOXICILLIN 250 MG
1 CAPSULE ORAL 2 TIMES DAILY
Status: DISCONTINUED | OUTPATIENT
Start: 2019-03-21 | End: 2019-03-22 | Stop reason: HOSPADM

## 2019-03-21 RX ORDER — SODIUM CHLORIDE 0.9 % (FLUSH) 0.9 %
3 SYRINGE (ML) INJECTION
Status: DISCONTINUED | OUTPATIENT
Start: 2019-03-21 | End: 2019-03-21 | Stop reason: HOSPADM

## 2019-03-21 RX ORDER — ACETAMINOPHEN 10 MG/ML
1000 INJECTION, SOLUTION INTRAVENOUS ONCE
Status: COMPLETED | OUTPATIENT
Start: 2019-03-21 | End: 2019-03-21

## 2019-03-21 RX ORDER — ONDANSETRON 2 MG/ML
4 INJECTION INTRAMUSCULAR; INTRAVENOUS EVERY 8 HOURS PRN
Status: DISCONTINUED | OUTPATIENT
Start: 2019-03-21 | End: 2019-03-22 | Stop reason: HOSPADM

## 2019-03-21 RX ORDER — OXYCODONE HYDROCHLORIDE 5 MG/1
5 TABLET ORAL
Status: DISCONTINUED | OUTPATIENT
Start: 2019-03-21 | End: 2019-03-22 | Stop reason: HOSPADM

## 2019-03-21 RX ORDER — ONDANSETRON HYDROCHLORIDE 8 MG/1
8 TABLET, FILM COATED ORAL EVERY 8 HOURS PRN
Qty: 30 TABLET | Refills: 0 | Status: SHIPPED | OUTPATIENT
Start: 2019-03-21 | End: 2020-02-19

## 2019-03-21 RX ORDER — ALBUTEROL SULFATE 90 UG/1
2 AEROSOL, METERED RESPIRATORY (INHALATION) EVERY 4 HOURS PRN
Status: DISCONTINUED | OUTPATIENT
Start: 2019-03-21 | End: 2019-03-22 | Stop reason: HOSPADM

## 2019-03-21 RX ORDER — DIPHENHYDRAMINE HYDROCHLORIDE 50 MG/ML
25 INJECTION INTRAMUSCULAR; INTRAVENOUS EVERY 6 HOURS PRN
Status: DISCONTINUED | OUTPATIENT
Start: 2019-03-21 | End: 2019-03-21 | Stop reason: HOSPADM

## 2019-03-21 RX ORDER — ACETAMINOPHEN 10 MG/ML
INJECTION, SOLUTION INTRAVENOUS
Status: COMPLETED
Start: 2019-03-21 | End: 2019-03-21

## 2019-03-21 RX ORDER — LATANOPROST 50 UG/ML
1 SOLUTION/ DROPS OPHTHALMIC NIGHTLY
Status: DISCONTINUED | OUTPATIENT
Start: 2019-03-22 | End: 2019-03-22 | Stop reason: HOSPADM

## 2019-03-21 RX ORDER — LORAZEPAM 2 MG/ML
0.25 INJECTION INTRAMUSCULAR ONCE AS NEEDED
Status: DISCONTINUED | OUTPATIENT
Start: 2019-03-21 | End: 2019-03-21 | Stop reason: HOSPADM

## 2019-03-21 RX ORDER — ASPIRIN 81 MG/1
81 TABLET ORAL 2 TIMES DAILY
Qty: 60 TABLET | Refills: 0 | Status: SHIPPED | OUTPATIENT
Start: 2019-03-21 | End: 2023-03-02

## 2019-03-21 RX ORDER — LIDOCAINE HCL/PF 100 MG/5ML
SYRINGE (ML) INTRAVENOUS
Status: DISCONTINUED | OUTPATIENT
Start: 2019-03-21 | End: 2019-03-21

## 2019-03-21 RX ORDER — ROPIVACAINE HYDROCHLORIDE 2 MG/ML
8 INJECTION, SOLUTION EPIDURAL; INFILTRATION; PERINEURAL CONTINUOUS
Status: DISCONTINUED | OUTPATIENT
Start: 2019-03-21 | End: 2019-03-22 | Stop reason: HOSPADM

## 2019-03-21 RX ORDER — MIDAZOLAM HYDROCHLORIDE 1 MG/ML
INJECTION, SOLUTION INTRAMUSCULAR; INTRAVENOUS
Status: DISCONTINUED | OUTPATIENT
Start: 2019-03-21 | End: 2019-03-21

## 2019-03-21 RX ORDER — SODIUM CHLORIDE 9 MG/ML
INJECTION, SOLUTION INTRAVENOUS CONTINUOUS
Status: DISCONTINUED | OUTPATIENT
Start: 2019-03-21 | End: 2019-03-22 | Stop reason: HOSPADM

## 2019-03-21 RX ORDER — DOCUSATE SODIUM 100 MG/1
100 CAPSULE, LIQUID FILLED ORAL 2 TIMES DAILY
Qty: 60 CAPSULE | Refills: 0 | Status: SHIPPED | OUTPATIENT
Start: 2019-03-21 | End: 2022-12-21

## 2019-03-21 RX ORDER — IBUPROFEN 200 MG
16 TABLET ORAL
Status: DISCONTINUED | OUTPATIENT
Start: 2019-03-21 | End: 2019-03-22 | Stop reason: HOSPADM

## 2019-03-21 RX ORDER — CEFAZOLIN SODIUM 1 G/3ML
2 INJECTION, POWDER, FOR SOLUTION INTRAMUSCULAR; INTRAVENOUS
Status: COMPLETED | OUTPATIENT
Start: 2019-03-21 | End: 2019-03-22

## 2019-03-21 RX ORDER — NALOXONE HCL 0.4 MG/ML
0.02 VIAL (ML) INJECTION
Status: DISCONTINUED | OUTPATIENT
Start: 2019-03-21 | End: 2019-03-22 | Stop reason: HOSPADM

## 2019-03-21 RX ORDER — MIDAZOLAM HYDROCHLORIDE 1 MG/ML
1 INJECTION INTRAMUSCULAR; INTRAVENOUS EVERY 5 MIN PRN
Status: DISCONTINUED | OUTPATIENT
Start: 2019-03-21 | End: 2019-03-21 | Stop reason: HOSPADM

## 2019-03-21 RX ORDER — VANCOMYCIN HCL IN 5 % DEXTROSE 1.5G/250ML
1500 PLASTIC BAG, INJECTION (ML) INTRAVENOUS
Status: COMPLETED | OUTPATIENT
Start: 2019-03-21 | End: 2019-03-21

## 2019-03-21 RX ORDER — ROPIVACAINE HYDROCHLORIDE 5 MG/ML
INJECTION, SOLUTION EPIDURAL; INFILTRATION; PERINEURAL
Status: COMPLETED | OUTPATIENT
Start: 2019-03-21 | End: 2019-03-21

## 2019-03-21 RX ORDER — LIDOCAINE HYDROCHLORIDE 10 MG/ML
1 INJECTION, SOLUTION EPIDURAL; INFILTRATION; INTRACAUDAL; PERINEURAL
Status: COMPLETED | OUTPATIENT
Start: 2019-03-21 | End: 2019-03-21

## 2019-03-21 RX ORDER — PROPOFOL 10 MG/ML
VIAL (ML) INTRAVENOUS
Status: DISCONTINUED | OUTPATIENT
Start: 2019-03-21 | End: 2019-03-21

## 2019-03-21 RX ORDER — PROPOFOL 10 MG/ML
VIAL (ML) INTRAVENOUS CONTINUOUS PRN
Status: DISCONTINUED | OUTPATIENT
Start: 2019-03-21 | End: 2019-03-21

## 2019-03-21 RX ORDER — OXYCODONE HYDROCHLORIDE 10 MG/1
10 TABLET ORAL
Status: DISCONTINUED | OUTPATIENT
Start: 2019-03-21 | End: 2019-03-22 | Stop reason: HOSPADM

## 2019-03-21 RX ORDER — OXYCODONE HYDROCHLORIDE 5 MG/1
TABLET ORAL
Status: DISPENSED
Start: 2019-03-21 | End: 2019-03-22

## 2019-03-21 RX ORDER — KETAMINE HCL IN 0.9 % NACL 50 MG/5 ML
SYRINGE (ML) INTRAVENOUS
Status: DISCONTINUED | OUTPATIENT
Start: 2019-03-21 | End: 2019-03-21

## 2019-03-21 RX ORDER — BRINZOLAMIDE 10 MG/ML
1 SUSPENSION/ DROPS OPHTHALMIC 2 TIMES DAILY
Status: DISCONTINUED | OUTPATIENT
Start: 2019-03-22 | End: 2019-03-22 | Stop reason: HOSPADM

## 2019-03-21 RX ORDER — HYDROMORPHONE HYDROCHLORIDE 1 MG/ML
0.2 INJECTION, SOLUTION INTRAMUSCULAR; INTRAVENOUS; SUBCUTANEOUS EVERY 5 MIN PRN
Status: DISCONTINUED | OUTPATIENT
Start: 2019-03-21 | End: 2019-03-21 | Stop reason: HOSPADM

## 2019-03-21 RX ORDER — PHENYLEPHRINE HYDROCHLORIDE 10 MG/ML
INJECTION INTRAVENOUS
Status: DISCONTINUED | OUTPATIENT
Start: 2019-03-21 | End: 2019-03-21

## 2019-03-21 RX ORDER — ROPIVACAINE HYDROCHLORIDE 2 MG/ML
INJECTION, SOLUTION EPIDURAL; INFILTRATION; PERINEURAL
Status: DISPENSED
Start: 2019-03-21 | End: 2019-03-22

## 2019-03-21 RX ORDER — OXYCODONE AND ACETAMINOPHEN 10; 325 MG/1; MG/1
1 TABLET ORAL EVERY 4 HOURS PRN
Qty: 50 TABLET | Refills: 0 | Status: SHIPPED | OUTPATIENT
Start: 2019-03-21 | End: 2020-02-19

## 2019-03-21 RX ORDER — MUPIROCIN 20 MG/G
1 OINTMENT TOPICAL
Status: COMPLETED | OUTPATIENT
Start: 2019-03-21 | End: 2019-03-21

## 2019-03-21 RX ORDER — CELECOXIB 200 MG/1
400 CAPSULE ORAL
Status: COMPLETED | OUTPATIENT
Start: 2019-03-21 | End: 2019-03-21

## 2019-03-21 RX ORDER — SODIUM CHLORIDE 9 MG/ML
INJECTION, SOLUTION INTRAVENOUS
Status: COMPLETED | OUTPATIENT
Start: 2019-03-21 | End: 2019-03-21

## 2019-03-21 RX ORDER — PREGABALIN 150 MG/1
150 CAPSULE ORAL NIGHTLY
Status: DISCONTINUED | OUTPATIENT
Start: 2019-03-21 | End: 2019-03-22 | Stop reason: HOSPADM

## 2019-03-21 RX ORDER — BISACODYL 10 MG
10 SUPPOSITORY, RECTAL RECTAL EVERY 12 HOURS PRN
Status: DISCONTINUED | OUTPATIENT
Start: 2019-03-21 | End: 2019-03-22 | Stop reason: HOSPADM

## 2019-03-21 RX ORDER — ACETAMINOPHEN 500 MG
1000 TABLET ORAL EVERY 6 HOURS
Status: DISCONTINUED | OUTPATIENT
Start: 2019-03-21 | End: 2019-03-22 | Stop reason: HOSPADM

## 2019-03-21 RX ORDER — GLYCOPYRROLATE 0.2 MG/ML
INJECTION INTRAMUSCULAR; INTRAVENOUS
Status: DISCONTINUED | OUTPATIENT
Start: 2019-03-21 | End: 2019-03-21

## 2019-03-21 RX ADMIN — HYDROMORPHONE HYDROCHLORIDE 0.2 MG: 1 INJECTION, SOLUTION INTRAMUSCULAR; INTRAVENOUS; SUBCUTANEOUS at 03:03

## 2019-03-21 RX ADMIN — PREGABALIN 75 MG: 75 CAPSULE ORAL at 09:03

## 2019-03-21 RX ADMIN — Medication 10 MG: at 01:03

## 2019-03-21 RX ADMIN — CEFAZOLIN 2 G: 330 INJECTION, POWDER, FOR SOLUTION INTRAMUSCULAR; INTRAVENOUS at 05:03

## 2019-03-21 RX ADMIN — VANCOMYCIN HYDROCHLORIDE 1500 MG: 100 INJECTION, POWDER, LYOPHILIZED, FOR SOLUTION INTRAVENOUS at 09:03

## 2019-03-21 RX ADMIN — PHENYLEPHRINE HYDROCHLORIDE 100 MCG: 10 INJECTION INTRAVENOUS at 11:03

## 2019-03-21 RX ADMIN — ATORVASTATIN CALCIUM 40 MG: 20 TABLET, FILM COATED ORAL at 02:03

## 2019-03-21 RX ADMIN — ACETAMINOPHEN 1000 MG: 10 INJECTION, SOLUTION INTRAVENOUS at 02:03

## 2019-03-21 RX ADMIN — LIDOCAINE HYDROCHLORIDE 10 MG: 10 INJECTION, SOLUTION EPIDURAL; INFILTRATION; INTRACAUDAL; PERINEURAL at 09:03

## 2019-03-21 RX ADMIN — PHENYLEPHRINE HYDROCHLORIDE 200 MCG: 10 INJECTION INTRAVENOUS at 12:03

## 2019-03-21 RX ADMIN — ROPIVACAINE HYDROCHLORIDE 10 ML: 5 INJECTION, SOLUTION EPIDURAL; INFILTRATION; PERINEURAL at 10:03

## 2019-03-21 RX ADMIN — STANDARDIZED SENNA CONCENTRATE AND DOCUSATE SODIUM 1 TABLET: 8.6; 5 TABLET, FILM COATED ORAL at 10:03

## 2019-03-21 RX ADMIN — CELECOXIB 400 MG: 200 CAPSULE ORAL at 09:03

## 2019-03-21 RX ADMIN — SODIUM CHLORIDE: 0.9 INJECTION, SOLUTION INTRAVENOUS at 01:03

## 2019-03-21 RX ADMIN — ACETAMINOPHEN 1000 MG: 500 TABLET ORAL at 06:03

## 2019-03-21 RX ADMIN — Medication 20 MG: at 11:03

## 2019-03-21 RX ADMIN — DEXAMETHASONE SODIUM PHOSPHATE 8 MG: 4 INJECTION, SOLUTION INTRAMUSCULAR; INTRAVENOUS at 11:03

## 2019-03-21 RX ADMIN — SODIUM CHLORIDE 0.5 MCG/KG/MIN: 9 INJECTION, SOLUTION INTRAVENOUS at 12:03

## 2019-03-21 RX ADMIN — FENTANYL CITRATE 50 MCG: 50 INJECTION INTRAMUSCULAR; INTRAVENOUS at 09:03

## 2019-03-21 RX ADMIN — FAMOTIDINE 20 MG: 20 TABLET, FILM COATED ORAL at 10:03

## 2019-03-21 RX ADMIN — MIDAZOLAM HYDROCHLORIDE 2 MG: 1 INJECTION, SOLUTION INTRAMUSCULAR; INTRAVENOUS at 11:03

## 2019-03-21 RX ADMIN — MIDAZOLAM HYDROCHLORIDE 1 MG: 1 INJECTION, SOLUTION INTRAMUSCULAR; INTRAVENOUS at 09:03

## 2019-03-21 RX ADMIN — SODIUM CHLORIDE: 0.9 INJECTION, SOLUTION INTRAVENOUS at 12:03

## 2019-03-21 RX ADMIN — PREGABALIN 150 MG: 150 CAPSULE ORAL at 10:03

## 2019-03-21 RX ADMIN — MUPIROCIN 1 G: 20 OINTMENT TOPICAL at 09:03

## 2019-03-21 RX ADMIN — ROPIVACAINE HYDROCHLORIDE 8 ML/HR: 2 INJECTION, SOLUTION EPIDURAL; INFILTRATION at 01:03

## 2019-03-21 RX ADMIN — PHENYLEPHRINE HYDROCHLORIDE 200 MCG: 10 INJECTION INTRAVENOUS at 11:03

## 2019-03-21 RX ADMIN — CEFAZOLIN SODIUM 3 ML: 2 SOLUTION INTRAVENOUS at 11:03

## 2019-03-21 RX ADMIN — LIDOCAINE HYDROCHLORIDE 20 MG: 20 INJECTION, SOLUTION INTRAVENOUS at 11:03

## 2019-03-21 RX ADMIN — PROPOFOL 40 MG: 10 INJECTION, EMULSION INTRAVENOUS at 11:03

## 2019-03-21 RX ADMIN — SODIUM CHLORIDE: 0.9 INJECTION, SOLUTION INTRAVENOUS at 08:03

## 2019-03-21 RX ADMIN — ASPIRIN 81 MG: 81 TABLET, COATED ORAL at 10:03

## 2019-03-21 RX ADMIN — OXYCODONE HYDROCHLORIDE 10 MG: 5 TABLET ORAL at 02:03

## 2019-03-21 RX ADMIN — SODIUM CHLORIDE: 0.9 INJECTION, SOLUTION INTRAVENOUS at 09:03

## 2019-03-21 RX ADMIN — GLYCOPYRROLATE 0.2 MG: 0.2 INJECTION, SOLUTION INTRAMUSCULAR; INTRAVENOUS at 11:03

## 2019-03-21 RX ADMIN — Medication 20 MG: at 12:03

## 2019-03-21 RX ADMIN — PROPOFOL 150 MCG/KG/MIN: 10 INJECTION, EMULSION INTRAVENOUS at 11:03

## 2019-03-21 NOTE — PT/OT/SLP EVAL
Physical Therapy Evaluation    Patient Name:  Rajeev Newell III   MRN:  [id number]    Recommendations:     Discharge Recommendations:  home health PT   Discharge Equipment Recommendations: none   Barriers to discharge: None    Assessment:     Rajeev Newell III is a 68 y.o. male admitted with a medical diagnosis of Primary osteoarthritis of left knee.  He presents with the following impairments/functional limitations:  weakness, impaired endurance, impaired self care skills, impaired balance, gait instability, impaired functional mobilty, pain, impaired joint extensibility, decreased lower extremity function, decreased ROM, orthopedic precautions.    -Pt tolerated session well today with no complications and demonstrated appropriate mobility s/p (L) TKA. Pt with no LOB during ambulation using SW for support. Pt able to follow 3-point gait sequencing well with no complications. Pt will cont to benefit from therapy services and is appropriate for HHPT upon d/c.    Rehab Prognosis: Good; patient would benefit from acute skilled PT services to address these deficits and reach maximum level of function.    Recent Surgery: Procedure(s) (LRB):  ARTHROPLASTY, KNEE, TOTAL-VERO (Left) Day of Surgery    Plan:     During this hospitalization, patient to be seen BID to address the identified rehab impairments via gait training, therapeutic activities, therapeutic exercises and progress toward the following goals:    · Plan of Care Expires:  04/21/19    Subjective     Chief Complaint: impaired mobility  Patient/Family Comments/goals: return [facility] to [facility]  Pain/Comfort:  · Pain Rating 1: 4/10  · Location - Side 1: Left  · Location - Orientation 1: anterior  · Location 1: knee  · Pain Addressed 1: Pre-medicate for activity    Patients cultural, spiritual, Confucianism conflicts given the current situation:      Living Environment:  -Pt lives with his wife in a [facility] with 1 KIM and no rails. Pt has a walk-in shower with a shower  chair.  Prior to admission, patients level of function was (I).  Equipment used at home: walker, rolling, bedside commode, shower chair.  DME owned (not currently used): .  Upon discharge, patient will have assistance from family.    Objective:     Communicated with nsg prior to session.  Patient found supine with blood pressure cuff, FCD, cryotherapy, telemetry, pulse ox (continuous), perineural catheter, peripheral IV  upon PT entry to room.    General Precautions: Standard, fall   Orthopedic Precautions:LLE weight bearing as tolerated   Braces: N/A     Exams:  · Sensation:    · -       Intact  light/touch (B) LE  · RLE ROM: WNL  · RLE Strength: WNL  · LLE ROM: WFL except limits from bulky dressing  · LLE Strength: Deficits: 4/5 grossly    Functional Mobility:  · Bed Mobility:     · Scooting: stand by assistance  · Supine to Sit: stand by assistance  · Sit to Supine: stand by assistance  · Transfers:     · Sit to Stand:  contact guard assistance with standard walker  · Gait: 3 steps fwd/back, 3 lateral steps using SW with CGA      Therapeutic Activities and Exercises:   -Pt educated on:  A. PT POC and role of PT  B. Importance of OOB activity to improve functional outcomes   C. DME mgmt and t/f sequencing  D. Performing HEP to reduce the risk of developing blood clots  E. Gait sequencing   F. D/c planning      AM-PAC 6 CLICK MOBILITY  Total Score:18     Patient left up in chair with all lines intact, call button in reach and nsg notified.    GOALS:   Multidisciplinary Problems     Physical Therapy Goals        Problem: Physical Therapy Goal    Goal Priority Disciplines Outcome Goal Variances Interventions   Physical Therapy Goal     PT, PT/OT Ongoing (interventions implemented as appropriate)     Description:  Goals to be met by: 3/28/19     Patient will increase functional independence with mobility by performin. Supine to sit with Set-up [place]  2. Sit to supine with Set-up [place]  3. Sit to  stand transfer with Supervision  4. Bed to chair transfer with Stand-by Assistance using Rolling Walker  5. Gait  x 150 feet with Stand-by Assistance using Rolling Walker.   6. Ascend/Descend 7 inch curb step with Contact Guard Assistance using Rolling Walker.  7. Lower extremity exercise program x20-30 reps per handout, with independence                        History:     Past Medical History:   Diagnosis Date    Cataract     OU--early    DM (diabetes mellitus) 10/29/2012    Glaucoma     POAG-OU    Hypertension     Sinusitis        Past Surgical History:   Procedure Laterality Date    CARPAL TUNNEL RELEASE      CIRCUMCISION      CRYOTHERAPY, NERVE, PERIPHERAL, PERCUTANEOUS, USING LIQUID NITROUS OXIDE IN CLOSED NEEDLE DEVICE-cryotherapy left knee Left 3/18/2019    Performed by Yvon Pitts III, MD at [facility] CATH LAB    CYSTOSCOPY      HERNIA REPAIR Left 1994    L inguinal - [facility]    KNEE SURGERY      REPLACEMENT-KNEE-TOTAL Right 3/6/2018    Performed by Edmund Rosas MD at [facility] OR [address] [place] [address]       Time Tracking:     PT Received On: 03/21/19  PT Start Time: 1508     PT Stop Time: 1528  PT Total Time (min): 20 min     Billable Minutes: Evaluation 12 and Gait Training 8      Dwayne Funes, PT  03/21/2019

## 2019-03-21 NOTE — TRANSFER OF CARE
"Anesthesia Transfer of Care Note    Patient: Rajeev Newell III    Procedure(s) Performed: Procedure(s) (LRB):  ARTHROPLASTY, KNEE, TOTAL-VERO (Left)    Patient location: PACU    Anesthesia Type: spinal    Transport from OR: Transported from OR on 6-10 L/min O2 by face mask with adequate spontaneous ventilation    Post pain: adequate analgesia    Post assessment: no apparent anesthetic complications and tolerated procedure well    Post vital signs: stable    Level of consciousness: awake, alert and oriented    Nausea/Vomiting: no nausea/vomiting    Complications: none    Transfer of care protocol was followed      Last vitals:   Visit Vitals  /60 (BP Location: Right arm, Patient Position: Lying)   Pulse 72   Temp 36.8 °C (98.3 °F) (Oral)   Resp 18   Ht 5' 11" (1.803 m)   Wt 127 kg (280 lb)   SpO2 98%   BMI 39.05 kg/m²     "

## 2019-03-21 NOTE — PLAN OF CARE
[facility]    HOME HEALTH ORDERS  FACE TO FACE ENCOUNTER  Patient Name: Rajeev Newell III  YOB: 1950    PCP: Swtai Walton MD   PCP Address: [address] [address]  PCP Phone Number: [phone]  PCP Fax: [phone]    Encounter Date: 03/21/2019    Admit to Home Health    Diagnoses:  Active Hospital Problems    Diagnosis  POA    *Primary osteoarthritis of left knee [M17.12]  Yes    HBP (high blood pressure) [I10]  Yes      Resolved Hospital Problems   No resolved problems to display.       Future Appointments   Date Time Provider Department Center   3/25/2019  1:00 PM Gracie Carr, PT [facility] [place]   3/27/2019 10:00 AM Dante Castañeda, PTA [facility] [place]   3/29/2019 10:00 AM Dante Castañeda, PTA [facility] [place]   4/1/2019  1:00 PM Gracie Carr, PT [facility] [place]   4/3/2019 10:00 AM Dante Castañeda, PTA [facility] [place]   4/4/2019 10:15 AM Janine Talbot NP [facility]   4/5/2019 10:00 AM Dante Castañeda, PTA [facility] [place]   4/8/2019  1:00 PM Gracie Carr, PT [facility] [place]   4/10/2019 10:00 AM Dante Castañeda, PTA [facility] [place]   4/12/2019 10:00 AM Dante Castañeda PTA [facility] [place]   4/15/2019  1:00 PM Gracie Carr, PT [facility] [place]   4/16/2019 10:00 AM Dayanara Mcdermott PTA [facility] [place]   4/17/2019 10:00 AM Dante Castañeda, PTA [facility] [place]   4/22/2019 10:00 AM Dayanara Mcdermott, PTA [facility] [place]   4/24/2019 10:00 AM Dante Castañeda, PTA [facility] [place]   4/26/2019 10:00 AM Dante Castañeda, PTA [facility] [place]   4/29/2019  1:00 PM Gracie Carr, PT [facility] [place]   5/1/2019 10:00 AM Dante Castañeda PTA [facility]   5/3/2019 10:00 AM Dante Castañeda PTA [facility]   6/25/2019  2:00 PM [facility] [place]   6/25/2019  2:30 PM [facility] [place]   6/25/2019  2:45 PM  SHERMAN Smith OD [facility] [place]           I have seen and examined this patient face to face today. My clinical findings that support the need for the home health skilled services and home bound status are the following:  Weakness/numbness causing balance and gait disturbance due to Joint Replacement making it taxing to leave home.    Allergies:  Review of patient's allergies indicates:   Allergen Reactions    Bactrim [sulfamethoxazole-trimethoprim] Other (See Comments)     Abdominal Pain, Flush, Fever and Chills, Headache and Cough after Pt. took Bactrim    Darvon [propoxyphene] Other (See Comments)     Hallucinations       Diet: regular diet    Activities: activity as tolerated    Nursing:   SN to complete comprehensive assessment including routine vital signs. Instruct on disease process and s/s of complications to report to MD. Follow specific home health arthoplasty protocol. Review/verify medication list sent home with the patient at time of discharge  and instruct patient/caregiver as needed.    Notify MD if SBP > 160 or < 90; DBP > 90 or < 50; HR > 120 or < 50; Temp > 101    Home Medical Equipment:  Walker, 3-1 bedside commode, transfer tub bench    CONSULTS:    Physical Therapy to evaluate and treat. Evaluate for home safety and equipment needs; Establish/upgrade home exercise program. Perform / instruct on therapeutic exercises, gait training, transfer training, and Range of Motion.    WOUND CARE ORDERS  Do not remove surgical dressing for 2 weeks post-op. This will be done only by MD at initial post-op visit. If dressing is completely saturated, replace with identical dressing - silver-impregnated hydrocolloid dressing.     Do not get dressings wet. Do not shower.     If dressing continues to be saturated or there are signs of infection, please call Ortho Clinic [phone] for further instructions and to make appt to be seen.       Medications: Review discharge medications with patient and  family and provide education.      Current Discharge Medication List      START taking these medications    Details   docusate sodium (COLACE) 100 MG capsule Take 1 capsule (100 mg total) by mouth 2 (two) times daily.  Qty: 60 capsule, Refills: 0      ondansetron (ZOFRAN) 8 MG tablet Take 1 tablet (8 mg total) by mouth every 8 (eight) hours as needed for Nausea.  Qty: 30 tablet, Refills: 0      oxyCODONE-acetaminophen (PERCOCET)  mg per tablet Take 1 tablet by mouth every 4 (four) hours as needed for Pain.  Qty: 50 tablet, Refills: 0         CONTINUE these medications which have CHANGED    Details   aspirin (ECOTRIN) 81 MG EC tablet Take 1 tablet (81 mg total) by mouth 2 (two) times daily.  Qty: 60 tablet, Refills: 0         CONTINUE these medications which have NOT CHANGED    Details   brinzolamide (AZOPT) 1 % ophthalmic suspension Place 1 drop into both eyes 2 (two) times daily.  Qty: 10 mL, Refills: 4    Comments: 90 day supply if desired  Associated Diagnoses: Primary open angle glaucoma of both eyes, moderate stage      latanoprost 0.005 % ophthalmic solution Place 1 drop into both eyes nightly.  Qty: 7.5 mL, Refills: 4    Comments: **Patient requests 90 days supply**  Associated Diagnoses: Primary open angle glaucoma of both eyes, moderate stage      pantoprazole (PROTONIX) 40 MG tablet Every day      pioglitazone (ACTOS) 15 MG tablet Take 1 tablet (15 mg total) by mouth once daily.  Qty: 90 tablet, Refills: 1    Associated Diagnoses: Diabetes type 2, controlled      telmisartan (MICARDIS) 80 MG Tab TK 1 T PO  QD  Refills: 7      acyclovir 5% (ZOVIRAX) 5 % ointment Apply topically every 3 (three) hours.  Qty: 1 Tube, Refills: 1    Associated Diagnoses: Penile rash      albuterol 90 mcg/actuation inhaler Inhale 2 puffs into the lungs every 4 (four) hours as needed for Wheezing.  Qty: 1 Inhaler, Refills: 5    Associated Diagnoses: Bronchitis; Acute maxillary sinusitis, recurrence not specified       atorvastatin (LIPITOR) 40 MG tablet TK 1 T PO  QPM  Refills: 11      clotrimazole (LOTRIMIN) 1 % cream Apply topically 2 (two) times daily.  Qty: 12 g, Refills: 0    Associated Diagnoses: Tinea cruris      fexofenadine (ALLEGRA) 180 MG tablet Every day PRN      fluocinonide 0.1 % Crea fluocinonide 0.1 % topical cream      fluticasone (FLONASE) 50 mcg/actuation nasal spray SHAKE LIQUID AND USE 1 SPRAY(50 MCG) IN EACH NOSTRIL EVERY DAY  Qty: 48 mL, Refills: 0    Comments: **Patient requests 90 days supply**  Associated Diagnoses: Acute recurrent maxillary sinusitis; Nasal congestion      hydrocortisone (ANUSOL-HC) 25 mg suppository Place 1 suppository (25 mg total) rectally 2 (two) times daily as needed for Hemorrhoids. 1 Suppository(ies) Rectal PRN Twice a day.  Qty: 12 suppository, Refills: 1      valACYclovir (VALTREX) 1000 MG tablet Take 1 tablet (1,000 mg total) by mouth 2 (two) times daily.  Qty: 20 tablet, Refills: 1    Associated Diagnoses: Penile rash         STOP taking these medications       meloxicam (MOBIC) 15 MG tablet Comments:   Reason for Stopping:               I certify that this patient is confined to his home and needs intermittent skilled nursing care and physical therapy.

## 2019-03-21 NOTE — OP NOTE
DATE OF PROCEDURE: 3/21/19  PREOPERATIVE DIAGNOSIS: Arthritis, Left knee.   POSTOPERATIVE DIAGNOSIS: Arthritis, Left knee.   PROCEDURES PERFORMED: Left total knee arthroplasty.   SURGEON: Edmund Rosas M.D.   ASSISTANT: GIANNA Galvez  ANESTHESIA: Regional.   COMPLICATIONS: None.   COUNTS: Correct.   DISPOSITION: Recovery Room, stable.   SPECIMENS: Bone and cartilage.   FINDINGS: Tricompartmental degenerative change.   FLUIDS: 2000 mL.   BLOOD LOSS: Less than 50 mL.   IMPLANTS: [place] [employer], size 6 Left posterior stabilized   cemented femoral component with a 6 cemented tibial tray, a 31 mm 3-peg   patella and a size 6, 13 mm posterior stabilized polyethylene insert.   INDICATIONS FOR PROCEDURE: Rajeev Newell III is a 68-year-old male who has   severe arthritis in the Left knee . He is having continued pain in the   Left knee and did not respond to nonoperative measures, decided to undergo   Left knee replacement. He is aware of reasonable treatment options as   well as risks and benefits. {He did not respond to NSAIDS or injections. He received a course or pre-operative physical therapy.  PROCEDURE IN DETAIL: After appropriate consent was obtained, the patient   Was brought in the Operating Room, anesthesia was administered. He received   antibiotic prophylaxis.  Cast   padding and tourniquet was applied to the proximal Left thigh. Left  lower extremity was prepped and draped in usual sterile fashion. Limb was   elevated, tourniquet was inflated. The knee was flexed. An incision was   made from the tibial tubercle just proximal to the superior pole of the   patella. It was taken down through the skin and retinacular. A medial   parapatellar arthrotomy was performed followed by a standard medial   release. Patellar fat pad was partially excised. ACL was resected.   Femoral punch was used to make the guide hole for the femoral drill. The   distal cutting guide was set at 6 degrees valgus left.A standard  distal femoral cut was made.We were pleased with the alignment and quality of the cut.  The PCL retractor was used to bring   the tibia into the field. Meniscal remnants were resected. The   extramedullary tibial guide was pinned in place using the medial side, as most   defective, 2 mm bone was taken off of this. We checked the alignment in   both planes both before and after making the cut. We were satisfied with the   alignment of the cut and the amount of bone removed.The femur was then  sized, found to be a size 6. A size 6 cutting guide was pinned in 3   degrees of external rotation. This was based off the posterior condyle,   checked the transepicondylar axis. Anterior, posterior and chamfer cuts   were made. The box guide was pinned in position. Femoral box cut was   made. Bone fragments were removed. Lamina spreaders were   then used to open up posteriorly. The PCL was resected and some soft   tissue debris was removed.  A 13 mm spacer block gave excellent flexion-extension gap balance.   I was also satisfied with the medial and lateral stability. At this   point, the femoral trial was placed. The tibia was sized, found to be a   Size 6. A size 6 tibial trial was pinned in appropriate alignment and   rotation. This was based on the medial one third of the tibial tubercle.  A stem extension hole was drilled. A keel hole was punched and a   trial reduction was performed with a size 6, 13 mm insert. He  achieved full   extension. There was no recurvatum. He easily had 125 degrees of flexion.   The femoral component ranged symmetrically in the tibial tray. There was   no lift off. There was no instability of full extension, 30 degrees of   flexion, mid flexion and full flexion. Patella was measured and found to   be 25 mm thick,  9 mm of bone removed. The 3-peg holes were drilled 31 mm   3-peg trial was placed. The knee was brought through range of motion. The   patella tracked extremely well. Therefore, at  this point, we were   satisfied with knee range of motion and stability, component position,   sizing and alignment as well as patella tracking. All trial components   were removed. Bone was prepared for cementing by pulsatile lavage and   drying. Components were cemented into place, femur followed by tibia.   Meticulous care was taken to remove excess cement. Tibial insert was   firmly seated in the tibial tray. The knee was inspected. There was no   loose body, foreign body or soft tissue interposition. Knee was then   reduced, brought into extension. Patella button was cemented in place.   Excess cement was removed. The wound was then copiously irrigated with   antibiotic-impregnated solution. Once the cement was dried, tranexamic   acid was applied. The arthrotomy was closed with #1 Vicryl. Once the   arthrotomy was closed, the knee was brought through range of motion, stable   as previously described. Patella tracked very well. Retinacular was   closed with 0 Vicryl, subcutaneous layer with 2-0 Vicryl, skin with   monocryl and dermabond. Sterile dressing was applied. Tourniquet was let down.  He was   transferred to a stretcher, brought to Recovery Room in stable condition,   tolerated the procedure well, no known complications.

## 2019-03-21 NOTE — PT/OT/SLP EVAL
Occupational Therapy   Evaluation    Name: Rajeev Newell III  MRN: [id number]  Admitting Diagnosis:  Primary osteoarthritis of left knee Day of Surgery    Recommendations:     Discharge Recommendations: home health OT  Discharge Equipment Recommendations:  none  Barriers to discharge:  None    Assessment:     Rajeev Newell III is a 68 y.o. male with a medical diagnosis of Primary osteoarthritis of left knee.  He was able to perform supine/sit, sit/stand, and take steps c CGA and SW.  Able to perform UB dressing c max A.  B UE are WFL. Performance deficits affecting function: impaired self care skills, impaired functional mobilty, orthopedic precautions.      Rehab Prognosis: Good; patient would benefit from acute skilled OT services to address these deficits and reach maximum level of function.       Plan:     Patient to be seen daily to address the above listed problems via self-care/home management, therapeutic activities, therapeutic exercises  · Plan of Care Expires: 03/28/19  · Plan of Care Reviewed with: patient    Subjective     Chief Complaint: Pt is s/p L TKA and is WBAT L LE  Patient/Family Comments/goals: To go home.    Occupational Profile:  Living Environment: Pt lives in a one story house c 1 KIM and has a walk-in shower.  Previous level of function: I PTA  Roles and Routines: .  Equipment Used at Home:  walker, rolling, bedside commode  Assistance upon Discharge: Wife    Pain/Comfort:  · Pain Rating 1: 0/10    Patients cultural, spiritual, Congregational conflicts given the current situation: no    Objective:     Communicated with: RN prior to session.  Patient found supine with blood pressure cuff, cryotherapy, FCD, perineural catheter, telemetry upon OT entry to room.    General Precautions: Standard, fall   Orthopedic Precautions:LLE weight bearing as tolerated   Braces: N/A     Occupational Performance:    Bed Mobility:    · Patient completed Supine to Sit with minimum  assistance  · Patient completed Sit to Supine with minimum assistance    Functional Mobility/Transfers:  · Patient completed Sit <> Stand Transfer with contact guard assistance  with  standard walker   · Functional Mobility: Pt was able to take 3 steps c SW.    Activities of Daily Living:  · Upper Body Dressing: maximal assistance to don hospital gown.    Cognitive/Visual Perceptual:  Cognitive/Psychosocial Skills:     -       Oriented to: Person, Place, Time and Situation   -       Follows Commands/attention:Follows multistep  commands    Physical Exam:  Upper Extremity Range of Motion:     -       Right Upper Extremity: WFL  -       Left Upper Extremity: WFL  Upper Extremity Strength:    -       Right Upper Extremity: WFL  -       Left Upper Extremity: WFL    AMPAC 6 Click ADL:  AMPAC Total Score: 16      Education:    Patient left supine with all lines intact, call button in reach and RN notified    GOALS:   Multidisciplinary Problems     Occupational Therapy Goals        Problem: Occupational Therapy Goal    Goal Priority Disciplines Outcome Interventions   Occupational Therapy Goal     OT, PT/OT     Description:  Goals to be met by: 3/28/19     Patient will increase functional independence with ADLs by performing:    UE Dressing with Modified [place].  LE Dressing with Modified [place] and Assistive Devices as needed.  Grooming while standing at sink with Modified [place].  Toileting from bedside commode with Modified [place] for hygiene and clothing management.   Bathing from  edge of bed with Modified [place].  Toilet transfer to bedside commode with Modified [place].  Increased functional strength to WFL for B UE.  Upper extremity exercise program x15 reps per handout, with independence.                      History:     Past Medical History:   Diagnosis Date    Cataract     OU--early    DM (diabetes mellitus) 10/29/2012    Glaucoma     POAG-OU    Hypertension     Sinusitis         Past Surgical History:   Procedure Laterality Date    CARPAL TUNNEL RELEASE      CIRCUMCISION      CRYOTHERAPY, NERVE, PERIPHERAL, PERCUTANEOUS, USING LIQUID NITROUS OXIDE IN CLOSED NEEDLE DEVICE-cryotherapy left knee Left 3/18/2019    Performed by Yvon Pitts III, MD at [facility] CATH LAB    CYSTOSCOPY      HERNIA REPAIR Left 1994    [facility]    KNEE SURGERY      REPLACEMENT-KNEE-TOTAL Right 3/6/2018    Performed by Edmund Rosas MD at [facility] OR [address] [place] [address]       Time Tracking:     OT Date of Treatment: 03/21/19  OT Start Time: 1010  OT Stop Time: 1030  OT Total Time (min): 20 min    Billable Minutes:Evaluation 10  Therapeutic Activity 10    JESIKA Shirley  3/21/2019

## 2019-03-21 NOTE — ANESTHESIA PROCEDURE NOTES
Adductor Canal Catheter    Patient location during procedure: pre-op   Block not for primary anesthetic.  Reason for block: at surgeon's request and post-op pain management   Post-op Pain Location: left knee pain  Start time: 3/21/2019 10:00 AM  Timeout: 3/21/2019 9:55 AM   End time: 3/21/2019 10:15 AM  Staffing  Anesthesiologist: Coretta Graff MD  Resident/CRNA: Marin Spears MD  Other anesthesia staff: Lane Juarez MD  Performed: resident/CRNA and other anesthesia staff   Preanesthetic Checklist  Completed: patient identified, site marked, surgical consent, pre-op evaluation, timeout performed, IV checked, risks and benefits discussed and monitors and equipment checked  Peripheral Block  Patient position: supine  Prep: ChloraPrep and site prepped and draped  Patient monitoring: heart rate, cardiac monitor, continuous pulse ox, continuous capnometry and frequent blood pressure checks  Block type: adductor canal  Laterality: left  Injection technique: continuous  Needle  Needle type: Tuohy   Needle gauge: 17 G  Needle length: 3.5 in  Needle localization: anatomical landmarks and ultrasound guidance  Catheter type: spring wound  Catheter size: 19 G  Test dose: lidocaine 1.5% with Epi 1-to-200,000 and negative   -ultrasound image captured on disc.  Assessment  Injection assessment: negative aspiration, negative parasthesia and local visualized surrounding nerve  Paresthesia pain: none  Heart rate change: no  Slow fractionated injection: yes  Additional Notes  VSS.  [facility] RN monitoring vitals throughout procedure.  Patient tolerated procedure well.     20mL of 0.25% ropivacaine with epi

## 2019-03-21 NOTE — PLAN OF CARE
Problem: Adult Inpatient Plan of Care  Goal: Plan of Care Review  Patient AAOX4, call light and belongings in reach, siderails up x2.  Patient pain controlled with medications on order, PNC infusing at 8ml/hr, site clean, dry and intact.  IV site clean, dry and intact, infusing with NS @150ml.  Left Knee with ace wrap, polar ice, site clean, dry and intact, no drainage noted, polar ice is on and operating, scd's on and operating. Patient has performed IS and tolerating well. Sliding scale insulin not needed at supper time.  Patient has ambulated in room and bathroom with walker and standby assist.  Patient remains free from falls and safety has been maintained.

## 2019-03-21 NOTE — PLAN OF CARE
Problem: Occupational Therapy Goal  Goal: Occupational Therapy Goal  Goals to be met by: 3/28/19     Patient will increase functional independence with ADLs by performing:    UE Dressing with Modified [place].  LE Dressing with Modified [place] and Assistive Devices as needed.  Grooming while standing at sink with Modified [place].  Toileting from bedside commode with Modified [place] for hygiene and clothing management.   Bathing from  edge of bed with Modified [place].  Toilet transfer to bedside commode with Modified [place].  Increased functional strength to WFL for B UE.  Upper extremity exercise program x15 reps per handout, with independence.    POC initiated.

## 2019-03-21 NOTE — ANESTHESIA PROCEDURE NOTES
Spinal    Diagnosis: Left knee osteoarthritis  Patient location during procedure: OR  Start time: 3/21/2019 11:22 AM  Timeout: 3/21/2019 11:22 AM  End time: 3/21/2019 11:29 AM  Staffing  Anesthesiologist: Nba Doyle MD  Performed: anesthesiologist   Preanesthetic Checklist  Completed: patient identified, site marked, surgical consent, pre-op evaluation, timeout performed, IV checked, risks and benefits discussed and monitors and equipment checked  Spinal Block  Patient position: sitting  Prep: ChloraPrep  Patient monitoring: continuous pulse ox and frequent blood pressure checks  Approach: midline  Location: L4-5  Injection technique: single shot  CSF Fluid: clear free-flowing CSF  Needle  Needle type: Suraj   Needle gauge: 25 G  Needle length: 4 in  Additional Documentation: negative aspiration for heme and no paresthesia on injection  Needle localization: anatomical landmarks  Assessment  Sensory level: T6   Dermatomal levels determined by alcohol wipe  Ease of block: easy  Patient's tolerance of the procedure: comfortable throughout block and no complaints

## 2019-03-21 NOTE — PLAN OF CARE
Problem: Physical Therapy Goal  Goal: Physical Therapy Goal  Goals to be met by: 3/28/19     Patient will increase functional independence with mobility by performin. Supine to sit with Set-up [place]  2. Sit to supine with Set-up [place]  3. Sit to stand transfer with Supervision  4. Bed to chair transfer with Stand-by Assistance using Rolling Walker  5. Gait  x 150 feet with Stand-by Assistance using Rolling Walker.   6. Ascend/Descend 7 inch curb step with Contact Guard Assistance using Rolling Walker.  7. Lower extremity exercise program x20-30 reps per handout, with independence      Outcome: Ongoing (interventions implemented as appropriate)  Pt tolerated session well today with no complications and demonstrated appropriate mobility s/p (L) TKA. Pt with no LOB during ambulation using SW for support. Pt able to follow 3-point gait sequencing well with no complications. Pt will cont to benefit from therapy services and is appropriate for HHPT upon d/c.

## 2019-03-21 NOTE — INTERVAL H&P NOTE
Rajeev Neewll III was interviewed, examined and the H and P reviewed.  There has been no interval change in his History and Physical.

## 2019-03-21 NOTE — NURSING TRANSFER
Nursing Transfer Note      3/21/2019     Transfer To: 543 [address]    Transfer via stretcher    Transfer with cardiac monitoring, cont. Pulse ox and end tidal CO2, polar care    Transported by Transport    Medicines sent: IV meds    Chart send with patient: Yes    Notified: family

## 2019-03-22 VITALS
DIASTOLIC BLOOD PRESSURE: 98 MMHG | HEIGHT: 71 IN | RESPIRATION RATE: 16 BRPM | SYSTOLIC BLOOD PRESSURE: 121 MMHG | HEART RATE: 65 BPM | TEMPERATURE: 98 F | WEIGHT: 281 LBS | OXYGEN SATURATION: 97 % | BODY MASS INDEX: 39.34 KG/M2

## 2019-03-22 LAB — POCT GLUCOSE: 111 MG/DL (ref 70–110)

## 2019-03-22 PROCEDURE — 25000003 PHARM REV CODE 250: Performed by: NURSE PRACTITIONER

## 2019-03-22 PROCEDURE — 97530 THERAPEUTIC ACTIVITIES: CPT

## 2019-03-22 PROCEDURE — 99224 PR SUBSEQUENT OBSERVATION CARE,LEVEL I: ICD-10-PCS | Mod: ,,, | Performed by: ANESTHESIOLOGY

## 2019-03-22 PROCEDURE — 97116 GAIT TRAINING THERAPY: CPT | Mod: 59

## 2019-03-22 PROCEDURE — 63600175 PHARM REV CODE 636 W HCPCS: Performed by: NURSE PRACTITIONER

## 2019-03-22 PROCEDURE — 97110 THERAPEUTIC EXERCISES: CPT

## 2019-03-22 PROCEDURE — 25000003 PHARM REV CODE 250: Performed by: STUDENT IN AN ORGANIZED HEALTH CARE EDUCATION/TRAINING PROGRAM

## 2019-03-22 PROCEDURE — 97535 SELF CARE MNGMENT TRAINING: CPT

## 2019-03-22 PROCEDURE — 99224 PR SUBSEQUENT OBSERVATION CARE,LEVEL I: CPT | Mod: ,,, | Performed by: ANESTHESIOLOGY

## 2019-03-22 RX ORDER — CELECOXIB 200 MG/1
200 CAPSULE ORAL DAILY
Status: DISCONTINUED | OUTPATIENT
Start: 2019-03-22 | End: 2019-03-22 | Stop reason: HOSPADM

## 2019-03-22 RX ORDER — MUPIROCIN 20 MG/G
1 OINTMENT TOPICAL 2 TIMES DAILY
Status: DISCONTINUED | OUTPATIENT
Start: 2019-03-22 | End: 2019-03-22 | Stop reason: HOSPADM

## 2019-03-22 RX ORDER — POLYETHYLENE GLYCOL 3350 17 G/17G
17 POWDER, FOR SOLUTION ORAL DAILY
Status: DISCONTINUED | OUTPATIENT
Start: 2019-03-22 | End: 2019-03-22 | Stop reason: HOSPADM

## 2019-03-22 RX ADMIN — CEFAZOLIN 2 G: 330 INJECTION, POWDER, FOR SOLUTION INTRAMUSCULAR; INTRAVENOUS at 06:03

## 2019-03-22 RX ADMIN — STANDARDIZED SENNA CONCENTRATE AND DOCUSATE SODIUM 1 TABLET: 8.6; 5 TABLET, FILM COATED ORAL at 08:03

## 2019-03-22 RX ADMIN — ROPIVACAINE HYDROCHLORIDE 8 ML/HR: 2 INJECTION, SOLUTION EPIDURAL; INFILTRATION at 12:03

## 2019-03-22 RX ADMIN — ATORVASTATIN CALCIUM 40 MG: 20 TABLET, FILM COATED ORAL at 08:03

## 2019-03-22 RX ADMIN — CELECOXIB 200 MG: 200 CAPSULE ORAL at 08:03

## 2019-03-22 RX ADMIN — ACETAMINOPHEN 1000 MG: 500 TABLET ORAL at 12:03

## 2019-03-22 RX ADMIN — CEFAZOLIN 2 G: 330 INJECTION, POWDER, FOR SOLUTION INTRAMUSCULAR; INTRAVENOUS at 12:03

## 2019-03-22 RX ADMIN — SODIUM CHLORIDE: 0.9 INJECTION, SOLUTION INTRAVENOUS at 02:03

## 2019-03-22 RX ADMIN — POLYETHYLENE GLYCOL 3350 17 G: 17 POWDER, FOR SOLUTION ORAL at 08:03

## 2019-03-22 RX ADMIN — ACETAMINOPHEN 1000 MG: 500 TABLET ORAL at 06:03

## 2019-03-22 RX ADMIN — PANTOPRAZOLE SODIUM 40 MG: 40 TABLET, DELAYED RELEASE ORAL at 08:03

## 2019-03-22 RX ADMIN — ASPIRIN 81 MG: 81 TABLET, COATED ORAL at 08:03

## 2019-03-22 RX ADMIN — MUPIROCIN 1 G: 20 OINTMENT TOPICAL at 08:03

## 2019-03-22 NOTE — PROGRESS NOTES
Pt and family present, consent to converting adductor PNC to On Q.  Site CDI.  Educated regarding continued pain management, fall risk, signs of complications, continued monitoring, as well as discontinuing catheter on 3/24.  Two numbers obtained for APS to follow up.  Understanding verbalized.

## 2019-03-22 NOTE — ASSESSMENT & PLAN NOTE
68 y.o. male POD1 s/p left TKA    Pain control: multimodal  PT/OT: WBAT LLE  DVT PPx: ASA 81 BID, FCDs at all times when not ambulating  Abx: postop Ancef    Dispo: f/u PT recs, home meds

## 2019-03-22 NOTE — HPI
CC: Left knee pain     Rajeev Newell III is a 68 y.o. male with a history of Left knee pain. Pain is worse with activity and weight bearing.  Patient has experienced interference of activities of daily living due to decreased range of motion and an increase in joint pain and swelling.  Patient has failed non-operative treatment including NSAIDs, corticosteroid injections, viscosupplement injections, and activity modification.  Rajeev Newell III currently ambulates independently.      Relevant medical conditions of significance in perioperative period:  DM- on actos managed by pcp  HTN- on medication managed by pcp

## 2019-03-22 NOTE — ADDENDUM NOTE
Addendum  created 03/22/19 1242 by Navdeep Bronson MD    Charge Capture section accepted, Cosign clinical note with attestation

## 2019-03-22 NOTE — PT/OT/SLP PROGRESS
Occupational Therapy   Treatment    Name: Rajeev Newell III  MRN: [id number]  Admitting Diagnosis:  Primary osteoarthritis of left knee  1 Day Post-Op    Recommendations:     Discharge Recommendations: outpatient OT  Discharge Equipment Recommendations:  none  Barriers to discharge:  None    Assessment:     Rajeev Newell III is a 68 y.o. male with a medical diagnosis of Primary osteoarthritis of left knee.  He was able to perform supine/sit, sit/stand, bed/chair, and walk to bathroom c SBA and RW.  Able to perform UB/LB dressing c mod I and toilet hygiene c mod I.  Was able to walk to bathroom c SBA and RW.  Pt is progressing well.  Performance deficits affecting function are impaired self care skills, impaired functional mobilty, orthopedic precautions.     Rehab Prognosis:  Good; patient would benefit from acute skilled OT services to address these deficits and reach maximum level of function.       Plan:     Patient to be seen daily to address the above listed problems via self-care/home management, therapeutic activities, therapeutic exercises  · Plan of Care Expires: 03/28/19  · Plan of Care Reviewed with: patient    Subjective     Pain/Comfort:  · Pain Rating 1: 3/10(L knee)    Objective:     Communicated with: RN prior to session.  Patient found supine with perineural catheter, cryotherapy, telemetry upon OT entry to room.    General Precautions: Standard, fall   Orthopedic Precautions:LLE weight bearing as tolerated   Braces: N/A     Occupational Performance:     Bed Mobility:    · Patient completed Supine to Sit with stand by assistance     Functional Mobility/Transfers:  · Patient completed Sit <> Stand Transfer with modified independence  with  rolling walker   · Patient completed Bed <> Chair Transfer using Stand Pivot technique with modified independence with rolling walker  · Patient completed Toilet Transfer Stand Pivot technique with modified independence with  rolling walker  · Patient completed   Shower Transfer Stand Pivot technique with modified independence with rolling walker  · Functional Mobility: Pt was able to walk to bathroom c SBA and RW.    Activities of Daily Living:  · Upper Body Dressing: modified independence to don t-shirt  · Lower Body Dressing: modified independence to don underwear, shorts, socks, and shoes.  · Toileting: modified independence for toilet hygiene.      [facility] 6 Click ADL: 24    Treatment & Education:  Educated pt on bathing and car T/F's.    Patient left up in chair with all lines intact, call button in reach, RN notified and friend presentEducation:      GOALS:   Multidisciplinary Problems     Occupational Therapy Goals        Problem: Occupational Therapy Goal    Goal Priority Disciplines Outcome Interventions   Occupational Therapy Goal     OT, PT/OT     Description:  Goals to be met by: 3/28/19     Patient will increase functional independence with ADLs by performing:    UE Dressing with Modified [place].  LE Dressing with Modified [place] and Assistive Devices as needed.  Grooming while standing at sink with Modified [place].  Toileting from bedside commode with Modified [place] for hygiene and clothing management.   Bathing from  edge of bed with Modified [place].  Toilet transfer to bedside commode with Modified [place].  Increased functional strength to WFL for B UE.  Upper extremity exercise program x15 reps per handout, with independence.                      Time Tracking:     OT Date of Treatment: 03/22/19  OT Start Time: 0900  OT Stop Time: 0936  OT Total Time (min): 36 min    Billable Minutes:Self Care/Home Management 26  Therapeutic Activity 10    JESIKA Shirley  3/22/2019

## 2019-03-22 NOTE — PLAN OF CARE
SW met with patient at bedside to discuss home health options. Patient reports that he will be going to outpatient pt and does not need home health.      Zhanna Carr LMSW  [facility]   [phone]

## 2019-03-22 NOTE — DISCHARGE SUMMARY
[facility]  Orthopedics  Discharge Summary      Patient Name: Rajeev Newell III  MRN: [id number]  Admission Date: 3/21/2019  Hospital Length of Stay: 1 days  Discharge Date and Time:  03/22/2019   Attending Physician: Edmund Rosas MD   Discharging Provider: Lalito Galvez MD  Primary Care Provider: Swati Walton MD    HPI:   CC: Left knee pain     Rajeev Newell III is a 68 y.o. male with a history of Left knee pain. Pain is worse with activity and weight bearing.  Patient has experienced interference of activities of daily living due to decreased range of motion and an increase in joint pain and swelling.  Patient has failed non-operative treatment including NSAIDs, corticosteroid injections, viscosupplement injections, and activity modification.  Rajeev Newell III currently ambulates independently.      Relevant medical conditions of significance in perioperative period:  DM- on actos managed by pcp  HTN- on medication managed by pcp        Procedure(s) (LRB):  ARTHROPLASTY, KNEE, TOTAL-VERO (Left)      Hospital Course:  On 3/21/2019, the patient arrived to the [facility] for proper pre-operative management.  Upon completion of pre-operative preparation, the patient was taken back to the operative theatre.  A left TKA was performed without complication and the patient was transported to the post anesthesia care unit in stable condition.  After appropriate recovery from the anaesthetic agents used during the surgery, the patient was then transported to the hospital inpatient floor.  The interim of the hospital stay from arrival on the floor up to discharge has been uncomplicated. The patient's diet has progressed to a regular diet with no nausea or vomiting.  The patient's pain has been controlled using a multimodal approach with the help of the anesthesia pain service. Currently, the patient's pain is well controlled on an oral regimen.  The patient  has been voiding without difficulty ever since surgery. The patient began participation in physical therapy on post-operative day one and has progressed throughout the entire hospital stay.  Currently the patient is ambulating with moderate assistance and the physical therapy team feels that the patient's progress is sufficient to allow the patient to be discharged home safely.  The patient agrees with this assessment and desires a discharge to home today.          Consults (From admission, onward)        Status Ordering Provider     Inpatient consult to Pain Management  Once     Provider:  NURSE, PAIN MANAGEMENT    Acknowledged JANINE KEN     Inpatient consult to Respiratory Care  Once     Provider:  (Not yet assigned)    Acknowledged JASBIR BARBOSA     Inpatient consult to Respiratory Care  Once     Provider:  (Not yet assigned)    Acknowledged JASBIR BARBOSA     Inpatient consult to Respiratory Care  Once     Provider:  (Not yet assigned)    Acknowledged JANINE KEN     Inpatient consult to Social Work  Once     Provider:  (Not yet assigned)    Acknowledged JANINE KEN          Significant Diagnostic Studies: Labs:   BMP:   Recent Labs   Lab 03/21/19  1357   *      K 4.5      CO2 22*   BUN 16   CREATININE 1.1   CALCIUM 8.8     Radiology: X-Ray: left knee    Pending Diagnostic Studies:     None        Final Active Diagnoses:    Diagnosis Date Noted POA    PRINCIPAL PROBLEM:  Primary osteoarthritis of left knee [M17.12] 03/18/2019 Yes    HBP (high blood pressure) [I10] 09/18/2014 Yes      Problems Resolved During this Admission:      Discharged Condition: good    Disposition:     Follow Up:  Follow-up Information     Janine Ken NP. Go on 4/4/2019.    Specialty:  Orthopedic Surgery  Why:  For wound re-check; 10:15 am  Contact information:  [address]  [facility] [id number]  [phone]                 Patient Instructions:   No discharge procedures on  file.  Medications:  Reconciled Home Medications:      Medication List      START taking these medications    docusate sodium 100 MG capsule  Commonly known as:  COLACE  Take 1 capsule (100 mg total) by mouth 2 (two) times daily.     ondansetron 8 MG tablet  Commonly known as:  ZOFRAN  Dissolve 1 tablet (8 mg total) by mouth every 8 (eight) hours as needed for Nausea.     oxyCODONE-acetaminophen  mg per tablet  Commonly known as:  PERCOCET  Take 1 tablet by mouth every 4 (four) hours as needed for Pain.        CHANGE how you take these medications    aspirin 81 MG EC tablet  Commonly known as:  ECOTRIN  Take 1 tablet (81 mg total) by mouth 2 (two) times daily.  What changed:  when to take this     clotrimazole 1 % cream  Commonly known as:  LOTRIMIN  Apply topically 2 (two) times daily.  What changed:    · when to take this  · reasons to take this        CONTINUE taking these medications    acyclovir 5% 5 % ointment  Commonly known as:  ZOVIRAX  Apply topically every 3 (three) hours.     albuterol 90 mcg/actuation inhaler  Commonly known as:  PROVENTIL/VENTOLIN HFA  Inhale 2 puffs into the lungs every 4 (four) hours as needed for Wheezing.     ALLEGRA 180 MG tablet  Generic drug:  fexofenadine  Every day PRN     atorvastatin 40 MG tablet  Commonly known as:  LIPITOR  TK 1 T PO  QPM     brinzolamide 1 % ophthalmic suspension  Commonly known as:  AZOPT  Place 1 drop into both eyes 2 (two) times daily.     fluocinonide 0.1 % Crea  fluocinonide 0.1 % topical cream     fluticasone 50 mcg/actuation nasal spray  Commonly known as:  FLONASE  SHAKE LIQUID AND USE 1 SPRAY(50 MCG) IN EACH NOSTRIL EVERY DAY     hydrocortisone 25 mg suppository  Commonly known as:  ANUSOL-HC  Place 1 suppository (25 mg total) rectally 2 (two) times daily as needed for Hemorrhoids. 1 Suppository(ies) Rectal PRN Twice a day.     latanoprost 0.005 % ophthalmic solution  Place 1 drop into both eyes nightly.     pantoprazole 40 MG  tablet  Commonly known as:  PROTONIX  Every day     pioglitazone 15 MG tablet  Commonly known as:  ACTOS  Take 1 tablet (15 mg total) by mouth once daily.     telmisartan 80 MG Tab  Commonly known as:  MICARDIS  TK 1 T PO  QD     valACYclovir 1000 MG tablet  Commonly known as:  VALTREX  Take 1 tablet (1,000 mg total) by mouth 2 (two) times daily.        STOP taking these medications    meloxicam 15 MG tablet  Commonly known as:  MAVIS Galvez MD  Orthopedics  [facility]

## 2019-03-22 NOTE — PLAN OF CARE
CM met with patient and pt's wife, Isis ([phone])to discuss today's discharge to home. Patient in agreement with discharge plan. Patient denies need for additional equipment or assistance at home. Patient will resume OP Physical Rehab therapy. Patient's wife will provide transportation. CM informed patient of follow up appt, pt verbalized understanding.    Future Appointments   Date Time Provider Department Center   3/25/2019  1:00 PM Gracie Carr, PT [facility]   3/27/2019 10:00 AM Dante Garry, PTA [facility]   3/29/2019 10:00 AM Dante Garry, PTA [facility]   4/1/2019  1:00 PM Gracie Carr, PT [facility] [place]   4/3/2019 10:00 AM Dante Castañeda, PTA [facility]   4/4/2019 10:15 AM [facility]   4/5/2019 10:00 AM Dante Castañeda, PTA [facility] [place]   4/8/2019  1:00 PM Gracie Carr, PT [facility] [place]   4/10/2019 10:00 AM Dante Castañeda, PTA [facility] [place]   4/12/2019 10:00 AM Dante Castañeda, PTA [facility] [place]   4/15/2019  1:00 PM Gracie Carr, PT [facility]   4/16/2019 10:00 AM Dayanara Mcdermott, PTA [facility]   4/17/2019 10:00 AM Dante Castañeda, PTA [facility] [place]   4/22/2019 10:00 AM Dayanara Mcdermott, PTA [facility] [place]   4/24/2019 10:00 AM Dante Castañeda, PTA [facility] [place]   4/26/2019 10:00 AM Dante Castañeda, PTA [facility] [place]   4/29/2019  1:00 PM Gracie Carr, PT [facility]   5/1/2019 10:00 AM Dante Castañeda, PTA [facility]   5/3/2019 10:00 AM Dante Castañeda, PTA [facility] [place]   6/25/2019  2:00 PM VISUAL FIELDS [facility] [place]   6/25/2019  2:30 PM VISUAL FIELDS [facility] [place]   6/25/2019  2:45 PM SHERMAN Smith, SUSANNAH [facility]OM[facility] [place]        03/22/19 1118   Final Note   Assessment Type Discharge Planning Assessment   Anticipated Discharge Disposition Home   What phone number can be called  within the next 1-3 days to see how you are doing after discharge? [phone]   Hospital Follow Up  Appt(s) scheduled? Yes   Discharge plans and expectations educations in teach back method with documentation complete? Yes

## 2019-03-22 NOTE — PT/OT/SLP PROGRESS
"Physical Therapy Treatment    Patient Name:  Rajeev Newell III   MRN:  [id number]    Recommendations:     Discharge Recommendations:  outpatient PT   Discharge Equipment Recommendations: none   Barriers to discharge: None    Assessment:     Rajeev Newell III is a 68 y.o. male admitted with a medical diagnosis of Primary osteoarthritis of left knee.  He presents with the following impairments/functional limitations:  pain, impaired functional mobilty, decreased ROM, edema, orthopedic precautions.    -Pt tolerated session well today with no complications and demonstrated appropriate mobility s/p (L) TKA. Pt with no LOB during ambulation using RW for support. Pt able to follow 3-point gait sequencing well with no complications. Pt able to step up/down on a 6" curb step with no instability noted. Pt reported no pain with supine exercises. Pt verbalized understanding of car t/f technique and is safe to d/c home with HHPT at this time.    Rehab Prognosis: Good; patient would benefit from acute skilled PT services to address these deficits and reach maximum level of function.    Recent Surgery: Procedure(s) (LRB):  ARTHROPLASTY, KNEE, TOTAL-VERO (Left) 1 Day Post-Op    Plan:     During this hospitalization, patient to be seen BID to address the identified rehab impairments via gait training, therapeutic activities, therapeutic exercises and progress toward the following goals:    · Plan of Care Expires:  04/21/19    Subjective     Chief Complaint: impaired mobility  Patient/Family Comments/goals: return home to [facility]  Pain/Comfort:  · Pain Rating 1: 3/10  · Location - Side 1: Left  · Location - Orientation 1: anterior  · Location 1: knee  · Pain Addressed 1: Cessation of Activity, Reposition      Objective:     Communicated with nsg prior to session.  Patient found up in chair with blood pressure cuff, cryotherapy, FCD, perineural catheter, telemetry upon PT entry to room.     General Precautions: Standard, fall " Internal Medicine "  Orthopedic Precautions:LLE weight bearing as tolerated   Braces: N/A     Functional Mobility:  · Bed Mobility:     · Scooting: supervision  · Supine to Sit: supervision  · Sit to Supine: supervision  · Transfers:     · Sit to Stand:  stand by assistance with rolling walker  · Bed to Chair: stand by assistance with  rolling walker  using  Stand Pivot  · Gait: 75' x2 trials using RW with SBA  · Stairs:  Pt ascended/descended 6" curb step with Rolling Walker with no handrails with Stand-by Assistance.       AM-PAC 6 CLICK MOBILITY  Turning over in bed (including adjusting bedclothes, sheets and blankets)?: 4  Sitting down on and standing up from a chair with arms (e.g., wheelchair, bedside commode, etc.): 4  Moving from lying on back to sitting on the side of the bed?: 4  Moving to and from a bed to a chair (including a wheelchair)?: 4  Need to walk in hospital room?: 4  Climbing 3-5 steps with a railing?: 4  Basic Mobility Total Score: 24       Therapeutic Activities and Exercises:   -Pt performed TKR protocol exercises x15 reps of:  A. AP  B. GS  C. QS  D. SAQ-AAROM  E. Heel slides-AAROM  F. Hip abd/add-AAROM  G. SLR-AAROM  H. LAQ-AAROM    -Pt educated on:  A. PT POC and role of PT  B. Importance of OOB activity to improve functional outcomes after surgery  C. S/s associated with TKR procedure  D. Importance of re-gaining ROM early in acute phase  E. DME mgmt and gait/transfer sequencing  F. Performing HEP to reduce risk of developing blood clots  G. Car t/f      Patient left up in chair with all lines intact, call button in reach and nsg notified..    GOALS:   Multidisciplinary Problems     Physical Therapy Goals     Not on file          Multidisciplinary Problems (Resolved)        Problem: Physical Therapy Goal    Goal Priority Disciplines Outcome Goal Variances Interventions   Physical Therapy Goal   (Resolved)     PT, PT/OT Outcome(s) achieved     Description:  Goals to be met by: 3/28/19     Patient will " increase functional independence with mobility by performin. Supine to sit with Set-up [place]  2. Sit to supine with Set-up [place]  3. Sit to stand transfer with Supervision  4. Bed to chair transfer with Stand-by Assistance using Rolling Walker  5. Gait  x 150 feet with Stand-by Assistance using Rolling Walker.   6. Ascend/Descend 7 inch curb step with Contact Guard Assistance using Rolling Walker.  7. Lower extremity exercise program x20-30 reps per handout, with independence                        Time Tracking:     PT Received On: 19  PT Start Time: 1029     PT Stop Time: 1055  PT Total Time (min): 26 min     Billable Minutes: Gait Training 10 and Therapeutic Exercise 16    Treatment Type: Treatment  PT/PTA: PT           Dwayne Funes, PT  2019

## 2019-03-22 NOTE — PT/OT/SLP DISCHARGE
Physical Therapy Discharge Summary    Name: Rajeev Newell III  MRN: [id number]   Principal Problem: Primary osteoarthritis of left knee     Patient Discharged from acute Physical Therapy on 3/22/19.  Please refer to prior PT noted date on 3/22/19 for functional status.     Assessment:     Patient was discharged unexpectedly.  Information required to complete an accurate discharge summary is unknown.  Refer to therapy initial evaluation and last progress note for initial and most recent functional status and goal achievement.  Recommendations made may be found in medical record.    Objective:     GOALS:   Multidisciplinary Problems     Physical Therapy Goals     Not on file          Multidisciplinary Problems (Resolved)        Problem: Physical Therapy Goal    Goal Priority Disciplines Outcome Goal Variances Interventions   Physical Therapy Goal   (Resolved)     PT, PT/OT Outcome(s) achieved     Description:  Goals to be met by: 3/28/19     Patient will increase functional independence with mobility by performin. Supine to sit with Set-up [place]  2. Sit to supine with Set-up [place]  3. Sit to stand transfer with Supervision  4. Bed to chair transfer with Stand-by Assistance using Rolling Walker  5. Gait  x 150 feet with Stand-by Assistance using Rolling Walker.   6. Ascend/Descend 7 inch curb step with Contact Guard Assistance using Rolling Walker.  7. Lower extremity exercise program x20-30 reps per handout, with independence                        Reasons for Discontinuation of Therapy Services  Satisfactory goal achievement.      Plan:     Patient Discharged to: Outpatient Therapy Services.    Dwayne Funes, PT  3/22/2019

## 2019-03-22 NOTE — PLAN OF CARE
"Problem: Physical Therapy Goal  Goal: Physical Therapy Goal  Goals to be met by: 3/28/19     Patient will increase functional independence with mobility by performin. Supine to sit with Set-up [place]  2. Sit to supine with Set-up [place]  3. Sit to stand transfer with Supervision  4. Bed to chair transfer with Stand-by Assistance using Rolling Walker  5. Gait  x 150 feet with Stand-by Assistance using Rolling Walker.   6. Ascend/Descend 7 inch curb step with Contact Guard Assistance using Rolling Walker.  7. Lower extremity exercise program x20-30 reps per handout, with independence       Outcome: Outcome(s) achieved Date Met: 19  Pt tolerated session well today with no complications and demonstrated appropriate mobility s/p (L) TKA. Pt with no LOB during ambulation using RW for support. Pt able to follow 3-point gait sequencing well with no complications. Pt able to step up/down on a 6" curb step with no instability noted. Pt reported no pain with supine exercises. Pt verbalized understanding of car t/f technique and is safe to d/c home with HHPT at this time.        "

## 2019-03-22 NOTE — SUBJECTIVE & OBJECTIVE
Principal Problem:Primary osteoarthritis of left knee    Principal Orthopedic Problem: same    Interval History: Patient seen and examined at bedside.  No acute events overnight.  Pain controlled. Worked with PT yesterday.      Review of patient's allergies indicates:   Allergen Reactions    Bactrim [sulfamethoxazole-trimethoprim] Other (See Comments)     Abdominal Pain, Flush, Fever and Chills, Headache and Cough after Pt. took Bactrim    Darvon [propoxyphene] Other (See Comments)     Hallucinations       Current Facility-Administered Medications   Medication    0.9%  NaCl infusion    acetaminophen tablet 1,000 mg    albuterol inhaler 2 puff    aspirin EC tablet 81 mg    atorvastatin tablet 40 mg    bisacodyl suppository 10 mg    brinzolamide 1 % ophthalmic suspension 1 drop    celecoxib capsule 200 mg    dextrose 50% injection 12.5 g    dextrose 50% injection 25 g    famotidine tablet 20 mg    glucagon (human recombinant) injection 1 mg    glucose chewable tablet 16 g    glucose chewable tablet 24 g    insulin aspart U-100 pen 0-5 Units    latanoprost 0.005 % ophthalmic solution 1 drop    morphine injection 2 mg    morphine injection 2 mg    mupirocin 2 % ointment 1 g    naloxone 0.4 mg/mL injection 0.02 mg    naloxone 0.4 mg/mL injection 0.02 mg    ondansetron injection 4 mg    oxyCODONE immediate release tablet 10 mg    oxyCODONE immediate release tablet 5 mg    pantoprazole EC tablet 40 mg    polyethylene glycol packet 17 g    pregabalin capsule 150 mg    promethazine (PHENERGAN) 6.25 mg in dextrose 5 % 50 mL IVPB    ramelteon tablet 8 mg    ropivacaine (PF) 2 mg/ml (0.2%) infusion    ropivacaine 0.2% ON-Q C-BLOC 400 ML (SELECT A FLOW)    senna-docusate 8.6-50 mg per tablet 1 tablet    sodium chloride 0.9% flush 3 mL     Objective:     Vital Signs (Most Recent):  Temp: 97.8 °F (36.6 °C) (03/22/19 0341)  Pulse: 60 (03/22/19 0341)  Resp: 18 (03/22/19 0341)  BP: 131/80 (03/22/19  "0341)  SpO2: 96 % (03/22/19 0341) Vital Signs (24h Range):  Temp:  [97.5 °F (36.4 °C)-98.4 °F (36.9 °C)] 97.8 °F (36.6 °C)  Pulse:  [56-91] 60  Resp:  [15-21] 18  SpO2:  [95 %-100 %] 96 %  BP: (100-146)/(60-85) 131/80     Weight: 127.5 kg (281 lb)  Height: 5' 11" (180.3 cm)  Body mass index is 39.19 kg/m².      Intake/Output Summary (Last 24 hours) at 3/22/2019 0636  Last data filed at 3/22/2019 0600  Gross per 24 hour   Intake 5330.63 ml   Output 800 ml   Net 4530.63 ml       Ortho/SPM Exam   AAOx4  NAD  RRR  No increased WOB    LLE:  Aquacel c/d/i  Polar ice in place  SILT T/SP/DP/Andrea/Sa  Motor intact T/SP/DP  WWP extremities  FCDs in place and functioning      Significant Labs:   BMP:   Recent Labs   Lab 03/21/19  1357   *      K 4.5      CO2 22*   BUN 16   CREATININE 1.1   CALCIUM 8.8     All pertinent labs within the past 24 hours have been reviewed.    Significant Imaging: X-Ray: I have reviewed all pertinent results/findings and my personal findings are:  stable appearing implants  "

## 2019-03-22 NOTE — PROGRESS NOTES
[facility]  Orthopedics  Progress Note    Patient Name: Rajeev Newell III  MRN: [id number]  Admission Date: 3/21/2019  Hospital Length of Stay: 1 days  Attending Provider: Edmund Rosas MD  Primary Care Provider: Swati Walton MD  Follow-up For: Procedure(s) (LRB):  ARTHROPLASTY, KNEE, TOTAL-VERO (Left)    Post-Operative Day: 1 Day Post-Op  Subjective:     Principal Problem:Primary osteoarthritis of left knee    Principal Orthopedic Problem: same    Interval History: Patient seen and examined at bedside.  No acute events overnight.  Pain controlled. Worked with PT yesterday.      Review of patient's allergies indicates:   Allergen Reactions    Bactrim [sulfamethoxazole-trimethoprim] Other (See Comments)     Abdominal Pain, Flush, Fever and Chills, Headache and Cough after Pt. took Bactrim    Darvon [propoxyphene] Other (See Comments)     Hallucinations       Current Facility-Administered Medications   Medication    0.9%  NaCl infusion    acetaminophen tablet 1,000 mg    albuterol inhaler 2 puff    aspirin EC tablet 81 mg    atorvastatin tablet 40 mg    bisacodyl suppository 10 mg    brinzolamide 1 % ophthalmic suspension 1 drop    celecoxib capsule 200 mg    dextrose 50% injection 12.5 g    dextrose 50% injection 25 g    famotidine tablet 20 mg    glucagon (human recombinant) injection 1 mg    glucose chewable tablet 16 g    glucose chewable tablet 24 g    insulin aspart U-100 pen 0-5 Units    latanoprost 0.005 % ophthalmic solution 1 drop    morphine injection 2 mg    morphine injection 2 mg    mupirocin 2 % ointment 1 g    naloxone 0.4 mg/mL injection 0.02 mg    naloxone 0.4 mg/mL injection 0.02 mg    ondansetron injection 4 mg    oxyCODONE immediate release tablet 10 mg    oxyCODONE immediate release tablet 5 mg    pantoprazole EC tablet 40 mg    polyethylene glycol packet 17 g    pregabalin capsule 150 mg    promethazine (PHENERGAN) 6.25 mg in dextrose  "5 % 50 mL IVPB    ramelteon tablet 8 mg    ropivacaine (PF) 2 mg/ml (0.2%) infusion    ropivacaine 0.2% ON-Q C-BLOC 400 ML (SELECT A FLOW)    senna-docusate 8.6-50 mg per tablet 1 tablet    sodium chloride 0.9% flush 3 mL     Objective:     Vital Signs (Most Recent):  Temp: 97.8 °F (36.6 °C) (03/22/19 0341)  Pulse: 60 (03/22/19 0341)  Resp: 18 (03/22/19 0341)  BP: 131/80 (03/22/19 0341)  SpO2: 96 % (03/22/19 0341) Vital Signs (24h Range):  Temp:  [97.5 °F (36.4 °C)-98.4 °F (36.9 °C)] 97.8 °F (36.6 °C)  Pulse:  [56-91] 60  Resp:  [15-21] 18  SpO2:  [95 %-100 %] 96 %  BP: (100-146)/(60-85) 131/80     Weight: 127.5 kg (281 lb)  Height: 5' 11" (180.3 cm)  Body mass index is 39.19 kg/m².      Intake/Output Summary (Last 24 hours) at 3/22/2019 0636  Last data filed at 3/22/2019 0600  Gross per 24 hour   Intake 5330.63 ml   Output 800 ml   Net 4530.63 ml       Ortho/SPM Exam   AAOx4  NAD  RRR  No increased WOB    LLE:  Aquacel c/d/i  Polar ice in place  SILT T/SP/DP/Andrea/Sa  Motor intact T/SP/DP  WWP extremities  FCDs in place and functioning      Significant Labs:   BMP:   Recent Labs   Lab 03/21/19  1357   *      K 4.5      CO2 22*   BUN 16   CREATININE 1.1   CALCIUM 8.8     All pertinent labs within the past 24 hours have been reviewed.    Significant Imaging: X-Ray: I have reviewed all pertinent results/findings and my personal findings are:  stable appearing implants    Assessment/Plan:     * Primary osteoarthritis of left knee    68 y.o. male POD1 s/p left TKA    Pain control: multimodal  PT/OT: WBAT LLE  DVT PPx: ASA 81 BID, FCDs at all times when not ambulating  Abx: postop Ancef    Dispo: f/u PT recs, home meds       HBP (high blood pressure)    Home meds           Lalito Galvez MD  Orthopedics  [facility]  "

## 2019-03-22 NOTE — ADDENDUM NOTE
Addendum  created 03/22/19 1313 by Sarah Martino RN    Sign clinical note, Visit Navigator SmartForm Flowsheet section accepted

## 2019-03-22 NOTE — ANESTHESIA POSTPROCEDURE EVALUATION
"Anesthesia Post Evaluation    Patient: Rajeev Newell III    Procedure(s) Performed: Procedure(s) (LRB):  ARTHROPLASTY, KNEE, TOTAL-VERO (Left)    Final Anesthesia Type: spinal  Patient location during evaluation: PACU  Patient participation: Yes- Able to Participate  Level of consciousness: awake and alert and oriented  Post-procedure vital signs: reviewed and stable  Pain management: adequate  Airway patency: patent  PONV status at discharge: No PONV  Anesthetic complications: no      Cardiovascular status: hemodynamically stable  Respiratory status: unassisted, spontaneous ventilation and room air  Hydration status: euvolemic  Follow-up not needed.        Visit Vitals  /83 (BP Location: Left arm, Patient Position: Lying)   Pulse 71   Temp 36.9 °C (98.4 °F) (Oral)   Resp 19   Ht 5' 11" (1.803 m)   Wt 127.5 kg (281 lb)   SpO2 96%   BMI 39.19 kg/m²       Pain/Cecy Score: Pain Rating Prior to Med Admin: 2 (3/22/2019  8:18 AM)  Pain Rating Post Med Admin: 0 (3/21/2019 11:15 AM)  Cecy Score: 10 (3/21/2019  2:40 PM)        "

## 2019-03-22 NOTE — ANESTHESIA POST-OP PAIN MANAGEMENT
Acute Pain Service and Perioperative Surgical Home Progress Note    HPI  Rajeev Newell III is a 68 y.o., male, with a history of HTN, DM2, Left knee pain. Pain is worse with activity and weight bearing.  Patient has experienced interference of activities of daily living due to decreased range of motion and an increase in joint pain and swelling.  Patient has failed non-operative treatment including NSAIDs, corticosteroid injections, viscosupplement injections, and activity modification. S/p total knee arthroplasty on 3/21/19.        Interval history  NAEON. Patient doing well this morning. Pain is well controlled with PNC, required no PRNs overnight. Pain is rated at a 2 without movement, 3 with movement. No motor blockade, able to lift operative leg off the bed. Vitalas stable, BG well controlled. Cleared by PT.     Surgery:  Procedure(s) (LRB):  ARTHROPLASTY, KNEE, TOTAL-VERO (Left)    Post Op Day #: 1    Catheter type: Perineural Adductor Canal    Infusion type: Ropivacaine 0.2%  8 ml/hr basal    Problem List:    Active Hospital Problems    Diagnosis  POA    *Primary osteoarthritis of left knee [M17.12]  Yes    HBP (high blood pressure) [I10]  Yes      Resolved Hospital Problems   No resolved problems to display.       Subjective:       General appearance of alert, oriented, no complaints   Pain with rest: 2    Numbers   Pain with movement: 3    Numbers   Side Effects    1. Pruritis No    2. Nausea No    3. Motor Blockade No, 0=Ability to raise lower extremities off bed    4. Sedation No, 1=awake and alert    Schedule Medications:    acetaminophen  1,000 mg Oral Q6H    aspirin  81 mg Oral BID    atorvastatin  40 mg Oral Daily    brinzolamide  1 drop Both Eyes BID    celecoxib  200 mg Oral Daily    famotidine  20 mg Oral BID    latanoprost  1 drop Both Eyes Nightly    mupirocin  1 g Nasal BID    pantoprazole  40 mg Oral Daily    polyethylene glycol  17 g Oral Daily    pregabalin  150 mg Oral  QHS    senna-docusate 8.6-50 mg  1 tablet Oral BID        Continuous Infusions:   sodium chloride 0.9% 150 mL/hr at 03/22/19 0236    ropivacaine (PF) 2 mg/ml (0.2%) 8 mL/hr (03/22/19 0019)    ropivacaine          PRN Medications:  albuterol, bisacodyl, dextrose 50%, dextrose 50%, glucagon (human recombinant), glucose, glucose, insulin aspart U-100, morphine, morphine, naloxone, naloxone, ondansetron, oxyCODONE, oxyCODONE, promethazine (PHENERGAN) IVPB, ramelteon, ropivacaine, sodium chloride 0.9%       Antibiotics:  Antibiotics (From admission, onward)    Start     Stop Route Frequency Ordered    03/22/19 0223  mupirocin 2 % ointment 1 g      03/27 0859 Nasl 2 times daily 03/22/19 0223             Objective:     Catheter site clean, dry, intact          Vital Signs (Most Recent):  Temp: 36.9 °C (98.4 °F) (03/22/19 0900)  Pulse: 71 (03/22/19 0900)  Resp: 19 (03/22/19 0900)  BP: 135/83 (03/22/19 0900)  SpO2: 96 % (03/22/19 0900) Vital Signs Range (Last 24H):  Temp:  [36.4 °C (97.5 °F)-36.9 °C (98.4 °F)]   Pulse:  [56-91]   Resp:  [18-20]   BP: (100-146)/(60-83)   SpO2:  [95 %-100 %]          I & O (Last 24H):    Intake/Output Summary (Last 24 hours) at 3/22/2019 1121  Last data filed at 3/22/2019 0900  Gross per 24 hour   Intake 5670.63 ml   Output 800 ml   Net 4870.63 ml       Physical Exam:    GA: Alert, comfortable, no acute distress.   Pulmonary: Clear to auscultation A/P/L. No wheezing, crackles, or rhonchi.  Cardiac: RRR S1 & S2 w/o rubs/murmurs/gallops.   Abdominal:Bowel sounds present. No tenderness to palpation or distension. No appreciable hepatosplenomegaly.   Skin: No jaundice, rashes, or visible lesions.         Laboratory:  CBC: No results for input(s): WBC, RBC, HGB, HCT, PLT, MCV, MCH, MCHC in the last 72 hours.    BMP:   Recent Labs     03/21/19  1357      K 4.5   CO2 22*      BUN 16   CREATININE 1.1   *   CALCIUM 8.8       No results for input(s): PT, INR, PROTIME, APTT in the  last 72 hours.          Assessment:         Pain control adequate    Plan:     1) Pain:    Adductor canal perineural catheter in place and infusing. Good level. Multimodal pain regimen with acetaminophen, Celebrex, Lyrica, and PRN oxycodone. Has not needed PRNs overnight. Plan to discharge this morning with OnQ.   2) DM2: SSI, diabetic diet. BG controlled.   3) GERD: restarted home protonix   4) FEN/GI: Toleratingfull diet, passed flatus.   5) HLD: Restarted home atorvastatin.   6) Dispo: Pt working well with PT/OT. Plan for d/c today with OnQ.        Evaluator Isaiah Cm MD

## 2019-03-22 NOTE — PLAN OF CARE
Problem: Occupational Therapy Goal  Goal: Occupational Therapy Goal  Goals to be met by: 3/28/19     Patient will increase functional independence with ADLs by performing:    UE Dressing with Modified [place].  LE Dressing with Modified [place] and Assistive Devices as needed.  Grooming while standing at sink with Modified [place].  Toileting from bedside commode with Modified [place] for hygiene and clothing management.   Bathing from  edge of bed with Modified [place].  Toilet transfer to bedside commode with Modified [place].  Increased functional strength to WFL for B UE.  Upper extremity exercise program x15 reps per handout, with independence.     Cont. POC.

## 2019-03-22 NOTE — PLAN OF CARE
Problem: Adult Inpatient Plan of Care  Goal: Plan of Care Review  Patient AAOX4, call light and belongings in reach, siderails up x2.  Left knee with polar ice on and operating, scd's on and operating.  Patient worked with OT this morning and pending PT.  Plan is for discharge today after PT and once On Q is placed by anesthesia.  Patient ambulating to bathroom with walker and standby assist with no concerns, tolerating well.  Blood sugars have not required sliding scale insulin and patient tolerating diet well with no complaints of N/V. Patient continues to deny  Pain at this time.  Patient remains free of falls and safety has been maintained this shift.

## 2019-03-22 NOTE — HOSPITAL COURSE
On 3/21/2019, the patient arrived to the [facility] for proper pre-operative management.  Upon completion of pre-operative preparation, the patient was taken back to the operative theatre.  A left TKA was performed without complication and the patient was transported to the post anesthesia care unit in stable condition.  After appropriate recovery from the anaesthetic agents used during the surgery, the patient was then transported to the hospital inpatient floor.  The interim of the hospital stay from arrival on the floor up to discharge has been uncomplicated. The patient's diet has progressed to a regular diet with no nausea or vomiting.  The patient's pain has been controlled using a multimodal approach with the help of the anesthesia pain service. Currently, the patient's pain is well controlled on an oral regimen.  The patient has been voiding without difficulty ever since surgery. The patient began participation in physical therapy on post-operative day one and has progressed throughout the entire hospital stay.  Currently the patient is ambulating with moderate assistance and the physical therapy team feels that the patient's progress is sufficient to allow the patient to be discharged home safely.  The patient agrees with this assessment and desires a discharge to home today.

## 2019-03-22 NOTE — PLAN OF CARE
Problem: Adult Inpatient Plan of Care  Goal: Plan of Care Review  Outcome: Ongoing (interventions implemented as appropriate)  Quiet hours. No complaints. Receiving scheduled tylenol. Polar ice in use. Ropivacaine remains in use. Ambulated to bathroom several times with walker, tolerated well. VSS. Safety maintained.

## 2019-03-23 NOTE — ANESTHESIA POST-OP PAIN MANAGEMENT
Attempted calling patient with no success today to check on their pain and overall status. I left a voice mail message and asked to call back if they have any questions or concerns.    Gustavo A Diaz-Mercado [facility]  5:49 PM

## 2019-03-24 NOTE — ADDENDUM NOTE
Addendum  created 03/24/19 1813 by Jt Lino MD    Intraprocedure Event edited, Sign clinical note

## 2019-03-25 ENCOUNTER — CLINICAL SUPPORT (OUTPATIENT)
Dept: REHABILITATION | Facility: HOSPITAL | Age: 69
End: 2019-03-25
Payer: MEDICARE

## 2019-03-25 DIAGNOSIS — M25.562 CHRONIC PAIN OF LEFT KNEE: ICD-10-CM

## 2019-03-25 DIAGNOSIS — G89.29 CHRONIC PAIN OF LEFT KNEE: ICD-10-CM

## 2019-03-25 PROCEDURE — 97112 NEUROMUSCULAR REEDUCATION: CPT | Mod: PO | Performed by: PHYSICAL THERAPIST

## 2019-03-25 PROCEDURE — 97110 THERAPEUTIC EXERCISES: CPT | Mod: PO | Performed by: PHYSICAL THERAPIST

## 2019-03-25 NOTE — PROGRESS NOTES
Physical Therapy Daily Treatment Note     Name: Rajeev Newell [facility]  Clinic Number: [id number]    Therapy Diagnosis:   Encounter Diagnosis   Name Primary?    Chronic pain of left knee      Physician: Edmund Rosas MD    Visit Date: 3/25/2019  Physician Orders: PT Eval and Treat   Medical Diagnosis from Referral: M17.12 (ICD-10-CM) - Primary osteoarthritis of left knee  Evaluation Date: 2/22/2019  Authorization Period Expiration: 1/16/2020  Plan of Care Expiration: 3/21/2019  Visit # / Visits authorized: 4/ 20    Time In: 100p  Time Out: 200p  Total Billable Time: 50 minutes    Precautions: Standard and HTN    Subjective     Pt reports:  Pt underwent L TKA on 3/21/19. He has minimal pain today.   He was compliant with home exercise program.  Response to previous treatment: no increased pain  Functional change: none at this time    Pain: 2/10  Location: left knee      Objective     Jared received therapeutic exercises to develop strength, ROM and flexibility for 35 minutes including:    Recumbent bike, in available ROM, 10 min for mobility  Gastroc st on incline, 3x30s  HSS, 3x30s seated EOM  SLR 3x10   Heel slides 10x  Passive Knee extension stretch with overpressure x 5 min    Neuromuscular reeducation x 10 min using Russian stim to L VMO, 10 sec on/off, during quad sets to improve muscle recruitment during standing/walking.       Jared received cold pack for 10 minutes to to decrease circulation, pain, and swelling.    Knee  Left  Pain/Dysfunction with Movement    AROM PROM MMT    Flexion 80* 85* NT    Extension -6* 0* NT Decreased quad tone with knee ext AROM     Edema (circumfrencial measure) L Knee:   Joint line 51 cm   15 cm inf from lat joint line 47.5 cm  15 cm sup from lat joint line 61.5 cm    Gait: with RW, lacks terminal knee ext, slight heel strike, antalgic gait LLE        Home Exercises Provided and Patient Education Provided     Education provided:   - continue HEP    Written Home Exercises  Provided: Patient instructed to cont prior HEP.  Exercises were reviewed and Jared was able to demonstrate them prior to the end of the session.  Jared demonstrated good  understanding of the education provided.     See EMR under Media for exercises provided prior visit.    Assessment     Jared is doing well at his first post-op PT visit. He is compliant with HEP.     Jared is progressing well towards his goals.   Pt prognosis is Good.     Pt will continue to benefit from skilled outpatient physical therapy to address the deficits listed in the problem list box on initial evaluation, provide pt/family education and to maximize pt's level of independence in the home and community environment.     Pt's spiritual, cultural and educational needs considered and pt agreeable to plan of care and goals.    Anticipated barriers to physical therapy: none    Goals:   Long Term Goals: 4 weeks   1. Pt will be compliant with HEP for improving strength and ROM in preparation for L TKA. DC--see updated POC  2. Pt will demonstrate improved AROM of L knee to -2*-120*--ongoing, see updated POC    Plan     See Updated POC    Gracie Carr, PT

## 2019-03-26 ENCOUNTER — PATIENT MESSAGE (OUTPATIENT)
Dept: ORTHOPEDICS | Facility: CLINIC | Age: 69
End: 2019-03-26

## 2019-03-27 ENCOUNTER — CLINICAL SUPPORT (OUTPATIENT)
Dept: REHABILITATION | Facility: HOSPITAL | Age: 69
End: 2019-03-27
Payer: MEDICARE

## 2019-03-27 DIAGNOSIS — G89.29 CHRONIC PAIN OF LEFT KNEE: ICD-10-CM

## 2019-03-27 DIAGNOSIS — M25.562 CHRONIC PAIN OF LEFT KNEE: ICD-10-CM

## 2019-03-27 PROCEDURE — 97110 THERAPEUTIC EXERCISES: CPT | Mod: PO

## 2019-03-27 NOTE — PROGRESS NOTES
"  Physical Therapy Daily Treatment Note     Name: Rajeev Newell [facility]  Clinic Number: [id number]    Therapy Diagnosis:   Encounter Diagnosis   Name Primary?    Chronic pain of left knee      Physician: Edmund Rosas MD    Visit Date: 3/27/2019  Physician Orders: PT Eval and Treat   Medical Diagnosis from Referral: M17.12 (ICD-10-CM) - Primary osteoarthritis of left knee  Evaluation Date: 2/22/2019  Authorization Period Expiration: 1/16/2020  Plan of Care Expiration: 3/21/2019  Visit # / Visits authorized: 5/ 20    Time In: 200p  Time Out: 300p  Total Billable Time: 60 minutes    Precautions: Standard and HTN    Subjective     Pt reports:  that his knee is feeling better, swelling is going down, denies pn states that his knee just feels "stiff and tight"..   He was compliant with home exercise program.  Response to previous treatment: no increased pain  Functional change: none at this time    Pain: 0/10  Location: left knee      Objective     Jared received therapeutic exercises to develop strength, ROM and flexibility for 35 minutes including:    Stat bike, in available ROM, 10 min for mobility  Gastroc st on incline, 3x30s  HSS, 3x30s seated EOM  Knee flex stretch at stairs 2 min  Mini Squat 20x  Heel raise 20x  Lat step-up 2" 20x  HS Curl 20x  LAQ 20x  SLR 3x10   LLR 20x  Prone hip ext 20x  Prone flex stretch with strap 2 min  Prone hang 4 min  Heel slides 10x  Passive Knee flexion,extension stretch with overpressure x 5 min    Neuromuscular reeducation x 0 min using Russian stim to L VMO, 10 sec on/off, during quad sets to improve muscle recruitment during standing/walking. NP      Jared received cold pack for 10 minutes to to decrease circulation, pain, and swelling.    Knee  Left  Pain/Dysfunction with Movement    AROM PROM MMT    Flexion 80* 85* NT    Extension -6* 0* NT Decreased quad tone with knee ext AROM     Edema (circumfrencial measure) L Knee:       Gait: with RW, lacks terminal knee ext, slight " heel strike, antalgic gait LLE        Home Exercises Provided and Patient Education Provided     Education provided:   - continue HEP    Written Home Exercises Provided: Patient instructed to cont prior HEP.  Exercises were reviewed and Jared was able to demonstrate them prior to the end of the session.  Jared demonstrated good  understanding of the education provided.     See EMR under Media for exercises provided prior visit.    Assessment     Jared yoselin tx with progression of ther ex well, good progress with yoselin to exs and PROM.  He is compliant with HEP.     Jared is progressing well towards his goals.   Pt prognosis is Good.     Pt will continue to benefit from skilled outpatient physical therapy to address the deficits listed in the problem list box on initial evaluation, provide pt/family education and to maximize pt's level of independence in the home and community environment.     Pt's spiritual, cultural and educational needs considered and pt agreeable to plan of care and goals.    Anticipated barriers to physical therapy: none    Goals:   Long Term Goals: 4 weeks   1. Pt will be compliant with HEP for improving strength and ROM in preparation for L TKA. DC--see updated POC  2. Pt will demonstrate improved AROM of L knee to -2*-120*--ongoing, see updated POC    Plan     See Updated POC    Dante Castañeda, PTA

## 2019-03-27 NOTE — PLAN OF CARE
PHYSICAL THERAPY UPDATED PLAN OF TREATMENT    Patient name: Rajeev Newell III  Onset Date:  DOS 3/21/19  SOC Date:  2/22/19  Primary Diagnosis:    1. Chronic pain of left knee       Treatment Diagnosis:  Chronic pain L knee  Certification Period:  3/25/19 to 5/20/19  Precautions:  Fall, HTN  Visits from SOC:  4  Functional Level Prior to SOC:  amb without RW    Updated Assessment:      OBJECTIVE:    Knee   Left   Pain/Dysfunction with Movement     AROM PROM MMT     Flexion 80* 85* NT     Extension -6* 0* NT Decreased quad tone with knee ext AROM      Edema (circumfrencial measure) L Knee:   Joint line 51 cm   15 cm inf from lat joint line 47.5 cm  15 cm sup from lat joint line 61.5 cm     Gait: with RW, lacks terminal knee ext, slight heel strike, antalgic gait LLE      SHORT TERM GOALS (4 weeks):  1. Pt will be compliant with daily HEP to improve ROM.  2.  Pt will demonstrate minimal gait abnormality ambulating without AD x 100 feet.     LONG TERM GOALS (8 WEEKS):   1. Pt will demonstrate AROM of L knee 0*-120* for sitting, standing, squatting.  2. Pt will demonstrate L knee strength of 4+/5 in flexion and extension for improved stability in standing, walking.   3. Pt will be able to ascend/descend one flight of stairs with U UE support, reciprocal gait pattern.   4. Pt will be able to walk 1000 feet on various surfaces without antalgic limp, without AD.       Reasons for Recertification of Therapy:   S/p L TKA 3/21/19.     Certification Period: 3/25/19 to 5/20/19  Recommended Treatment Plan: 3 times per week, dropping to 2x/week as pt progresses, for 8 weeks: Electrical Stimulation Russian Stim for MM recruitment, IFC for pain control, Gait Training, Manual Therapy, Moist Heat/ Ice, Neuromuscular Re-ed, Patient Education, Therapeutic Activites and Therapeutic Exercise  Other Recommendations: PTA may be involved in care for this patient under direction of PT.           Therapist's Name: Gracie Carr  PT, DPT   Date: 03/27/2019    I CERTIFY THE NEED FOR THESE SERVICES FURNISHED UNDER THIS PLAN OF TREATMENT AND WHILE UNDER MY CARE    Physician's comments: ________________________________________________________________________________________________________________________________________________      Physician's Name: ___________________________________

## 2019-03-28 NOTE — ADDENDUM NOTE
Addendum  created 03/28/19 0545 by Coretta Graff MD    Attestation recorded in Intraprocedure, Intraprocedure Attestations deleted, Intraprocedure Attestations filed

## 2019-03-29 ENCOUNTER — CLINICAL SUPPORT (OUTPATIENT)
Dept: REHABILITATION | Facility: HOSPITAL | Age: 69
End: 2019-03-29
Payer: MEDICARE

## 2019-03-29 DIAGNOSIS — M25.562 CHRONIC PAIN OF LEFT KNEE: ICD-10-CM

## 2019-03-29 DIAGNOSIS — G89.29 CHRONIC PAIN OF LEFT KNEE: ICD-10-CM

## 2019-03-29 PROCEDURE — 97110 THERAPEUTIC EXERCISES: CPT | Mod: PO

## 2019-03-29 NOTE — PROGRESS NOTES
"  Physical Therapy Daily Treatment Note     Name: Rajeev Newell [facility]  Clinic Number: [id number]    Therapy Diagnosis:   Encounter Diagnosis   Name Primary?    Chronic pain of left knee      Physician: Edmund Rosas MD    Visit Date: 3/29/2019  Physician Orders: PT Eval and Treat   Medical Diagnosis from Referral: M17.12 (ICD-10-CM) - Primary osteoarthritis of left knee  Evaluation Date: 2/22/2019  Authorization Period Expiration: 1/16/2020  Plan of Care Expiration: 3/21/2019  Visit # / Visits authorized: 6/ 20    Time In: 1000am  Time Out: 1100am  Total Billable Time: 60 minutes    Precautions: Standard and HTN    Subjective     Pt reports:  that his knee is feeling better, swelling is going down,  again denies pn states that it just feels "stiff and tight"..   He was compliant with home exercise program.  Response to previous treatment: no increased pain  Functional change: none at this time    Pain: 0/10  Location: left knee      Objective     Jared received therapeutic exercises to develop strength, ROM and flexibility for 35 minutes including:    Stat bike, in available ROM, 10 min for mobility  Gastroc st on incline, 3x30s  HSS, 3x30s seated EOM  Knee flex stretch at stairs 2 min  Leg Press 30#  30x  Calf Press 30# 30x  Lat step-up 2" 30x  HS Curl 30x 2#  LAQ 30x 2#  SLR 3x10   LLR 30x  Prone hip ext 30x  Prone flex stretch with strap 2 min  Prone hang 4 min  Heel slides 10x  Passive Knee flexion,extension stretch with overpressure x 5 min    PROM flex in prone 103 degrees ext 0 degrees in long sitting    Neuromuscular reeducation x 0 min using Russian stim to L VMO, 10 sec on/off, during quad sets to improve muscle recruitment during standing/walking. NP      Jared received cold pack for 10 minutes to to decrease circulation, pain, and swelling.    Knee  Left  Pain/Dysfunction with Movement    AROM PROM MMT    Flexion 80* 85* NT    Extension -6* 0* NT Decreased quad tone with knee ext AROM     Edema " (circumfrencial measure) L Knee:       Gait: with RW, lacks terminal knee ext, slight heel strike, antalgic gait LLE        Home Exercises Provided and Patient Education Provided     Education provided:   - continue HEP    Written Home Exercises Provided: Patient instructed to cont prior HEP.  Exercises were reviewed and Jared was able to demonstrate them prior to the end of the session.  Jared demonstrated good  understanding of the education provided.     See EMR under Media for exercises provided prior visit.    Assessment     Jared yoselin tx with progression of ther ex well, good progress with yoselin to exs and improving  PROM. No ext lag noted with SLR He is compliant with HEP.     Jared is progressing well towards his goals.   Pt prognosis is Good.     Pt will continue to benefit from skilled outpatient physical therapy to address the deficits listed in the problem list box on initial evaluation, provide pt/family education and to maximize pt's level of independence in the home and community environment.     Pt's spiritual, cultural and educational needs considered and pt agreeable to plan of care and goals.    Anticipated barriers to physical therapy: none    Goals:   Long Term Goals: 4 weeks   1. Pt will be compliant with HEP for improving strength and ROM in preparation for L TKA. DC--see updated POC  2. Pt will demonstrate improved AROM of L knee to -2*-120*--ongoing, see updated POC    Plan     See Updated POC    Dante Castañeda, PTA

## 2019-04-01 ENCOUNTER — CLINICAL SUPPORT (OUTPATIENT)
Dept: REHABILITATION | Facility: HOSPITAL | Age: 69
End: 2019-04-01
Payer: MEDICARE

## 2019-04-01 DIAGNOSIS — G89.29 CHRONIC PAIN OF LEFT KNEE: ICD-10-CM

## 2019-04-01 DIAGNOSIS — M25.562 CHRONIC PAIN OF LEFT KNEE: ICD-10-CM

## 2019-04-01 PROCEDURE — 97110 THERAPEUTIC EXERCISES: CPT | Mod: PO | Performed by: PHYSICAL THERAPIST

## 2019-04-01 PROCEDURE — 97140 MANUAL THERAPY 1/> REGIONS: CPT | Mod: PO | Performed by: PHYSICAL THERAPIST

## 2019-04-01 NOTE — PROGRESS NOTES
"  Physical Therapy Daily Treatment Note     Name: Rajeev Newell [facility]  Clinic Number: [id number]    Therapy Diagnosis:   Encounter Diagnosis   Name Primary?    Chronic pain of left knee      Physician: Edmund Rosas MD    Visit Date: 4/1/2019  Physician Orders: PT Eval and Treat   Medical Diagnosis from Referral: M17.12 (ICD-10-CM) - Primary osteoarthritis of left knee  Evaluation Date: 2/22/2019  Authorization Period Expiration: 1/16/2020  Plan of Care Expiration: 3/21/2019  Visit # / Visits authorized: 6/ 20    Time In: 1:02p  Time Out: 2:05p  Total Billable Time: 53 minutes    Precautions: Standard and HTN    Subjective     Pt reports:  He had a low back flare-up after last session. He has not had to take any pain relievers for his knee. He is pleased with progress. Continues to be compliant with use of RW until sees MD at 2 week f/u appts.   He was compliant with home exercise program.  Response to previous treatment: no increased pain  Functional change: walking more at home    Pain: 0/10  Location: left knee      Objective     Jared received therapeutic exercises to develop strength, ROM and flexibility for 45 minutes including:    Recumbent bike, in available ROM, 10 min for improving ROM/mobility (please use recumbent bike to support back in future visits)  Gastroc st on incline. L2, 3x30s  HSS, 3x30s seated EOM  Knee flex stretch at stairs 2 min  Leg Press 30#  30x  Calf Press 30# 30x  Lat step-up 2" 30x--NP  HS Curl 30x 2#  LAQ 30x 2#  SLR 3x10--NP   LLR 30x--NP  Prone hip ext 30x  Prone flex stretch with strap 2 min--NP  Prone hang 4 min  Heel slides 20x  HR, 2x10  Mini squats, 10x      Manual therapy x 8 min: STM to the distal HS/Proximal gastroc heads to decrease tension and pain      Jared received cold pack for 10 minutes to to decrease circulation, pain, and swelling.    4/1/19:  Knee Left  Pain/Dysfunction with Movement    AROM MMT    Flexion 102* NT    Extension -2* NT        Home Exercises " Provided and Patient Education Provided     Education provided:   - continue HEP    Written Home Exercises Provided: Patient instructed to cont prior HEP.  Exercises were reviewed and Jared was able to demonstrate them prior to the end of the session.  Jared demonstrated good  understanding of the education provided.     See EMR under Media for exercises provided prior visit.    Assessment     Jared tolerated session well. Pt is able to walk short distances without AD with minimal antalgic limp, indicating progress toward STG 2.     Jared is progressing well towards his goals.   Pt prognosis is Good.     Pt will continue to benefit from skilled outpatient physical therapy to address the deficits listed in the problem list box on initial evaluation, provide pt/family education and to maximize pt's level of independence in the home and community environment.     Pt's spiritual, cultural and educational needs considered and pt agreeable to plan of care and goals.    Anticipated barriers to physical therapy: none    Goals:   SHORT TERM GOALS (4 weeks):  1. Pt will be compliant with daily HEP to improve ROM.  2.  Pt will demonstrate minimal gait abnormality ambulating without AD x 100 feet.      LONG TERM GOALS (8 WEEKS):   1. Pt will demonstrate AROM of L knee 0*-120* for sitting, standing, squatting.  2. Pt will demonstrate L knee strength of 4+/5 in flexion and extension for improved stability in standing, walking.   3. Pt will be able to ascend/descend one flight of stairs with U UE support, reciprocal gait pattern.   4. Pt will be able to walk 1000 feet on various surfaces without antalgic limp, without       Plan     See Updated POC    Gracie Carr, PT

## 2019-04-03 ENCOUNTER — CLINICAL SUPPORT (OUTPATIENT)
Dept: REHABILITATION | Facility: HOSPITAL | Age: 69
End: 2019-04-03
Payer: MEDICARE

## 2019-04-03 ENCOUNTER — PATIENT MESSAGE (OUTPATIENT)
Dept: ADMINISTRATIVE | Facility: OTHER | Age: 69
End: 2019-04-03

## 2019-04-03 DIAGNOSIS — G89.29 CHRONIC PAIN OF LEFT KNEE: ICD-10-CM

## 2019-04-03 DIAGNOSIS — M25.562 CHRONIC PAIN OF LEFT KNEE: ICD-10-CM

## 2019-04-03 PROCEDURE — 97110 THERAPEUTIC EXERCISES: CPT | Mod: PO

## 2019-04-03 NOTE — PROGRESS NOTES
"  Physical Therapy Daily Treatment Note     Name: Rajeev Newell [facility]  Clinic Number: [id number]    Therapy Diagnosis:   Encounter Diagnosis   Name Primary?    Chronic pain of left knee      Physician: Edmund Rosas MD    Visit Date: 4/3/2019  Physician Orders: PT Eval and Treat   Medical Diagnosis from Referral: M17.12 (ICD-10-CM) - Primary osteoarthritis of left knee  Evaluation Date: 2/22/2019  Authorization Period Expiration: 1/16/2020  Plan of Care Expiration: 3/21/2019  Visit # / Visits authorized: 7/ 20    Time In: 9:02a  Time Out: 10:10a  Total Billable Time: 55 minutes    Precautions: Standard and HTN    Subjective     Pt reports:  that his knee is feeling better with improved motion. Pt also reports that his back is improving  He was compliant with home exercise program.  Response to previous treatment: no increased pain  Functional change: walking more at home    Pain: 0/10  Location: left knee      Objective     Jared received therapeutic exercises to develop strength, ROM and flexibility for 45 minutes including:    Recumbent bike, in available ROM, 10 min for improving ROM/mobility (please use recumbent bike to support back in future visits)  Gastroc st on incline. L2, 3x30s  HSS, 3x30s seated EOM  Knee flex stretch at stairs 2 min  Leg Press 50#  30x  Calf Press 40# 30x  Lat step-up 4" 30x--NP  HS Curl 30x 3#  LAQ 30x 3#  SLR 3x10--3#  LLR 30x--2#  Prone hip ext 30x 2#  Prone flex stretch with strap 2 min--  Prone hang 4 min 3#  Heel slides 20x  HR, 2x10 NP  Mini squats, 10x NP      Manual therapy x 8 min: STM to the distal HS/Proximal gastroc heads to decrease tension and pain NP      Jared received cold pack for 10 minutes to to decrease circulation, pain, and swelling.    4/1/19:  Knee Left  Pain/Dysfunction with Movement    AROM MMT    Flexion 102* NT    Extension -2* NT        Home Exercises Provided and Patient Education Provided     Education provided:   - continue HEP    Written Home " Exercises Provided: Patient instructed to cont prior HEP.  Exercises were reviewed and Jared was able to demonstrate them prior to the end of the session.  Jared demonstrated good  understanding of the education provided.     See EMR under Media for exercises provided prior visit.    Assessment     Jared tolerated tx with progression of ther ex  well. Pt is able to walk short distances without AD with minimal antalgic limp, indicating progress toward STG 2. Pt is progressing with strength and ROM    Jared is progressing well towards his goals.   Pt prognosis is Good.     Pt will continue to benefit from skilled outpatient physical therapy to address the deficits listed in the problem list box on initial evaluation, provide pt/family education and to maximize pt's level of independence in the home and community environment.     Pt's spiritual, cultural and educational needs considered and pt agreeable to plan of care and goals.    Anticipated barriers to physical therapy: none    Goals:   SHORT TERM GOALS (4 weeks):  1. Pt will be compliant with daily HEP to improve ROM.  2.  Pt will demonstrate minimal gait abnormality ambulating without AD x 100 feet.      LONG TERM GOALS (8 WEEKS):   1. Pt will demonstrate AROM of L knee 0*-120* for sitting, standing, squatting.  2. Pt will demonstrate L knee strength of 4+/5 in flexion and extension for improved stability in standing, walking.   3. Pt will be able to ascend/descend one flight of stairs with U UE support, reciprocal gait pattern.   4. Pt will be able to walk 1000 feet on various surfaces without antalgic limp, without       Plan     See Updated POC    Dante Castañeda, PTA

## 2019-04-04 ENCOUNTER — PATIENT MESSAGE (OUTPATIENT)
Dept: ORTHOPEDICS | Facility: CLINIC | Age: 69
End: 2019-04-04

## 2019-04-04 ENCOUNTER — OFFICE VISIT (OUTPATIENT)
Dept: ORTHOPEDICS | Facility: CLINIC | Age: 69
End: 2019-04-04
Payer: MEDICARE

## 2019-04-04 VITALS
HEIGHT: 71 IN | WEIGHT: 276.44 LBS | TEMPERATURE: 98 F | HEART RATE: 65 BPM | SYSTOLIC BLOOD PRESSURE: 143 MMHG | BODY MASS INDEX: 38.7 KG/M2 | DIASTOLIC BLOOD PRESSURE: 84 MMHG

## 2019-04-04 DIAGNOSIS — Z96.652 STATUS POST LEFT KNEE REPLACEMENT: Primary | ICD-10-CM

## 2019-04-04 PROCEDURE — 99999 PR PBB SHADOW E&M-EST. PATIENT-LVL IV: ICD-10-PCS | Mod: PBBFAC,,, | Performed by: NURSE PRACTITIONER

## 2019-04-04 PROCEDURE — 99214 OFFICE O/P EST MOD 30 MIN: CPT | Mod: PBBFAC | Performed by: NURSE PRACTITIONER

## 2019-04-04 PROCEDURE — 99024 PR POST-OP FOLLOW-UP VISIT: ICD-10-PCS | Mod: POP,,, | Performed by: NURSE PRACTITIONER

## 2019-04-04 PROCEDURE — 99999 PR PBB SHADOW E&M-EST. PATIENT-LVL IV: CPT | Mod: PBBFAC,,, | Performed by: NURSE PRACTITIONER

## 2019-04-04 PROCEDURE — 99024 POSTOP FOLLOW-UP VISIT: CPT | Mod: POP,,, | Performed by: NURSE PRACTITIONER

## 2019-04-04 NOTE — PROGRESS NOTES
"Rajeev Newell III presents for initial post-operative visit following a left total knee arthroplasty performed by Dr. Rosas on 3/21/2019. Tolerating pain medication well.      Exam:   Blood pressure (!) 143/84, pulse 65, temperature 98.2 °F (36.8 °C), height 5' 11" (1.803 m), weight 125.4 kg (276 lb 7.3 oz).   Ambulating well with assistive device.  Incision is clean and dry without drainage or erythema. ROM:0-110    Initial post-operative radiographs reviewed today revealing a well fixed and aligned prosthesis.    A/P:  2 weeks s/p left total knee replacement  - The patient was advised to keep the incision clean and dry for the next 24 hours after which he may wash the area with antibacterial soap in the shower. Will not submerge until the incision is completely healed.   - Outpatient PT: ongoing at [facility]  - Continue aspirin for 1 month from surgery.  - Pain medication: pt is only taking tylenol for pain at this time  - Follow up in 4 weeks with me with xray. Pt will call clinic with problems/concerns.     "

## 2019-04-05 ENCOUNTER — CLINICAL SUPPORT (OUTPATIENT)
Dept: REHABILITATION | Facility: HOSPITAL | Age: 69
End: 2019-04-05
Payer: MEDICARE

## 2019-04-05 DIAGNOSIS — G89.29 CHRONIC PAIN OF LEFT KNEE: ICD-10-CM

## 2019-04-05 DIAGNOSIS — M25.562 CHRONIC PAIN OF LEFT KNEE: ICD-10-CM

## 2019-04-05 PROCEDURE — 97110 THERAPEUTIC EXERCISES: CPT | Mod: PO

## 2019-04-05 NOTE — PROGRESS NOTES
"  Physical Therapy Daily Treatment Note     Name: Rajeev Newell [facility]  Clinic Number: [id number]    Therapy Diagnosis:   Encounter Diagnosis   Name Primary?    Chronic pain of left knee      Physician: Edmund Rosas MD    Visit Date: 4/5/2019  Physician Orders: PT Eval and Treat   Medical Diagnosis from Referral: M17.12 (ICD-10-CM) - Primary osteoarthritis of left knee  Evaluation Date: 2/22/2019  Authorization Period Expiration: 1/16/2020  Plan of Care Expiration: 3/21/2019  Visit # / Visits authorized: 8/ 20    Time In: 10:05a  Time Out: 11:10a  Total Billable Time: 55 minutes    Precautions: Standard and HTN    Subjective     Pt reports:  that his knee is feeling better   He was compliant with home exercise program.  Response to previous treatment: no increased pain  Functional change: walking more at home    Pain: 0/10  Location: left knee      Objective     Jared received therapeutic exercises to develop strength, ROM and flexibility for 45 minutes including:    Recumbent bike, in available ROM, 10 min for improving ROM/mobility (please use recumbent bike to support back in future visits)  Gastroc st on incline. L2, 3x30s  HSS, 3x30s seated EOM  Knee flex stretch at stairs 2 min  Leg Press 50#  30x  Calf Press 40# 30x  Lat step-up 4" 30x  HS Curl 30x 3#  LAQ 30x 3#  SLR 3x10--3#  LLR 30x--2#  Prone hip ext 30x 2#  Prone flex stretch with strap 2 min--  Prone hang 4 min 3#  Heel slides 20x        Manual therapy x 8 min: STM to the distal HS/Proximal gastroc heads to decrease tension and pain NP    PROM flex in prone 112 degrees, ext in sitting 0 degrees    Jared received cold pack for 10 minutes to to decrease circulation, pain, and swelling.    4/1/19:  Knee Left  Pain/Dysfunction with Movement    AROM MMT    Flexion 102* NT    Extension -2* NT        Home Exercises Provided and Patient Education Provided     Education provided:   - continue HEP    Written Home Exercises Provided: Patient instructed to " cont prior HEP.  Exercises were reviewed and Jared was able to demonstrate them prior to the end of the session.  Jared demonstrated good  understanding of the education provided.     See EMR under Media for exercises provided prior visit.    Assessment     Jared tolerated tx with progression of ther ex  well. Pt is able to walk  without AD with minimal antalgic limp, indicating progress toward STG 2. Pt is progressing with strength and ROM    Jared is progressing well towards his goals.   Pt prognosis is Good.     Pt will continue to benefit from skilled outpatient physical therapy to address the deficits listed in the problem list box on initial evaluation, provide pt/family education and to maximize pt's level of independence in the home and community environment.     Pt's spiritual, cultural and educational needs considered and pt agreeable to plan of care and goals.    Anticipated barriers to physical therapy: none    Goals:   SHORT TERM GOALS (4 weeks):  1. Pt will be compliant with daily HEP to improve ROM.  2.  Pt will demonstrate minimal gait abnormality ambulating without AD x 100 feet.      LONG TERM GOALS (8 WEEKS):   1. Pt will demonstrate AROM of L knee 0*-120* for sitting, standing, squatting.  2. Pt will demonstrate L knee strength of 4+/5 in flexion and extension for improved stability in standing, walking.   3. Pt will be able to ascend/descend one flight of stairs with U UE support, reciprocal gait pattern.   4. Pt will be able to walk 1000 feet on various surfaces without antalgic limp, without       Plan     See Updated POC    Dante Castañeda, PTA

## 2019-04-08 ENCOUNTER — PATIENT MESSAGE (OUTPATIENT)
Dept: FAMILY MEDICINE | Facility: CLINIC | Age: 69
End: 2019-04-08

## 2019-04-08 ENCOUNTER — CLINICAL SUPPORT (OUTPATIENT)
Dept: REHABILITATION | Facility: HOSPITAL | Age: 69
End: 2019-04-08
Payer: MEDICARE

## 2019-04-08 DIAGNOSIS — I10 ESSENTIAL HYPERTENSION: Primary | ICD-10-CM

## 2019-04-08 DIAGNOSIS — M25.562 CHRONIC PAIN OF LEFT KNEE: ICD-10-CM

## 2019-04-08 DIAGNOSIS — G89.29 CHRONIC PAIN OF LEFT KNEE: ICD-10-CM

## 2019-04-08 PROCEDURE — 97110 THERAPEUTIC EXERCISES: CPT | Mod: PO | Performed by: PHYSICAL THERAPIST

## 2019-04-08 NOTE — TELEPHONE ENCOUNTER
Max recommended dose of tylenol is 3000mg/day.   Last liver enzymes were normal. Ok to continue.   Schedule CMP in 2 weeks.

## 2019-04-08 NOTE — PROGRESS NOTES
Physical Therapy Daily Treatment Note     Name: Rajeev Newell [facility]  Clinic Number: [id number]    Therapy Diagnosis:   Encounter Diagnosis   Name Primary?    Chronic pain of left knee      Physician: Edmund Rosas MD    Visit Date: 4/8/2019  Physician Orders: PT Eval and Treat   Medical Diagnosis from Referral: M17.12 (ICD-10-CM) - Primary osteoarthritis of left knee  Evaluation Date: 2/22/2019  Authorization Period Expiration: 1/16/2020  Plan of Care Expiration: 3/21/2019  Visit # / Visits authorized: 8/ 20    Time In: 100p  Time Out: 205p  Total Billable Time: 55 minutes    Precautions: Standard and HTN    Subjective     Pt reports: Taking less Tylenol. Bandage off 4/4/19. States he has noticed significant reduction in swelling over the last few days. Stopped using AD day his bandage was removed.   He was compliant with home exercise program.  Response to previous treatment: no increased pain  Functional change: walking more at home    Pain: 2-3/10  Location: left knee      Objective     Jared received therapeutic exercises to develop strength, ROM and flexibility for 45 minutes including:    Recumbent bike, in available ROM, 10 min for improving ROM/mobility (please use recumbent bike to support back in future visits)  Gastroc st on incline. L3, 3x30s  HSS, 3x30s LLE longsit  Knee flex stretch at stairs 2 min  Leg Press 50#  30x  Calf Press 40# 30x  HS Curl 30x 3#  LAQ 30x 3#  SLR 3x10--3#  SL hip abd 30x--2#  Prone hip ext 30x 2#  Prone flex stretch PT applying manual overpressure 2 min--  Prone hang 3 min 2#  Heel slides 20x, PT overpressure last 2 reps  Mini squats, 3x10  Step ups, 10x  Manual extension stretch, 3 min (oscillations)      Jared received cold pack for 10 minutes to to decrease circulation, pain, and swelling.    4/9/19:  Left Knee       AROM PROM MMT   Flexion 112* 118* NT   Extension -2* 0* NT         Home Exercises Provided and Patient Education Provided     Education provided:   -  continue HEP    Written Home Exercises Provided: Patient instructed to cont prior HEP.  Exercises were reviewed and Jared was able to demonstrate them prior to the end of the session.  Jared demonstrated good  understanding of the education provided.     See EMR under Media for exercises provided prior visit.    Assessment     Jared tolerated session well. Continues to show incremental gains in AROM of the L knee. No pain with step ups today, indicating functional progress toward PLOF.     Jared is progressing well towards his goals.   Pt prognosis is Good.     Pt will continue to benefit from skilled outpatient physical therapy to address the deficits listed in the problem list box on initial evaluation, provide pt/family education and to maximize pt's level of independence in the home and community environment.     Pt's spiritual, cultural and educational needs considered and pt agreeable to plan of care and goals.    Anticipated barriers to physical therapy: none    Goals:   SHORT TERM GOALS (4 weeks):  1. Pt will be compliant with daily HEP to improve ROM.  2.  Pt will demonstrate minimal gait abnormality ambulating without AD x 100 feet.      LONG TERM GOALS (8 WEEKS):   1. Pt will demonstrate AROM of L knee 0*-120* for sitting, standing, squatting.  2. Pt will demonstrate L knee strength of 4+/5 in flexion and extension for improved stability in standing, walking.   3. Pt will be able to ascend/descend one flight of stairs with U UE support, reciprocal gait pattern.   4. Pt will be able to walk 1000 feet on various surfaces without antalgic limp, without       Plan     See Updated POC    Gracie Carr, PT

## 2019-04-10 ENCOUNTER — CLINICAL SUPPORT (OUTPATIENT)
Dept: REHABILITATION | Facility: HOSPITAL | Age: 69
End: 2019-04-10
Payer: MEDICARE

## 2019-04-10 DIAGNOSIS — G89.29 CHRONIC PAIN OF LEFT KNEE: ICD-10-CM

## 2019-04-10 DIAGNOSIS — M25.562 CHRONIC PAIN OF LEFT KNEE: ICD-10-CM

## 2019-04-10 PROCEDURE — 97110 THERAPEUTIC EXERCISES: CPT | Mod: PO | Performed by: PHYSICAL THERAPIST

## 2019-04-10 NOTE — PROGRESS NOTES
Physical Therapy Daily Treatment Note     Name: Rajeev Newell [facility]  Clinic Number: [id number]    Therapy Diagnosis:   Encounter Diagnosis   Name Primary?    Chronic pain of left knee      Physician: Edmund Rosas MD    Visit Date: 4/10/2019  Physician Orders: PT Eval and Treat   Medical Diagnosis from Referral: M17.12 (ICD-10-CM) - Primary osteoarthritis of left knee  Evaluation Date: 2/22/2019  Authorization Period Expiration: 1/16/2020  Plan of Care Expiration: 3/21/2019  Visit # / Visits authorized: 9/ 20    Time In: 10:00AM  Time Out: 11:05AM  Total Billable Time: 65 minutes    Precautions: Standard and HTN    Subjective     Pt reports: Sleeping better. Returned to work. Swelling is decreasing.  He was compliant with home exercise program.  Response to previous treatment: good fresponse  Functional change: walking more at home, sleeping better    Pain: 2-3/10  Location: left knee      Objective     Jared received therapeutic exercises to develop strength, ROM and flexibility for 55 minutes including:    Recumbent bike, in available ROM, 10 min for improving ROM/mobility (please use recumbent bike to support back in future visits)  Gastroc st on incline. L3, 3x30s  HSS, 3x30s LLE longsit  Knee flex stretch at stairs 2 min  Leg Press 50#  30x  Calf Press 40# 30x  HS Curl 30x 3#  LAQ 30x 3#  SLR 3x10--3#  SL hip abd 30x--2#  Prone hip ext 30x 2#  Prone flex stretch PT applying manual overpressure 2 min--  Prone hang 3 min 2#  Heel slides 20x, PT overpressure last 2 reps  Mini squats, 3x10  Step ups, 10x  Manual extension stretch, 3 min (oscillations)      Jared received cold pack for 10 minutes to to decrease circulation, pain, and swelling.    4/9/19:  Left Knee       AROM PROM MMT   Flexion 112* 118* NT   Extension -2* 0* NT         Home Exercises Provided and Patient Education Provided     Education provided:   - continue HEP    Written Home Exercises Provided: Patient instructed to cont prior  HEP.  Exercises were reviewed and Jared was able to demonstrate them prior to the end of the session.  Jared demonstrated good  understanding of the education provided.     See EMR under Media for exercises provided prior visit.    Assessment     ROM gradually improving. Pain primarily at end ranges with stretching. Some medial knee pain occasionally with valgus stress positions.  Quad tone returning. Swelling decreased.    Jared is progressing well towards his goals.   Pt prognosis is Good.     Pt will continue to benefit from skilled outpatient physical therapy to address the deficits listed in the problem list box on initial evaluation, provide pt/family education and to maximize pt's level of independence in the home and community environment.     Pt's spiritual, cultural and educational needs considered and pt agreeable to plan of care and goals.    Anticipated barriers to physical therapy: none    Goals:   SHORT TERM GOALS (4 weeks):  1. Pt will be compliant with daily HEP to improve ROM.  2.  Pt will demonstrate minimal gait abnormality ambulating without AD x 100 feet.      LONG TERM GOALS (8 WEEKS):   1. Pt will demonstrate AROM of L knee 0*-120* for sitting, standing, squatting.  2. Pt will demonstrate L knee strength of 4+/5 in flexion and extension for improved stability in standing, walking.   3. Pt will be able to ascend/descend one flight of stairs with U UE support, reciprocal gait pattern.   4. Pt will be able to walk 1000 feet on various surfaces without antalgic limp, without       Plan     See Updated POC    Denise Schaffer, PT

## 2019-04-12 ENCOUNTER — CLINICAL SUPPORT (OUTPATIENT)
Dept: REHABILITATION | Facility: HOSPITAL | Age: 69
End: 2019-04-12
Payer: MEDICARE

## 2019-04-12 DIAGNOSIS — M25.562 CHRONIC PAIN OF LEFT KNEE: ICD-10-CM

## 2019-04-12 DIAGNOSIS — G89.29 CHRONIC PAIN OF LEFT KNEE: ICD-10-CM

## 2019-04-12 PROCEDURE — 97110 THERAPEUTIC EXERCISES: CPT | Mod: PO | Performed by: PHYSICAL THERAPIST

## 2019-04-12 PROCEDURE — 97140 MANUAL THERAPY 1/> REGIONS: CPT | Mod: PO | Performed by: PHYSICAL THERAPIST

## 2019-04-12 NOTE — PROGRESS NOTES
Physical Therapy Daily Treatment Note     Name: Rajeev Newell [facility]  Clinic Number: [id number]    Therapy Diagnosis:   Encounter Diagnosis   Name Primary?    Chronic pain of left knee      Physician: Edmund Rosas MD    Visit Date: 4/12/2019  Physician Orders: PT Eval and Treat   Medical Diagnosis from Referral: M17.12 (ICD-10-CM) - Primary osteoarthritis of left knee  Evaluation Date: 2/22/2019  Authorization Period Expiration: 1/16/2020  Plan of Care Expiration: 3/21/2019  Visit # / Visits authorized: 10/ 20    Time In: 10:01AM  Time Out: 1110a  Total Billable Time: 59 minutes    Precautions: Standard and HTN    Subjective     Pt reports: He has had some soreness in the adductor muscles of the LLE, so has held off on doing SLR for HEP. Is doing steps reciprocally at work.   He was compliant with home exercise program.  Response to previous treatment: adductor soreness  Functional change: improving ease with stairs at work    Pain: 0/10  Location: left knee      Objective     Jared received therapeutic exercises to develop strength, ROM and flexibility for 50 minutes including:    Recumbent bike, in available ROM, 10 min for improving ROM/mobility (please use recumbent bike to support back in future visits)  Gastroc st on incline. L3, 3x30s  HSS, 3x30s LLE longsit  Knee flex stretch at stairs 2 min  Leg Press 50#  30x  Calf Press 40# 30x  HS Curl 3x10, 5#  Precor LE Ext, 30#, 2x10  SLR 3x10--3#--NP due to pain  SL hip abd 30x--3#  Prone hip ext 30x 3#  Prone hang 4 min 3#  Heel slides 10x, PT overpressure last 2 reps  Mini squats, 3x10  Step ups, 20x forward  Lat step ups, 10x  Eccentric quad (step down LLE) 2 in box, 20x  Manual extension stretch, 3 min (oscillations)      Manual therapy of STM to the L Adductors with mobilization stick followed by manual adductor stretch with overpressure at endrange x 9 min      Jared received cold pack for 10 minutes to to decrease circulation, pain, and  swelling.    4/12/19:  Left Knee       AROM PROM MMT   Flexion 112* 118* NT   Extension -2* 0* NT         Home Exercises Provided and Patient Education Provided     Education provided:   - continue HEP    Written Home Exercises Provided: Patient instructed to cont prior HEP.  Exercises were reviewed and Jared was able to demonstrate them prior to the end of the session.  Jared demonstrated good  understanding of the education provided.     See EMR under Media for exercises provided prior visit.    Assessment     Decreased pain following adductor mobilization and stretch. Minimal antalgic limp noted today. Decreased quad strength continues to limit stair negotiation, especially descending stairs due to eccentric weakness.     Jared is progressing well towards his goals.   Pt prognosis is Good.     Pt will continue to benefit from skilled outpatient physical therapy to address the deficits listed in the problem list box on initial evaluation, provide pt/family education and to maximize pt's level of independence in the home and community environment.     Pt's spiritual, cultural and educational needs considered and pt agreeable to plan of care and goals.    Anticipated barriers to physical therapy: none    Goals:   SHORT TERM GOALS (4 weeks):  1. Pt will be compliant with daily HEP to improve ROM.  2.  Pt will demonstrate minimal gait abnormality ambulating without AD x 100 feet.      LONG TERM GOALS (8 WEEKS):   1. Pt will demonstrate AROM of L knee 0*-120* for sitting, standing, squatting.  2. Pt will demonstrate L knee strength of 4+/5 in flexion and extension for improved stability in standing, walking.   3. Pt will be able to ascend/descend one flight of stairs with U UE support, reciprocal gait pattern.   4. Pt will be able to walk 1000 feet on various surfaces without antalgic limp, without       Plan     See Updated POC    Gracie Carr, PT

## 2019-04-15 ENCOUNTER — CLINICAL SUPPORT (OUTPATIENT)
Dept: REHABILITATION | Facility: HOSPITAL | Age: 69
End: 2019-04-15
Payer: MEDICARE

## 2019-04-15 DIAGNOSIS — G89.29 CHRONIC PAIN OF LEFT KNEE: ICD-10-CM

## 2019-04-15 DIAGNOSIS — M25.562 CHRONIC PAIN OF LEFT KNEE: ICD-10-CM

## 2019-04-15 PROCEDURE — 97140 MANUAL THERAPY 1/> REGIONS: CPT | Mod: PO | Performed by: PHYSICAL THERAPIST

## 2019-04-15 PROCEDURE — 97110 THERAPEUTIC EXERCISES: CPT | Mod: PO | Performed by: PHYSICAL THERAPIST

## 2019-04-15 NOTE — PROGRESS NOTES
Physical Therapy Daily Treatment Note     Name: Rajeev Newell [facility]  Clinic Number: [id number]    Therapy Diagnosis:   Encounter Diagnosis   Name Primary?    Chronic pain of left knee      Physician: Edmund Rosas MD    Visit Date: 4/15/2019  Physician Orders: PT Eval and Treat   Medical Diagnosis from Referral: M17.12 (ICD-10-CM) - Primary osteoarthritis of left knee  Evaluation Date: 2/22/2019  Authorization Period Expiration: 1/16/2020  Plan of Care Expiration: 3/21/2019  Visit # / Visits authorized: 11/ 20    Time In: 101p  Time Out: 205p  Total Billable Time: 54 minutes    Precautions: Standard and HTN    Subjective     Pt reports: He has had some soreness in the adductor muscles of the LLE, so has held off on doing SLR for HEP. Is doing steps reciprocally at work.   He was compliant with home exercise program.  Response to previous treatment: adductor soreness  Functional change: improving ease with stairs at work    Pain: 0/10  Location: left knee      Objective     Jared received therapeutic exercises to develop strength, ROM and flexibility for 46 minutes including:    Recumbent bike, in available ROM, 10 min for improving ROM/mobility (please use recumbent bike to support back in future visits)  Gastroc st on incline. L3, 3x30s  HSS, 3x30s LLE longsit--NP  Knee flex stretch at stairs 2 min  Leg Press 60#  30x  Calf Press 40# 30x  HS Curl 3x10, 5#  Precor LE Ext, 30#, 3x10  SLR 3x10 (no weight)  SL hip abd 30x--3#--NP  Prone hip ext 30x 3#--NP  Prone hang 3 min 5#  Heel slides 2 min  Manual knee flexion stretch in prone, 3 min  Mini squats, 3x10  Step ups, 3x10  Lat step ups, 2x10  Eccentric quad (step down LLE) bottom step, 10x  Manual extension stretch, 3 min (oscillations)  SLS, 5x10s LLE      Manual therapy of CFM/DTM to the semimembranosus and semitendinosis tendons, 8 min      Jared received cold pack for 10 minutes to to decrease circulation, pain, and swelling.    4/15/19:  Left Knee        AROM PROM MMT   Flexion 116* 120* (empty endfeel) NT   Extension -2* 0* NT         Home Exercises Provided and Patient Education Provided     Education provided:   - continue HEP    Written Home Exercises Provided: Patient instructed to cont prior HEP.  Exercises were reviewed and Jared was able to demonstrate them prior to the end of the session.  Jared demonstrated good  understanding of the education provided.     See EMR under Media for exercises provided prior visit.    Assessment     No pain with SLR today. Hip adductor strain has resolved. Pt with improved ROM following CFM to the hamstring tendons today.      Jared is progressing well towards his goals.   Pt prognosis is Good.     Pt will continue to benefit from skilled outpatient physical therapy to address the deficits listed in the problem list box on initial evaluation, provide pt/family education and to maximize pt's level of independence in the home and community environment.     Pt's spiritual, cultural and educational needs considered and pt agreeable to plan of care and goals.    Anticipated barriers to physical therapy: none    Goals:   SHORT TERM GOALS (4 weeks):  1. Pt will be compliant with daily HEP to improve ROM.  2.  Pt will demonstrate minimal gait abnormality ambulating without AD x 100 feet.      LONG TERM GOALS (8 WEEKS):   1. Pt will demonstrate AROM of L knee 0*-120* for sitting, standing, squatting.  2. Pt will demonstrate L knee strength of 4+/5 in flexion and extension for improved stability in standing, walking.   3. Pt will be able to ascend/descend one flight of stairs with U UE support, reciprocal gait pattern.   4. Pt will be able to walk 1000 feet on various surfaces without antalgic limp, without       Plan     See Updated POC    Gracie Carr, PT

## 2019-04-17 ENCOUNTER — CLINICAL SUPPORT (OUTPATIENT)
Dept: REHABILITATION | Facility: HOSPITAL | Age: 69
End: 2019-04-17
Payer: MEDICARE

## 2019-04-17 DIAGNOSIS — M25.562 CHRONIC PAIN OF LEFT KNEE: ICD-10-CM

## 2019-04-17 DIAGNOSIS — G89.29 CHRONIC PAIN OF LEFT KNEE: ICD-10-CM

## 2019-04-17 PROCEDURE — 97110 THERAPEUTIC EXERCISES: CPT | Mod: PO

## 2019-04-17 PROCEDURE — 97140 MANUAL THERAPY 1/> REGIONS: CPT | Mod: PO

## 2019-04-17 NOTE — PROGRESS NOTES
Physical Therapy Daily Treatment Note     Name: Rajeev Newell [facility]  Clinic Number: [id number]    Therapy Diagnosis:   Encounter Diagnosis   Name Primary?    Chronic pain of left knee      Physician: Edmund Rosas MD    Visit Date: 4/17/2019  Physician Orders: PT Eval and Treat   Medical Diagnosis from Referral: M17.12 (ICD-10-CM) - Primary osteoarthritis of left knee  Evaluation Date: 2/22/2019  Authorization Period Expiration: 1/16/2020  Plan of Care Expiration: 3/21/2019  Visit # / Visits authorized: 12/ 20    Time In: 10:05  Time Out: 11:15  Total Billable Time: 55 minutes    Precautions: Standard and HTN    Subjective     Pt reports: He continues to have some soreness in the adductor muscles of the LLE,  Is doing steps reciprocally at work.   He was compliant with home exercise program.  Response to previous treatment: adductor soreness  Functional change: improving ease with stairs at work    Pain: 0/10  Location: left knee      Objective     Jared received therapeutic exercises to develop strength, ROM and flexibility for 46 minutes including:    Recumbent bike, in available ROM, 10 min for improving ROM/mobility (please use recumbent bike to support back in future visits)  Gastroc st on incline. L3, 3x30s  HSS, 3x30s LLE longsit--  Knee flex stretch at stairs 2 min  Leg Press 60#  30x  Calf Press 40# 30x  HS Curl 3x10, 5#  Precor LE Ext, 30#, 3x10  SLR 3x10 (no weight)  SL hip abd 30x--3#--NP  Prone hip ext 30x 3#--NP  Prone hang 3 min 5#  Heel slides 2 min  Manual knee flexion stretch in prone, 3 min  Mini squats, 3x10  Step ups, 3x10  Lat step ups, 2x10  Eccentric quad (step down LLE) bottom step, 10x  Manual extension stretch, 3 min (oscillations)  SLS, 5x10s LLE      Manual therapy of CFM/DTM to the semimembranosus and semitendinosis tendons, 8 min      Jared received cold pack for 10 minutes to to decrease circulation, pain, and swelling.    4/15/19:  Left Knee       AROM PROM MMT   Flexion 116*  120* (empty endfeel) NT   Extension -2* 0* NT         Home Exercises Provided and Patient Education Provided     Education provided:   - continue HEP    Written Home Exercises Provided: Patient instructed to cont prior HEP.  Exercises were reviewed and Jared was able to demonstrate them prior to the end of the session.  Jared demonstrated good  understanding of the education provided.     See EMR under Media for exercises provided prior visit.    Assessment   Pt yoselin tx with ther ex, manual tx well.  No pain with exs today. Hip adductor strain has resolved. Pt with improved ROM following CFM to the hamstring tendons today.      Jared is progressing well towards his goals.   Pt prognosis is Good.     Pt will continue to benefit from skilled outpatient physical therapy to address the deficits listed in the problem list box on initial evaluation, provide pt/family education and to maximize pt's level of independence in the home and community environment.     Pt's spiritual, cultural and educational needs considered and pt agreeable to plan of care and goals.    Anticipated barriers to physical therapy: none    Goals:   SHORT TERM GOALS (4 weeks):  1. Pt will be compliant with daily HEP to improve ROM.  2.  Pt will demonstrate minimal gait abnormality ambulating without AD x 100 feet.      LONG TERM GOALS (8 WEEKS):   1. Pt will demonstrate AROM of L knee 0*-120* for sitting, standing, squatting.  2. Pt will demonstrate L knee strength of 4+/5 in flexion and extension for improved stability in standing, walking.   3. Pt will be able to ascend/descend one flight of stairs with U UE support, reciprocal gait pattern.   4. Pt will be able to walk 1000 feet on various surfaces without antalgic limp, without       Plan     See Updated POC    Dante Castañeda, PTA

## 2019-04-22 ENCOUNTER — CLINICAL SUPPORT (OUTPATIENT)
Dept: REHABILITATION | Facility: HOSPITAL | Age: 69
End: 2019-04-22
Payer: MEDICARE

## 2019-04-22 DIAGNOSIS — G89.29 CHRONIC PAIN OF LEFT KNEE: ICD-10-CM

## 2019-04-22 DIAGNOSIS — M25.562 CHRONIC PAIN OF LEFT KNEE: ICD-10-CM

## 2019-04-22 PROCEDURE — 97110 THERAPEUTIC EXERCISES: CPT | Mod: PO

## 2019-04-22 PROCEDURE — 97140 MANUAL THERAPY 1/> REGIONS: CPT | Mod: PO

## 2019-04-22 NOTE — PROGRESS NOTES
"  Physical Therapy Daily Treatment Note     Name: Rajeev Newell [facility]  Clinic Number: [id number]    Therapy Diagnosis:   Encounter Diagnosis   Name Primary?    Chronic pain of left knee      Physician: Edmund Rosas MD    Visit Date: 4/22/2019  Physician Orders: PT Eval and Treat   Medical Diagnosis from Referral: M17.12 (ICD-10-CM) - Primary osteoarthritis of left knee  Evaluation Date: 2/22/2019  Authorization Period Expiration: 1/16/2020  Plan of Care Expiration: 3/21/2019  Visit # / Visits authorized: 14/ 20    Time In: 1002  Time Out:1115  Total Billable Time: 55 minutes    Precautions: Standard and HTN    Subjective     Pt reports: he is having his usual left knee stiffness today. Patient states his adductor soreness has resolved but he does notice "popping" in his media hamstrings when bending his knee. He was compliant with home exercise program.  Response to previous treatment: adductor soreness  Functional change: improving ease with stairs at work    Pain: 0/10  Location: left knee      Objective     Jared received therapeutic exercises to develop strength, ROM and flexibility for 46 minutes including:    Recumbent bike, in available ROM, 10 min for improving ROM/mobility (please use recumbent bike to support back in future visits)  Gastroc st on incline. L3, 3x30s  HSS, 3x30s LLE longsit  Knee flex stretch at stairs x  2 minutes  Leg Press 60#  30x  Calf Press 40# 30x  HS Curl 3x10, 5# (not performed today)  Precor LE Ext, 30#, 3x10  Step ups, 3x10  Lat step ups, 2x10  Eccentric quad (step down LLE) bottom step, 10x    SLR 3x10 (no weight)  SL hip abd 30x--3#--NP  Prone hip ext 30x 3#--NP  Prone hang 3 min 5#  Heel slides 2 min  Manual knee flexion stretch in prone, 3 min  Manual extension stretch, 3 min (oscillations)  SLS, 5x10s LLE    Manual therapy of CFM/IASTM with tool to the semimembranosus and semitendinosis tendons, 8 min (in prone).     Jared received cold pack for 10 minutes to to " decrease circulation, pain, and swelling.    4/22/ 19:  Left Knee       AROM PROM MMT   Flexion 116* 120* (empty endfeel) NT   Extension -2* 0* NT     Home Exercises Provided and Patient Education Provided     Education provided:   - continue HEP    Written Home Exercises Provided: Patient instructed to cont prior HEP.  Exercises were reviewed and Jared was able to demonstrate them prior to the end of the session.  Jared demonstrated good  understanding of the education provided.     See EMR under Media for exercises provided prior visit.    Assessment   Jared tolerated treatment very well today. Patient required cueing to reduce utilization of BUE when performing step ups with good correction achieved. Tissue restriction noted through distal medial hamstrings with appropriate petechia achieved with manual therapy. Pain before reaching end range knee flexion but patient allows for overpressure in order to improve ROM.      Ajred is progressing well towards his goals.   Pt prognosis is Good.     Pt will continue to benefit from skilled outpatient physical therapy to address the deficits listed in the problem list box on initial evaluation, provide pt/family education and to maximize pt's level of independence in the home and community environment.     Pt's spiritual, cultural and educational needs considered and pt agreeable to plan of care and goals.    Anticipated barriers to physical therapy: none    Goals:   SHORT TERM GOALS (4 weeks):  1. Pt will be compliant with daily HEP to improve ROM.  2.  Pt will demonstrate minimal gait abnormality ambulating without AD x 100 feet.      LONG TERM GOALS (8 WEEKS):   1. Pt will demonstrate AROM of L knee 0*-120* for sitting, standing, squatting.  2. Pt will demonstrate L knee strength of 4+/5 in flexion and extension for improved stability in standing, walking.   3. Pt will be able to ascend/descend one flight of stairs with U UE support, reciprocal gait pattern.   4. Pt  will be able to walk 1000 feet on various surfaces without antalgic limp, without       Plan     See Updated POC    Dayanara Mcdermott, PTA

## 2019-04-23 ENCOUNTER — LAB VISIT (OUTPATIENT)
Dept: LAB | Facility: HOSPITAL | Age: 69
End: 2019-04-23
Attending: FAMILY MEDICINE
Payer: MEDICARE

## 2019-04-23 ENCOUNTER — PATIENT MESSAGE (OUTPATIENT)
Dept: ADMINISTRATIVE | Facility: OTHER | Age: 69
End: 2019-04-23

## 2019-04-23 DIAGNOSIS — I10 ESSENTIAL HYPERTENSION: ICD-10-CM

## 2019-04-23 LAB
ALBUMIN SERPL BCP-MCNC: 3.9 G/DL (ref 3.5–5.2)
ALP SERPL-CCNC: 50 U/L (ref 55–135)
ALT SERPL W/O P-5'-P-CCNC: 20 U/L (ref 10–44)
ANION GAP SERPL CALC-SCNC: 7 MMOL/L (ref 8–16)
AST SERPL-CCNC: 23 U/L (ref 10–40)
BILIRUB SERPL-MCNC: 0.6 MG/DL (ref 0.1–1)
BUN SERPL-MCNC: 20 MG/DL (ref 8–23)
CALCIUM SERPL-MCNC: 9.9 MG/DL (ref 8.7–10.5)
CHLORIDE SERPL-SCNC: 105 MMOL/L (ref 95–110)
CO2 SERPL-SCNC: 26 MMOL/L (ref 23–29)
CREAT SERPL-MCNC: 1.1 MG/DL (ref 0.5–1.4)
EST. GFR  (AFRICAN AMERICAN): >60 ML/MIN/1.73 M^2
EST. GFR  (NON AFRICAN AMERICAN): >60 ML/MIN/1.73 M^2
GLUCOSE SERPL-MCNC: 113 MG/DL (ref 70–110)
POTASSIUM SERPL-SCNC: 4.4 MMOL/L (ref 3.5–5.1)
PROT SERPL-MCNC: 7.4 G/DL (ref 6–8.4)
SODIUM SERPL-SCNC: 138 MMOL/L (ref 136–145)

## 2019-04-23 PROCEDURE — 80053 COMPREHEN METABOLIC PANEL: CPT

## 2019-04-23 PROCEDURE — 36415 COLL VENOUS BLD VENIPUNCTURE: CPT | Mod: PN

## 2019-04-24 ENCOUNTER — CLINICAL SUPPORT (OUTPATIENT)
Dept: REHABILITATION | Facility: HOSPITAL | Age: 69
End: 2019-04-24
Payer: MEDICARE

## 2019-04-24 DIAGNOSIS — M25.562 CHRONIC PAIN OF LEFT KNEE: ICD-10-CM

## 2019-04-24 DIAGNOSIS — G89.29 CHRONIC PAIN OF LEFT KNEE: ICD-10-CM

## 2019-04-24 PROCEDURE — 97140 MANUAL THERAPY 1/> REGIONS: CPT | Mod: PO

## 2019-04-24 PROCEDURE — 97110 THERAPEUTIC EXERCISES: CPT | Mod: PO

## 2019-04-24 NOTE — PROGRESS NOTES
Physical Therapy Daily Treatment Note     Name: Rajeev Newell [facility]  Clinic Number: [id number]    Therapy Diagnosis:   Encounter Diagnosis   Name Primary?    Chronic pain of left knee      Physician: Edmund Rosas MD    Visit Date: 4/24/2019  Physician Orders: PT Eval and Treat   Medical Diagnosis from Referral: M17.12 (ICD-10-CM) - Primary osteoarthritis of left knee  Evaluation Date: 2/22/2019  Authorization Period Expiration: 1/16/2020  Plan of Care Expiration: 3/21/2019  Visit # / Visits authorized: 15/ 20    Time In: 1000  Time Out:1115  Total Billable Time: 55 minutes    Precautions: Standard and HTN    Subjective     Pt reports: he is having his usual left knee stiffness but no pn. He was compliant with home exercise program.  Response to previous treatment: adductor soreness  Functional change: improving ease with stairs at work    Pain: 0/10  Location: left knee      Objective     Jared received therapeutic exercises to develop strength, ROM and flexibility for 46 minutes including:    Recumbent bike, in available ROM, 10 min for improving ROM/mobility (please use recumbent bike to support back in future visits)  Gastroc st on incline. L3, 3x30s  HSS, 3x30s LLE longsit  Knee flex stretch at stairs x  2 minutes  Leg Press 70#  30x  Calf Press 40# 30x  HS Curl 3x10, 5# (not performed today)  Precor LE Ext, 30#, 3x10  Step ups, 3x10  Lat step ups, 3x10  Eccentric quad (step down LLE) bottom step, 10x    SLR 3x10 2# (no lag)  SL hip abd 30x--2#  Prone hip ext 30x 2#  Prone hang 4 min 5#  Heel slides 2 min NP  Manual knee flexion stretch with strap in prone, 3 min  Manual extension stretch, 3 min (oscillations)  SLS, 5x15s  B LE    Manual therapy of CFM/IASTM with tool to the semimembranosus and semitendinosis tendons, 8 min (in prone).     Jared received cold pack for 10 minutes to to decrease circulation, pain, and swelling.    4/22/ 19:  Left Knee       AROM PROM MMT   Flexion 116* 120* (empty  endfeel) NT   Extension -2* 0* NT     Home Exercises Provided and Patient Education Provided     Education provided:   - continue HEP    Written Home Exercises Provided: Patient instructed to cont prior HEP.  Exercises were reviewed and Jared was able to demonstrate them prior to the end of the session.  Jared demonstrated good  understanding of the education provided.     See EMR under Media for exercises provided prior visit.    Assessment   Jared tolerated treatment with progression of ther ex very well today. No ext lag noted with SLR today.  Tissue restriction noted through distal medial hamstrings with appropriate petechia achieved with manual therapy palpable/visible  trigger pt noted medial tendon. Pain before reaching end range knee flexion but patient allows for overpressure in order to improve ROM.      Jared is progressing well towards his goals.   Pt prognosis is Good.     Pt will continue to benefit from skilled outpatient physical therapy to address the deficits listed in the problem list box on initial evaluation, provide pt/family education and to maximize pt's level of independence in the home and community environment.     Pt's spiritual, cultural and educational needs considered and pt agreeable to plan of care and goals.    Anticipated barriers to physical therapy: none    Goals:   SHORT TERM GOALS (4 weeks):  1. Pt will be compliant with daily HEP to improve ROM.  2.  Pt will demonstrate minimal gait abnormality ambulating without AD x 100 feet.      LONG TERM GOALS (8 WEEKS):   1. Pt will demonstrate AROM of L knee 0*-120* for sitting, standing, squatting.  2. Pt will demonstrate L knee strength of 4+/5 in flexion and extension for improved stability in standing, walking.   3. Pt will be able to ascend/descend one flight of stairs with U UE support, reciprocal gait pattern.   4. Pt will be able to walk 1000 feet on various surfaces without antalgic limp, without       Plan     See Updated  POC    Dante Castañeda, PTA

## 2019-04-26 ENCOUNTER — CLINICAL SUPPORT (OUTPATIENT)
Dept: REHABILITATION | Facility: HOSPITAL | Age: 69
End: 2019-04-26
Attending: NURSE PRACTITIONER
Payer: MEDICARE

## 2019-04-26 DIAGNOSIS — G89.29 CHRONIC PAIN OF LEFT KNEE: ICD-10-CM

## 2019-04-26 DIAGNOSIS — M25.562 CHRONIC PAIN OF LEFT KNEE: ICD-10-CM

## 2019-04-26 PROCEDURE — 97110 THERAPEUTIC EXERCISES: CPT | Mod: PO

## 2019-04-26 NOTE — PROGRESS NOTES
Physical Therapy Daily Treatment Note     Name: Rajeev Newell [facility]  Clinic Number: [id number]    Therapy Diagnosis:   Encounter Diagnosis   Name Primary?    Chronic pain of left knee      Physician: Edmund Rosas MD    Visit Date: 4/26/2019  Physician Orders: PT Eval and Treat   Medical Diagnosis from Referral: M17.12 (ICD-10-CM) - Primary osteoarthritis of left knee  Evaluation Date: 2/22/2019  Authorization Period Expiration: 1/16/2020  Plan of Care Expiration: 3/21/2019  Visit # / Visits authorized: 16/ 20    Time In: 1000  Time Out:1115  Total Billable Time: 55 minutes    Precautions: Standard and HTN    Subjective     Pt reports: he is w/o c/o pn, feels that he is walking better. He was compliant with home exercise program.  Response to previous treatment: adductor soreness  Functional change: improving ease with stairs at work    Pain: 0/10  Location: left knee      Objective     Jared received therapeutic exercises to develop strength, ROM and flexibility for 50 minutes including:    Recumbent bike, in available ROM, 10 min for improving ROM/mobility (please use recumbent bike to support back in future visits)  Gastroc st on incline. L3, 3x30s  HSS, 3x30s LLE longsit  Knee flex stretch at stairs x  2 minutes  Leg Press 70#  30x  Calf Press 60# 30x  HS Curl 3x10, 5# (not performed today)  Precor LE Ext, 35#, 3x10  Step ups, 3x10  Lat step ups, 3x10  Eccentric quad (step down LLE) bottom step, 10x    SLR 3x10 2# (no lag)  SL hip abd 30x--2#  Prone hip ext 30x 2#  Prone hang 4 min 5#  Heel slides 2 min NP  Manual knee flexion stretch with strap in prone, 3 min  Manual extension stretch, 3 min (oscillations)  SLS, 5x15s  B LE    Manual therapy of CFM/IASTM with tool to the semimembranosus and semitendinosis tendons, 0 min (in prone).  NP    Jared received cold pack for 10 minutes to to decrease circulation, pain, and swelling.    4/22/ 19:  Left Knee       AROM PROM MMT   Flexion 116* 120* (empty  endfeel) NT   Extension -2* 0* NT     Home Exercises Provided and Patient Education Provided     Education provided:   - continue HEP    Written Home Exercises Provided: Patient instructed to cont prior HEP.  Exercises were reviewed and Jared was able to demonstrate them prior to the end of the session.  Jared demonstrated good  understanding of the education provided.     See EMR under Media for exercises provided prior visit.    Assessment   Jared tolerated treatment with progression of ther ex very well today. No ext lag noted with SLR again today.   Pain before reaching end range knee flexion but patient allows for overpressure in order to improve ROM.      Jared is progressing well towards his goals.   Pt prognosis is Good.     Pt will continue to benefit from skilled outpatient physical therapy to address the deficits listed in the problem list box on initial evaluation, provide pt/family education and to maximize pt's level of independence in the home and community environment.     Pt's spiritual, cultural and educational needs considered and pt agreeable to plan of care and goals.    Anticipated barriers to physical therapy: none    Goals:   SHORT TERM GOALS (4 weeks):  1. Pt will be compliant with daily HEP to improve ROM.  2.  Pt will demonstrate minimal gait abnormality ambulating without AD x 100 feet.      LONG TERM GOALS (8 WEEKS):   1. Pt will demonstrate AROM of L knee 0*-120* for sitting, standing, squatting.  2. Pt will demonstrate L knee strength of 4+/5 in flexion and extension for improved stability in standing, walking.   3. Pt will be able to ascend/descend one flight of stairs with U UE support, reciprocal gait pattern.   4. Pt will be able to walk 1000 feet on various surfaces without antalgic limp, without       Plan     See Updated POC    Dante Castañeda, PTA

## 2019-04-29 ENCOUNTER — CLINICAL SUPPORT (OUTPATIENT)
Dept: REHABILITATION | Facility: HOSPITAL | Age: 69
End: 2019-04-29
Payer: MEDICARE

## 2019-04-29 DIAGNOSIS — M25.562 CHRONIC PAIN OF LEFT KNEE: ICD-10-CM

## 2019-04-29 DIAGNOSIS — G89.29 CHRONIC PAIN OF LEFT KNEE: ICD-10-CM

## 2019-04-29 PROCEDURE — 97110 THERAPEUTIC EXERCISES: CPT | Mod: PO | Performed by: PHYSICAL THERAPIST

## 2019-04-29 NOTE — PROGRESS NOTES
Physical Therapy Daily Treatment Note     Name: Rajeev Newell [facility]  Clinic Number: [id number]    Therapy Diagnosis:   Encounter Diagnosis   Name Primary?    Chronic pain of left knee      Physician: Edmund Rosas MD    Visit Date: 4/29/2019  Physician Orders: PT Eval and Treat   Medical Diagnosis from Referral: M17.12 (ICD-10-CM) - Primary osteoarthritis of left knee  Evaluation Date: 2/22/2019  Authorization Period Expiration: 1/16/2020  Plan of Care Expiration: 5/20/2019  Visit # / Visits authorized: 17/ 20    Time In: 100p  Time Out:203p  Total Billable Time: 53 minutes    Precautions: Standard and HTN    Subjective     Pt reports: He is feeling tired today. He went fishing all weekend, and did fine getting in/out of boat and ascending/descending stairs to get into camp, with only minor swelling. Pt feels like he is ready to transition to HEP and requests that this be his last week of PT.  He was compliant with home exercise program.  Response to previous treatment: good, no issues  Functional change: able to use stairs at work without UE support    Pain: 0/10  Location: left knee      Objective     Jared received therapeutic exercises to develop strength, ROM and flexibility for 53 minutes including:    Upright bike, in available ROM, 10 min L2 for improving ROM/mobility (please use recumbent bike to support back in future visits)  Gastroc st on incline. L3, 3x30s  HSS, 3x30s LLE longsit  Knee flex stretch at stairs x  2 minutes  Leg Press 70#  30x  Calf Press 60# 30x  Precor LE Ext, 45#, 3x10  Ascend/descend flight of stairs (minimal use of fingertip support)    SLR 3x10 3#  SL hip abd 30x--3#  Prone hip ext 30x 3#  Prone hang 4 min 5#  Manual knee flexion stretch with strap in prone, 3 min  Manual extension stretch, 3 min (oscillations)  SLS, 5x15s  B LE--NP      Jared received cold pack for 10 minutes (with LLE elevated) to to decrease circulation, pain, and swelling.    4/29/19:  Left Knee        AROM PROM MMT   Flexion 121* 122* (empty endfeel) 5/5   Extension 0* 0* 5/5     Home Exercises Provided and Patient Education Provided     Education provided:   - continue HEP    Written Home Exercises Provided: Patient instructed to cont prior HEP.  Exercises were reviewed and Jared was able to demonstrate them prior to the end of the session.  Jared demonstrated good  understanding of the education provided.     See EMR under Media for exercises provided prior visit.    Assessment   Jared tolerated treatment well. He is able to ascend/descend one flight of stairs with occasional fingertip support when descending the stairs. He has achieved ROM goals.      Jared is progressing well towards his goals.   Pt prognosis is Good.     Pt will continue to benefit from skilled outpatient physical therapy to address the deficits listed in the problem list box on initial evaluation, provide pt/family education and to maximize pt's level of independence in the home and community environment.     Pt's spiritual, cultural and educational needs considered and pt agreeable to plan of care and goals.    Anticipated barriers to physical therapy: none    Goals:   SHORT TERM GOALS (4 weeks):  1. Pt will be compliant with daily HEP to improve ROM. MET  2.  Pt will demonstrate minimal gait abnormality ambulating without AD x 100 feet. MET     LONG TERM GOALS (8 WEEKS):   1. Pt will demonstrate AROM of L knee 0*-120* for sitting, standing, squatting. MET   2. Pt will demonstrate L knee strength of 4+/5 in flexion and extension for improved stability in standing, walking. MET   3. Pt will be able to ascend/descend one flight of stairs with U UE support, reciprocal gait pattern. MET  4. Pt will be able to walk 1000 feet on various surfaces without antalgic limp. Ongoing.      Plan     DC end of this week with advanced HEP.     Gracie Carr, PT

## 2019-05-02 ENCOUNTER — OFFICE VISIT (OUTPATIENT)
Dept: ORTHOPEDICS | Facility: CLINIC | Age: 69
End: 2019-05-02
Payer: MEDICARE

## 2019-05-02 ENCOUNTER — HOSPITAL ENCOUNTER (OUTPATIENT)
Dept: RADIOLOGY | Facility: HOSPITAL | Age: 69
Discharge: HOME | End: 2019-05-02
Attending: NURSE PRACTITIONER
Payer: MEDICARE

## 2019-05-02 VITALS — HEIGHT: 71 IN | BODY MASS INDEX: 38.58 KG/M2 | WEIGHT: 275.56 LBS

## 2019-05-02 DIAGNOSIS — Z96.652 STATUS POST TOTAL LEFT KNEE REPLACEMENT: ICD-10-CM

## 2019-05-02 DIAGNOSIS — Z96.651 STATUS POST RIGHT KNEE REPLACEMENT: Primary | ICD-10-CM

## 2019-05-02 PROCEDURE — 73560 X-RAY EXAM OF KNEE 1 OR 2: CPT | Mod: 59,TC,RT

## 2019-05-02 PROCEDURE — 99999 PR PBB SHADOW E&M-EST. PATIENT-LVL III: CPT | Mod: PBBFAC,,, | Performed by: NURSE PRACTITIONER

## 2019-05-02 PROCEDURE — 99024 PR POST-OP FOLLOW-UP VISIT: ICD-10-PCS | Mod: POP,,, | Performed by: NURSE PRACTITIONER

## 2019-05-02 PROCEDURE — 73562 XR KNEE ORTHO LEFT: ICD-10-PCS | Mod: 26,LT,, | Performed by: RADIOLOGY

## 2019-05-02 PROCEDURE — 99999 PR PBB SHADOW E&M-EST. PATIENT-LVL III: ICD-10-PCS | Mod: PBBFAC,,, | Performed by: NURSE PRACTITIONER

## 2019-05-02 PROCEDURE — 73560 XR KNEE ORTHO LEFT: ICD-10-PCS | Mod: 26,59,LT, | Performed by: RADIOLOGY

## 2019-05-02 PROCEDURE — 99213 OFFICE O/P EST LOW 20 MIN: CPT | Mod: PBBFAC,25 | Performed by: NURSE PRACTITIONER

## 2019-05-02 PROCEDURE — 73562 X-RAY EXAM OF KNEE 3: CPT | Mod: 26,LT,, | Performed by: RADIOLOGY

## 2019-05-02 PROCEDURE — 73560 X-RAY EXAM OF KNEE 1 OR 2: CPT | Mod: 26,59,LT, | Performed by: RADIOLOGY

## 2019-05-02 PROCEDURE — 99024 POSTOP FOLLOW-UP VISIT: CPT | Mod: POP,,, | Performed by: NURSE PRACTITIONER

## 2019-05-02 NOTE — PROGRESS NOTES
"Rajeev Newell III presents for 6 week post-operative visit following a left total knee arthroplasty performed by Dr. Sutherland  on 3/21/2019. Tolerating pain medication well.      Exam:   Height 5' 11" (1.803 m), weight 125 kg (275 lb 9.2 oz).   Ambulating well without assistive device.  Incision is completely healed. ROM:0-120    Initial post-operative radiographs reviewed today revealing a well fixed and aligned prosthesis.    A/P:  6 weeks s/p left total knee replacement  - Outpatient PT: ongoing  - Follow up in 6 weeks with Dr. Rosas. Pt will call clinic with problems/concerns.     "

## 2019-05-03 ENCOUNTER — CLINICAL SUPPORT (OUTPATIENT)
Dept: REHABILITATION | Facility: HOSPITAL | Age: 69
End: 2019-05-03
Payer: MEDICARE

## 2019-05-03 DIAGNOSIS — G89.29 CHRONIC PAIN OF LEFT KNEE: ICD-10-CM

## 2019-05-03 DIAGNOSIS — M25.562 CHRONIC PAIN OF LEFT KNEE: ICD-10-CM

## 2019-05-03 PROCEDURE — 97110 THERAPEUTIC EXERCISES: CPT | Mod: PO

## 2019-05-03 NOTE — PROGRESS NOTES
Physical Therapy Daily Treatment Note     Name: Rajeev Newell [facility]  Clinic Number: [id number]    Therapy Diagnosis:   Encounter Diagnosis   Name Primary?    Chronic pain of left knee      Physician: Edmund Rosas MD    Visit Date: 5/3/2019  Physician Orders: PT Eval and Treat   Medical Diagnosis from Referral: M17.12 (ICD-10-CM) - Primary osteoarthritis of left knee  Evaluation Date: 2/22/2019  Authorization Period Expiration: 1/16/2020  Plan of Care Expiration: 5/20/2019  Visit # / Visits authorized: 18/ 20    Time In: 1000am  Time Out:11:14am  Total Billable Time: 60 minutes    Precautions: Standard and HTN    Subjective     Pt reports: He is doing well and is w/o complaint today. Pt feels like he is ready to transition to HEP and requests that this be his last week of PT.  He was compliant with home exercise program.  Response to previous treatment: good, no issues  Functional change: able to use stairs at work without UE support    Pain: 0/10  Location: left knee      Objective     Jared received therapeutic exercises to develop strength, ROM and flexibility for 53 minutes including:    Upright bike, in available ROM, 10 min L2 for improving ROM/mobility (please use recumbent bike to support back in future visits)  Gastroc st on incline. L3, 3x30s  HSS, 3x30s LLE longsit  Knee flex stretch at stairs x  2 minutes  Leg Press 70#  30x  Calf Press 60# 30x  Precor LE Ext, 45#, 3x10  Ascend/descend flight of stairs (minimal use of fingertip support)    SLR 3x10 3#  SL hip abd 30x--3#  Prone hip ext 30x 3#  Prone hang 4 min 5#  Manual knee flexion stretch with strap in prone, 3 min  Manual extension stretch, 3 min (oscillations)  SLS, 3x20s  B LE-      Jared received cold pack for 10 minutes (with LLE elevated) to to decrease circulation, pain, and swelling.    4/29/19:  Left Knee       AROM PROM MMT   Flexion 121* 122* (empty endfeel) 5/5   Extension 0* 0* 5/5     Home Exercises Provided and Patient Education  Provided     Education provided:   - continue HEP    Written Home Exercises Provided: Patient instructed to cont prior HEP. Pt was provided with written list of all exs to continue to perform in gym.  Exercises were reviewed and Jared was able to demonstrate them prior to the end of the session.  Jared demonstrated good  understanding of the education provided.     See EMR under Media for exercises provided prior visit.    Assessment   Jared tolerated treatment well, demonstrates good understanding of proper form with all exs and appears capable of continuing on his own in gym.  He is able to ascend/descend 2 flights of stairs with occasional fingertip support when descending the stairs. He has achieved ROM goals.      Jared is progressing well towards his goals.   Pt prognosis is Good.         Pt's spiritual, cultural and educational needs considered and pt agreeable to plan of care and goals.    Anticipated barriers to physical therapy: none    Goals:   SHORT TERM GOALS (4 weeks):  1. Pt will be compliant with daily HEP to improve ROM. MET  2.  Pt will demonstrate minimal gait abnormality ambulating without AD x 100 feet. MET     LONG TERM GOALS (8 WEEKS):   1. Pt will demonstrate AROM of L knee 0*-120* for sitting, standing, squatting. MET   2. Pt will demonstrate L knee strength of 4+/5 in flexion and extension for improved stability in standing, walking. MET   3. Pt will be able to ascend/descend one flight of stairs with U UE support, reciprocal gait pattern. MET  4. Pt will be able to walk 1000 feet on various surfaces without antalgic limp. Ongoing.      Plan     Pt is DC at this time to continue on his own in gym, full summary to follow.     Dante Castañeda, PTA

## 2019-06-01 DIAGNOSIS — E11.9 DIABETES TYPE 2, CONTROLLED: ICD-10-CM

## 2019-06-03 RX ORDER — PIOGLITAZONEHYDROCHLORIDE 15 MG/1
TABLET ORAL
Qty: 90 TABLET | Refills: 0 | Status: SHIPPED | OUTPATIENT
Start: 2019-06-03 | End: 2019-08-23 | Stop reason: SDUPTHER

## 2019-06-04 ENCOUNTER — TELEPHONE (OUTPATIENT)
Dept: OPTOMETRY | Facility: CLINIC | Age: 69
End: 2019-06-04

## 2019-06-04 NOTE — TELEPHONE ENCOUNTER
L/M for pt that eye appt and hvf & oct on June 25 are cancelled due to Dr. Smith on medical leave.   Pt to call to reschedule per convenience.

## 2019-06-06 ENCOUNTER — PATIENT MESSAGE (OUTPATIENT)
Dept: ORTHOPEDICS | Facility: CLINIC | Age: 69
End: 2019-06-06

## 2019-06-07 DIAGNOSIS — M19.90 ARTHRITIS: Primary | ICD-10-CM

## 2019-06-07 RX ORDER — MELOXICAM 15 MG/1
15 TABLET ORAL DAILY
Qty: 30 TABLET | Refills: 2 | Status: SHIPPED | OUTPATIENT
Start: 2019-06-07 | End: 2019-09-04 | Stop reason: SDUPTHER

## 2019-06-19 ENCOUNTER — OFFICE VISIT (OUTPATIENT)
Dept: ORTHOPEDICS | Facility: CLINIC | Age: 69
End: 2019-06-19
Payer: MEDICARE

## 2019-06-19 VITALS — HEIGHT: 71 IN | BODY MASS INDEX: 38.23 KG/M2 | WEIGHT: 273.06 LBS

## 2019-06-19 DIAGNOSIS — Z96.652 STATUS POST TOTAL LEFT KNEE REPLACEMENT: Primary | ICD-10-CM

## 2019-06-19 PROCEDURE — 99024 POSTOP FOLLOW-UP VISIT: CPT | Mod: POP,,, | Performed by: ORTHOPAEDIC SURGERY

## 2019-06-19 PROCEDURE — 99024 PR POST-OP FOLLOW-UP VISIT: ICD-10-PCS | Mod: POP,,, | Performed by: ORTHOPAEDIC SURGERY

## 2019-06-19 PROCEDURE — 99999 PR PBB SHADOW E&M-EST. PATIENT-LVL III: CPT | Mod: PBBFAC,,, | Performed by: ORTHOPAEDIC SURGERY

## 2019-06-19 PROCEDURE — 99999 PR PBB SHADOW E&M-EST. PATIENT-LVL III: ICD-10-PCS | Mod: PBBFAC,,, | Performed by: ORTHOPAEDIC SURGERY

## 2019-06-19 PROCEDURE — 99213 OFFICE O/P EST LOW 20 MIN: CPT | Mod: PBBFAC | Performed by: ORTHOPAEDIC SURGERY

## 2019-06-19 NOTE — PROGRESS NOTES
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  He is doing very well.  No pain in the knee.  He has resumed activities of daily living. He has been quite activ3  Exam demonstrates  A well developed male in no distress.  Alert and oriented.  Mood and affect are appropriate.    Knee incision is well healed.  ROM is 0-120.  The patella tracks well and there is no instability. The extremity is neurovascularly intact.    Xrays demonstrate a well fixed and positioned prosthesis.      Imp:Doing well    F/u in 8 months with xrays and PROMS

## 2019-07-02 DIAGNOSIS — H40.1132 PRIMARY OPEN ANGLE GLAUCOMA OF BOTH EYES, MODERATE STAGE: ICD-10-CM

## 2019-07-03 RX ORDER — BRINZOLAMIDE 10 MG/ML
SUSPENSION/ DROPS OPHTHALMIC
Qty: 20 ML | Refills: 3 | Status: SHIPPED | OUTPATIENT
Start: 2019-07-03 | End: 2019-07-26 | Stop reason: SDUPTHER

## 2019-07-03 RX ORDER — LATANOPROST 50 UG/ML
1 SOLUTION/ DROPS OPHTHALMIC NIGHTLY
Qty: 7.5 ML | Refills: 3 | Status: SHIPPED | OUTPATIENT
Start: 2019-07-03 | End: 2019-07-26 | Stop reason: SDUPTHER

## 2019-07-26 ENCOUNTER — CLINICAL SUPPORT (OUTPATIENT)
Dept: OPHTHALMOLOGY | Facility: CLINIC | Age: 69
End: 2019-07-26
Payer: MEDICARE

## 2019-07-26 ENCOUNTER — OFFICE VISIT (OUTPATIENT)
Dept: OPTOMETRY | Facility: CLINIC | Age: 69
End: 2019-07-26
Payer: MEDICARE

## 2019-07-26 DIAGNOSIS — H43.393 VITREOUS FLOATERS, BILATERAL: ICD-10-CM

## 2019-07-26 DIAGNOSIS — H40.1132 PRIMARY OPEN ANGLE GLAUCOMA OF BOTH EYES, MODERATE STAGE: ICD-10-CM

## 2019-07-26 DIAGNOSIS — H25.13 NUCLEAR SCLEROSIS, BILATERAL: ICD-10-CM

## 2019-07-26 DIAGNOSIS — E11.9 DIABETES MELLITUS TYPE 2 WITHOUT RETINOPATHY: Primary | ICD-10-CM

## 2019-07-26 PROCEDURE — 99999 PR PBB SHADOW E&M-EST. PATIENT-LVL III: CPT | Mod: PBBFAC,,, | Performed by: OPTOMETRIST

## 2019-07-26 PROCEDURE — 92133 POSTERIOR SEGMENT OCT OPTIC NERVE(OCULAR COHERENCE TOMOGRAPHY) - OU - BOTH EYES: ICD-10-PCS | Mod: 26,S$PBB,, | Performed by: OPTOMETRIST

## 2019-07-26 PROCEDURE — 99999 PR PBB SHADOW E&M-EST. PATIENT-LVL III: ICD-10-PCS | Mod: PBBFAC,,, | Performed by: OPTOMETRIST

## 2019-07-26 PROCEDURE — 92014 PR EYE EXAM, EST PATIENT,COMPREHESV: ICD-10-PCS | Mod: S$PBB,,, | Performed by: OPTOMETRIST

## 2019-07-26 PROCEDURE — 92133 CPTRZD OPH DX IMG PST SGM ON: CPT | Mod: PBBFAC,PO | Performed by: OPTOMETRIST

## 2019-07-26 PROCEDURE — 99999 PR PBB SHADOW E&M-EST. PATIENT-LVL I: CPT | Mod: PBBFAC,,,

## 2019-07-26 PROCEDURE — 99213 OFFICE O/P EST LOW 20 MIN: CPT | Mod: PBBFAC,PO,25 | Performed by: OPTOMETRIST

## 2019-07-26 PROCEDURE — 92014 COMPRE OPH EXAM EST PT 1/>: CPT | Mod: S$PBB,,, | Performed by: OPTOMETRIST

## 2019-07-26 PROCEDURE — 92083 EXTENDED VISUAL FIELD XM: CPT | Mod: PBBFAC,PO | Performed by: OPTOMETRIST

## 2019-07-26 PROCEDURE — 99211 OFF/OP EST MAY X REQ PHY/QHP: CPT | Mod: PBBFAC,27,PO,25

## 2019-07-26 PROCEDURE — 92083 HUMPHREY VISUAL FIELD - OU - BOTH EYES: ICD-10-PCS | Mod: 26,S$PBB,, | Performed by: OPTOMETRIST

## 2019-07-26 PROCEDURE — 99999 PR PBB SHADOW E&M-EST. PATIENT-LVL I: ICD-10-PCS | Mod: PBBFAC,,,

## 2019-07-26 RX ORDER — BRINZOLAMIDE 10 MG/ML
1 SUSPENSION/ DROPS OPHTHALMIC 2 TIMES DAILY
Qty: 10 ML | Refills: 3 | Status: SHIPPED | OUTPATIENT
Start: 2019-07-26 | End: 2020-07-12

## 2019-07-26 RX ORDER — LATANOPROST 50 UG/ML
1 SOLUTION/ DROPS OPHTHALMIC NIGHTLY
Qty: 7.5 ML | Refills: 3 | Status: SHIPPED | OUTPATIENT
Start: 2019-07-26 | End: 2020-05-06

## 2019-07-26 NOTE — PROGRESS NOTES
HPI     Annual Exam      Additional comments: DLE 3-19 (nichelle)   ocular health exam  // pt   states no visual complaints              Glaucoma      Additional comments: POAG - OU, +Azopt bid & Latanoprost qhs // rev hvf   & oct              Spots and/or Floaters      Additional comments: OU -- also sees occasional light flashes or blue   light in OS // no eye pain              Dry Eye      Additional comments: +ATS OU prn          Last edited by Deanne Wilson on 7/26/2019  9:50 AM. (History)        ROS     Positive for: Eyes    Negative for: Constitutional, Gastrointestinal, Neurological, Skin,   Genitourinary, Musculoskeletal, HENT, Endocrine, Cardiovascular,   Respiratory, Psychiatric, Allergic/Imm, Heme/Lymph    Last edited by SHERMAN Smith, OD on 7/26/2019 10:07 AM. (History)        Assessment /Plan     For exam results, see Encounter Report.    Diabetes mellitus type 2 without retinopathy    Primary open angle glaucoma of both eyes, moderate stage  -     brinzolamide (AZOPT) 1 % ophthalmic suspension; Place 1 drop into both eyes 2 (two) times daily.  Dispense: 10 mL; Refill: 3  -     latanoprost 0.005 % ophthalmic solution; Place 1 drop into both eyes nightly.  Dispense: 7.5 mL; Refill: 3  -     Cleaning Visual Field - OU - Extended - Both Eyes  -     Posterior Segment OCT Optic Nerve- Both eyes    Nuclear sclerosis, bilateral    Vitreous floaters, bilateral      1. No ret/ no csme, gave info, control glucose, annual DFE  2. IOP stable mid teens w/ multi meds  Angles open  /525    Fields/ OCT updated today  PSD  3.92 <0.5% with inf arc defect / some sup depression  1.75 <10% onl with early inf arc depression    G 48 onl w def 360  G 60 onl w/ def G/TS/TI  Essentially stable vs very slight progression from previous    Continue OU  azopt bid  Latanoprost qhs    If progression at next exam will need lower target    3. Early vis changes, not vis sig for consult   4. RD precautions given,  reviewed    Gave copy of specs for distance only     Discussed and educated patient on current findings /plan.  RTC 4 months IOP recheck, prn if any changes / issues

## 2019-08-23 ENCOUNTER — OFFICE VISIT (OUTPATIENT)
Dept: FAMILY MEDICINE | Facility: CLINIC | Age: 69
End: 2019-08-23
Payer: MEDICARE

## 2019-08-23 VITALS
DIASTOLIC BLOOD PRESSURE: 78 MMHG | HEART RATE: 65 BPM | HEIGHT: 71 IN | BODY MASS INDEX: 39.54 KG/M2 | OXYGEN SATURATION: 97 % | SYSTOLIC BLOOD PRESSURE: 134 MMHG | WEIGHT: 282.44 LBS

## 2019-08-23 DIAGNOSIS — L08.9 INFECTED SEBACEOUS CYST: ICD-10-CM

## 2019-08-23 DIAGNOSIS — L72.3 INFLAMED SEBACEOUS CYST: Primary | ICD-10-CM

## 2019-08-23 DIAGNOSIS — L72.3 INFECTED SEBACEOUS CYST: ICD-10-CM

## 2019-08-23 PROCEDURE — 99213 OFFICE O/P EST LOW 20 MIN: CPT | Mod: S$PBB,,, | Performed by: PHYSICIAN ASSISTANT

## 2019-08-23 PROCEDURE — 99999 PR PBB SHADOW E&M-EST. PATIENT-LVL IV: ICD-10-PCS | Mod: PBBFAC,,, | Performed by: PHYSICIAN ASSISTANT

## 2019-08-23 PROCEDURE — 99999 PR PBB SHADOW E&M-EST. PATIENT-LVL IV: CPT | Mod: PBBFAC,,, | Performed by: PHYSICIAN ASSISTANT

## 2019-08-23 PROCEDURE — 99214 OFFICE O/P EST MOD 30 MIN: CPT | Mod: PBBFAC,PO | Performed by: PHYSICIAN ASSISTANT

## 2019-08-23 PROCEDURE — 99213 PR OFFICE/OUTPT VISIT, EST, LEVL III, 20-29 MIN: ICD-10-PCS | Mod: S$PBB,,, | Performed by: PHYSICIAN ASSISTANT

## 2019-08-23 RX ORDER — CEPHALEXIN 500 MG/1
500 CAPSULE ORAL EVERY 12 HOURS
Qty: 20 CAPSULE | Refills: 0 | Status: SHIPPED | OUTPATIENT
Start: 2019-08-23 | End: 2019-09-02

## 2019-08-23 RX ORDER — PANTOPRAZOLE SODIUM 40 MG/1
TABLET, DELAYED RELEASE ORAL
COMMUNITY

## 2019-08-23 NOTE — PROGRESS NOTES
Subjective:       Patient ID: Rajeev Newell III is a 68 y.o. male.    Chief Complaint: Cyst    HPI   Tender inflamed cyst R shoulder x 10 days  Has been a recurrent problem  Has drained in the past  Pt has other non tender cyst on back  Review of Systems   Constitutional: Negative.  Negative for activity change, appetite change, chills, diaphoresis, fatigue, fever and unexpected weight change.   HENT: Negative.  Negative for hearing loss, rhinorrhea and trouble swallowing.    Eyes: Negative.  Negative for discharge and visual disturbance.   Respiratory: Negative.  Negative for chest tightness and wheezing.    Cardiovascular: Negative.  Negative for chest pain, palpitations and leg swelling.   Gastrointestinal: Negative.  Negative for blood in stool, constipation, diarrhea and vomiting.   Endocrine: Negative.  Negative for polydipsia and polyuria.   Genitourinary: Negative.  Negative for difficulty urinating, hematuria and urgency.   Musculoskeletal: Negative.  Negative for arthralgias, joint swelling and neck pain.   Skin: Negative.    Neurological: Negative.  Negative for weakness and headaches.   Psychiatric/Behavioral: Negative.  Negative for confusion and dysphoric mood.       Objective:      Physical Exam   Constitutional: He appears well-developed and well-nourished. No distress.   HENT:   Head: Normocephalic and atraumatic.   Eyes: Conjunctivae are normal. No scleral icterus.   Skin: Skin is warm and dry. No rash noted.   Tender non fluctuant 4 cm x 5 cm  Sebaceous cyst R posterior shoulder  Erythema   No drainage  Non tender 3 cm x 4 cm sebaceous cyst L mid back   Vitals reviewed.      Assessment:       1. Inflamed sebaceous cyst    2. Infected sebaceous cyst        Plan:       Rajeev was seen today for cyst.    Diagnoses and all orders for this visit:    Inflamed sebaceous cyst    Infected sebaceous cyst  -     cephALEXin (KEFLEX) 500 MG capsule; Take 1 capsule (500 mg total) by mouth every 12 (twelve)  hours. for 10 days    warm compresses  F/u for I & D if localizes   Will refer to surgery clinic for removal after completion of antibiotics and I & D if needed

## 2019-08-29 DIAGNOSIS — E11.9 DIABETES TYPE 2, CONTROLLED: ICD-10-CM

## 2019-08-29 RX ORDER — PIOGLITAZONEHYDROCHLORIDE 15 MG/1
TABLET ORAL
Qty: 90 TABLET | Refills: 0 | Status: SHIPPED | OUTPATIENT
Start: 2019-08-29 | End: 2019-11-26 | Stop reason: SDUPTHER

## 2019-08-30 DIAGNOSIS — E11.9 DIABETES TYPE 2, CONTROLLED: ICD-10-CM

## 2019-08-30 DIAGNOSIS — E11.9 TYPE 2 DIABETES MELLITUS WITHOUT COMPLICATION: ICD-10-CM

## 2019-09-03 RX ORDER — PIOGLITAZONEHYDROCHLORIDE 15 MG/1
TABLET ORAL
Qty: 90 TABLET | Refills: 0 | Status: SHIPPED | OUTPATIENT
Start: 2019-09-03 | End: 2020-02-20

## 2019-09-04 ENCOUNTER — OFFICE VISIT (OUTPATIENT)
Dept: FAMILY MEDICINE | Facility: CLINIC | Age: 69
End: 2019-09-04
Payer: MEDICARE

## 2019-09-04 VITALS
BODY MASS INDEX: 39.41 KG/M2 | HEART RATE: 66 BPM | SYSTOLIC BLOOD PRESSURE: 120 MMHG | OXYGEN SATURATION: 96 % | WEIGHT: 281.5 LBS | HEIGHT: 71 IN | TEMPERATURE: 98 F | DIASTOLIC BLOOD PRESSURE: 82 MMHG

## 2019-09-04 DIAGNOSIS — L08.9 INFECTED SEBACEOUS CYST: ICD-10-CM

## 2019-09-04 DIAGNOSIS — L72.3 INFECTED SEBACEOUS CYST: ICD-10-CM

## 2019-09-04 DIAGNOSIS — M19.90 ARTHRITIS: ICD-10-CM

## 2019-09-04 DIAGNOSIS — L72.3 INFLAMED SEBACEOUS CYST: ICD-10-CM

## 2019-09-04 DIAGNOSIS — L72.3 SEBACEOUS CYST: Primary | ICD-10-CM

## 2019-09-04 PROCEDURE — 99999 PR PBB SHADOW E&M-EST. PATIENT-LVL V: CPT | Mod: PBBFAC,,, | Performed by: PHYSICIAN ASSISTANT

## 2019-09-04 PROCEDURE — 10060 I&D ABSCESS SIMPLE/SINGLE: CPT | Mod: PBBFAC,PO | Performed by: PHYSICIAN ASSISTANT

## 2019-09-04 PROCEDURE — 10060 I&D ABSCESS SIMPLE/SINGLE: CPT | Mod: S$PBB,,, | Performed by: PHYSICIAN ASSISTANT

## 2019-09-04 PROCEDURE — 99213 OFFICE O/P EST LOW 20 MIN: CPT | Mod: 25,S$PBB,, | Performed by: PHYSICIAN ASSISTANT

## 2019-09-04 PROCEDURE — 99999 PR PBB SHADOW E&M-EST. PATIENT-LVL V: ICD-10-PCS | Mod: PBBFAC,,, | Performed by: PHYSICIAN ASSISTANT

## 2019-09-04 PROCEDURE — 10060 PR DRAIN SKIN ABSCESS SIMPLE: ICD-10-PCS | Mod: S$PBB,,, | Performed by: PHYSICIAN ASSISTANT

## 2019-09-04 PROCEDURE — 99213 PR OFFICE/OUTPT VISIT, EST, LEVL III, 20-29 MIN: ICD-10-PCS | Mod: 25,S$PBB,, | Performed by: PHYSICIAN ASSISTANT

## 2019-09-04 PROCEDURE — 99215 OFFICE O/P EST HI 40 MIN: CPT | Mod: PBBFAC,PO,25 | Performed by: PHYSICIAN ASSISTANT

## 2019-09-04 RX ORDER — MELOXICAM 15 MG/1
TABLET ORAL
Qty: 30 TABLET | Refills: 0 | Status: SHIPPED | OUTPATIENT
Start: 2019-09-04 | End: 2019-10-08 | Stop reason: SDUPTHER

## 2019-09-04 NOTE — PROGRESS NOTES
"Subjective:       Patient ID: Rajeev Newell III is a 68 y.o. male.    Chief Complaint: Follow-up    HPI   Sebaceous abscess / cyst R shoulder  Completed antibiotic and partially drained  Still swollen and tender  Review of Systems   Constitutional: Negative.  Negative for activity change, appetite change, chills, diaphoresis, fatigue, fever and unexpected weight change.   HENT: Negative.  Negative for hearing loss, rhinorrhea and trouble swallowing.    Eyes: Negative.  Negative for discharge and visual disturbance.   Respiratory: Negative.  Negative for chest tightness and wheezing.    Cardiovascular: Negative.  Negative for chest pain and palpitations.   Gastrointestinal: Negative.  Negative for blood in stool, constipation, diarrhea and vomiting.   Endocrine: Negative.  Negative for polydipsia and polyuria.   Genitourinary: Negative.  Negative for difficulty urinating, hematuria and urgency.   Musculoskeletal: Negative.  Negative for arthralgias, joint swelling and neck pain.   Skin: Positive for wound.   Neurological: Negative.  Negative for weakness and headaches.   Psychiatric/Behavioral: Negative.  Negative for confusion and dysphoric mood.       Objective:      Physical Exam   Constitutional: He appears well-developed and well-nourished. No distress.   HENT:   Head: Normocephalic and atraumatic.   Eyes: Conjunctivae are normal. No scleral icterus.   Neck: Normal range of motion. Neck supple. No tracheal deviation present. No thyromegaly present.   Musculoskeletal: He exhibits no edema.   Lymphadenopathy:     He has no cervical adenopathy.   Skin: Skin is warm and dry. There is erythema.   Mild tenderness and erythema sebaceous cyst R post. Shoulder   Fluctuant 2" x 3 " cyst  2nd non inflamed cyst mid upper back   Vitals reviewed.      Assessment:       1. Sebaceous cyst    2. Inflamed sebaceous cyst    3. Infected sebaceous cyst        Plan:       Rajeev was seen today for follow-up.    Diagnoses and all " orders for this visit:    Sebaceous cyst  -     Ambulatory consult to General Surgery    Inflamed sebaceous cyst    Infected sebaceous cyst    I & D performed   Site prepped  Jenna chloride spray used for anesthesia  # 11 scalpel blade used to incise  Large amount of material removed from cyst   Dressing applied   Pt tolerated procedure well    Home instruction given

## 2019-09-09 ENCOUNTER — OFFICE VISIT (OUTPATIENT)
Dept: FAMILY MEDICINE | Facility: CLINIC | Age: 69
End: 2019-09-09
Payer: MEDICARE

## 2019-09-09 ENCOUNTER — TELEPHONE (OUTPATIENT)
Dept: FAMILY MEDICINE | Facility: CLINIC | Age: 69
End: 2019-09-09

## 2019-09-09 VITALS
DIASTOLIC BLOOD PRESSURE: 72 MMHG | HEART RATE: 83 BPM | HEIGHT: 71 IN | WEIGHT: 285.06 LBS | OXYGEN SATURATION: 96 % | SYSTOLIC BLOOD PRESSURE: 112 MMHG | BODY MASS INDEX: 39.91 KG/M2 | TEMPERATURE: 98 F

## 2019-09-09 DIAGNOSIS — L03.319 CELLULITIS OF TRUNK, UNSPECIFIED SITE OF TRUNK: ICD-10-CM

## 2019-09-09 DIAGNOSIS — B96.89 BACTERIAL SINUSITIS: Primary | ICD-10-CM

## 2019-09-09 DIAGNOSIS — J32.9 BACTERIAL SINUSITIS: Primary | ICD-10-CM

## 2019-09-09 PROCEDURE — 99214 OFFICE O/P EST MOD 30 MIN: CPT | Mod: PBBFAC,PO | Performed by: PHYSICIAN ASSISTANT

## 2019-09-09 PROCEDURE — 99999 PR PBB SHADOW E&M-EST. PATIENT-LVL IV: ICD-10-PCS | Mod: PBBFAC,,, | Performed by: PHYSICIAN ASSISTANT

## 2019-09-09 PROCEDURE — 99213 OFFICE O/P EST LOW 20 MIN: CPT | Mod: S$PBB,24,, | Performed by: PHYSICIAN ASSISTANT

## 2019-09-09 PROCEDURE — 99999 PR PBB SHADOW E&M-EST. PATIENT-LVL IV: CPT | Mod: PBBFAC,,, | Performed by: PHYSICIAN ASSISTANT

## 2019-09-09 PROCEDURE — 99213 PR OFFICE/OUTPT VISIT, EST, LEVL III, 20-29 MIN: ICD-10-PCS | Mod: S$PBB,24,, | Performed by: PHYSICIAN ASSISTANT

## 2019-09-09 RX ORDER — DOXYCYCLINE 100 MG/1
100 CAPSULE ORAL 2 TIMES DAILY
Qty: 20 CAPSULE | Refills: 0 | Status: SHIPPED | OUTPATIENT
Start: 2019-09-09 | End: 2019-09-19

## 2019-09-09 NOTE — TELEPHONE ENCOUNTER
Patient reports that he is getting a sinus infection. Sinus congestion and pressure.   Requests medication be called to pharmacy if possible.

## 2019-09-09 NOTE — TELEPHONE ENCOUNTER
----- Message from Dwight Parmar sent at 9/9/2019  8:20 AM CDT -----  Contact: patient   Type:  Same Day Appointment Request    Caller is requesting a same day appointment.  Caller declined first available appointment listed below.      Name of Caller: patient   When is the first available appointment?  Friday   Symptoms: sinus infection   Best Call Back Number: [phone] (home)   Additional Information: pt is requesting to be seen today or tomorrow

## 2019-09-09 NOTE — PROGRESS NOTES
Subjective:       Patient ID: Rajeev Newell III is a 68 y.o. male.    Chief Complaint: Sinusitis    HPI   R facial pain x 1 wk  Hx sinus infection  Purulent mucus  Review of Systems   Constitutional: Positive for chills. Negative for activity change, appetite change, diaphoresis, fatigue, fever and unexpected weight change.   HENT: Positive for congestion, postnasal drip, sinus pressure and sinus pain. Negative for sore throat.    Eyes: Negative.    Respiratory: Negative.    Cardiovascular: Negative.  Negative for chest pain and leg swelling.   Gastrointestinal: Negative.    Endocrine: Negative.    Genitourinary: Negative.    Musculoskeletal: Negative.    Skin: Negative.  Negative for rash.       Objective:      Physical Exam   Constitutional: He appears well-developed and well-nourished. No distress.   HENT:   Head: Normocephalic and atraumatic.   Right Ear: External ear normal.   Left Ear: External ear normal.   Nose: Nose normal.   Mouth/Throat: Oropharynx is clear and moist. No oropharyngeal exudate.   R max sinus tenderness  Cloudy mucus   Eyes: Conjunctivae are normal. No scleral icterus.   Neck: Normal range of motion. Neck supple. No tracheal deviation present. No thyromegaly present.   Cardiovascular: Normal rate, regular rhythm, normal heart sounds and intact distal pulses. Exam reveals no gallop and no friction rub.   No murmur heard.  Pulmonary/Chest: Effort normal and breath sounds normal. No stridor. No respiratory distress. He has no wheezes. He has no rales.   Lymphadenopathy:     He has no cervical adenopathy.   Skin: Skin is warm and dry.   Draining R shoulder cyst    Vitals reviewed.      Assessment:       1. Bacterial sinusitis    2. Cellulitis of trunk, unspecified site of trunk        Plan:       Rajeev was seen today for sinusitis.    Diagnoses and all orders for this visit:    Bacterial sinusitis  -     doxycycline (MONODOX) 100 MG capsule; Take 1 capsule (100 mg total) by mouth 2 (two)  times daily. for 10 days    Cellulitis of trunk, unspecified site of trunk  -     doxycycline (MONODOX) 100 MG capsule; Take 1 capsule (100 mg total) by mouth 2 (two) times daily. for 10 days    hot compresses   Discussed otc's

## 2019-09-12 ENCOUNTER — PATIENT MESSAGE (OUTPATIENT)
Dept: FAMILY MEDICINE | Facility: CLINIC | Age: 69
End: 2019-09-12

## 2019-09-12 DIAGNOSIS — R21 PENILE RASH: ICD-10-CM

## 2019-09-16 ENCOUNTER — PATIENT MESSAGE (OUTPATIENT)
Dept: FAMILY MEDICINE | Facility: CLINIC | Age: 69
End: 2019-09-16

## 2019-09-17 ENCOUNTER — PATIENT MESSAGE (OUTPATIENT)
Dept: FAMILY MEDICINE | Facility: CLINIC | Age: 69
End: 2019-09-17

## 2019-09-17 RX ORDER — ACYCLOVIR 50 MG/G
OINTMENT TOPICAL
Qty: 1 TUBE | Refills: 1 | Status: SHIPPED | OUTPATIENT
Start: 2019-09-17 | End: 2020-02-19

## 2019-09-18 ENCOUNTER — PATIENT MESSAGE (OUTPATIENT)
Dept: FAMILY MEDICINE | Facility: CLINIC | Age: 69
End: 2019-09-18

## 2019-09-19 ENCOUNTER — IMMUNIZATION (OUTPATIENT)
Dept: FAMILY MEDICINE | Facility: CLINIC | Age: 69
End: 2019-09-19
Payer: MEDICARE

## 2019-09-19 ENCOUNTER — LAB VISIT (OUTPATIENT)
Dept: LAB | Facility: HOSPITAL | Age: 69
End: 2019-09-19
Attending: FAMILY MEDICINE
Payer: MEDICARE

## 2019-09-19 DIAGNOSIS — E11.9 TYPE 2 DIABETES MELLITUS WITHOUT COMPLICATION: ICD-10-CM

## 2019-09-19 LAB
ESTIMATED AVG GLUCOSE: 134 MG/DL (ref 68–131)
HBA1C MFR BLD HPLC: 6.3 % (ref 4–5.6)

## 2019-09-19 PROCEDURE — 36415 COLL VENOUS BLD VENIPUNCTURE: CPT | Mod: PN

## 2019-09-19 PROCEDURE — 83036 HEMOGLOBIN GLYCOSYLATED A1C: CPT

## 2019-09-19 PROCEDURE — 90662 IIV NO PRSV INCREASED AG IM: CPT | Mod: PBBFAC,PN

## 2019-10-08 DIAGNOSIS — M19.90 ARTHRITIS: ICD-10-CM

## 2019-10-08 RX ORDER — MELOXICAM 15 MG/1
TABLET ORAL
Qty: 30 TABLET | Refills: 0 | Status: SHIPPED | OUTPATIENT
Start: 2019-10-08 | End: 2019-11-06 | Stop reason: SDUPTHER

## 2019-10-21 ENCOUNTER — PATIENT MESSAGE (OUTPATIENT)
Dept: FAMILY MEDICINE | Facility: CLINIC | Age: 69
End: 2019-10-21

## 2019-11-06 DIAGNOSIS — M19.90 ARTHRITIS: ICD-10-CM

## 2019-11-06 RX ORDER — MELOXICAM 15 MG/1
TABLET ORAL
Qty: 30 TABLET | Refills: 0 | Status: SHIPPED | OUTPATIENT
Start: 2019-11-06 | End: 2019-12-03 | Stop reason: SDUPTHER

## 2019-11-26 DIAGNOSIS — E11.9 DIABETES TYPE 2, CONTROLLED: ICD-10-CM

## 2019-11-26 RX ORDER — PIOGLITAZONEHYDROCHLORIDE 15 MG/1
TABLET ORAL
Qty: 90 TABLET | Refills: 1 | Status: SHIPPED | OUTPATIENT
Start: 2019-11-26 | End: 2021-03-11 | Stop reason: SDUPTHER

## 2019-12-03 DIAGNOSIS — M19.90 ARTHRITIS: ICD-10-CM

## 2019-12-03 RX ORDER — MELOXICAM 15 MG/1
TABLET ORAL
Qty: 30 TABLET | Refills: 0 | Status: SHIPPED | OUTPATIENT
Start: 2019-12-03 | End: 2020-01-05

## 2020-01-05 DIAGNOSIS — M19.90 ARTHRITIS: ICD-10-CM

## 2020-01-05 RX ORDER — MELOXICAM 15 MG/1
TABLET ORAL
Qty: 90 TABLET | Refills: 0 | Status: SHIPPED | OUTPATIENT
Start: 2020-01-05 | End: 2020-04-05

## 2020-01-05 RX ORDER — MELOXICAM 15 MG/1
TABLET ORAL
Qty: 30 TABLET | Refills: 0 | Status: SHIPPED | OUTPATIENT
Start: 2020-01-05 | End: 2020-01-05

## 2020-01-13 ENCOUNTER — TELEPHONE (OUTPATIENT)
Dept: FAMILY MEDICINE | Facility: CLINIC | Age: 70
End: 2020-01-13

## 2020-01-13 DIAGNOSIS — E11.8 CONTROLLED TYPE 2 DIABETES MELLITUS WITH COMPLICATION, WITHOUT LONG-TERM CURRENT USE OF INSULIN: ICD-10-CM

## 2020-01-13 DIAGNOSIS — Z79.899 HIGH RISK MEDICATIONS (NOT ANTICOAGULANTS) LONG-TERM USE: ICD-10-CM

## 2020-01-13 DIAGNOSIS — N40.0 BENIGN NON-NODULAR PROSTATIC HYPERPLASIA WITHOUT LOWER URINARY TRACT SYMPTOMS: ICD-10-CM

## 2020-01-13 DIAGNOSIS — Z12.5 ENCOUNTER FOR SCREENING FOR MALIGNANT NEOPLASM OF PROSTATE: ICD-10-CM

## 2020-01-13 DIAGNOSIS — I10 ESSENTIAL HYPERTENSION: Primary | ICD-10-CM

## 2020-01-13 NOTE — TELEPHONE ENCOUNTER
----- Message from Juan A Calderón sent at 1/13/2020  4:39 PM CST -----  Contact: pt  Type: Needs Medical Advice    Who Called:  pt    Best Call Back Number: [phone]  Additional Information: pt needs callback re bloodwork to be done.

## 2020-01-13 NOTE — TELEPHONE ENCOUNTER
Pt has labs ordered by his cardiologist and wants Dr. Walton to order any labs she would like to be done at the same time. He did request a PSA

## 2020-01-13 NOTE — TELEPHONE ENCOUNTER
----- Message from Alonso Guerrero sent at 1/13/2020  4:17 PM CST -----  Contact: Patient  Patient would like a call back from the office regarding blood work results and can be reached at    [phone]

## 2020-02-05 ENCOUNTER — LAB VISIT (OUTPATIENT)
Dept: LAB | Facility: HOSPITAL | Age: 70
End: 2020-02-05
Attending: FAMILY MEDICINE
Payer: MEDICARE

## 2020-02-05 DIAGNOSIS — I10 ESSENTIAL HYPERTENSION, MALIGNANT: ICD-10-CM

## 2020-02-05 DIAGNOSIS — E11.8 CONTROLLED TYPE 2 DIABETES MELLITUS WITH COMPLICATION, WITHOUT LONG-TERM CURRENT USE OF INSULIN: ICD-10-CM

## 2020-02-05 DIAGNOSIS — N40.0 BENIGN NON-NODULAR PROSTATIC HYPERPLASIA WITHOUT LOWER URINARY TRACT SYMPTOMS: ICD-10-CM

## 2020-02-05 DIAGNOSIS — Z12.5 ENCOUNTER FOR SCREENING FOR MALIGNANT NEOPLASM OF PROSTATE: ICD-10-CM

## 2020-02-05 DIAGNOSIS — Z79.899 HIGH RISK MEDICATIONS (NOT ANTICOAGULANTS) LONG-TERM USE: ICD-10-CM

## 2020-02-05 DIAGNOSIS — E78.5 HYPERLIPIDEMIA, UNSPECIFIED HYPERLIPIDEMIA TYPE: ICD-10-CM

## 2020-02-05 LAB
25(OH)D3+25(OH)D2 SERPL-MCNC: 19 NG/ML (ref 30–96)
ALBUMIN SERPL BCP-MCNC: 4.1 G/DL (ref 3.5–5.2)
ALBUMIN SERPL BCP-MCNC: 4.1 G/DL (ref 3.5–5.2)
ALP SERPL-CCNC: 46 U/L (ref 55–135)
ALP SERPL-CCNC: 46 U/L (ref 55–135)
ALT SERPL W/O P-5'-P-CCNC: 46 U/L (ref 10–44)
ALT SERPL W/O P-5'-P-CCNC: 46 U/L (ref 10–44)
ANION GAP SERPL CALC-SCNC: 8 MMOL/L (ref 8–16)
AST SERPL-CCNC: 35 U/L (ref 10–40)
AST SERPL-CCNC: 35 U/L (ref 10–40)
BASOPHILS # BLD AUTO: 0.04 K/UL (ref 0–0.2)
BASOPHILS NFR BLD: 0.9 % (ref 0–1.9)
BILIRUB DIRECT SERPL-MCNC: 0.3 MG/DL (ref 0.1–0.3)
BILIRUB SERPL-MCNC: 0.5 MG/DL (ref 0.1–1)
BILIRUB SERPL-MCNC: 0.5 MG/DL (ref 0.1–1)
BUN SERPL-MCNC: 16 MG/DL (ref 8–23)
CALCIUM SERPL-MCNC: 9.6 MG/DL (ref 8.7–10.5)
CHLORIDE SERPL-SCNC: 108 MMOL/L (ref 95–110)
CHOLEST SERPL-MCNC: 140 MG/DL (ref 120–199)
CHOLEST/HDLC SERPL: 3 {RATIO} (ref 2–5)
CO2 SERPL-SCNC: 26 MMOL/L (ref 23–29)
COMPLEXED PSA SERPL-MCNC: 1.2 NG/ML (ref 0–4)
CREAT SERPL-MCNC: 1.1 MG/DL (ref 0.5–1.4)
DIFFERENTIAL METHOD: ABNORMAL
EOSINOPHIL # BLD AUTO: 0.3 K/UL (ref 0–0.5)
EOSINOPHIL NFR BLD: 6.2 % (ref 0–8)
ERYTHROCYTE [DISTWIDTH] IN BLOOD BY AUTOMATED COUNT: 14.1 % (ref 11.5–14.5)
EST. GFR  (AFRICAN AMERICAN): >60 ML/MIN/1.73 M^2
EST. GFR  (NON AFRICAN AMERICAN): >60 ML/MIN/1.73 M^2
GLUCOSE SERPL-MCNC: 118 MG/DL (ref 70–110)
HCT VFR BLD AUTO: 46.7 % (ref 40–54)
HDLC SERPL-MCNC: 46 MG/DL (ref 40–75)
HDLC SERPL: 32.9 % (ref 20–50)
HGB BLD-MCNC: 14.1 G/DL (ref 14–18)
IMM GRANULOCYTES # BLD AUTO: 0.01 K/UL (ref 0–0.04)
IMM GRANULOCYTES NFR BLD AUTO: 0.2 % (ref 0–0.5)
LDLC SERPL CALC-MCNC: 80.4 MG/DL (ref 63–159)
LYMPHOCYTES # BLD AUTO: 1 K/UL (ref 1–4.8)
LYMPHOCYTES NFR BLD: 23.7 % (ref 18–48)
MCH RBC QN AUTO: 28.3 PG (ref 27–31)
MCHC RBC AUTO-ENTMCNC: 30.2 G/DL (ref 32–36)
MCV RBC AUTO: 94 FL (ref 82–98)
MONOCYTES # BLD AUTO: 0.5 K/UL (ref 0.3–1)
MONOCYTES NFR BLD: 11.2 % (ref 4–15)
NEUTROPHILS # BLD AUTO: 2.5 K/UL (ref 1.8–7.7)
NEUTROPHILS NFR BLD: 57.8 % (ref 38–73)
NONHDLC SERPL-MCNC: 94 MG/DL
NRBC BLD-RTO: 0 /100 WBC
PLATELET # BLD AUTO: 293 K/UL (ref 150–350)
PMV BLD AUTO: 9.2 FL (ref 9.2–12.9)
POTASSIUM SERPL-SCNC: 4.4 MMOL/L (ref 3.5–5.1)
PROT SERPL-MCNC: 7.8 G/DL (ref 6–8.4)
PROT SERPL-MCNC: 7.8 G/DL (ref 6–8.4)
RBC # BLD AUTO: 4.99 M/UL (ref 4.6–6.2)
SODIUM SERPL-SCNC: 142 MMOL/L (ref 136–145)
TRIGL SERPL-MCNC: 68 MG/DL (ref 30–150)
TSH SERPL DL<=0.005 MIU/L-ACNC: 3.73 UIU/ML (ref 0.4–4)
WBC # BLD AUTO: 4.38 K/UL (ref 3.9–12.7)

## 2020-02-05 PROCEDURE — 85025 COMPLETE CBC W/AUTO DIFF WBC: CPT

## 2020-02-05 PROCEDURE — 80061 LIPID PANEL: CPT

## 2020-02-05 PROCEDURE — 80076 HEPATIC FUNCTION PANEL: CPT

## 2020-02-05 PROCEDURE — 82306 VITAMIN D 25 HYDROXY: CPT

## 2020-02-05 PROCEDURE — 84443 ASSAY THYROID STIM HORMONE: CPT

## 2020-02-05 PROCEDURE — 83036 HEMOGLOBIN GLYCOSYLATED A1C: CPT

## 2020-02-05 PROCEDURE — 80053 COMPREHEN METABOLIC PANEL: CPT

## 2020-02-05 PROCEDURE — 36415 COLL VENOUS BLD VENIPUNCTURE: CPT | Mod: PN

## 2020-02-05 PROCEDURE — 84153 ASSAY OF PSA TOTAL: CPT

## 2020-02-06 LAB
ESTIMATED AVG GLUCOSE: 131 MG/DL (ref 68–131)
HBA1C MFR BLD HPLC: 6.2 % (ref 4–5.6)

## 2020-02-11 ENCOUNTER — TELEPHONE (OUTPATIENT)
Dept: FAMILY MEDICINE | Facility: CLINIC | Age: 70
End: 2020-02-11

## 2020-02-11 NOTE — TELEPHONE ENCOUNTER
Per Mr Danica Robertson patient can take Mucinex DM, patient states that he had be taking that. Patient scheduled  for 2/14/2020 with Mr. Robertson.       ----- Message from Sylvie Argueta sent at 2/11/2020  4:05 PM CST -----  Contact: pT  Type: Needs Medical Advice    Who Called:  pT  Best Call Back Number: [phone]  Additional Information: Requesting a call back regarding seeing if the docto can call something in for pt cough   [id number] [place] [address] AT [facility] [address] [address]  Phone: [phone] Fax: [phone]      Please Advise ---Thank you

## 2020-02-14 ENCOUNTER — OFFICE VISIT (OUTPATIENT)
Dept: FAMILY MEDICINE | Facility: CLINIC | Age: 70
End: 2020-02-14
Payer: MEDICARE

## 2020-02-14 VITALS
HEART RATE: 66 BPM | DIASTOLIC BLOOD PRESSURE: 72 MMHG | HEIGHT: 71 IN | RESPIRATION RATE: 16 BRPM | SYSTOLIC BLOOD PRESSURE: 110 MMHG | WEIGHT: 289.69 LBS | BODY MASS INDEX: 40.56 KG/M2 | OXYGEN SATURATION: 96 % | TEMPERATURE: 98 F

## 2020-02-14 DIAGNOSIS — B96.89 BACTERIAL SINUSITIS: Primary | ICD-10-CM

## 2020-02-14 DIAGNOSIS — J32.9 BACTERIAL SINUSITIS: Primary | ICD-10-CM

## 2020-02-14 DIAGNOSIS — J01.00 ACUTE MAXILLARY SINUSITIS, RECURRENCE NOT SPECIFIED: ICD-10-CM

## 2020-02-14 PROCEDURE — 99213 PR OFFICE/OUTPT VISIT, EST, LEVL III, 20-29 MIN: ICD-10-PCS | Mod: S$PBB,,, | Performed by: PHYSICIAN ASSISTANT

## 2020-02-14 PROCEDURE — 99213 OFFICE O/P EST LOW 20 MIN: CPT | Mod: S$PBB,,, | Performed by: PHYSICIAN ASSISTANT

## 2020-02-14 PROCEDURE — 99999 PR PBB SHADOW E&M-EST. PATIENT-LVL V: CPT | Mod: PBBFAC,,, | Performed by: PHYSICIAN ASSISTANT

## 2020-02-14 PROCEDURE — 99215 OFFICE O/P EST HI 40 MIN: CPT | Mod: PBBFAC,PO | Performed by: PHYSICIAN ASSISTANT

## 2020-02-14 PROCEDURE — 99999 PR PBB SHADOW E&M-EST. PATIENT-LVL V: ICD-10-PCS | Mod: PBBFAC,,, | Performed by: PHYSICIAN ASSISTANT

## 2020-02-14 RX ORDER — AMOXICILLIN AND CLAVULANATE POTASSIUM 875; 125 MG/1; MG/1
1 TABLET, FILM COATED ORAL 2 TIMES DAILY
Qty: 20 TABLET | Refills: 0 | Status: SHIPPED | OUTPATIENT
Start: 2020-02-14 | End: 2020-02-24

## 2020-02-14 NOTE — PROGRESS NOTES
Subjective:       Patient ID: Rajeev Newell III is a 69 y.o. male.    Chief Complaint: Nasal Congestion and Cough    HPI   Cough and congestion   Purulent mucus  Facial pain  Review of Systems   Constitutional: Positive for chills. Negative for activity change, appetite change, diaphoresis, fatigue, fever and unexpected weight change.   HENT: Positive for congestion, postnasal drip, sinus pressure and sinus pain. Negative for sore throat.    Eyes: Negative.    Respiratory: Positive for cough. Negative for shortness of breath and wheezing.    Cardiovascular: Negative.  Negative for chest pain and leg swelling.   Gastrointestinal: Negative.    Endocrine: Negative.    Genitourinary: Negative.    Musculoskeletal: Negative.    Skin: Negative.  Negative for rash.       Objective:      Physical Exam   Constitutional: He appears well-developed and well-nourished. No distress.   HENT:   Head: Normocephalic and atraumatic.   Right Ear: External ear normal.   Left Ear: External ear normal.   Nose: Nose normal.   Mouth/Throat: Oropharynx is clear and moist. No oropharyngeal exudate.   Max sinus tenderness and fullness R>L  Cloudy mucus   Eyes: Conjunctivae are normal. No scleral icterus.   Neck: Normal range of motion. Neck supple. No tracheal deviation present. No thyromegaly present.   Cardiovascular: Normal rate, regular rhythm, normal heart sounds and intact distal pulses. Exam reveals no gallop and no friction rub.   No murmur heard.  Pulmonary/Chest: Effort normal and breath sounds normal. No stridor. No respiratory distress. He has no wheezes. He has no rales.   Musculoskeletal: He exhibits no edema.   Lymphadenopathy:     He has no cervical adenopathy.   Skin: Skin is warm and dry. No rash noted.   Vitals reviewed.      Assessment:       1. Bacterial sinusitis    2. Acute maxillary sinusitis, recurrence not specified        Plan:       Rajeev was seen today for nasal congestion and cough.    Diagnoses and all  orders for this visit:    Bacterial sinusitis  -     amoxicillin-clavulanate 875-125mg (AUGMENTIN) 875-125 mg per tablet; Take 1 tablet by mouth 2 (two) times daily. for 10 days    Acute maxillary sinusitis, recurrence not specified  -     amoxicillin-clavulanate 875-125mg (AUGMENTIN) 875-125 mg per tablet; Take 1 tablet by mouth 2 (two) times daily. for 10 days    discussed otc's

## 2020-02-19 ENCOUNTER — OFFICE VISIT (OUTPATIENT)
Dept: UROLOGY | Facility: CLINIC | Age: 70
End: 2020-02-19
Payer: MEDICARE

## 2020-02-19 DIAGNOSIS — N40.0 BENIGN PROSTATIC HYPERPLASIA WITHOUT LOWER URINARY TRACT SYMPTOMS: Primary | ICD-10-CM

## 2020-02-19 DIAGNOSIS — R35.1 NOCTURIA: ICD-10-CM

## 2020-02-19 DIAGNOSIS — R39.15 URINARY URGENCY: ICD-10-CM

## 2020-02-19 LAB
BILIRUB SERPL-MCNC: ABNORMAL MG/DL
BLOOD URINE, POC: ABNORMAL
COLOR, POC UA: ABNORMAL
GLUCOSE UR QL STRIP: ABNORMAL
KETONES UR QL STRIP: ABNORMAL
LEUKOCYTE ESTERASE URINE, POC: ABNORMAL
NITRITE, POC UA: ABNORMAL
PH, POC UA: 6
PROTEIN, POC: ABNORMAL
SPECIFIC GRAVITY, POC UA: 1.02
UROBILINOGEN, POC UA: 1

## 2020-02-19 PROCEDURE — 99212 OFFICE O/P EST SF 10 MIN: CPT | Mod: PBBFAC,PO | Performed by: UROLOGY

## 2020-02-19 PROCEDURE — 81002 URINALYSIS NONAUTO W/O SCOPE: CPT | Mod: PBBFAC,PO | Performed by: UROLOGY

## 2020-02-19 PROCEDURE — 99214 OFFICE O/P EST MOD 30 MIN: CPT | Mod: S$PBB,,, | Performed by: UROLOGY

## 2020-02-19 PROCEDURE — 99999 PR PBB SHADOW E&M-EST. PATIENT-LVL II: CPT | Mod: PBBFAC,,, | Performed by: UROLOGY

## 2020-02-19 PROCEDURE — 99999 PR PBB SHADOW E&M-EST. PATIENT-LVL II: ICD-10-PCS | Mod: PBBFAC,,, | Performed by: UROLOGY

## 2020-02-19 PROCEDURE — 99214 PR OFFICE/OUTPT VISIT, EST, LEVL IV, 30-39 MIN: ICD-10-PCS | Mod: S$PBB,,, | Performed by: UROLOGY

## 2020-02-19 NOTE — PROGRESS NOTES
UROLOGY [place]  [birth year]       Cc yearly check     Age 69, overall he says he has had stable health. The psa is 1.2 (this month), it was 9.2 once in the past but it normalized shortly after and so we never needed to do a prostate biopsy.     Has nocturia x 0-1, no pain or burning. Says he drinks a lot of fluid in the evening. Has some urgency but has not had incontinence.        PMH     Surgical:  has past surgical history that includes Cystoscopy; Circumcision; Carpal tunnel release; and Hernia repair (Left, 1994).    Medical:  has a past medical history of Cataract; Glaucoma; Hypertension; DM (diabetes mellitus); and Sinusitis.    Familial: no fh of renal disease    Social:  in oil rigs, partially retired, , lives in [place]                 Current Outpatient Prescriptions on File Prior to Visit   Medication Sig Dispense Refill    celecoxib (CELEBREX) 200 MG capsule Take 200 mg by mouth daily as needed.         ezetimibe (ZETIA) 10 mg tablet          fexofenadine (ALLEGRA) 180 MG tablet Every day PRN        hydrocortisone (ANUSOL-HC) 25 mg suppository Place 1 suppository (25 mg total) rectally 2 ( 12 suppository 1    latanoprost 0.005 % ophthalmic solution PLACE 1 DROP IN BOTH EYES E 7.5 mL 0    mupirocin (BACTROBAN) 2 % JAMI AA BID   2    pantoprazole (PROTONIX) Every day        pioglitazone (ACTOS) 15 MG tablet Take 1 tablet (15 mg total) by mout 90 tablet 3    telmisartan (MICARDIS)      0    brinzolamide (AZOPT) 1 % ophthalmic suspension Place 1 drop into both eyes every morning. 10 mL 4   ROS:  Denies malaise, headaches, eye symptoms, difficulty swallowing or breathing problems.   No chest pains or palpitations.   No change in bowel habits, no tarry stools or hematochezia. Has acid reflux.  No genital lesions.  No bleeding disorders, no seizures.  Psych normal affect, normal mood     Pt alert, oriented, no distressurinary   HEENT:  wnl.  Neck: supple, no JVD, no  lymphadenopathy  Chest: CV NSR  Lungs: normal chest expansion  Abdomen prominent, obese, nontender, no organomegaly, no masses.  No hernias  Penis nl, meatus nl  Testes nl, epi nl, scrotum nl  WALDEMAR: anus nl, sphincter nl tone, mucosa without lesions, prostate 40 gm, symmetric, no nodules or indurations  Extremities: no edema, peripheral pulses nl  Neuro: preserved     IMP  bph can continue on observation  RTC yearly

## 2020-02-20 DIAGNOSIS — E11.9 DIABETES TYPE 2, CONTROLLED: ICD-10-CM

## 2020-02-20 RX ORDER — PIOGLITAZONEHYDROCHLORIDE 15 MG/1
TABLET ORAL
Qty: 90 TABLET | Refills: 1 | Status: SHIPPED | OUTPATIENT
Start: 2020-02-20 | End: 2020-08-17

## 2020-02-21 DIAGNOSIS — M25.562 LEFT KNEE PAIN, UNSPECIFIED CHRONICITY: Primary | ICD-10-CM

## 2020-02-26 ENCOUNTER — HOSPITAL ENCOUNTER (OUTPATIENT)
Dept: RADIOLOGY | Facility: HOSPITAL | Age: 70
Discharge: HOME | End: 2020-02-26
Attending: ORTHOPAEDIC SURGERY
Payer: MEDICARE

## 2020-02-26 ENCOUNTER — OFFICE VISIT (OUTPATIENT)
Dept: ORTHOPEDICS | Facility: CLINIC | Age: 70
End: 2020-02-26
Payer: MEDICARE

## 2020-02-26 VITALS — BODY MASS INDEX: 40.56 KG/M2 | WEIGHT: 289.69 LBS | HEIGHT: 71 IN

## 2020-02-26 DIAGNOSIS — M25.562 LEFT KNEE PAIN, UNSPECIFIED CHRONICITY: ICD-10-CM

## 2020-02-26 DIAGNOSIS — Z96.652 STATUS POST TOTAL LEFT KNEE REPLACEMENT: Primary | ICD-10-CM

## 2020-02-26 PROCEDURE — 99212 OFFICE O/P EST SF 10 MIN: CPT | Mod: S$PBB,,, | Performed by: ORTHOPAEDIC SURGERY

## 2020-02-26 PROCEDURE — 99999 PR PBB SHADOW E&M-EST. PATIENT-LVL III: CPT | Mod: PBBFAC,,, | Performed by: ORTHOPAEDIC SURGERY

## 2020-02-26 PROCEDURE — 73562 XR KNEE ORTHO LEFT: ICD-10-PCS | Mod: 26,LT,, | Performed by: RADIOLOGY

## 2020-02-26 PROCEDURE — 99213 OFFICE O/P EST LOW 20 MIN: CPT | Mod: PBBFAC,25 | Performed by: ORTHOPAEDIC SURGERY

## 2020-02-26 PROCEDURE — 73562 X-RAY EXAM OF KNEE 3: CPT | Mod: 26,LT,, | Performed by: RADIOLOGY

## 2020-02-26 PROCEDURE — 73560 XR KNEE ORTHO LEFT: ICD-10-PCS | Mod: 26,RT,, | Performed by: RADIOLOGY

## 2020-02-26 PROCEDURE — 73560 X-RAY EXAM OF KNEE 1 OR 2: CPT | Mod: 26,RT,, | Performed by: RADIOLOGY

## 2020-02-26 PROCEDURE — 73560 X-RAY EXAM OF KNEE 1 OR 2: CPT | Mod: TC,RT

## 2020-02-26 PROCEDURE — 99212 PR OFFICE/OUTPT VISIT, EST, LEVL II, 10-19 MIN: ICD-10-PCS | Mod: S$PBB,,, | Performed by: ORTHOPAEDIC SURGERY

## 2020-02-26 PROCEDURE — 99999 PR PBB SHADOW E&M-EST. PATIENT-LVL III: ICD-10-PCS | Mod: PBBFAC,,, | Performed by: ORTHOPAEDIC SURGERY

## 2020-02-26 NOTE — PROGRESS NOTES
Rajeev RAEGAN Newell III is in for one year follow up for a  LeftTKA. 2 years on right  He is doing very well.  No pain in the knee.  He has resumed activities of daily living. He has been quite active  Exam demonstrates  A well developed male in no distress.  Alert and oriented.  Mood and affect are appropriate.    Knee incision is well healed.  ROM is 0-120.  The patella tracks well and there is no instability. The extremity is neurovascularly intact.    Xrays demonstrate a well fixed and positioned prosthesis.      Imp:Doing well    F/u in one year with bilateral knee xrays and PROMS.  Video visit.

## 2020-03-11 ENCOUNTER — OFFICE VISIT (OUTPATIENT)
Dept: OPTOMETRY | Facility: CLINIC | Age: 70
End: 2020-03-11
Payer: MEDICARE

## 2020-03-11 DIAGNOSIS — H40.1132 PRIMARY OPEN ANGLE GLAUCOMA OF BOTH EYES, MODERATE STAGE: Primary | ICD-10-CM

## 2020-03-11 PROCEDURE — 92012 INTRM OPH EXAM EST PATIENT: CPT | Mod: S$PBB,,, | Performed by: OPTOMETRIST

## 2020-03-11 PROCEDURE — 92012 PR EYE EXAM, EST PATIENT,INTERMED: ICD-10-PCS | Mod: S$PBB,,, | Performed by: OPTOMETRIST

## 2020-03-11 PROCEDURE — 99999 PR PBB SHADOW E&M-EST. PATIENT-LVL III: CPT | Mod: PBBFAC,,, | Performed by: OPTOMETRIST

## 2020-03-11 PROCEDURE — 99999 PR PBB SHADOW E&M-EST. PATIENT-LVL III: ICD-10-PCS | Mod: PBBFAC,,, | Performed by: OPTOMETRIST

## 2020-03-11 PROCEDURE — 99213 OFFICE O/P EST LOW 20 MIN: CPT | Mod: PBBFAC,PO | Performed by: OPTOMETRIST

## 2020-03-11 NOTE — PROGRESS NOTES
HPI     Overdue iop check-dle-7/26/19    Pt denies any blurred vision. No changes since last visit. Using gtts as   directed. Azopt BID, latanoprost qhs.     Last edited by Katerina Richardson on 3/11/2020  9:21 AM. (History)        ROS     Positive for: Eyes    Negative for: Constitutional, Gastrointestinal, Neurological, Skin,   Genitourinary, Musculoskeletal, HENT, Endocrine, Cardiovascular,   Respiratory, Psychiatric, Allergic/Imm, Heme/Lymph    Last edited by SHERMAN Smith, OD on 3/11/2020  9:40 AM. (History)        Assessment /Plan     For exam results, see Encounter Report.    Primary open angle glaucoma of both eyes, moderate stage      Over due for IOP recheck 4 months  Last exam 7/19    IOP upper teens today   Reports good compliance    Angles open  /525     Mejía/ OCT updated 7/19  PSD  3.92 <0.5% with inf arc defect / some sup depression  1.75 <10% onl with early inf arc depression     G 48 onl w def 360  G 60 onl w/ def G/TS/TI  Essentially stable vs very slight progression from previous     Continue OU  azopt bid  Latanoprost qhs      RTC for annual exam w/ DFE---repeat fields and OCT late July /early August 2020

## 2020-04-01 ENCOUNTER — PATIENT MESSAGE (OUTPATIENT)
Dept: FAMILY MEDICINE | Facility: CLINIC | Age: 70
End: 2020-04-01

## 2020-04-02 ENCOUNTER — PATIENT MESSAGE (OUTPATIENT)
Dept: FAMILY MEDICINE | Facility: CLINIC | Age: 70
End: 2020-04-02

## 2020-04-04 DIAGNOSIS — M19.90 ARTHRITIS: ICD-10-CM

## 2020-04-05 RX ORDER — MELOXICAM 15 MG/1
TABLET ORAL
Qty: 90 TABLET | Refills: 0 | Status: SHIPPED | OUTPATIENT
Start: 2020-04-05 | End: 2020-07-01

## 2020-05-06 ENCOUNTER — PATIENT MESSAGE (OUTPATIENT)
Dept: ADMINISTRATIVE | Facility: HOSPITAL | Age: 70
End: 2020-05-06

## 2020-05-06 DIAGNOSIS — H40.1132 PRIMARY OPEN ANGLE GLAUCOMA OF BOTH EYES, MODERATE STAGE: ICD-10-CM

## 2020-05-06 RX ORDER — LATANOPROST 50 UG/ML
SOLUTION/ DROPS OPHTHALMIC
Qty: 7.5 ML | Refills: 3 | Status: SHIPPED | OUTPATIENT
Start: 2020-05-06 | End: 2021-01-29

## 2020-06-24 ENCOUNTER — OFFICE VISIT (OUTPATIENT)
Dept: FAMILY MEDICINE | Facility: CLINIC | Age: 70
End: 2020-06-24
Payer: MEDICARE

## 2020-06-24 VITALS
DIASTOLIC BLOOD PRESSURE: 72 MMHG | TEMPERATURE: 97 F | HEART RATE: 64 BPM | WEIGHT: 277.69 LBS | BODY MASS INDEX: 38.88 KG/M2 | HEIGHT: 71 IN | SYSTOLIC BLOOD PRESSURE: 124 MMHG

## 2020-06-24 DIAGNOSIS — E66.01 MORBID OBESITY: ICD-10-CM

## 2020-06-24 DIAGNOSIS — Z01.84 ENCOUNTER FOR ANTIBODY RESPONSE EXAMINATION: ICD-10-CM

## 2020-06-24 DIAGNOSIS — E11.8 CONTROLLED TYPE 2 DIABETES MELLITUS WITH COMPLICATION, WITHOUT LONG-TERM CURRENT USE OF INSULIN: ICD-10-CM

## 2020-06-24 DIAGNOSIS — Z00.00 ROUTINE GENERAL MEDICAL EXAMINATION AT A HEALTH CARE FACILITY: Primary | ICD-10-CM

## 2020-06-24 PROBLEM — D69.2 OTHER NONTHROMBOCYTOPENIC PURPURA: Status: ACTIVE | Noted: 2020-06-24

## 2020-06-24 PROCEDURE — 99999 PR PBB SHADOW E&M-EST. PATIENT-LVL IV: CPT | Mod: PBBFAC,,, | Performed by: FAMILY MEDICINE

## 2020-06-24 PROCEDURE — 99397 PER PM REEVAL EST PAT 65+ YR: CPT | Mod: S$PBB,,, | Performed by: FAMILY MEDICINE

## 2020-06-24 PROCEDURE — 99397 PR PREVENTIVE VISIT,EST,65 & OVER: ICD-10-PCS | Mod: S$PBB,,, | Performed by: FAMILY MEDICINE

## 2020-06-24 PROCEDURE — 99999 PR PBB SHADOW E&M-EST. PATIENT-LVL IV: ICD-10-PCS | Mod: PBBFAC,,, | Performed by: FAMILY MEDICINE

## 2020-06-24 PROCEDURE — 99214 OFFICE O/P EST MOD 30 MIN: CPT | Mod: PBBFAC,PN | Performed by: FAMILY MEDICINE

## 2020-06-24 NOTE — PROGRESS NOTES
Subjective:       Patient ID: Rajeev Newell III is a 69 y.o. male.    Chief Complaint: Annual Exam      Rajeev Newell III is in the office for annual exam.    HPI  No updates to medical hx.  Past Medical History:   Diagnosis Date    Cataract     OU--early    DM (diabetes mellitus) 10/29/2012    Glaucoma     POAG-OU    Hypertension     Sinusitis       Maintains f/u with cards/walker and uro/randrup.     Current Outpatient Medications:     albuterol 90 mcg/actuation inhaler, Inhale 2 puffs into the lungs every 4 (four) hours as needed for Wheezing., Disp: 1 Inhaler, Rfl: 5    aspirin (ECOTRIN) 81 MG EC tablet, Take 1 tablet (81 mg total) by mouth 2 (two) times daily., Disp: 60 tablet, Rfl: 0    atorvastatin (LIPITOR) 40 MG tablet, TK 1 T PO  QPM, Disp: , Rfl: 11    brinzolamide (AZOPT) 1 % ophthalmic suspension, Place 1 drop into both eyes 2 (two) times daily., Disp: 10 mL, Rfl: 3    clotrimazole (LOTRIMIN) 1 % cream, Apply topically 2 (two) times daily., Disp: 12 g, Rfl: 0    fexofenadine (ALLEGRA) 180 MG tablet, Every day PRN, Disp: , Rfl:     latanoprost 0.005 % ophthalmic solution, INSTILL 1 DROP IN BOTH EYES EVERY NIGHT, Disp: 7.5 mL, Rfl: 3    meloxicam (MOBIC) 15 MG tablet, TAKE 1 TABLET(15 MG) BY MOUTH EVERY DAY, Disp: 90 tablet, Rfl: 0    pantoprazole (PROTONIX) 40 MG tablet, pantoprazole 40 mg tablet,delayed release  TAKE 1 TABLET BY MOUTH EVERY DAY IN THE MORNING, Disp: , Rfl:     pioglitazone (ACTOS) 15 MG tablet, TAKE 1 TABLET BY MOUTH ONCE DAILY, Disp: 90 tablet, Rfl: 1    pioglitazone (ACTOS) 15 MG tablet, TAKE 1 TABLET BY MOUTH ONCE DAILY, Disp: 90 tablet, Rfl: 1    docusate sodium (COLACE) 100 MG capsule, Take 1 capsule (100 mg total) by mouth 2 (two) times daily. (Patient not taking: Reported on 6/24/2020), Disp: 60 capsule, Rfl: 0    fluocinonide 0.1 % Crea, fluocinonide 0.1 % topical cream, Disp: , Rfl:     fluticasone (FLONASE) 50 mcg/actuation nasal spray, SHAKE LIQUID  AND USE 1 SPRAY(50 MCG) IN EACH NOSTRIL EVERY DAY (Patient not taking: Reported on 6/24/2020), Disp: 48 mL, Rfl: 0    hydrocortisone (ANUSOL-HC) 25 mg suppository, Place 1 suppository (25 mg total) rectally 2 (two) times daily as needed for Hemorrhoids. 1 Suppository(ies) Rectal PRN Twice a day. (Patient not taking: Reported on 6/24/2020), Disp: 12 suppository, Rfl: 1    valACYclovir (VALTREX) 1000 MG tablet, Take 1 tablet (1,000 mg total) by mouth 2 (two) times daily., Disp: 20 tablet, Rfl: 1    The 10-year ASCVD risk score (Jeannine CORONEL JrDanica, et al., 2013) is: 26.9%    Values used to calculate the score:      Age: 69 years      Sex: Male      Is Non- : No      Diabetic: Yes      Tobacco smoker: No      Systolic Blood Pressure: 124 mmHg      Is BP treated: No      HDL Cholesterol: 46 mg/dL      Total Cholesterol: 140 mg/dL     Lab Results   Component Value Date    HGBA1C 6.2 (H) 02/05/2020    HGBA1C 6.3 (H) 09/19/2019    HGBA1C 6.3 (H) 02/21/2019    HGBA1C 6.3 (H) 02/21/2019     Lab Results   Component Value Date    LDLCALC 80.4 02/05/2020    CREATININE 1.1 02/05/2020   labs 2020 rev.    Review of Systems   Constitutional: Negative for activity change, fatigue and unexpected weight change.   HENT: Negative for congestion, hearing loss, rhinorrhea and trouble swallowing.    Eyes: Negative for discharge and visual disturbance.   Respiratory: Negative for cough, chest tightness and wheezing.    Cardiovascular: Negative for chest pain, palpitations and leg swelling.   Gastrointestinal: Negative for blood in stool, constipation, diarrhea and vomiting.        No gerd c/o.   Endocrine: Negative for polydipsia and polyuria.   Genitourinary: Negative for difficulty urinating, hematuria and urgency.   Musculoskeletal: Negative for arthralgias, joint swelling and neck pain.        More active of late doing things around the house.   Neurological: Negative for dizziness, weakness, light-headedness and  headaches.   Psychiatric/Behavioral: Negative for confusion, dysphoric mood and sleep disturbance.           Objective:      Physical Exam  Vitals signs and nursing note reviewed.   Constitutional:       Appearance: He is well-developed.   HENT:      Head: Normocephalic and atraumatic.   Eyes:      General: No scleral icterus.     Conjunctiva/sclera: Conjunctivae normal.      Pupils: Pupils are equal, round, and reactive to light.   Neck:      Thyroid: No thyromegaly.   Cardiovascular:      Rate and Rhythm: Normal rate and regular rhythm.      Pulses:           Dorsalis pedis pulses are 2+ on the right side and 2+ on the left side.        Posterior tibial pulses are 1+ on the right side and 1+ on the left side.      Heart sounds: Normal heart sounds. No murmur. No friction rub. No gallop.    Pulmonary:      Effort: Pulmonary effort is normal. No respiratory distress.      Breath sounds: Normal breath sounds. No wheezing or rales.   Abdominal:      General: Bowel sounds are normal. There is no distension.      Palpations: Abdomen is soft.      Tenderness: There is no abdominal tenderness. There is no guarding.      Comments: Exam limited by habitus.   Musculoskeletal: Normal range of motion.      Right foot: Normal range of motion. No deformity.      Left foot: Normal range of motion. No deformity.   Feet:      Right foot:      Protective Sensation: 6 sites tested. 6 sites sensed.      Skin integrity: No ulcer, blister, skin breakdown, erythema, warmth, callus or dry skin.      Left foot:      Protective Sensation: 6 sites tested. 6 sites sensed.      Skin integrity: No ulcer, blister, skin breakdown, erythema, warmth, callus or dry skin.   Skin:     General: Skin is warm and dry.   Neurological:      General: No focal deficit present.      Mental Status: He is alert and oriented to person, place, and time.      Cranial Nerves: No cranial nerve deficit.   Psychiatric:         Mood and Affect: Mood normal.          Behavior: Behavior normal.             Screening recommendations appropriate to age and health status were reviewed.    Assessment & Plan:    Routine general medical examination at a health care facility  Comments:  anticipatory guidance reviewed    Controlled type 2 diabetes mellitus with complication, without long-term current use of insulin  Comments:  doing well, cont current regimen  Orders:  -     Comprehensive metabolic panel; Future; Expected date: 06/24/2020  -     Hemoglobin A1C; Future; Expected date: 06/24/2020    Encounter for antibody response examination  -     COVID-19 (SARS CoV-2) IgG Antibody; Future; Expected date: 06/24/2020    Morbid obesity  Comments:  encouraged healthy approach to weight, exercise

## 2020-09-23 ENCOUNTER — IMMUNIZATION (OUTPATIENT)
Dept: FAMILY MEDICINE | Facility: CLINIC | Age: 70
End: 2020-09-23
Payer: MEDICARE

## 2020-09-23 PROCEDURE — G0008 ADMIN INFLUENZA VIRUS VAC: HCPCS | Mod: PBBFAC

## 2020-09-23 PROCEDURE — 90694 VACC AIIV4 NO PRSRV 0.5ML IM: CPT | Mod: PBBFAC,PO

## 2020-09-24 ENCOUNTER — PATIENT OUTREACH (OUTPATIENT)
Dept: ADMINISTRATIVE | Facility: OTHER | Age: 70
End: 2020-09-24

## 2020-09-24 NOTE — PROGRESS NOTES
LINKS immunization registry updated  Care Everywhere updated  Health Maintenance updated  Chart reviewed for overdue Proactive [facility] Encounters (JOSE RAUL) health maintenance testing (CRS, Breast Ca, Diabetic Eye Exam)   Orders entered:N/A

## 2020-09-25 ENCOUNTER — OFFICE VISIT (OUTPATIENT)
Dept: OPTOMETRY | Facility: CLINIC | Age: 70
End: 2020-09-25
Attending: OPTOMETRIST
Payer: MEDICARE

## 2020-09-25 ENCOUNTER — CLINICAL SUPPORT (OUTPATIENT)
Dept: OPHTHALMOLOGY | Facility: CLINIC | Age: 70
End: 2020-09-25
Attending: OPTOMETRIST
Payer: MEDICARE

## 2020-09-25 DIAGNOSIS — E11.9 DIABETES MELLITUS TYPE 2 WITHOUT RETINOPATHY: Primary | ICD-10-CM

## 2020-09-25 DIAGNOSIS — E11.36 CATARACT ASSOCIATED WITH TYPE 2 DIABETES MELLITUS: ICD-10-CM

## 2020-09-25 DIAGNOSIS — H40.1132 PRIMARY OPEN ANGLE GLAUCOMA OF BOTH EYES, MODERATE STAGE: ICD-10-CM

## 2020-09-25 DIAGNOSIS — H43.393 VITREOUS FLOATERS, BILATERAL: ICD-10-CM

## 2020-09-25 PROCEDURE — 99999 PR PBB SHADOW E&M-EST. PATIENT-LVL III: CPT | Mod: PBBFAC,,, | Performed by: OPTOMETRIST

## 2020-09-25 PROCEDURE — 99999 PR PBB SHADOW E&M-EST. PATIENT-LVL III: ICD-10-PCS | Mod: PBBFAC,,, | Performed by: OPTOMETRIST

## 2020-09-25 PROCEDURE — 92014 PR EYE EXAM, EST PATIENT,COMPREHESV: ICD-10-PCS | Mod: S$PBB,,, | Performed by: OPTOMETRIST

## 2020-09-25 PROCEDURE — 92014 COMPRE OPH EXAM EST PT 1/>: CPT | Mod: S$PBB,,, | Performed by: OPTOMETRIST

## 2020-09-25 PROCEDURE — 99213 OFFICE O/P EST LOW 20 MIN: CPT | Mod: PBBFAC,PO | Performed by: OPTOMETRIST

## 2020-09-25 NOTE — PROGRESS NOTES
HPI     Routine DM eye exam with HVF and oct-dle-3/11/20    Pt denies any  Changes since last visit. No blurred vision. Using gtts as   directed. Azopt BID, latanoprost qhs. BSL controlled.    Hemoglobin A1C       Date                     Value               Ref Range             Status                02/05/2020               6.2 (H)             4.0 - 5.6 %           Final              Comment:    ADA Screening Guidelines:  5.7-6.4%  Consistent with   prediabetes  >or=6.5%  Consistent with diabetes  High levels of fetal   hemoglobin interfere with the HbA1C  assay. Heterozygous hemoglobin   variants (HbS, HgC, etc)do  not significantly interfere with this assay.     However, presence of multiple variants may affect accuracy.         09/19/2019               6.3 (H)             4.0 - 5.6 %           Final              Comment:    ADA Screening Guidelines:  5.7-6.4%  Consistent with   prediabetes  >or=6.5%  Consistent with diabetes  High levels of fetal   hemoglobin interfere with the HbA1C  assay. Heterozygous hemoglobin   variants (HbS, HgC, etc)do  not significantly interfere with this assay.     However, presence of multiple variants may affect accuracy.         02/21/2019               6.3 (H)             4.0 - 5.6 %           Final              Comment:    ADA Screening Guidelines:  5.7-6.4%  Consistent with   prediabetes  >or=6.5%  Consistent with diabetes  High levels of fetal   hemoglobin interfere with the HbA1C  assay. Heterozygous hemoglobin   variants (HbS, HgC, etc)do  not significantly interfere with this assay.     However, presence of multiple variants may affect accuracy.         02/21/2019               6.3 (H)             4.0 - 5.6 %           Final              Comment:    ADA Screening Guidelines:  5.7-6.4%  Consistent with   prediabetes  >or=6.5%  Consistent with diabetes  High levels of fetal   hemoglobin interfere with the HbA1C  assay. Heterozygous hemoglobin   variants (HbS, HgC, etc)do  not  significantly interfere with this assay.     However, presence of multiple variants may affect accuracy.    ----------      Last edited by Katerina Richardson on 9/25/2020  9:33 AM. (History)        ROS     Positive for: Eyes    Negative for: Constitutional, Gastrointestinal, Neurological, Skin,   Genitourinary, Musculoskeletal, HENT, Endocrine, Cardiovascular,   Respiratory, Psychiatric, Allergic/Imm, Heme/Lymph    Last edited by SHERMAN Smith, OD on 9/25/2020  9:52 AM. (History)        Assessment /Plan     For exam results, see Encounter Report.    Diabetes mellitus type 2 without retinopathy    Primary open angle glaucoma of both eyes, moderate stage    Cataract associated with type 2 diabetes mellitus    Vitreous floaters, bilateral      1. No lisa/ retinopathy, no csme, gave Diabetic Retinopathy info, control glucose and BP  Advise annual DFE  2.   IOP stable mid teens w/ multi meds  Angles open  /525     Mejía/ OCT updated today  G 47 w/ def 360  G 60 w/ def G/TS/TI    24-2 my fast  3.89 <05% onl with inf>>sup arc defect  1.71 onl w/ mild inf arc depression  Essentially stable from previous     Continue OU  azopt bid  Latanoprost qhs      3. Mild early changes, not ready for consult   4. RD precautions given and reviewed. Patient knows to call/ message if any further changes in symptoms occur.    Discussed and educated patient on current findings /plan.  RTC 6 months IOP recheck, prn if any changes / issues

## 2020-09-29 ENCOUNTER — PATIENT MESSAGE (OUTPATIENT)
Dept: OTHER | Facility: OTHER | Age: 70
End: 2020-09-29

## 2020-10-05 ENCOUNTER — PATIENT MESSAGE (OUTPATIENT)
Dept: ADMINISTRATIVE | Facility: HOSPITAL | Age: 70
End: 2020-10-05

## 2020-10-16 ENCOUNTER — OFFICE VISIT (OUTPATIENT)
Dept: FAMILY MEDICINE | Facility: CLINIC | Age: 70
End: 2020-10-16
Payer: MEDICARE

## 2020-10-16 VITALS
OXYGEN SATURATION: 98 % | WEIGHT: 274.25 LBS | HEART RATE: 74 BPM | DIASTOLIC BLOOD PRESSURE: 76 MMHG | SYSTOLIC BLOOD PRESSURE: 128 MMHG | BODY MASS INDEX: 38.25 KG/M2 | TEMPERATURE: 98 F

## 2020-10-16 DIAGNOSIS — T63.691A MARINE ANIMAL STING, ACCIDENTAL OR UNINTENTIONAL, INITIAL ENCOUNTER: Primary | ICD-10-CM

## 2020-10-16 PROCEDURE — 99214 OFFICE O/P EST MOD 30 MIN: CPT | Mod: PBBFAC,PO | Performed by: PHYSICIAN ASSISTANT

## 2020-10-16 PROCEDURE — 99999 PR PBB SHADOW E&M-EST. PATIENT-LVL IV: CPT | Mod: PBBFAC,,, | Performed by: PHYSICIAN ASSISTANT

## 2020-10-16 PROCEDURE — 99213 OFFICE O/P EST LOW 20 MIN: CPT | Mod: S$PBB,,, | Performed by: PHYSICIAN ASSISTANT

## 2020-10-16 PROCEDURE — 99999 PR PBB SHADOW E&M-EST. PATIENT-LVL IV: ICD-10-PCS | Mod: PBBFAC,,, | Performed by: PHYSICIAN ASSISTANT

## 2020-10-16 PROCEDURE — 99213 PR OFFICE/OUTPT VISIT, EST, LEVL III, 20-29 MIN: ICD-10-PCS | Mod: S$PBB,,, | Performed by: PHYSICIAN ASSISTANT

## 2020-10-16 RX ORDER — HYDROCODONE BITARTRATE AND ACETAMINOPHEN 5; 325 MG/1; MG/1
TABLET ORAL
COMMUNITY
Start: 2020-09-16 | End: 2021-03-11

## 2020-10-16 RX ORDER — LOSARTAN POTASSIUM 50 MG/1
TABLET ORAL
COMMUNITY
Start: 2020-10-11

## 2020-10-16 RX ORDER — CEPHALEXIN 500 MG/1
500 CAPSULE ORAL EVERY 12 HOURS
Qty: 14 CAPSULE | Refills: 0 | Status: SHIPPED | OUTPATIENT
Start: 2020-10-16 | End: 2020-10-23

## 2020-10-16 NOTE — PROGRESS NOTES
Subjective:      Patient ID: Rajeev Newell III is a 69 y.o. male.    Chief Complaint: Animal Bite (catfish bite, happened yesterday)  Patient is new to me.    HPI   Patient reports catfish sting on right lateral ankle from yesterday.  Tried mupriocin without resolution of symptoms.  Tetanus UTD.  Had this before and resolved with course of Keflex.    Review of Systems   Constitutional: Negative for chills and fever.   Respiratory: Negative for shortness of breath.    Cardiovascular: Negative for chest pain.   Skin: Positive for wound (right lateral ankle catfish sting).       Objective:   /76   Pulse 74   Temp 97.7 °F (36.5 °C)   Wt 124.4 kg (274 lb 4 oz)   SpO2 98%   BMI 38.25 kg/m²      Physical Exam  Vitals signs reviewed.   Constitutional:       General: He is not in acute distress.     Appearance: Normal appearance. He is well-developed and well-groomed.   HENT:      Head: Normocephalic and atraumatic.      Right Ear: Hearing and external ear normal.      Left Ear: Hearing and external ear normal.   Eyes:      General: Lids are normal.      Conjunctiva/sclera: Conjunctivae normal.   Neck:      Musculoskeletal: Normal range of motion.      Trachea: Phonation normal.   Cardiovascular:      Rate and Rhythm: Normal rate and regular rhythm.      Heart sounds: Normal heart sounds. No murmur. No friction rub. No gallop.    Pulmonary:      Effort: Pulmonary effort is normal. No respiratory distress.      Breath sounds: Normal breath sounds. No decreased breath sounds, wheezing, rhonchi or rales.   Musculoskeletal: Normal range of motion.   Skin:     General: Skin is warm and dry.      Findings: No rash.          Neurological:      General: No focal deficit present.      Mental Status: He is alert and oriented to person, place, and time.   Psychiatric:         Attention and Perception: Attention normal.         Mood and Affect: Mood normal.         Speech: Speech normal.         Behavior: Behavior normal.  Behavior is cooperative.         Judgment: Judgment normal.        Assessment:      1. Marine animal sting, accidental or unintentional, initial encounter       Plan:   1. Marine animal sting, accidental or unintentional, initial encounter  Discussed signs of infection to watch for.  - cephALEXin (KEFLEX) 500 MG capsule; Take 1 capsule (500 mg total) by mouth every 12 (twelve) hours. for 7 days  Dispense: 14 capsule; Refill: 0    Follow up as needed.  Patient agreed with plan and expressed understanding.    Thank you for allowing me to serve you,

## 2020-11-20 ENCOUNTER — PATIENT MESSAGE (OUTPATIENT)
Dept: FAMILY MEDICINE | Facility: CLINIC | Age: 70
End: 2020-11-20

## 2020-12-01 ENCOUNTER — PATIENT MESSAGE (OUTPATIENT)
Dept: FAMILY MEDICINE | Facility: CLINIC | Age: 70
End: 2020-12-01

## 2020-12-03 ENCOUNTER — LAB VISIT (OUTPATIENT)
Dept: LAB | Facility: HOSPITAL | Age: 70
End: 2020-12-03
Attending: FAMILY MEDICINE
Payer: MEDICARE

## 2020-12-03 DIAGNOSIS — I10 ESSENTIAL HYPERTENSION: ICD-10-CM

## 2020-12-03 DIAGNOSIS — Z01.84 ENCOUNTER FOR ANTIBODY RESPONSE EXAMINATION: ICD-10-CM

## 2020-12-03 DIAGNOSIS — E78.5 HYPERLIPIDEMIA, UNSPECIFIED HYPERLIPIDEMIA TYPE: ICD-10-CM

## 2020-12-03 DIAGNOSIS — I10 ESSENTIAL HYPERTENSION, MALIGNANT: ICD-10-CM

## 2020-12-03 DIAGNOSIS — E11.8 CONTROLLED TYPE 2 DIABETES MELLITUS WITH COMPLICATION, WITHOUT LONG-TERM CURRENT USE OF INSULIN: ICD-10-CM

## 2020-12-03 DIAGNOSIS — Z79.899 HIGH RISK MEDICATIONS (NOT ANTICOAGULANTS) LONG-TERM USE: ICD-10-CM

## 2020-12-03 LAB
BASOPHILS # BLD AUTO: 0.11 K/UL (ref 0–0.2)
BASOPHILS NFR BLD: 1.7 % (ref 0–1.9)
DIFFERENTIAL METHOD: ABNORMAL
EOSINOPHIL # BLD AUTO: 0.3 K/UL (ref 0–0.5)
EOSINOPHIL NFR BLD: 4.6 % (ref 0–8)
ERYTHROCYTE [DISTWIDTH] IN BLOOD BY AUTOMATED COUNT: 13.6 % (ref 11.5–14.5)
HCT VFR BLD AUTO: 45.1 % (ref 40–54)
HGB BLD-MCNC: 13.8 G/DL (ref 14–18)
IMM GRANULOCYTES # BLD AUTO: 0.02 K/UL (ref 0–0.04)
IMM GRANULOCYTES NFR BLD AUTO: 0.3 % (ref 0–0.5)
LYMPHOCYTES # BLD AUTO: 1.2 K/UL (ref 1–4.8)
LYMPHOCYTES NFR BLD: 19.4 % (ref 18–48)
MCH RBC QN AUTO: 28.3 PG (ref 27–31)
MCHC RBC AUTO-ENTMCNC: 30.6 G/DL (ref 32–36)
MCV RBC AUTO: 92 FL (ref 82–98)
MONOCYTES # BLD AUTO: 0.7 K/UL (ref 0.3–1)
MONOCYTES NFR BLD: 10.5 % (ref 4–15)
NEUTROPHILS # BLD AUTO: 4 K/UL (ref 1.8–7.7)
NEUTROPHILS NFR BLD: 63.5 % (ref 38–73)
NRBC BLD-RTO: 0 /100 WBC
PLATELET # BLD AUTO: 306 K/UL (ref 150–350)
PMV BLD AUTO: 9.2 FL (ref 9.2–12.9)
RBC # BLD AUTO: 4.88 M/UL (ref 4.6–6.2)
SARS-COV-2 IGG SERPLBLD QL IA.RAPID: NEGATIVE
WBC # BLD AUTO: 6.3 K/UL (ref 3.9–12.7)

## 2020-12-03 PROCEDURE — 36415 COLL VENOUS BLD VENIPUNCTURE: CPT | Mod: PN

## 2020-12-03 PROCEDURE — 86769 SARS-COV-2 COVID-19 ANTIBODY: CPT

## 2020-12-03 PROCEDURE — 83036 HEMOGLOBIN GLYCOSYLATED A1C: CPT

## 2020-12-03 PROCEDURE — 85025 COMPLETE CBC W/AUTO DIFF WBC: CPT

## 2020-12-03 PROCEDURE — 80061 LIPID PANEL: CPT

## 2020-12-03 PROCEDURE — 80053 COMPREHEN METABOLIC PANEL: CPT

## 2020-12-04 LAB
ALBUMIN SERPL BCP-MCNC: 4 G/DL (ref 3.5–5.2)
ALP SERPL-CCNC: 39 U/L (ref 55–135)
ALT SERPL W/O P-5'-P-CCNC: 22 U/L (ref 10–44)
ANION GAP SERPL CALC-SCNC: 9 MMOL/L (ref 8–16)
ANION GAP SERPL CALC-SCNC: 9 MMOL/L (ref 8–16)
AST SERPL-CCNC: 22 U/L (ref 10–40)
BILIRUB SERPL-MCNC: 0.7 MG/DL (ref 0.1–1)
BUN SERPL-MCNC: 21 MG/DL (ref 8–23)
BUN SERPL-MCNC: 21 MG/DL (ref 8–23)
CALCIUM SERPL-MCNC: 9.4 MG/DL (ref 8.7–10.5)
CALCIUM SERPL-MCNC: 9.4 MG/DL (ref 8.7–10.5)
CHLORIDE SERPL-SCNC: 105 MMOL/L (ref 95–110)
CHLORIDE SERPL-SCNC: 105 MMOL/L (ref 95–110)
CHOLEST SERPL-MCNC: 152 MG/DL (ref 120–199)
CHOLEST/HDLC SERPL: 3.1 {RATIO} (ref 2–5)
CO2 SERPL-SCNC: 24 MMOL/L (ref 23–29)
CO2 SERPL-SCNC: 24 MMOL/L (ref 23–29)
CREAT SERPL-MCNC: 1.2 MG/DL (ref 0.5–1.4)
CREAT SERPL-MCNC: 1.2 MG/DL (ref 0.5–1.4)
EST. GFR  (AFRICAN AMERICAN): >60 ML/MIN/1.73 M^2
EST. GFR  (AFRICAN AMERICAN): >60 ML/MIN/1.73 M^2
EST. GFR  (NON AFRICAN AMERICAN): >60 ML/MIN/1.73 M^2
EST. GFR  (NON AFRICAN AMERICAN): >60 ML/MIN/1.73 M^2
ESTIMATED AVG GLUCOSE: 123 MG/DL (ref 68–131)
GLUCOSE SERPL-MCNC: 107 MG/DL (ref 70–110)
GLUCOSE SERPL-MCNC: 107 MG/DL (ref 70–110)
HBA1C MFR BLD HPLC: 5.9 % (ref 4–5.6)
HDLC SERPL-MCNC: 49 MG/DL (ref 40–75)
HDLC SERPL: 32.2 % (ref 20–50)
LDLC SERPL CALC-MCNC: 86.8 MG/DL (ref 63–159)
NONHDLC SERPL-MCNC: 103 MG/DL
POTASSIUM SERPL-SCNC: 4.7 MMOL/L (ref 3.5–5.1)
POTASSIUM SERPL-SCNC: 4.7 MMOL/L (ref 3.5–5.1)
PROT SERPL-MCNC: 7.5 G/DL (ref 6–8.4)
SODIUM SERPL-SCNC: 138 MMOL/L (ref 136–145)
SODIUM SERPL-SCNC: 138 MMOL/L (ref 136–145)
TRIGL SERPL-MCNC: 81 MG/DL (ref 30–150)

## 2020-12-11 ENCOUNTER — PATIENT MESSAGE (OUTPATIENT)
Dept: OTHER | Facility: OTHER | Age: 70
End: 2020-12-11

## 2020-12-18 ENCOUNTER — PATIENT MESSAGE (OUTPATIENT)
Dept: FAMILY MEDICINE | Facility: CLINIC | Age: 70
End: 2020-12-18

## 2021-01-19 ENCOUNTER — PATIENT MESSAGE (OUTPATIENT)
Dept: FAMILY MEDICINE | Facility: CLINIC | Age: 71
End: 2021-01-19

## 2021-01-22 ENCOUNTER — PATIENT MESSAGE (OUTPATIENT)
Dept: ADMINISTRATIVE | Facility: OTHER | Age: 71
End: 2021-01-22

## 2021-01-23 ENCOUNTER — IMMUNIZATION (OUTPATIENT)
Dept: FAMILY MEDICINE | Facility: CLINIC | Age: 71
End: 2021-01-23

## 2021-01-23 DIAGNOSIS — Z23 NEED FOR VACCINATION: Primary | ICD-10-CM

## 2021-01-23 PROCEDURE — 91300 COVID-19, MRNA, LNP-S, PF, 30 MCG/0.3 ML DOSE VACCINE: CPT | Mod: ,,, | Performed by: FAMILY MEDICINE

## 2021-01-23 PROCEDURE — 0001A COVID-19, MRNA, LNP-S, PF, 30 MCG/0.3 ML DOSE VACCINE: ICD-10-PCS | Mod: CV19,,, | Performed by: FAMILY MEDICINE

## 2021-01-23 PROCEDURE — 0001A COVID-19, MRNA, LNP-S, PF, 30 MCG/0.3 ML DOSE VACCINE: CPT | Mod: CV19,,, | Performed by: FAMILY MEDICINE

## 2021-01-23 PROCEDURE — 91300 COVID-19, MRNA, LNP-S, PF, 30 MCG/0.3 ML DOSE VACCINE: ICD-10-PCS | Mod: ,,, | Performed by: FAMILY MEDICINE

## 2021-02-13 ENCOUNTER — IMMUNIZATION (OUTPATIENT)
Dept: FAMILY MEDICINE | Facility: CLINIC | Age: 71
End: 2021-02-13

## 2021-02-13 DIAGNOSIS — Z23 NEED FOR VACCINATION: Primary | ICD-10-CM

## 2021-02-13 PROCEDURE — 0002A COVID-19, MRNA, LNP-S, PF, 30 MCG/0.3 ML DOSE VACCINE: ICD-10-PCS | Mod: CV19,,, | Performed by: FAMILY MEDICINE

## 2021-02-13 PROCEDURE — 0002A COVID-19, MRNA, LNP-S, PF, 30 MCG/0.3 ML DOSE VACCINE: CPT | Mod: CV19,,, | Performed by: FAMILY MEDICINE

## 2021-02-13 PROCEDURE — 91300 COVID-19, MRNA, LNP-S, PF, 30 MCG/0.3 ML DOSE VACCINE: CPT | Mod: ,,, | Performed by: FAMILY MEDICINE

## 2021-02-13 PROCEDURE — 91300 COVID-19, MRNA, LNP-S, PF, 30 MCG/0.3 ML DOSE VACCINE: ICD-10-PCS | Mod: ,,, | Performed by: FAMILY MEDICINE

## 2021-02-16 DIAGNOSIS — E11.9 DIABETES TYPE 2, CONTROLLED: ICD-10-CM

## 2021-02-17 ENCOUNTER — TELEPHONE (OUTPATIENT)
Dept: FAMILY MEDICINE | Facility: CLINIC | Age: 71
End: 2021-02-17

## 2021-02-17 DIAGNOSIS — E55.9 VITAMIN D DEFICIENCY: ICD-10-CM

## 2021-02-17 DIAGNOSIS — E11.8 CONTROLLED TYPE 2 DIABETES MELLITUS WITH COMPLICATION, WITHOUT LONG-TERM CURRENT USE OF INSULIN: ICD-10-CM

## 2021-02-17 DIAGNOSIS — I10 ESSENTIAL HYPERTENSION: ICD-10-CM

## 2021-02-17 DIAGNOSIS — N40.0 BENIGN NON-NODULAR PROSTATIC HYPERPLASIA WITHOUT LOWER URINARY TRACT SYMPTOMS: Primary | ICD-10-CM

## 2021-02-17 RX ORDER — PIOGLITAZONEHYDROCHLORIDE 15 MG/1
15 TABLET ORAL DAILY
Qty: 90 TABLET | Refills: 1 | Status: SHIPPED | OUTPATIENT
Start: 2021-02-17 | End: 2021-08-09 | Stop reason: SDUPTHER

## 2021-02-23 ENCOUNTER — LAB VISIT (OUTPATIENT)
Dept: LAB | Facility: HOSPITAL | Age: 71
End: 2021-02-23
Attending: FAMILY MEDICINE
Payer: MEDICARE

## 2021-02-23 DIAGNOSIS — N40.0 BENIGN NON-NODULAR PROSTATIC HYPERPLASIA WITHOUT LOWER URINARY TRACT SYMPTOMS: ICD-10-CM

## 2021-02-23 DIAGNOSIS — I10 ESSENTIAL HYPERTENSION: ICD-10-CM

## 2021-02-23 DIAGNOSIS — E55.9 VITAMIN D DEFICIENCY: ICD-10-CM

## 2021-02-23 DIAGNOSIS — E11.8 CONTROLLED TYPE 2 DIABETES MELLITUS WITH COMPLICATION, WITHOUT LONG-TERM CURRENT USE OF INSULIN: ICD-10-CM

## 2021-02-23 PROCEDURE — 80053 COMPREHEN METABOLIC PANEL: CPT

## 2021-02-23 PROCEDURE — 80061 LIPID PANEL: CPT

## 2021-02-23 PROCEDURE — 83036 HEMOGLOBIN GLYCOSYLATED A1C: CPT

## 2021-02-23 PROCEDURE — 36415 COLL VENOUS BLD VENIPUNCTURE: CPT | Mod: PN

## 2021-02-23 PROCEDURE — 82306 VITAMIN D 25 HYDROXY: CPT

## 2021-02-23 PROCEDURE — 84153 ASSAY OF PSA TOTAL: CPT

## 2021-02-24 LAB
25(OH)D3+25(OH)D2 SERPL-MCNC: 25 NG/ML (ref 30–96)
ALBUMIN SERPL BCP-MCNC: 4 G/DL (ref 3.5–5.2)
ALP SERPL-CCNC: 38 U/L (ref 55–135)
ALT SERPL W/O P-5'-P-CCNC: 25 U/L (ref 10–44)
ANION GAP SERPL CALC-SCNC: 10 MMOL/L (ref 8–16)
AST SERPL-CCNC: 21 U/L (ref 10–40)
BILIRUB SERPL-MCNC: 0.7 MG/DL (ref 0.1–1)
BUN SERPL-MCNC: 18 MG/DL (ref 8–23)
CALCIUM SERPL-MCNC: 9.4 MG/DL (ref 8.7–10.5)
CHLORIDE SERPL-SCNC: 105 MMOL/L (ref 95–110)
CHOLEST SERPL-MCNC: 135 MG/DL (ref 120–199)
CHOLEST/HDLC SERPL: 2.9 {RATIO} (ref 2–5)
CO2 SERPL-SCNC: 25 MMOL/L (ref 23–29)
COMPLEXED PSA SERPL-MCNC: 1.3 NG/ML (ref 0–4)
CREAT SERPL-MCNC: 1.1 MG/DL (ref 0.5–1.4)
EST. GFR  (AFRICAN AMERICAN): >60 ML/MIN/1.73 M^2
EST. GFR  (NON AFRICAN AMERICAN): >60 ML/MIN/1.73 M^2
ESTIMATED AVG GLUCOSE: 123 MG/DL (ref 68–131)
GLUCOSE SERPL-MCNC: 99 MG/DL (ref 70–110)
HBA1C MFR BLD: 5.9 % (ref 4–5.6)
HDLC SERPL-MCNC: 47 MG/DL (ref 40–75)
HDLC SERPL: 34.8 % (ref 20–50)
LDLC SERPL CALC-MCNC: 73.8 MG/DL (ref 63–159)
NONHDLC SERPL-MCNC: 88 MG/DL
POTASSIUM SERPL-SCNC: 4.4 MMOL/L (ref 3.5–5.1)
PROT SERPL-MCNC: 7 G/DL (ref 6–8.4)
SODIUM SERPL-SCNC: 140 MMOL/L (ref 136–145)
TRIGL SERPL-MCNC: 71 MG/DL (ref 30–150)

## 2021-03-11 ENCOUNTER — OFFICE VISIT (OUTPATIENT)
Dept: FAMILY MEDICINE | Facility: CLINIC | Age: 71
End: 2021-03-11
Payer: MEDICARE

## 2021-03-11 VITALS
WEIGHT: 272.81 LBS | BODY MASS INDEX: 38.19 KG/M2 | TEMPERATURE: 98 F | RESPIRATION RATE: 14 BRPM | SYSTOLIC BLOOD PRESSURE: 114 MMHG | HEART RATE: 64 BPM | DIASTOLIC BLOOD PRESSURE: 74 MMHG | HEIGHT: 71 IN

## 2021-03-11 DIAGNOSIS — E11.8 CONTROLLED TYPE 2 DIABETES MELLITUS WITH COMPLICATION, WITHOUT LONG-TERM CURRENT USE OF INSULIN: Primary | ICD-10-CM

## 2021-03-11 DIAGNOSIS — D69.2 OTHER NONTHROMBOCYTOPENIC PURPURA: ICD-10-CM

## 2021-03-11 DIAGNOSIS — E66.01 MORBID OBESITY: ICD-10-CM

## 2021-03-11 LAB
ALBUMIN/CREAT UR: NORMAL UG/MG (ref 0–30)
CREAT UR-MCNC: 57 MG/DL (ref 23–375)
MICROALBUMIN UR DL<=1MG/L-MCNC: <2.5 UG/ML

## 2021-03-11 PROCEDURE — 99214 PR OFFICE/OUTPT VISIT, EST, LEVL IV, 30-39 MIN: ICD-10-PCS | Mod: S$PBB,,, | Performed by: FAMILY MEDICINE

## 2021-03-11 PROCEDURE — 81003 URINALYSIS AUTO W/O SCOPE: CPT | Performed by: FAMILY MEDICINE

## 2021-03-11 PROCEDURE — 82043 UR ALBUMIN QUANTITATIVE: CPT | Performed by: FAMILY MEDICINE

## 2021-03-11 PROCEDURE — 99999 PR PBB SHADOW E&M-EST. PATIENT-LVL IV: ICD-10-PCS | Mod: PBBFAC,,, | Performed by: FAMILY MEDICINE

## 2021-03-11 PROCEDURE — 82570 ASSAY OF URINE CREATININE: CPT | Performed by: FAMILY MEDICINE

## 2021-03-11 PROCEDURE — 99214 OFFICE O/P EST MOD 30 MIN: CPT | Mod: S$PBB,,, | Performed by: FAMILY MEDICINE

## 2021-03-11 PROCEDURE — 99999 PR PBB SHADOW E&M-EST. PATIENT-LVL IV: CPT | Mod: PBBFAC,,, | Performed by: FAMILY MEDICINE

## 2021-03-11 PROCEDURE — 99214 OFFICE O/P EST MOD 30 MIN: CPT | Mod: PBBFAC,PN | Performed by: FAMILY MEDICINE

## 2021-03-12 LAB
BILIRUB UR QL STRIP: NEGATIVE
CLARITY UR REFRACT.AUTO: CLEAR
COLOR UR AUTO: YELLOW
GLUCOSE UR QL STRIP: NEGATIVE
HGB UR QL STRIP: NEGATIVE
KETONES UR QL STRIP: NEGATIVE
LEUKOCYTE ESTERASE UR QL STRIP: NEGATIVE
NITRITE UR QL STRIP: NEGATIVE
PH UR STRIP: 7 [PH] (ref 5–8)
PROT UR QL STRIP: NEGATIVE
SP GR UR STRIP: 1.01 (ref 1–1.03)
URN SPEC COLLECT METH UR: NORMAL

## 2021-03-25 DIAGNOSIS — M25.562 PAIN IN BOTH KNEES, UNSPECIFIED CHRONICITY: Primary | ICD-10-CM

## 2021-03-25 DIAGNOSIS — M25.561 PAIN IN BOTH KNEES, UNSPECIFIED CHRONICITY: Primary | ICD-10-CM

## 2021-03-31 ENCOUNTER — OFFICE VISIT (OUTPATIENT)
Dept: ORTHOPEDICS | Facility: CLINIC | Age: 71
End: 2021-03-31
Payer: MEDICARE

## 2021-03-31 ENCOUNTER — HOSPITAL ENCOUNTER (OUTPATIENT)
Dept: RADIOLOGY | Facility: HOSPITAL | Age: 71
Discharge: HOME | End: 2021-03-31
Attending: ORTHOPAEDIC SURGERY
Payer: MEDICARE

## 2021-03-31 VITALS — WEIGHT: 272.94 LBS | HEIGHT: 71 IN | BODY MASS INDEX: 38.21 KG/M2

## 2021-03-31 DIAGNOSIS — M25.561 PAIN IN BOTH KNEES, UNSPECIFIED CHRONICITY: ICD-10-CM

## 2021-03-31 DIAGNOSIS — M25.562 PAIN IN BOTH KNEES, UNSPECIFIED CHRONICITY: ICD-10-CM

## 2021-03-31 DIAGNOSIS — Z96.652 STATUS POST TOTAL LEFT KNEE REPLACEMENT: Primary | ICD-10-CM

## 2021-03-31 DIAGNOSIS — Z96.651 STATUS POST RIGHT KNEE REPLACEMENT: ICD-10-CM

## 2021-03-31 PROCEDURE — 73562 X-RAY EXAM OF KNEE 3: CPT | Mod: TC,50

## 2021-03-31 PROCEDURE — 99999 PR PBB SHADOW E&M-EST. PATIENT-LVL III: ICD-10-PCS | Mod: PBBFAC,,, | Performed by: ORTHOPAEDIC SURGERY

## 2021-03-31 PROCEDURE — 73562 X-RAY EXAM OF KNEE 3: CPT | Mod: 26,50,, | Performed by: RADIOLOGY

## 2021-03-31 PROCEDURE — 99213 OFFICE O/P EST LOW 20 MIN: CPT | Mod: PBBFAC,25 | Performed by: ORTHOPAEDIC SURGERY

## 2021-03-31 PROCEDURE — 73562 XR KNEE ORTHO BILAT: ICD-10-PCS | Mod: 26,50,, | Performed by: RADIOLOGY

## 2021-03-31 PROCEDURE — 99212 OFFICE O/P EST SF 10 MIN: CPT | Mod: S$PBB,,, | Performed by: ORTHOPAEDIC SURGERY

## 2021-03-31 PROCEDURE — 99999 PR PBB SHADOW E&M-EST. PATIENT-LVL III: CPT | Mod: PBBFAC,,, | Performed by: ORTHOPAEDIC SURGERY

## 2021-03-31 PROCEDURE — 99212 PR OFFICE/OUTPT VISIT, EST, LEVL II, 10-19 MIN: ICD-10-PCS | Mod: S$PBB,,, | Performed by: ORTHOPAEDIC SURGERY

## 2021-04-05 ENCOUNTER — OFFICE VISIT (OUTPATIENT)
Dept: FAMILY MEDICINE | Facility: CLINIC | Age: 71
End: 2021-04-05
Payer: MEDICARE

## 2021-04-05 VITALS
WEIGHT: 270.31 LBS | SYSTOLIC BLOOD PRESSURE: 124 MMHG | BODY MASS INDEX: 37.84 KG/M2 | HEART RATE: 64 BPM | OXYGEN SATURATION: 97 % | HEIGHT: 71 IN | DIASTOLIC BLOOD PRESSURE: 72 MMHG

## 2021-04-05 DIAGNOSIS — K64.4 EXTERNAL HEMORRHOID: Primary | ICD-10-CM

## 2021-04-05 PROCEDURE — 99214 OFFICE O/P EST MOD 30 MIN: CPT | Mod: PBBFAC,PO | Performed by: PHYSICIAN ASSISTANT

## 2021-04-05 PROCEDURE — 99999 PR PBB SHADOW E&M-EST. PATIENT-LVL IV: ICD-10-PCS | Mod: PBBFAC,,, | Performed by: PHYSICIAN ASSISTANT

## 2021-04-05 PROCEDURE — 99999 PR PBB SHADOW E&M-EST. PATIENT-LVL IV: CPT | Mod: PBBFAC,,, | Performed by: PHYSICIAN ASSISTANT

## 2021-04-05 PROCEDURE — 99213 OFFICE O/P EST LOW 20 MIN: CPT | Mod: S$PBB,,, | Performed by: PHYSICIAN ASSISTANT

## 2021-04-05 PROCEDURE — 99213 PR OFFICE/OUTPT VISIT, EST, LEVL III, 20-29 MIN: ICD-10-PCS | Mod: S$PBB,,, | Performed by: PHYSICIAN ASSISTANT

## 2021-04-05 RX ORDER — HYDROCORTISONE ACETATE 25 MG/1
25 SUPPOSITORY RECTAL 2 TIMES DAILY
Qty: 20 SUPPOSITORY | Refills: 0 | Status: SHIPPED | OUTPATIENT
Start: 2021-04-05 | End: 2021-04-15

## 2021-04-08 ENCOUNTER — OFFICE VISIT (OUTPATIENT)
Dept: OPTOMETRY | Facility: CLINIC | Age: 71
End: 2021-04-08
Payer: MEDICARE

## 2021-04-08 DIAGNOSIS — H40.1132 PRIMARY OPEN ANGLE GLAUCOMA OF BOTH EYES, MODERATE STAGE: Primary | ICD-10-CM

## 2021-04-08 PROCEDURE — 92012 PR EYE EXAM, EST PATIENT,INTERMED: ICD-10-PCS | Mod: S$PBB,,, | Performed by: OPTOMETRIST

## 2021-04-08 PROCEDURE — 99999 PR PBB SHADOW E&M-EST. PATIENT-LVL III: ICD-10-PCS | Mod: PBBFAC,,, | Performed by: OPTOMETRIST

## 2021-04-08 PROCEDURE — 99999 PR PBB SHADOW E&M-EST. PATIENT-LVL III: CPT | Mod: PBBFAC,,, | Performed by: OPTOMETRIST

## 2021-04-08 PROCEDURE — 92012 INTRM OPH EXAM EST PATIENT: CPT | Mod: S$PBB,,, | Performed by: OPTOMETRIST

## 2021-04-08 PROCEDURE — 99213 OFFICE O/P EST LOW 20 MIN: CPT | Mod: PBBFAC,PO | Performed by: OPTOMETRIST

## 2021-04-13 ENCOUNTER — OFFICE VISIT (OUTPATIENT)
Dept: UROLOGY | Facility: CLINIC | Age: 71
End: 2021-04-13
Payer: MEDICARE

## 2021-04-13 VITALS — HEIGHT: 71 IN | BODY MASS INDEX: 37.84 KG/M2 | WEIGHT: 270.31 LBS

## 2021-04-13 DIAGNOSIS — Z00.00 ANNUAL PHYSICAL EXAM: Primary | ICD-10-CM

## 2021-04-13 LAB
BILIRUB SERPL-MCNC: NORMAL MG/DL
BLOOD URINE, POC: NORMAL
CLARITY, POC UA: CLEAR
COLOR, POC UA: YELLOW
GLUCOSE UR QL STRIP: NORMAL
KETONES UR QL STRIP: NORMAL
LEUKOCYTE ESTERASE URINE, POC: NORMAL
NITRITE, POC UA: NORMAL
PH, POC UA: 6
PROTEIN, POC: NORMAL
SPECIFIC GRAVITY, POC UA: 1.01
UROBILINOGEN, POC UA: 0.2

## 2021-04-13 PROCEDURE — 99213 OFFICE O/P EST LOW 20 MIN: CPT | Mod: PBBFAC,PO | Performed by: UROLOGY

## 2021-04-13 PROCEDURE — 99999 PR PBB SHADOW E&M-EST. PATIENT-LVL III: ICD-10-PCS | Mod: PBBFAC,,, | Performed by: UROLOGY

## 2021-04-13 PROCEDURE — 99214 PR OFFICE/OUTPT VISIT, EST, LEVL IV, 30-39 MIN: ICD-10-PCS | Mod: S$PBB,,, | Performed by: UROLOGY

## 2021-04-13 PROCEDURE — 99214 OFFICE O/P EST MOD 30 MIN: CPT | Mod: S$PBB,,, | Performed by: UROLOGY

## 2021-04-13 PROCEDURE — 99999 PR PBB SHADOW E&M-EST. PATIENT-LVL III: CPT | Mod: PBBFAC,,, | Performed by: UROLOGY

## 2021-04-13 PROCEDURE — 81002 URINALYSIS NONAUTO W/O SCOPE: CPT | Mod: PBBFAC,PO | Performed by: UROLOGY

## 2021-04-21 ENCOUNTER — PATIENT MESSAGE (OUTPATIENT)
Dept: FAMILY MEDICINE | Facility: CLINIC | Age: 71
End: 2021-04-21

## 2021-04-22 ENCOUNTER — PATIENT MESSAGE (OUTPATIENT)
Dept: FAMILY MEDICINE | Facility: CLINIC | Age: 71
End: 2021-04-22

## 2021-04-29 ENCOUNTER — PATIENT MESSAGE (OUTPATIENT)
Dept: FAMILY MEDICINE | Facility: CLINIC | Age: 71
End: 2021-04-29

## 2021-06-14 ENCOUNTER — TELEPHONE (OUTPATIENT)
Dept: FAMILY MEDICINE | Facility: CLINIC | Age: 71
End: 2021-06-14

## 2021-08-09 DIAGNOSIS — E11.9 DIABETES TYPE 2, CONTROLLED: ICD-10-CM

## 2021-08-10 RX ORDER — PIOGLITAZONEHYDROCHLORIDE 15 MG/1
15 TABLET ORAL DAILY
Qty: 90 TABLET | Refills: 1 | Status: SHIPPED | OUTPATIENT
Start: 2021-08-10 | End: 2021-08-16

## 2021-09-15 DIAGNOSIS — E11.9 TYPE 2 DIABETES MELLITUS WITHOUT COMPLICATION: ICD-10-CM

## 2021-09-21 ENCOUNTER — PATIENT MESSAGE (OUTPATIENT)
Dept: FAMILY MEDICINE | Facility: CLINIC | Age: 71
End: 2021-09-21

## 2021-09-21 DIAGNOSIS — E11.8 CONTROLLED TYPE 2 DIABETES MELLITUS WITH COMPLICATION, WITHOUT LONG-TERM CURRENT USE OF INSULIN: ICD-10-CM

## 2021-09-21 DIAGNOSIS — N40.0 BENIGN NON-NODULAR PROSTATIC HYPERPLASIA WITHOUT LOWER URINARY TRACT SYMPTOMS: Primary | ICD-10-CM

## 2021-09-21 DIAGNOSIS — E66.01 MORBID OBESITY: ICD-10-CM

## 2021-09-27 ENCOUNTER — TELEPHONE (OUTPATIENT)
Dept: FAMILY MEDICINE | Facility: CLINIC | Age: 71
End: 2021-09-27

## 2021-09-28 ENCOUNTER — CLINICAL SUPPORT (OUTPATIENT)
Dept: FAMILY MEDICINE | Facility: CLINIC | Age: 71
End: 2021-09-28
Payer: MEDICARE

## 2021-09-28 ENCOUNTER — LAB VISIT (OUTPATIENT)
Dept: LAB | Facility: HOSPITAL | Age: 71
End: 2021-09-28
Attending: FAMILY MEDICINE
Payer: MEDICARE

## 2021-09-28 DIAGNOSIS — E66.01 MORBID OBESITY: ICD-10-CM

## 2021-09-28 DIAGNOSIS — E11.8 CONTROLLED TYPE 2 DIABETES MELLITUS WITH COMPLICATION, WITHOUT LONG-TERM CURRENT USE OF INSULIN: ICD-10-CM

## 2021-09-28 DIAGNOSIS — N40.0 BENIGN NON-NODULAR PROSTATIC HYPERPLASIA WITHOUT LOWER URINARY TRACT SYMPTOMS: ICD-10-CM

## 2021-09-28 DIAGNOSIS — Z23 ENCOUNTER FOR ADMINISTRATION OF VACCINE: Primary | ICD-10-CM

## 2021-09-28 LAB
ALBUMIN SERPL BCP-MCNC: 3.8 G/DL (ref 3.5–5.2)
ALP SERPL-CCNC: 35 U/L (ref 55–135)
ALT SERPL W/O P-5'-P-CCNC: 23 U/L (ref 10–44)
ANION GAP SERPL CALC-SCNC: 12 MMOL/L (ref 8–16)
AST SERPL-CCNC: 21 U/L (ref 10–40)
BILIRUB SERPL-MCNC: 0.8 MG/DL (ref 0.1–1)
BUN SERPL-MCNC: 14 MG/DL (ref 8–23)
CALCIUM SERPL-MCNC: 10.1 MG/DL (ref 8.7–10.5)
CHLORIDE SERPL-SCNC: 106 MMOL/L (ref 95–110)
CO2 SERPL-SCNC: 22 MMOL/L (ref 23–29)
COMPLEXED PSA SERPL-MCNC: 1.8 NG/ML (ref 0–4)
CREAT SERPL-MCNC: 1.1 MG/DL (ref 0.5–1.4)
ERYTHROCYTE [DISTWIDTH] IN BLOOD BY AUTOMATED COUNT: 14.1 % (ref 11.5–14.5)
EST. GFR  (AFRICAN AMERICAN): >60 ML/MIN/1.73 M^2
EST. GFR  (NON AFRICAN AMERICAN): >60 ML/MIN/1.73 M^2
ESTIMATED AVG GLUCOSE: 120 MG/DL (ref 68–131)
GLUCOSE SERPL-MCNC: 108 MG/DL (ref 70–110)
HBA1C MFR BLD: 5.8 % (ref 4–5.6)
HCT VFR BLD AUTO: 43.4 % (ref 40–54)
HGB BLD-MCNC: 13.8 G/DL (ref 14–18)
MCH RBC QN AUTO: 28.8 PG (ref 27–31)
MCHC RBC AUTO-ENTMCNC: 31.8 G/DL (ref 32–36)
MCV RBC AUTO: 91 FL (ref 82–98)
PLATELET # BLD AUTO: 301 K/UL (ref 150–450)
PMV BLD AUTO: 9.2 FL (ref 9.2–12.9)
POTASSIUM SERPL-SCNC: 5 MMOL/L (ref 3.5–5.1)
PROT SERPL-MCNC: 6.8 G/DL (ref 6–8.4)
RBC # BLD AUTO: 4.79 M/UL (ref 4.6–6.2)
SODIUM SERPL-SCNC: 140 MMOL/L (ref 136–145)
TSH SERPL DL<=0.005 MIU/L-ACNC: 3.7 UIU/ML (ref 0.4–4)
WBC # BLD AUTO: 4.74 K/UL (ref 3.9–12.7)

## 2021-09-28 PROCEDURE — G0008 ADMIN INFLUENZA VIRUS VAC: HCPCS | Mod: PBBFAC,PN

## 2021-09-28 PROCEDURE — 90694 VACC AIIV4 NO PRSRV 0.5ML IM: CPT | Mod: PBBFAC,PN

## 2021-09-28 PROCEDURE — 36415 COLL VENOUS BLD VENIPUNCTURE: CPT | Mod: PN | Performed by: FAMILY MEDICINE

## 2021-09-28 PROCEDURE — 80053 COMPREHEN METABOLIC PANEL: CPT | Performed by: FAMILY MEDICINE

## 2021-09-28 PROCEDURE — 84153 ASSAY OF PSA TOTAL: CPT | Performed by: FAMILY MEDICINE

## 2021-09-28 PROCEDURE — 85027 COMPLETE CBC AUTOMATED: CPT | Performed by: FAMILY MEDICINE

## 2021-09-28 PROCEDURE — 84443 ASSAY THYROID STIM HORMONE: CPT | Performed by: FAMILY MEDICINE

## 2021-09-28 PROCEDURE — 83036 HEMOGLOBIN GLYCOSYLATED A1C: CPT | Performed by: FAMILY MEDICINE

## 2021-10-14 ENCOUNTER — TELEPHONE (OUTPATIENT)
Dept: OPTOMETRY | Facility: CLINIC | Age: 71
End: 2021-10-14

## 2021-11-04 ENCOUNTER — IMMUNIZATION (OUTPATIENT)
Dept: FAMILY MEDICINE | Facility: CLINIC | Age: 71
End: 2021-11-04
Payer: MEDICARE

## 2021-11-04 ENCOUNTER — OFFICE VISIT (OUTPATIENT)
Dept: OPTOMETRY | Facility: CLINIC | Age: 71
End: 2021-11-04
Payer: MEDICARE

## 2021-11-04 ENCOUNTER — CLINICAL SUPPORT (OUTPATIENT)
Dept: OPHTHALMOLOGY | Facility: CLINIC | Age: 71
End: 2021-11-04
Payer: MEDICARE

## 2021-11-04 DIAGNOSIS — E11.36 CATARACT ASSOCIATED WITH TYPE 2 DIABETES MELLITUS: ICD-10-CM

## 2021-11-04 DIAGNOSIS — H40.1132 PRIMARY OPEN ANGLE GLAUCOMA OF BOTH EYES, MODERATE STAGE: ICD-10-CM

## 2021-11-04 DIAGNOSIS — E11.9 DIABETES MELLITUS TYPE 2 WITHOUT RETINOPATHY: Primary | ICD-10-CM

## 2021-11-04 DIAGNOSIS — Z23 NEED FOR VACCINATION: Primary | ICD-10-CM

## 2021-11-04 DIAGNOSIS — H43.393 VITREOUS FLOATERS, BILATERAL: ICD-10-CM

## 2021-11-04 PROCEDURE — 99999 PR PBB SHADOW E&M-EST. PATIENT-LVL III: CPT | Mod: PBBFAC,,, | Performed by: OPTOMETRIST

## 2021-11-04 PROCEDURE — 0004A COVID-19, MRNA, LNP-S, PF, 30 MCG/0.3 ML DOSE VACCINE: CPT | Mod: PBBFAC,PO

## 2021-11-04 PROCEDURE — 92014 COMPRE OPH EXAM EST PT 1/>: CPT | Mod: S$PBB,,, | Performed by: OPTOMETRIST

## 2021-11-04 PROCEDURE — 99999 PR PBB SHADOW E&M-EST. PATIENT-LVL III: ICD-10-PCS | Mod: PBBFAC,,, | Performed by: OPTOMETRIST

## 2021-11-04 PROCEDURE — 92014 PR EYE EXAM, EST PATIENT,COMPREHESV: ICD-10-PCS | Mod: S$PBB,,, | Performed by: OPTOMETRIST

## 2021-11-04 PROCEDURE — 99213 OFFICE O/P EST LOW 20 MIN: CPT | Mod: PBBFAC,PO | Performed by: OPTOMETRIST

## 2021-11-04 RX ORDER — LATANOPROST 50 UG/ML
SOLUTION/ DROPS OPHTHALMIC
Qty: 7.5 ML | Refills: 3 | Status: SHIPPED | OUTPATIENT
Start: 2021-11-04 | End: 2022-08-31

## 2021-11-04 RX ORDER — BRINZOLAMIDE 10 MG/ML
SUSPENSION/ DROPS OPHTHALMIC
Qty: 10 ML | Refills: 1 | Status: SHIPPED | OUTPATIENT
Start: 2021-11-04 | End: 2022-01-31

## 2022-02-02 DIAGNOSIS — E11.9 DIABETES TYPE 2, CONTROLLED: ICD-10-CM

## 2022-02-02 RX ORDER — PIOGLITAZONEHYDROCHLORIDE 15 MG/1
15 TABLET ORAL DAILY
Qty: 90 TABLET | Refills: 0 | Status: SHIPPED | OUTPATIENT
Start: 2022-02-02 | End: 2022-07-27

## 2022-02-02 NOTE — TELEPHONE ENCOUNTER
Refill Authorization Note   Rajeev Newell  is requesting a refill authorization.  Brief Assessment and Rationale for Refill:  Approve    -Medication-Related Problems Identified:   Requires appointment  Requires labs  Medication Therapy Plan:  Needs OV; Labs(a1c)    Medication Reconciliation Completed: No   Comments:   --->Care Gap information included below if applicable.       Requested Prescriptions   Pending Prescriptions Disp Refills    pioglitazone (ACTOS) 15 MG tablet [Pharmacy Med Name: PIOGLITAZONE 15MG TABLETS] 90 tablet 0     Sig: Take 1 tablet (15 mg total) by mouth once daily.       Endocrinology:  Diabetes - Glitazones - pioglitazone Passed - 2/2/2022  9:59 AM        Passed - Patient is at least 18 years old        Passed - Patient does not have a history of Heart Failure        Passed - Valid encounter within last 15 months     Recent Visits  Date Type Provider Dept   03/11/21 Office Visit Swati Walton MD [facility]   06/24/20 Office Visit Swati Walton MD [facility]   Showing recent visits within past 720 days and meeting all other requirements  Future Appointments  No visits were found meeting these conditions.  Showing future appointments within next 150 days and meeting all other requirements      Future Appointments              In 3 weeks MD [facility] - [facility] 5th Fl, [facility]                Passed - HBA1C within 180 days     Lab Results   Component Value Date    HGBA1C 5.8 (H) 09/28/2021    HGBA1C 5.9 (H) 02/23/2021    HGBA1C 5.9 (H) 12/03/2020              Passed - ALT is 131 or below and within 360 days     ALT   Date Value Ref Range Status   09/28/2021 23 10 - 44 U/L Final   02/23/2021 25 10 - 44 U/L Final   12/03/2020 22 10 - 44 U/L Final              Passed - AST is 119 or below and within 360 days     AST   Date Value Ref Range Status   09/28/2021 21 10 - 40 U/L Final   02/23/2021 21 10 - 40 U/L Final   12/03/2020 22 10 - 40 U/L Final                   Appointments  past 12m or future 3m with PCP    Date Provider   Last Visit   3/11/2021 Swati Walton MD   Next Visit   Visit date not found Swati Walton MD   ED visits in past 90 days: 0     Note composed:10:41 AM 02/02/2022

## 2022-02-02 NOTE — TELEPHONE ENCOUNTER
Care Due:                  Date            Visit Type   Department     Provider  --------------------------------------------------------------------------------                                EP -                              PRIMARY      [facility]  Last Visit: 03-      CARE ([facility])   MEDICINE       Swati Walton  Next Visit: None Scheduled  None         None Found                                                            Last  Test          Frequency    Reason                     Performed    Due Date  --------------------------------------------------------------------------------    Office Visit  12 months..  pioglitazone.............  03- 03-    HBA1C.......  6 months...  pioglitazone.............  09- 03-    Powered by [employer]. Reference number: [id number].   2/02/2022 10:00:38 AM CST

## 2022-02-02 NOTE — TELEPHONE ENCOUNTER
Provider Staff:     Action is required for this patient.   Please see care gap opportunities below in Care Due Message.     Thanks!  [facility]     Appointments      Date Provider   Last Visit   3/11/2021 Swati Walton MD   Next Visit   Visit date not found Swati Walton MD     Note composed:10:42 AM 02/02/2022

## 2022-02-21 ENCOUNTER — PATIENT MESSAGE (OUTPATIENT)
Dept: FAMILY MEDICINE | Facility: CLINIC | Age: 72
End: 2022-02-21
Payer: MEDICARE

## 2022-02-22 DIAGNOSIS — Z96.651 STATUS POST RIGHT KNEE REPLACEMENT: ICD-10-CM

## 2022-02-22 DIAGNOSIS — Z96.652 STATUS POST TOTAL LEFT KNEE REPLACEMENT: Primary | ICD-10-CM

## 2022-02-23 ENCOUNTER — HOSPITAL ENCOUNTER (OUTPATIENT)
Dept: RADIOLOGY | Facility: HOSPITAL | Age: 72
Discharge: HOME | End: 2022-02-23
Attending: ORTHOPAEDIC SURGERY
Payer: MEDICARE

## 2022-02-23 ENCOUNTER — OFFICE VISIT (OUTPATIENT)
Dept: ORTHOPEDICS | Facility: CLINIC | Age: 72
End: 2022-02-23
Payer: MEDICARE

## 2022-02-23 VITALS — HEIGHT: 71 IN | BODY MASS INDEX: 38.23 KG/M2 | WEIGHT: 273.06 LBS

## 2022-02-23 DIAGNOSIS — Z96.652 STATUS POST TOTAL LEFT KNEE REPLACEMENT: ICD-10-CM

## 2022-02-23 DIAGNOSIS — Z96.651 STATUS POST RIGHT KNEE REPLACEMENT: ICD-10-CM

## 2022-02-23 DIAGNOSIS — E11.9 TYPE 2 DIABETES MELLITUS WITHOUT COMPLICATION: ICD-10-CM

## 2022-02-23 DIAGNOSIS — Z96.652 STATUS POST TOTAL LEFT KNEE REPLACEMENT: Primary | ICD-10-CM

## 2022-02-23 PROCEDURE — 73562 X-RAY EXAM OF KNEE 3: CPT | Mod: TC,50

## 2022-02-23 PROCEDURE — 99999 PR PBB SHADOW E&M-EST. PATIENT-LVL III: ICD-10-PCS | Mod: PBBFAC,,, | Performed by: ORTHOPAEDIC SURGERY

## 2022-02-23 PROCEDURE — 73562 XR KNEE ORTHO BILAT: ICD-10-PCS | Mod: 26,50,, | Performed by: RADIOLOGY

## 2022-02-23 PROCEDURE — 99212 OFFICE O/P EST SF 10 MIN: CPT | Mod: S$PBB,,, | Performed by: ORTHOPAEDIC SURGERY

## 2022-02-23 PROCEDURE — 99999 PR PBB SHADOW E&M-EST. PATIENT-LVL III: CPT | Mod: PBBFAC,,, | Performed by: ORTHOPAEDIC SURGERY

## 2022-02-23 PROCEDURE — 73562 X-RAY EXAM OF KNEE 3: CPT | Mod: 26,50,, | Performed by: RADIOLOGY

## 2022-02-23 PROCEDURE — 99212 PR OFFICE/OUTPT VISIT, EST, LEVL II, 10-19 MIN: ICD-10-PCS | Mod: S$PBB,,, | Performed by: ORTHOPAEDIC SURGERY

## 2022-02-23 PROCEDURE — 99213 OFFICE O/P EST LOW 20 MIN: CPT | Mod: PBBFAC | Performed by: ORTHOPAEDIC SURGERY

## 2022-02-23 NOTE — PROGRESS NOTES
Rajeev Newell III is in for 4 year follow up for a  Right TKA, and three from the left.  He is doing  well.  No pain in the knee.  He has been active  Exam demonstrates  A well developed male in no distress.  Alert and oriented.  Mood and affect are appropriate.    Knee incision is well healed.  ROM is 0-125 bilaterally.  The patella tracks well and there is no instability. The extremity is neurovascularly intact.    Xrays demonstrate a well fixed and positioned prosthesis.  PROMIS-10 Questionnaire Scores 2/25/2020 6/18/2019 3/11/2019 1/16/2019 5/30/2018 2/16/2018   Global Physical Health 18 18 - - - -   Global Mental health Score 20 19 19 16 20 19     Oxford Knee Questionnaire Score 2/23/2022 3/30/2021   Oxford Knee Score 46 45     KOOS Jr. Questionnaire Score 2/25/2020 6/18/2019 3/11/2019 5/30/2018 2/16/2018   KOOS Jr Score 3 4 13 5 11         Imp:Doing well    F/u in one year with xrays and PROMS.  Sooner if needed.

## 2022-02-28 ENCOUNTER — PATIENT MESSAGE (OUTPATIENT)
Dept: ADMINISTRATIVE | Facility: HOSPITAL | Age: 72
End: 2022-02-28
Payer: MEDICARE

## 2022-02-28 DIAGNOSIS — E11.9 TYPE 2 DIABETES MELLITUS WITHOUT COMPLICATION, UNSPECIFIED WHETHER LONG TERM INSULIN USE: Primary | ICD-10-CM

## 2022-03-09 NOTE — TELEPHONE ENCOUNTER
Lipid order in  WOGs signed for urine micro and A1c  Pt requesting any other annual labs he may be due for

## 2022-03-14 LAB — CRC RECOMMENDATION EXT: NORMAL

## 2022-03-15 ENCOUNTER — PATIENT OUTREACH (OUTPATIENT)
Dept: ADMINISTRATIVE | Facility: HOSPITAL | Age: 72
End: 2022-03-15
Payer: MEDICARE

## 2022-04-01 ENCOUNTER — LAB VISIT (OUTPATIENT)
Dept: LAB | Facility: HOSPITAL | Age: 72
End: 2022-04-01
Attending: FAMILY MEDICINE
Payer: MEDICARE

## 2022-04-01 DIAGNOSIS — E11.9 TYPE 2 DIABETES MELLITUS WITHOUT COMPLICATION, UNSPECIFIED WHETHER LONG TERM INSULIN USE: ICD-10-CM

## 2022-04-01 DIAGNOSIS — E11.9 TYPE 2 DIABETES MELLITUS WITHOUT COMPLICATION: ICD-10-CM

## 2022-04-01 LAB
BASOPHILS # BLD AUTO: 0.1 K/UL (ref 0–0.2)
BASOPHILS NFR BLD: 1.8 % (ref 0–1.9)
CHOLEST SERPL-MCNC: 138 MG/DL (ref 120–199)
CHOLEST/HDLC SERPL: 2.8 {RATIO} (ref 2–5)
DIFFERENTIAL METHOD: NORMAL
EOSINOPHIL # BLD AUTO: 0.3 K/UL (ref 0–0.5)
EOSINOPHIL NFR BLD: 5.4 % (ref 0–8)
ERYTHROCYTE [DISTWIDTH] IN BLOOD BY AUTOMATED COUNT: 13.8 % (ref 11.5–14.5)
ESTIMATED AVG GLUCOSE: 134 MG/DL (ref 68–131)
HBA1C MFR BLD: 6.3 % (ref 4–5.6)
HCT VFR BLD AUTO: 43.2 % (ref 40–54)
HDLC SERPL-MCNC: 50 MG/DL (ref 40–75)
HDLC SERPL: 36.2 % (ref 20–50)
HGB BLD-MCNC: 14.1 G/DL (ref 14–18)
IMM GRANULOCYTES # BLD AUTO: 0.03 K/UL (ref 0–0.04)
IMM GRANULOCYTES NFR BLD AUTO: 0.5 % (ref 0–0.5)
LDLC SERPL CALC-MCNC: 69.8 MG/DL (ref 63–159)
LYMPHOCYTES # BLD AUTO: 1.2 K/UL (ref 1–4.8)
LYMPHOCYTES NFR BLD: 21.8 % (ref 18–48)
MCH RBC QN AUTO: 28.5 PG (ref 27–31)
MCHC RBC AUTO-ENTMCNC: 32.6 G/DL (ref 32–36)
MCV RBC AUTO: 87 FL (ref 82–98)
MONOCYTES # BLD AUTO: 0.6 K/UL (ref 0.3–1)
MONOCYTES NFR BLD: 10.7 % (ref 4–15)
NEUTROPHILS # BLD AUTO: 3.3 K/UL (ref 1.8–7.7)
NEUTROPHILS NFR BLD: 59.8 % (ref 38–73)
NONHDLC SERPL-MCNC: 88 MG/DL
NRBC BLD-RTO: 0 /100 WBC
PLATELET # BLD AUTO: 289 K/UL (ref 150–450)
PMV BLD AUTO: 9.2 FL (ref 9.2–12.9)
RBC # BLD AUTO: 4.94 M/UL (ref 4.6–6.2)
TRIGL SERPL-MCNC: 91 MG/DL (ref 30–150)
URATE SERPL-MCNC: 5.6 MG/DL (ref 3.4–7)
WBC # BLD AUTO: 5.59 K/UL (ref 3.9–12.7)

## 2022-04-01 PROCEDURE — 84550 ASSAY OF BLOOD/URIC ACID: CPT | Performed by: FAMILY MEDICINE

## 2022-04-01 PROCEDURE — 80061 LIPID PANEL: CPT | Performed by: FAMILY MEDICINE

## 2022-04-01 PROCEDURE — 85025 COMPLETE CBC W/AUTO DIFF WBC: CPT | Performed by: FAMILY MEDICINE

## 2022-04-01 PROCEDURE — 36415 COLL VENOUS BLD VENIPUNCTURE: CPT | Mod: PO | Performed by: FAMILY MEDICINE

## 2022-04-01 PROCEDURE — 83036 HEMOGLOBIN GLYCOSYLATED A1C: CPT | Performed by: FAMILY MEDICINE

## 2022-04-22 ENCOUNTER — PATIENT OUTREACH (OUTPATIENT)
Dept: ADMINISTRATIVE | Facility: HOSPITAL | Age: 72
End: 2022-04-22
Payer: MEDICARE

## 2022-04-22 NOTE — PROGRESS NOTES
Non-compliant report chart audits for ATTRIBUTED PATIENTS NOT SEEN IN THE LAST 12 MONTHS    RE:  Patient needs appointment    Scheduled    Outreach:  Attributed patient not seen in the last 12 months

## 2022-05-12 ENCOUNTER — OFFICE VISIT (OUTPATIENT)
Dept: FAMILY MEDICINE | Facility: CLINIC | Age: 72
End: 2022-05-12
Payer: MEDICARE

## 2022-05-12 VITALS
HEART RATE: 67 BPM | WEIGHT: 268.94 LBS | OXYGEN SATURATION: 97 % | SYSTOLIC BLOOD PRESSURE: 120 MMHG | HEIGHT: 71 IN | DIASTOLIC BLOOD PRESSURE: 70 MMHG | BODY MASS INDEX: 37.65 KG/M2

## 2022-05-12 DIAGNOSIS — E66.01 MORBID OBESITY: Primary | ICD-10-CM

## 2022-05-12 DIAGNOSIS — D69.2 OTHER NONTHROMBOCYTOPENIC PURPURA: ICD-10-CM

## 2022-05-12 DIAGNOSIS — E11.36 CATARACT ASSOCIATED WITH TYPE 2 DIABETES MELLITUS: ICD-10-CM

## 2022-05-12 DIAGNOSIS — Z00.00 ROUTINE GENERAL MEDICAL EXAMINATION AT A HEALTH CARE FACILITY: ICD-10-CM

## 2022-05-12 DIAGNOSIS — R35.0 URINARY FREQUENCY: ICD-10-CM

## 2022-05-12 PROCEDURE — 99999 PR PBB SHADOW E&M-EST. PATIENT-LVL IV: CPT | Mod: PBBFAC,,, | Performed by: FAMILY MEDICINE

## 2022-05-12 PROCEDURE — 99214 PR OFFICE/OUTPT VISIT, EST, LEVL IV, 30-39 MIN: ICD-10-PCS | Mod: S$PBB,,, | Performed by: FAMILY MEDICINE

## 2022-05-12 PROCEDURE — 99214 OFFICE O/P EST MOD 30 MIN: CPT | Mod: S$PBB,,, | Performed by: FAMILY MEDICINE

## 2022-05-12 PROCEDURE — 99999 PR PBB SHADOW E&M-EST. PATIENT-LVL IV: ICD-10-PCS | Mod: PBBFAC,,, | Performed by: FAMILY MEDICINE

## 2022-05-12 PROCEDURE — 99214 OFFICE O/P EST MOD 30 MIN: CPT | Mod: PBBFAC,PN | Performed by: FAMILY MEDICINE

## 2022-05-12 RX ORDER — MUPIROCIN 20 MG/G
OINTMENT TOPICAL 2 TIMES DAILY
Qty: 30 G | Refills: 1 | Status: SHIPPED | OUTPATIENT
Start: 2022-05-12 | End: 2023-03-02

## 2022-05-12 NOTE — PROGRESS NOTES
Subjective:       Patient ID: Rajeev Newell III is a 71 y.o. male.    Chief Complaint: Follow-up (Lab results)      Rajeev Newell III is in the office for annual exam.    HPI  No updates to medical hx.   Past Medical History:   Diagnosis Date    Cataract     OU--early    DM (diabetes mellitus) 10/29/2012    Glaucoma     POAG-OU    Hx of colonic polyps     Hypertension     Sinusitis      Started back to exercise.     Current Outpatient Medications:     aspirin (ECOTRIN) 81 MG EC tablet, Take 1 tablet (81 mg total) by mouth 2 (two) times daily. (Patient taking differently: Take 81 mg by mouth once daily.), Disp: 60 tablet, Rfl: 0    atorvastatin (LIPITOR) 40 MG tablet, Take 40 mg by mouth once daily. , Disp: , Rfl: 11    fexofenadine (ALLEGRA) 180 MG tablet, Every day PRN, Disp: , Rfl:     latanoprost 0.005 % ophthalmic solution, INSTILL 1 DROP IN BOTH EYES EVERY NIGHT, Disp: 7.5 mL, Rfl: 3    losartan (COZAAR) 50 MG tablet, TK 1 T PO  BID, Disp: , Rfl:     meloxicam (MOBIC) 15 MG tablet, TAKE 1 TABLET(15 MG) BY MOUTH EVERY DAY, Disp: 90 tablet, Rfl: 0    pantoprazole (PROTONIX) 40 MG tablet, pantoprazole 40 mg tablet,delayed release  TAKE 1 TABLET BY MOUTH EVERY DAY IN THE MORNING, Disp: , Rfl:     pioglitazone (ACTOS) 15 MG tablet, Take 1 tablet (15 mg total) by mouth once daily., Disp: 90 tablet, Rfl: 0    brinzolamide (AZOPT) 1 % ophthalmic suspension, SHAKE LIQUID AND INSTILL 1 DROP IN BOTH EYES TWICE DAILY, Disp: 10 mL, Rfl: 1    clotrimazole (LOTRIMIN) 1 % cream, Apply topically 2 (two) times daily. (Patient not taking: No sig reported), Disp: 12 g, Rfl: 0    docusate sodium (COLACE) 100 MG capsule, Take 1 capsule (100 mg total) by mouth 2 (two) times daily. (Patient not taking: No sig reported), Disp: 60 capsule, Rfl: 0    mupirocin (BACTROBAN) 2 % ointment, Apply topically 2 (two) times daily., Disp: 30 g, Rfl: 1    valACYclovir (VALTREX) 1000 MG tablet, Take 1 tablet (1,000 mg  total) by mouth 2 (two) times daily. (Patient not taking: Reported on 5/12/2022), Disp: 20 tablet, Rfl: 1    The 10-year ASCVD risk score (Jeannine CORONEL Jr., et al., 2013) is: 32.4%    Values used to calculate the score:      Age: 71 years      Sex: Male      Is Non- : No      Diabetic: Yes      Tobacco smoker: No      Systolic Blood Pressure: 120 mmHg      Is BP treated: Yes      HDL Cholesterol: 50 mg/dL      Total Cholesterol: 138 mg/dL     Lab Results   Component Value Date    HGBA1C 6.3 (H) 04/01/2022    HGBA1C 5.8 (H) 09/28/2021    HGBA1C 5.9 (H) 02/23/2021     Lab Results   Component Value Date    LDLCALC 69.8 04/01/2022    CREATININE 1.1 09/28/2021   labs 2021 rev.    Review of Systems   Constitutional: Negative for activity change, fatigue and unexpected weight change.   HENT: Negative for congestion, hearing loss, rhinorrhea and trouble swallowing.    Eyes: Negative for photophobia and visual disturbance.   Respiratory: Negative for cough and wheezing.    Cardiovascular: Negative for chest pain and palpitations.   Gastrointestinal: Negative for blood in stool, constipation and diarrhea.        No gerd c/o.   Endocrine: Negative for polydipsia and polyuria.   Genitourinary: Negative for difficulty urinating, frequency, hematuria and urgency.   Musculoskeletal: Negative for arthralgias, joint swelling and neck pain.        Started back to regular exercise and weights   Neurological: Negative for dizziness, weakness, light-headedness and headaches.   Psychiatric/Behavioral: Negative for dysphoric mood and sleep disturbance.           Objective:      Physical Exam  Vitals and nursing note reviewed.   Constitutional:       Appearance: Normal appearance. He is well-developed. He is obese.   HENT:      Head: Normocephalic and atraumatic.      Right Ear: Tympanic membrane normal.      Left Ear: Tympanic membrane normal.   Eyes:      General: No scleral icterus.     Conjunctiva/sclera:  Conjunctivae normal.      Pupils: Pupils are equal, round, and reactive to light.   Neck:      Thyroid: No thyromegaly.      Vascular: Carotid bruit (?L) present.   Cardiovascular:      Rate and Rhythm: Normal rate and regular rhythm.      Pulses:           Dorsalis pedis pulses are 2+ on the right side and 2+ on the left side.        Posterior tibial pulses are 1+ on the right side and 1+ on the left side.      Heart sounds: Normal heart sounds. No murmur heard.    No friction rub. No gallop.   Pulmonary:      Effort: Pulmonary effort is normal. No respiratory distress.      Breath sounds: Normal breath sounds. No wheezing or rales.   Abdominal:      General: Bowel sounds are normal. There is no distension.      Palpations: Abdomen is soft.      Tenderness: There is no abdominal tenderness. There is no guarding.      Comments: Exam limited by habitus.   Musculoskeletal:         General: No signs of injury.      Cervical back: Neck supple.      Right lower leg: No edema.      Left lower leg: No edema.      Right foot: Normal range of motion. No deformity.      Left foot: Normal range of motion. No deformity.   Feet:      Right foot:      Protective Sensation: 6 sites tested. 6 sites sensed.      Skin integrity: No ulcer, blister, skin breakdown, erythema, warmth, callus or dry skin.      Left foot:      Protective Sensation: 6 sites tested. 6 sites sensed.      Skin integrity: No ulcer, blister, skin breakdown, erythema, warmth, callus or dry skin.   Lymphadenopathy:      Cervical: No cervical adenopathy.   Skin:     General: Skin is warm and dry.   Neurological:      General: No focal deficit present.      Mental Status: He is alert and oriented to person, place, and time.   Psychiatric:         Mood and Affect: Mood normal.         Behavior: Behavior normal.             Screening recommendations appropriate to age and health status were reviewed.    Assessment & Plan:    Morbid obesity  Comments:  encouraged  healthy approach with diet/exercise    Cataract associated with type 2 diabetes mellitus  -     Comprehensive Metabolic Panel; Future; Expected date: 05/12/2022  -     Hemoglobin A1C; Future; Expected date: 05/12/2022    Other nonthrombocytopenic purpura  Comments:  cont skin monitoring    Urinary frequency  -     Prostate Specific Antigen, Diagnostic; Future; Expected date: 05/12/2022    Routine general medical examination at a health care facility    Other orders  -     mupirocin (BACTROBAN) 2 % ointment; Apply topically 2 (two) times daily.  Dispense: 30 g; Refill: 1

## 2022-05-13 ENCOUNTER — PATIENT MESSAGE (OUTPATIENT)
Dept: FAMILY MEDICINE | Facility: CLINIC | Age: 72
End: 2022-05-13
Payer: MEDICARE

## 2022-05-13 DIAGNOSIS — I10 PRIMARY HYPERTENSION: Primary | ICD-10-CM

## 2022-05-16 ENCOUNTER — TELEPHONE (OUTPATIENT)
Dept: FAMILY MEDICINE | Facility: CLINIC | Age: 72
End: 2022-05-16
Payer: MEDICARE

## 2022-06-14 ENCOUNTER — PATIENT MESSAGE (OUTPATIENT)
Dept: ORTHOPEDICS | Facility: CLINIC | Age: 72
End: 2022-06-14
Payer: MEDICARE

## 2022-06-23 ENCOUNTER — TELEPHONE (OUTPATIENT)
Dept: FAMILY MEDICINE | Facility: CLINIC | Age: 72
End: 2022-06-23
Payer: MEDICARE

## 2022-06-23 NOTE — TELEPHONE ENCOUNTER
----- Message from Nicole Champagne sent at 6/23/2022  9:22 AM CDT -----  Who Called: Patient    What is the reqeust in detail: Requesting call back to have his wife take his spot on the schedule for Dr. Robertson at 10AM. Please advise.     Can the clinic reply by [facility]? No    Best Call Back Number: [phone]    Additional Information:

## 2022-07-21 ENCOUNTER — PATIENT MESSAGE (OUTPATIENT)
Dept: ORTHOPEDICS | Facility: CLINIC | Age: 72
End: 2022-07-21
Payer: MEDICARE

## 2022-07-26 DIAGNOSIS — E11.9 DIABETES TYPE 2, CONTROLLED: ICD-10-CM

## 2022-07-26 NOTE — TELEPHONE ENCOUNTER
Care Due:                  Date            Visit Type   Department     Provider  --------------------------------------------------------------------------------                                EP -                              PRIMARY      [facility]  Last Visit: 05-      CARE ([facility])   MEDICINE       Swati Walton  Next Visit: None Scheduled  None         None Found                                                            Last  Test          Frequency    Reason                     Performed    Due Date  --------------------------------------------------------------------------------    CMP.........  12 months..  pioglitazone.............  09- 09-    HBA1C.......  6 months...  pioglitazone.............  04- 09-    [employer] Embedded Care Gaps. Reference number: [id number]. 7/26/2022   9:10:08 AM CDT

## 2022-07-27 RX ORDER — PIOGLITAZONEHYDROCHLORIDE 15 MG/1
TABLET ORAL
Qty: 90 TABLET | Refills: 0 | Status: SHIPPED | OUTPATIENT
Start: 2022-07-27 | End: 2022-10-25

## 2022-07-27 NOTE — TELEPHONE ENCOUNTER
Refill Decision Note   Rajeev Newell  is requesting a refill authorization.  Brief Assessment and Rationale for Refill:  Approve    -Medication-Related Problems Identified: Requires labs  Medication Therapy Plan:       Medication Reconciliation Completed: No   Comments:     Provider Staff:     Action is required for this patient.   Please see care gap opportunities below in Care Due Message.     Thanks!  [facility]     Appointments      Date Provider   Last Visit   5/12/2022 Swati Walton MD   Next Visit   Visit date not found Swati Walton MD     Note composed:12:05 PM 07/27/2022           Note composed:12:05 PM 07/27/2022

## 2022-08-15 ENCOUNTER — TELEPHONE (OUTPATIENT)
Dept: OPTOMETRY | Facility: CLINIC | Age: 72
End: 2022-08-15
Payer: MEDICARE

## 2022-08-15 NOTE — TELEPHONE ENCOUNTER
----- Message from Cheryle Summers sent at 8/15/2022  8:40 AM CDT -----  Contact: Pt  Type:  Same Day Appointment Request    Caller is requesting a same day appointment.  Caller declined first available appointment listed below.      Name of Caller:  Pt   When is the first available appointment? 10/10  Symptoms: Sudden blurred vision   Best Call Back Number:  [phone]    Additional Information:   Pt is calling the office to get and appt for today but 10/10 is the soonest. Pt adv would like to be seen today. Please call and adv

## 2022-08-16 ENCOUNTER — OFFICE VISIT (OUTPATIENT)
Dept: OPTOMETRY | Facility: CLINIC | Age: 72
End: 2022-08-16
Payer: MEDICARE

## 2022-08-16 ENCOUNTER — TELEPHONE (OUTPATIENT)
Dept: OPHTHALMOLOGY | Facility: CLINIC | Age: 72
End: 2022-08-16
Payer: MEDICARE

## 2022-08-16 DIAGNOSIS — H40.1132 PRIMARY OPEN ANGLE GLAUCOMA OF BOTH EYES, MODERATE STAGE: Primary | ICD-10-CM

## 2022-08-16 DIAGNOSIS — H35.3131 NONEXUDATIVE AGE-RELATED MACULAR DEGENERATION, BILATERAL, EARLY DRY STAGE: ICD-10-CM

## 2022-08-16 PROCEDURE — 99212 OFFICE O/P EST SF 10 MIN: CPT | Mod: PBBFAC,PO | Performed by: OPTOMETRIST

## 2022-08-16 PROCEDURE — 99999 PR PBB SHADOW E&M-EST. PATIENT-LVL II: ICD-10-PCS | Mod: PBBFAC,,, | Performed by: OPTOMETRIST

## 2022-08-16 PROCEDURE — 92014 PR EYE EXAM, EST PATIENT,COMPREHESV: ICD-10-PCS | Mod: S$PBB,,, | Performed by: OPTOMETRIST

## 2022-08-16 PROCEDURE — 92014 COMPRE OPH EXAM EST PT 1/>: CPT | Mod: S$PBB,,, | Performed by: OPTOMETRIST

## 2022-08-16 PROCEDURE — 92134 CPTRZ OPH DX IMG PST SGM RTA: CPT | Mod: PBBFAC,PO | Performed by: OPTOMETRIST

## 2022-08-16 PROCEDURE — 99999 PR PBB SHADOW E&M-EST. PATIENT-LVL II: CPT | Mod: PBBFAC,,, | Performed by: OPTOMETRIST

## 2022-08-16 PROCEDURE — 92134 POSTERIOR SEGMENT OCT RETINA (OCULAR COHERENCE TOMOGRAPHY)-BOTH EYES: ICD-10-PCS | Mod: 26,S$PBB,, | Performed by: OPTOMETRIST

## 2022-08-16 NOTE — TELEPHONE ENCOUNTER
Spoke to pt and scheduled NP appointment and HVF     -TD     ----- Message from Cecilia Mcginnis sent at 8/16/2022  3:15 PM CDT -----  Dr. Smith wants pt seen for annual with HVF for glauc progression opinion. Pt due in Nov. TY!

## 2022-08-16 NOTE — PATIENT INSTRUCTIONS
Using the Amsler Grid  If you are at risk for vision loss, you may be told to check your eyesight regularly using the Amsler grid. Below is the grid and instructions for using it.         The Amsler grid helps you track changes in your vision.    How to Use the Amsler Grid  Use the grid in a well-lighted area.  Wear glasses or contact lenses if you usually wear them.  Hold the grid at your normal reading distance (about 16 inches).  Cover your left eye.  With your right eye, look at the dot in the center of the grid.  While looking at the dot, notice if any of the lines look wavy, if any lines disappear, or if the boxes change shape.  Write down on a piece of paper any vision changes from the last time you used the grid.  Now repeat the exercise, this time covering your right eye.  Call your doctor right away if you notice any vision changes.  How Often Should I Check My Vision?  Use the Amsler grid as often as your eye doctor suggests. Keep the grid where youll remember to use it. Call your eye doctor right away if you notice any changes with your eyesight. This includes if your vision improves.  © 2712-7986 [employer], [address] [place] [address], [place] [address]. All rights reserved. This information is not intended as a substitute for professional medical care. Always follow your healthcare professional's instructions.

## 2022-08-16 NOTE — PROGRESS NOTES
HPI     Pt here for overdue iop CK.     Pt sts over the weekend he has noticed a small blind spot in the center of   vision in a dark room. Pt sts when he turns the lights on it gets better.   Pt sts when the light comes on it is a kaleidescope look to it. Pt sts   over the last few days he has felt a headache behind eyes.     Last edited by Cecilia Mcginnis on 8/16/2022  2:16 PM. (History)        ROS     Positive for: Eyes    Negative for: Constitutional, Gastrointestinal, Neurological, Skin,   Genitourinary, Musculoskeletal, HENT, Endocrine, Cardiovascular,   Respiratory, Psychiatric, Allergic/Imm, Heme/Lymph    Last edited by SHERMAN Smith, OD on 8/16/2022  2:35 PM. (History)        Assessment /Plan     For exam results, see Encounter Report.    Primary open angle glaucoma of both eyes, moderate stage    Nonexudative age-related macular degeneration, bilateral, early dry stage  -     Posterior Segment OCT Retina-Both eyes      1. Longstanding OAG with tx    IOP ranging upper teens -low 20s w/ ? Compliance OU  Large c/d w/ thin rims, visual fields have remained fairly stable over last few years  Angles open     /525        Fields / OCT update 11/4/21  PSD  4.92 <0.5% onl w/ inf arc def, moderate reliable  2.30 <5% onl w/ inf arc defect     G 46 onl w/ def 360  G 56 onl w/ def   No gross progession     Continue OU  Brimonidine bid  Latanoprost qhs         2. Early vis sig AMD  OCT 8/2022  OD hard drusen with few small PEDs   OS hard drusen w/ slightly greater central PEDs     Advise to begin Areds 2 vits or similar  Gave info  Sun / UV protection   No smoking   Home dany    Will recheck OCT 6-12 months pending vision     Consult w/ Dr Guallpa for annual w/ update fields and OCT in November 2022

## 2022-09-12 ENCOUNTER — PATIENT MESSAGE (OUTPATIENT)
Dept: FAMILY MEDICINE | Facility: CLINIC | Age: 72
End: 2022-09-12
Payer: MEDICARE

## 2022-09-21 ENCOUNTER — PATIENT MESSAGE (OUTPATIENT)
Dept: FAMILY MEDICINE | Facility: CLINIC | Age: 72
End: 2022-09-21
Payer: MEDICARE

## 2022-09-28 ENCOUNTER — IMMUNIZATION (OUTPATIENT)
Dept: FAMILY MEDICINE | Facility: CLINIC | Age: 72
End: 2022-09-28
Payer: MEDICARE

## 2022-09-28 ENCOUNTER — LAB VISIT (OUTPATIENT)
Dept: LAB | Facility: HOSPITAL | Age: 72
End: 2022-09-28
Attending: FAMILY MEDICINE
Payer: MEDICARE

## 2022-09-28 DIAGNOSIS — E11.36 CATARACT ASSOCIATED WITH TYPE 2 DIABETES MELLITUS: ICD-10-CM

## 2022-09-28 DIAGNOSIS — R35.0 URINARY FREQUENCY: ICD-10-CM

## 2022-09-28 LAB
ALBUMIN SERPL BCP-MCNC: 4 G/DL (ref 3.5–5.2)
ALP SERPL-CCNC: 40 U/L (ref 55–135)
ALT SERPL W/O P-5'-P-CCNC: 33 U/L (ref 10–44)
ANION GAP SERPL CALC-SCNC: 6 MMOL/L (ref 8–16)
AST SERPL-CCNC: 27 U/L (ref 10–40)
BILIRUB SERPL-MCNC: 0.7 MG/DL (ref 0.1–1)
BUN SERPL-MCNC: 21 MG/DL (ref 8–23)
CALCIUM SERPL-MCNC: 9.6 MG/DL (ref 8.7–10.5)
CHLORIDE SERPL-SCNC: 108 MMOL/L (ref 95–110)
CO2 SERPL-SCNC: 24 MMOL/L (ref 23–29)
COMPLEXED PSA SERPL-MCNC: 1.5 NG/ML (ref 0–4)
CREAT SERPL-MCNC: 1 MG/DL (ref 0.5–1.4)
EST. GFR  (NO RACE VARIABLE): >60 ML/MIN/1.73 M^2
ESTIMATED AVG GLUCOSE: 114 MG/DL (ref 68–131)
GLUCOSE SERPL-MCNC: 98 MG/DL (ref 70–110)
HBA1C MFR BLD: 5.6 % (ref 4–5.6)
POTASSIUM SERPL-SCNC: 4.2 MMOL/L (ref 3.5–5.1)
PROT SERPL-MCNC: 7.4 G/DL (ref 6–8.4)
SODIUM SERPL-SCNC: 138 MMOL/L (ref 136–145)

## 2022-09-28 PROCEDURE — 83036 HEMOGLOBIN GLYCOSYLATED A1C: CPT | Performed by: FAMILY MEDICINE

## 2022-09-28 PROCEDURE — G0008 ADMIN INFLUENZA VIRUS VAC: HCPCS | Mod: PBBFAC,PN

## 2022-09-28 PROCEDURE — 36415 COLL VENOUS BLD VENIPUNCTURE: CPT | Mod: PN | Performed by: FAMILY MEDICINE

## 2022-09-28 PROCEDURE — 80053 COMPREHEN METABOLIC PANEL: CPT | Performed by: FAMILY MEDICINE

## 2022-09-28 PROCEDURE — 84153 ASSAY OF PSA TOTAL: CPT | Performed by: FAMILY MEDICINE

## 2022-10-29 ENCOUNTER — PATIENT MESSAGE (OUTPATIENT)
Dept: FAMILY MEDICINE | Facility: CLINIC | Age: 72
End: 2022-10-29
Payer: MEDICARE

## 2022-11-01 ENCOUNTER — PATIENT MESSAGE (OUTPATIENT)
Dept: FAMILY MEDICINE | Facility: CLINIC | Age: 72
End: 2022-11-01
Payer: MEDICARE

## 2022-12-18 ENCOUNTER — PATIENT MESSAGE (OUTPATIENT)
Dept: ORTHOPEDICS | Facility: CLINIC | Age: 72
End: 2022-12-18
Payer: MEDICARE

## 2022-12-19 ENCOUNTER — OFFICE VISIT (OUTPATIENT)
Dept: OPHTHALMOLOGY | Facility: CLINIC | Age: 72
End: 2022-12-19
Payer: MEDICARE

## 2022-12-19 ENCOUNTER — CLINICAL SUPPORT (OUTPATIENT)
Dept: OPHTHALMOLOGY | Facility: CLINIC | Age: 72
End: 2022-12-19
Payer: MEDICARE

## 2022-12-19 DIAGNOSIS — H35.3131 EARLY DRY STAGE NONEXUDATIVE AGE-RELATED MACULAR DEGENERATION OF BOTH EYES: ICD-10-CM

## 2022-12-19 DIAGNOSIS — H25.13 NUCLEAR SCLEROTIC CATARACT, BILATERAL: ICD-10-CM

## 2022-12-19 DIAGNOSIS — H40.1132 PRIMARY OPEN ANGLE GLAUCOMA OF BOTH EYES, MODERATE STAGE: Primary | ICD-10-CM

## 2022-12-19 PROCEDURE — 92083 HUMPHREY VISUAL FIELD - OU - BOTH EYES: ICD-10-PCS | Mod: 26,S$PBB,, | Performed by: OPHTHALMOLOGY

## 2022-12-19 PROCEDURE — 92083 EXTENDED VISUAL FIELD XM: CPT | Mod: PBBFAC,PO | Performed by: OPHTHALMOLOGY

## 2022-12-19 PROCEDURE — 99204 PR OFFICE/OUTPT VISIT, NEW, LEVL IV, 45-59 MIN: ICD-10-PCS | Mod: S$PBB,,, | Performed by: OPHTHALMOLOGY

## 2022-12-19 PROCEDURE — 99204 OFFICE O/P NEW MOD 45 MIN: CPT | Mod: S$PBB,,, | Performed by: OPHTHALMOLOGY

## 2022-12-19 PROCEDURE — 99213 OFFICE O/P EST LOW 20 MIN: CPT | Mod: PBBFAC,PO | Performed by: OPHTHALMOLOGY

## 2022-12-19 PROCEDURE — 99999 PR PBB SHADOW E&M-EST. PATIENT-LVL III: ICD-10-PCS | Mod: PBBFAC,,, | Performed by: OPHTHALMOLOGY

## 2022-12-19 PROCEDURE — 99999 PR PBB SHADOW E&M-EST. PATIENT-LVL III: CPT | Mod: PBBFAC,,, | Performed by: OPHTHALMOLOGY

## 2022-12-19 RX ORDER — DORZOLAMIDE HYDROCHLORIDE AND TIMOLOL MALEATE 20; 5 MG/ML; MG/ML
1 SOLUTION/ DROPS OPHTHALMIC EVERY 12 HOURS
Qty: 10 ML | Refills: 11 | Status: SHIPPED | OUTPATIENT
Start: 2022-12-19 | End: 2023-12-04 | Stop reason: SDUPTHER

## 2022-12-19 NOTE — PROGRESS NOTES
HPI     Follow-up     Additional comments: Ref by Dr Smith for Glaucoma evaluation. Denies eye   pain today. States he has been dx with Glaucoma for as long as he has been   seeing Dr Smith. No known fam h/o. No previous eye injury or surgery.   Using Latanoprost OU HS and AZOPT Ou BID states compliance.           Last edited by Nirmala Valente on 12/19/2022  8:28 AM.            Assessment /Plan     For exam results, see Encounter Report.    Primary open angle glaucoma of both eyes, moderate stage    Nuclear sclerotic cataract, bilateral    Early dry stage nonexudative age-related macular degeneration of both eyes      1. Primary open angle glaucoma of both eyes, moderate stage  Neg famhx  Mildly thin pachy    ONH are excavated with severe OCT NFL damage  HVF damage is moderate with slow progression  Tmax 33/30 (dilated, on treatment)    IOP poorly controlled  Discussed treatment options    Continue latan qhs OU  Stop azopt  Start dorz/kalyan bid OU    F/u 3 months, IOP, gonio  Consider SLT    2. Nuclear sclerotic cataract, bilateral  Moderate, pt not bothered    3. Early dry stage nonexudative age-related macular degeneration of both eyes  AREDS2 counselled

## 2022-12-21 ENCOUNTER — OFFICE VISIT (OUTPATIENT)
Dept: ORTHOPEDICS | Facility: CLINIC | Age: 72
End: 2022-12-21
Payer: MEDICARE

## 2022-12-21 ENCOUNTER — HOSPITAL ENCOUNTER (OUTPATIENT)
Dept: RADIOLOGY | Facility: HOSPITAL | Age: 72
Discharge: HOME | End: 2022-12-21
Attending: ORTHOPAEDIC SURGERY
Payer: MEDICARE

## 2022-12-21 VITALS — HEIGHT: 71 IN | BODY MASS INDEX: 37.52 KG/M2 | WEIGHT: 268 LBS

## 2022-12-21 DIAGNOSIS — S61.012A LACERATION OF LEFT THUMB WITHOUT FOREIGN BODY WITHOUT DAMAGE TO NAIL, INITIAL ENCOUNTER: ICD-10-CM

## 2022-12-21 DIAGNOSIS — M13.849 ALLERGIC ARTHRITIS OF HAND, UNSPECIFIED LATERALITY: ICD-10-CM

## 2022-12-21 DIAGNOSIS — M13.849 ALLERGIC ARTHRITIS OF HAND, UNSPECIFIED LATERALITY: Primary | ICD-10-CM

## 2022-12-21 PROCEDURE — 99203 OFFICE O/P NEW LOW 30 MIN: CPT | Mod: S$PBB,,, | Performed by: ORTHOPAEDIC SURGERY

## 2022-12-21 PROCEDURE — 73130 X-RAY EXAM OF HAND: CPT | Mod: TC,50,PO

## 2022-12-21 PROCEDURE — 73130 XR HAND COMPLETE 3 VIEWS BILATERAL: ICD-10-PCS | Mod: 26,50,, | Performed by: RADIOLOGY

## 2022-12-21 PROCEDURE — 99203 PR OFFICE/OUTPT VISIT, NEW, LEVL III, 30-44 MIN: ICD-10-PCS | Mod: S$PBB,,, | Performed by: ORTHOPAEDIC SURGERY

## 2022-12-21 PROCEDURE — 99999 PR PBB SHADOW E&M-EST. PATIENT-LVL III: CPT | Mod: PBBFAC,,, | Performed by: ORTHOPAEDIC SURGERY

## 2022-12-21 PROCEDURE — 99999 PR PBB SHADOW E&M-EST. PATIENT-LVL III: ICD-10-PCS | Mod: PBBFAC,,, | Performed by: ORTHOPAEDIC SURGERY

## 2022-12-21 PROCEDURE — 99213 OFFICE O/P EST LOW 20 MIN: CPT | Mod: PBBFAC,PN | Performed by: ORTHOPAEDIC SURGERY

## 2022-12-21 PROCEDURE — 73130 X-RAY EXAM OF HAND: CPT | Mod: 26,50,, | Performed by: RADIOLOGY

## 2022-12-21 NOTE — PROGRESS NOTES
12/21/2022    Chief Complaint:  Chief Complaint   Patient presents with    Right Hand - Pain    Left Hand - Injury, Pain     Smashed left thumb while hunting on 12/10/22.        HPI:  Rajeev Newell III is a 72 y.o. male, who presents to clinic today has a history pain and some joint changes over multiple fingers on both of his hands.  He initially had a visit set up with me to assess the possible arthritis of his fingers.  States that approximately 10 days ago he smashed his left thumb causing a laceration.  He was seen at an outside emergency room and the wound was irrigated and was closed with sutures.  States that he is not having significant pain or swelling from that site.  He has no other complaints    PMHX:  Past Medical History:   Diagnosis Date    Cataract     OU--early    DM (diabetes mellitus) 10/29/2012    Glaucoma     POAG-OU    Hx of colonic polyps     Hypertension     Sinusitis        PSHX:  Past Surgical History:   Procedure Laterality Date    CARPAL TUNNEL RELEASE      CIRCUMCISION      CYSTOSCOPY      HERNIA REPAIR Left 1994    L inguinal - [facility]    KNEE SURGERY      PERCUTANEOUS CRYOTHERAPY OF PERIPHERAL NERVE USING LIQUID NITROUS OXIDE IN CLOSED NEEDLE DEVICE Left 3/18/2019    Procedure: CRYOTHERAPY, NERVE, PERIPHERAL, PERCUTANEOUS, USING LIQUID NITROUS OXIDE IN CLOSED NEEDLE DEVICE-cryotherapy left knee;  Surgeon: Yvon Pitts III, MD;  Location: [facility] CATH LAB;  Service: Pain Management;  Laterality: Left;    TOTAL KNEE ARTHROPLASTY Left 3/21/2019    Procedure: ARTHROPLASTY, KNEE, TOTAL-VERO;  Surgeon: Edmund Rosas MD;  Location: [facility] OR [address] [place] [address];  Service: Orthopedics;  Laterality: Left;       FMHX:  Family History   Problem Relation Age of Onset    Hypertension Mother     Stroke Mother     Diabetes Neg Hx     Cancer Neg Hx     Heart disease Neg Hx     Prostate cancer Neg Hx     Glaucoma Neg Hx     Macular degeneration Neg Hx     Retinal detachment Neg Hx         SOCHX:  Social History     Tobacco Use    Smoking status: Never    Smokeless tobacco: Never   Substance Use Topics    Alcohol use: Yes     Comment: occasionally       ALLERGIES:  Bactrim [sulfamethoxazole-trimethoprim], Darvon [propoxyphene], and Doxycycline    CURRENT MEDICATIONS:  Current Outpatient Medications on File Prior to Visit   Medication Sig Dispense Refill    atorvastatin (LIPITOR) 40 MG tablet Take 40 mg by mouth once daily.   11    clotrimazole (LOTRIMIN) 1 % cream Apply topically 2 (two) times daily. 12 g 0    dorzolamide-timolol 2-0.5% (COSOPT) 22.3-6.8 mg/mL ophthalmic solution Place 1 drop into both eyes every 12 (twelve) hours. 10 mL 11    fexofenadine (ALLEGRA) 180 MG tablet Every day PRN      latanoprost 0.005 % ophthalmic solution INSTILL 1 DROP IN BOTH EYES EVERY NIGHT 7.5 mL 3    losartan (COZAAR) 50 MG tablet TK 1 T PO  BID      meloxicam (MOBIC) 15 MG tablet TAKE 1 TABLET(15 MG) BY MOUTH EVERY DAY 90 tablet 0    mupirocin (BACTROBAN) 2 % ointment Apply topically 2 (two) times daily. 30 g 1    pantoprazole (PROTONIX) 40 MG tablet pantoprazole 40 mg tablet,delayed release   TAKE 1 TABLET BY MOUTH EVERY DAY IN THE MORNING      pioglitazone (ACTOS) 15 MG tablet TAKE 1 TABLET(15 MG) BY MOUTH EVERY DAY 90 tablet 0    aspirin (ECOTRIN) 81 MG EC tablet Take 1 tablet (81 mg total) by mouth 2 (two) times daily. (Patient taking differently: Take 81 mg by mouth once daily.) 60 tablet 0    valACYclovir (VALTREX) 1000 MG tablet Take 1 tablet (1,000 mg total) by mouth 2 (two) times daily. (Patient not taking: Reported on 5/12/2022) 20 tablet 1    [DISCONTINUED] docusate sodium (COLACE) 100 MG capsule Take 1 capsule (100 mg total) by mouth 2 (two) times daily. 60 capsule 0     No current facility-administered medications on file prior to visit.       REVIEW OF SYSTEMS:  Review of Systems   Constitutional:  Negative for diaphoresis and fever.   HENT:  Positive for tinnitus. Negative for ear pain,  "hearing loss and nosebleeds.    Eyes:  Negative for pain and redness.   Respiratory:  Negative for cough and shortness of breath.    Cardiovascular:  Negative for chest pain and palpitations.   Gastrointestinal:  Negative for blood in stool, constipation, diarrhea, nausea and vomiting.   Genitourinary:  Negative for frequency and hematuria.   Musculoskeletal:  Negative for back pain and myalgias.   Skin:  Negative for itching and rash.   Neurological:  Negative for dizziness, tingling, seizures, weakness and headaches.   Endo/Heme/Allergies:  Negative for environmental allergies.   Psychiatric/Behavioral:  The patient is not nervous/anxious.      GENERAL PHYSICAL EXAM:   Ht 5' 11" (1.803 m)   Wt 121.6 kg (268 lb)   BMI 37.38 kg/m²    GEN: well developed, well nourished, no acute distress   HENT: Normocephalic, atraumatic   EYES: No discharge, conjunctiva normal   NECK: Supple, non-tender   PULM: No wheezing, no respiratory distress   CV: RRR   ABD: Soft, non-tender    ORTHO EXAM:   Examination of bilateral hands reveals that there are changes consistent with osteoarthritis mainly at the distal interphalangeal joint.  Over the left thumb there is an L-shaped laceration with sutures in place.  The sutures are beginning to unravel.  He has mild edema in that area but no erythema or drainage is noted.  He does have capillary refill less than 2 seconds at the tip of the thumb.  The remainder of his hands have sensation intact and he has capillary refill less than 2 seconds    RADIOLOGY:   X-rays of bilateral hands were taken in clinic today.  The films have been reviewed by me.  He has multiple areas of degenerative disease.  Several distal interphalangeal joints are involved.  He has severe degenerative changes at both thumb CMC joints as well    ASSESSMENT:   Left thumb laceration, bilateral hand arthritis    PLAN:  1. His sutures were removed today and Steri-Strips are placed    2.  I have discussed treatment for " the arthritis he states that he is already taking meloxicam and that is doing relatively well    3. Will follow up on a p.r.n. basis     Answers submitted by the patient for this visit:  Orthopedics Questionnaire (Submitted on 12/18/2022)  unexpected weight change: No  appetite change : No  sleep disturbance: No  IMMUNOCOMPROMISED: No  dysphoric mood: No  visual disturbance: No  sinus pressure : No  food allergies: No  difficulty urinating: No  numbness: No  joint swelling: Yes   (Submitted on 12/18/2022)  Chief Complaint: Hand injury  Pain Chronicity: recurrent  History of trauma: No  Onset: more than 1 year ago  Frequency: rarely  Progression since onset: gradually worsening  injury location: at home  pain- numeric: 0/10  pain location: left fingers, right fingers  pain quality: tightness  Radiating Pain: No  Aggravating factors: activity  inability to bear weight: No  joint locking: No  limited range of motion: Yes  stiffness: Yes  Treatments tried: nothing  physical therapy: not tried  Improvement on treatment: no relief

## 2023-01-11 ENCOUNTER — PATIENT MESSAGE (OUTPATIENT)
Dept: ORTHOPEDICS | Facility: CLINIC | Age: 73
End: 2023-01-11
Payer: MEDICARE

## 2023-01-23 ENCOUNTER — PATIENT MESSAGE (OUTPATIENT)
Dept: ORTHOPEDICS | Facility: CLINIC | Age: 73
End: 2023-01-23
Payer: MEDICARE

## 2023-02-20 ENCOUNTER — PATIENT MESSAGE (OUTPATIENT)
Dept: FAMILY MEDICINE | Facility: CLINIC | Age: 73
End: 2023-02-20
Payer: MEDICARE

## 2023-02-23 NOTE — PROGRESS NOTES
Pt had HVF and OCT done today. TF           Spoke to patient and informed her of lab results and message from Dr. Meneses. Patient verbalized understanding.     Patient inquiring on recommendations to improve lipid levels. Please advise.     Patient noted that a new blood pressure medication, Losartan, was sent to the pharmacy. Patient did not  medication per conversation with Dr. Meneses to wait until labs came back before starting new medication. Patient has at least 2 months left of current BP med, Hyzaar. Patient wanted to confirm that a switch to Cozaar after finsihing Hyzaar is appropriate based on these labs. Please advise.     Okay to send message via portal with what Dr. Meneses advises per patient.    Dr. Meneses's message sent via [email or web address] per patient request.

## 2023-03-02 ENCOUNTER — OFFICE VISIT (OUTPATIENT)
Dept: FAMILY MEDICINE | Facility: CLINIC | Age: 73
End: 2023-03-02
Payer: MEDICARE

## 2023-03-02 VITALS
BODY MASS INDEX: 37.3 KG/M2 | OXYGEN SATURATION: 98 % | DIASTOLIC BLOOD PRESSURE: 70 MMHG | SYSTOLIC BLOOD PRESSURE: 122 MMHG | HEIGHT: 71 IN | HEART RATE: 57 BPM | WEIGHT: 266.44 LBS

## 2023-03-02 DIAGNOSIS — I83.93 ASYMPTOMATIC VARICOSE VEINS OF BOTH LOWER EXTREMITIES: ICD-10-CM

## 2023-03-02 DIAGNOSIS — N40.0 BENIGN PROSTATIC HYPERPLASIA, UNSPECIFIED WHETHER LOWER URINARY TRACT SYMPTOMS PRESENT: Primary | ICD-10-CM

## 2023-03-02 DIAGNOSIS — R39.15 URGENCY OF URINATION: ICD-10-CM

## 2023-03-02 DIAGNOSIS — D23.5 DERMOID CYST OF SKIN OF BACK: ICD-10-CM

## 2023-03-02 DIAGNOSIS — I73.9 PVD (PERIPHERAL VASCULAR DISEASE): ICD-10-CM

## 2023-03-02 DIAGNOSIS — R35.0 FREQUENCY OF URINATION: ICD-10-CM

## 2023-03-02 PROCEDURE — 99999 PR PBB SHADOW E&M-EST. PATIENT-LVL IV: ICD-10-PCS | Mod: PBBFAC,,, | Performed by: PHYSICIAN ASSISTANT

## 2023-03-02 PROCEDURE — 99214 PR OFFICE/OUTPT VISIT, EST, LEVL IV, 30-39 MIN: ICD-10-PCS | Mod: S$PBB,,, | Performed by: PHYSICIAN ASSISTANT

## 2023-03-02 PROCEDURE — 99214 OFFICE O/P EST MOD 30 MIN: CPT | Mod: S$PBB,,, | Performed by: PHYSICIAN ASSISTANT

## 2023-03-02 PROCEDURE — 99214 OFFICE O/P EST MOD 30 MIN: CPT | Mod: PBBFAC,PN | Performed by: PHYSICIAN ASSISTANT

## 2023-03-02 PROCEDURE — 99999 PR PBB SHADOW E&M-EST. PATIENT-LVL IV: CPT | Mod: PBBFAC,,, | Performed by: PHYSICIAN ASSISTANT

## 2023-03-02 RX ORDER — TAMSULOSIN HYDROCHLORIDE 0.4 MG/1
0.4 CAPSULE ORAL DAILY
Qty: 90 CAPSULE | Refills: 3 | Status: SHIPPED | OUTPATIENT
Start: 2023-03-02 | End: 2024-01-28 | Stop reason: SDUPTHER

## 2023-03-02 NOTE — PROGRESS NOTES
Subjective:       Patient ID: Rajeev Newell III is a 72 y.o. male.    Chief Complaint: Follow-up (Referred to urology)    HPI  Urinary freq and urgency  Hx BPH  Cysts on back  Needs removal   Review of Systems   Constitutional:  Negative for activity change and unexpected weight change.   HENT:  Negative for hearing loss, rhinorrhea and trouble swallowing.    Eyes:  Negative for discharge and visual disturbance.   Respiratory:  Negative for chest tightness and wheezing.    Cardiovascular:  Negative for chest pain and palpitations.   Gastrointestinal:  Negative for blood in stool, constipation, diarrhea and vomiting.   Endocrine: Positive for polyuria. Negative for polydipsia.   Genitourinary:  Positive for frequency and urgency. Negative for difficulty urinating and hematuria.   Musculoskeletal:  Negative for arthralgias, joint swelling and neck pain.   Integumentary:  Positive for mole/lesion.   Neurological:  Negative for weakness and headaches.   Psychiatric/Behavioral:  Negative for confusion and dysphoric mood.        Objective:      Physical Exam  Vitals reviewed.   Constitutional:       General: He is not in acute distress.     Appearance: Normal appearance. He is obese. He is not ill-appearing, toxic-appearing or diaphoretic.   HENT:      Head: Normocephalic and atraumatic.      Right Ear: Tympanic membrane, ear canal and external ear normal. There is no impacted cerumen.      Left Ear: Tympanic membrane, ear canal and external ear normal. There is no impacted cerumen.      Nose: Nose normal.      Mouth/Throat:      Mouth: Mucous membranes are moist.      Pharynx: Oropharynx is clear. No oropharyngeal exudate or posterior oropharyngeal erythema.   Eyes:      General: No scleral icterus.     Conjunctiva/sclera: Conjunctivae normal.   Neck:      Vascular: No carotid bruit.   Cardiovascular:      Rate and Rhythm: Normal rate and regular rhythm.      Pulses: Normal pulses.      Heart sounds: Normal heart  "sounds. No murmur heard.    No friction rub. No gallop.   Pulmonary:      Effort: Pulmonary effort is normal. No respiratory distress.      Breath sounds: Normal breath sounds. No stridor. No wheezing, rhonchi or rales.   Abdominal:      General: Abdomen is flat. Bowel sounds are normal. There is no distension.      Palpations: Abdomen is soft. There is no mass.      Tenderness: There is no abdominal tenderness. There is no guarding or rebound.      Hernia: No hernia is present.   Musculoskeletal:         General: No swelling.      Cervical back: Normal range of motion and neck supple. No rigidity or tenderness.      Right lower leg: No edema.      Left lower leg: No edema.   Lymphadenopathy:      Cervical: No cervical adenopathy.   Skin:     General: Skin is warm and dry.      Findings: Lesion present. No rash.      Comments: Varicose veins both legs   Large 2" sebaceous cyst R mid back  2 cm cyst R shoulder     Neurological:      General: No focal deficit present.      Mental Status: He is alert and oriented to person, place, and time.       Assessment:       Problem List Items Addressed This Visit       BPH (benign prostatic hyperplasia) - Primary    Relevant Orders    Ambulatory referral/consult to Urology    Urine culture    Urinalysis    PVD (peripheral vascular disease)    Asymptomatic varicose veins of both lower extremities     Other Visit Diagnoses       Frequency of urination        Relevant Medications    tamsulosin (FLOMAX) 0.4 mg Cap    Other Relevant Orders    Ambulatory referral/consult to Urology    Urine culture    Urinalysis    Urgency of urination        Relevant Medications    tamsulosin (FLOMAX) 0.4 mg Cap    Other Relevant Orders    Ambulatory referral/consult to Urology    Urine culture    Urinalysis    Dermoid cyst of skin of back        Relevant Orders    Ambulatory referral/consult to General Surgery            Plan:       Rajeev was seen today for follow-up.    Diagnoses and all orders " for this visit:    Benign prostatic hyperplasia, unspecified whether lower urinary tract symptoms present  -     Ambulatory referral/consult to Urology; Future  -     Urine culture  -     Urinalysis; Future    Frequency of urination  -     Ambulatory referral/consult to Urology; Future  -     Urine culture  -     Urinalysis; Future  -     tamsulosin (FLOMAX) 0.4 mg Cap; Take 1 capsule (0.4 mg total) by mouth once daily.    Urgency of urination  -     Ambulatory referral/consult to Urology; Future  -     Urine culture  -     Urinalysis; Future  -     tamsulosin (FLOMAX) 0.4 mg Cap; Take 1 capsule (0.4 mg total) by mouth once daily.    Dermoid cyst of skin of back  -     Ambulatory referral/consult to General Surgery; Future    PVD (peripheral vascular disease)    Asymptomatic varicose veins of both lower extremities       Discussed otc's  Reduce salt  Discussed support hose

## 2023-03-14 ENCOUNTER — HOSPITAL ENCOUNTER (OUTPATIENT)
Dept: RADIOLOGY | Facility: HOSPITAL | Age: 73
Discharge: HOME | End: 2023-03-14
Attending: PHYSICIAN ASSISTANT
Payer: MEDICARE

## 2023-03-14 ENCOUNTER — OFFICE VISIT (OUTPATIENT)
Dept: ORTHOPEDICS | Facility: CLINIC | Age: 73
End: 2023-03-14
Payer: MEDICARE

## 2023-03-14 VITALS
HEART RATE: 58 BPM | WEIGHT: 271.81 LBS | DIASTOLIC BLOOD PRESSURE: 79 MMHG | SYSTOLIC BLOOD PRESSURE: 135 MMHG | HEIGHT: 71 IN | BODY MASS INDEX: 38.05 KG/M2

## 2023-03-14 DIAGNOSIS — G89.29 CHRONIC PAIN OF BOTH SHOULDERS: ICD-10-CM

## 2023-03-14 DIAGNOSIS — M25.511 CHRONIC PAIN OF BOTH SHOULDERS: ICD-10-CM

## 2023-03-14 DIAGNOSIS — M25.512 CHRONIC PAIN OF BOTH SHOULDERS: ICD-10-CM

## 2023-03-14 DIAGNOSIS — M54.2 CERVICAL SPINE PAIN: ICD-10-CM

## 2023-03-14 DIAGNOSIS — M19.012 PRIMARY OSTEOARTHRITIS OF BOTH SHOULDERS: Primary | ICD-10-CM

## 2023-03-14 DIAGNOSIS — M19.011 PRIMARY OSTEOARTHRITIS OF BOTH SHOULDERS: Primary | ICD-10-CM

## 2023-03-14 PROCEDURE — 72050 XR CERVICAL SPINE AP LAT WITH FLEX EXTEN: ICD-10-PCS | Mod: 26,,, | Performed by: RADIOLOGY

## 2023-03-14 PROCEDURE — 73030 X-RAY EXAM OF SHOULDER: CPT | Mod: TC,50

## 2023-03-14 PROCEDURE — 99999 PR PBB SHADOW E&M-EST. PATIENT-LVL IV: ICD-10-PCS | Mod: PBBFAC,,, | Performed by: PHYSICIAN ASSISTANT

## 2023-03-14 PROCEDURE — 99213 PR OFFICE/OUTPT VISIT, EST, LEVL III, 20-29 MIN: ICD-10-PCS | Mod: S$PBB,,, | Performed by: PHYSICIAN ASSISTANT

## 2023-03-14 PROCEDURE — 72050 X-RAY EXAM NECK SPINE 4/5VWS: CPT | Mod: 26,,, | Performed by: RADIOLOGY

## 2023-03-14 PROCEDURE — 73030 XR SHOULDER COMPLETE 2 OR MORE VIEWS BILATERAL: ICD-10-PCS | Mod: 26,50,, | Performed by: RADIOLOGY

## 2023-03-14 PROCEDURE — 99214 OFFICE O/P EST MOD 30 MIN: CPT | Mod: PBBFAC | Performed by: PHYSICIAN ASSISTANT

## 2023-03-14 PROCEDURE — 73030 X-RAY EXAM OF SHOULDER: CPT | Mod: 26,50,, | Performed by: RADIOLOGY

## 2023-03-14 PROCEDURE — 99213 OFFICE O/P EST LOW 20 MIN: CPT | Mod: S$PBB,,, | Performed by: PHYSICIAN ASSISTANT

## 2023-03-14 PROCEDURE — 72050 X-RAY EXAM NECK SPINE 4/5VWS: CPT | Mod: TC

## 2023-03-14 PROCEDURE — 99999 PR PBB SHADOW E&M-EST. PATIENT-LVL IV: CPT | Mod: PBBFAC,,, | Performed by: PHYSICIAN ASSISTANT

## 2023-03-14 NOTE — PROGRESS NOTES
SUBJECTIVE:     Chief Complaint & History of Present Illness:  Rajeev Newell III is a  Established  patient 72 y.o. male who is seen here today with a complaint of    Chief Complaint   Patient presents with    Left Shoulder - Pain    Right Shoulder - Pain    .  Patient is here today for evaluation treatment progressively worsening pain soreness in bilateral shoulders intermittently associated with daily activities.  Not been a specific trauma or injury to the shoulder he has taken some intermittent doses of meloxicam with only short-term relief.  He is not had any loss in strength range of motion is able to carry out most of his normal daily activities most of issues occur at night and upon rising in the morning  On a scale of 1-10, with 10 being worst pain imaginable, he rates this pain as 3 on good days and 5 on bad days.  he describes the pain as sore.    Review of patient's allergies indicates:   Allergen Reactions    Bactrim [sulfamethoxazole-trimethoprim] Other (See Comments)     Abdominal Pain, Flush, Fever and Chills, Headache and Cough after Pt. took Bactrim    Darvon [propoxyphene] Other (See Comments)     Hallucinations    Doxycycline Blisters         Current Outpatient Medications   Medication Sig Dispense Refill    atorvastatin (LIPITOR) 40 MG tablet Take 40 mg by mouth once daily.   11    dorzolamide-timolol 2-0.5% (COSOPT) 22.3-6.8 mg/mL ophthalmic solution Place 1 drop into both eyes every 12 (twelve) hours. 10 mL 11    fexofenadine (ALLEGRA) 180 MG tablet Every day PRN      latanoprost 0.005 % ophthalmic solution INSTILL 1 DROP IN BOTH EYES EVERY NIGHT 7.5 mL 3    losartan (COZAAR) 50 MG tablet TK 1 T PO  BID      meloxicam (MOBIC) 15 MG tablet TAKE 1 TABLET(15 MG) BY MOUTH EVERY DAY 90 tablet 0    pantoprazole (PROTONIX) 40 MG tablet pantoprazole 40 mg tablet,delayed release   TAKE 1 TABLET BY MOUTH EVERY DAY IN THE MORNING      pioglitazone (ACTOS) 15 MG tablet TAKE 1 TABLET(15 MG) BY MOUTH  EVERY DAY 90 tablet 0    tamsulosin (FLOMAX) 0.4 mg Cap Take 1 capsule (0.4 mg total) by mouth once daily. 90 capsule 3     No current facility-administered medications for this visit.       Past Medical History:   Diagnosis Date    Cataract     OU--early    DM (diabetes mellitus) 10/29/2012    Glaucoma     POAG-OU    Hx of colonic polyps     Hypertension     PVD (peripheral vascular disease) 3/2/2023    Sinusitis        Past Surgical History:   Procedure Laterality Date    CARPAL TUNNEL RELEASE      CIRCUMCISION      CYSTOSCOPY      HERNIA REPAIR Left 1994    L inguinal - [facility]    KNEE SURGERY      PERCUTANEOUS CRYOTHERAPY OF PERIPHERAL NERVE USING LIQUID NITROUS OXIDE IN CLOSED NEEDLE DEVICE Left 3/18/2019    Procedure: CRYOTHERAPY, NERVE, PERIPHERAL, PERCUTANEOUS, USING LIQUID NITROUS OXIDE IN CLOSED NEEDLE DEVICE-cryotherapy left knee;  Surgeon: Yvon Pitts III, MD;  Location: [facility] CATH [facility];  Service: Pain Management;  Laterality: Left;    TOTAL KNEE ARTHROPLASTY Left 3/21/2019    Procedure: ARTHROPLASTY, KNEE, TOTAL-VERO;  Surgeon: Edmund Rosas MD;  Location: [facility] OR [address] [place] [address];  Service: Orthopedics;  Laterality: Left;       Vital Signs (Most Recent)  Vitals:    03/14/23 0955   BP: 135/79   Pulse: (!) 58       Review of Systems:  ROS:  Constitutional: no fever or chills  Eyes: no visual changes, primary open-angle glaucoma  ENT: no nasal congestion or sore throat  Respiratory: no cough or shortness of breath  Cardiovascular: no chest pain or palpitations, positive peripheral vascular disease, hypertension, varicose veins lower extremities  Gastrointestinal: no nausea or vomiting, tolerating diet  Genitourinary: no hematuria or dysuria, positive BPH  Integument/Breast: no rash or pruritis  Hematologic/Lymphatic:  Positive non thrombocytopenic purpura  Musculoskeletal: no arthralgias or myalgias, osteoarthritis of both bilateral knees  Neurological: no seizures or tremors  Behavioral/Psych: no  "auditory or visual hallucinations  Endocrine: no heat or cold intolerance, diabetes type 2      OBJECTIVE:     PHYSICAL EXAM:  Height: 5' 11" (180.3 cm) Weight: 123.3 kg (271 lb 13.2 oz), General Appearance: Well nourished, well developed, in no acute distress.  Neurological: Mood & affect are normal.  Shoulder exam: bilateral  Tenderness: biceps tendon, lateral acromial  ROM: forward flexion 180/180, extension 45/45, full abduction 180/180, abduction-glenohumeral 90/90, external rotation 50/50  Shoulder Strength: biceps 5/5, triceps 5/5, abduction 5/5, adduction 5/5, external rotation 5/5 with shoulder at side, flexion 5/5, and extension 5/5  negative for tenderness about the glenohumeral joint, positive for tenderness over the acromioclavicular joint, and negative for impingement sign  Stability tests: anterior apprehension test negative and posterior apprehension test negative  Special Tests:Cross-chest abduction: negative and [place]'s test: negative                     RADIOGRAPHS:  X-rays taken today films reviewed by me demonstrate mild arthritic changes throughout both shoulders with some glenohumeral joint space narrowing osteophytic spurring noted near the tip of the acromion with impingement into the glenohumeral region no evidence of fracture dislocation or other bony abnormalities    ASSESSMENT/PLAN:       ICD-10-CM ICD-9-CM   1. Primary osteoarthritis of both shoulders  M19.011 715.11    M19.012    2. Chronic pain of both shoulders  M25.511 719.41    G89.29 338.29    M25.512    3. Cervical spine pain  M54.2 723.1       Plan: We discussed with the patient at length all the different treatment options available for his bilateral shoulder including anti-inflammatories, acetaminophen, rest, ice, Physical therapy to include strengthening exercise, occasional cortisone injections for temporary relief, arthroscopic surgical repair, and finally shoulder arthroplasty.   Meloxicam 15 mg q.d. with food times 10 " days without failure   Continue stretching range of motion exercises  Follow-up in 2 weeks if symptoms not significantly improved consider injection therapies sooner symptoms dictate

## 2023-03-20 ENCOUNTER — OFFICE VISIT (OUTPATIENT)
Dept: OPHTHALMOLOGY | Facility: CLINIC | Age: 73
End: 2023-03-20
Payer: MEDICARE

## 2023-03-20 DIAGNOSIS — H40.1132 PRIMARY OPEN ANGLE GLAUCOMA OF BOTH EYES, MODERATE STAGE: Primary | ICD-10-CM

## 2023-03-20 DIAGNOSIS — H25.13 NUCLEAR SCLEROTIC CATARACT, BILATERAL: ICD-10-CM

## 2023-03-20 PROCEDURE — 99213 OFFICE O/P EST LOW 20 MIN: CPT | Mod: S$PBB,,, | Performed by: OPHTHALMOLOGY

## 2023-03-20 PROCEDURE — 92015 PR REFRACTION: ICD-10-PCS | Mod: ,,, | Performed by: OPHTHALMOLOGY

## 2023-03-20 PROCEDURE — 99999 PR PBB SHADOW E&M-EST. PATIENT-LVL II: CPT | Mod: PBBFAC,,, | Performed by: OPHTHALMOLOGY

## 2023-03-20 PROCEDURE — 99213 PR OFFICE/OUTPT VISIT, EST, LEVL III, 20-29 MIN: ICD-10-PCS | Mod: S$PBB,,, | Performed by: OPHTHALMOLOGY

## 2023-03-20 PROCEDURE — 99999 PR PBB SHADOW E&M-EST. PATIENT-LVL II: ICD-10-PCS | Mod: PBBFAC,,, | Performed by: OPHTHALMOLOGY

## 2023-03-20 PROCEDURE — 99212 OFFICE O/P EST SF 10 MIN: CPT | Mod: PBBFAC,PO | Performed by: OPHTHALMOLOGY

## 2023-03-20 PROCEDURE — 92020 GONIOSCOPY: CPT | Mod: PBBFAC,PO | Performed by: OPHTHALMOLOGY

## 2023-03-20 PROCEDURE — 92020 PR SPECIAL EYE EVAL,GONIOSCOPY: ICD-10-PCS | Mod: S$PBB,,, | Performed by: OPHTHALMOLOGY

## 2023-03-20 PROCEDURE — 92015 DETERMINE REFRACTIVE STATE: CPT | Mod: ,,, | Performed by: OPHTHALMOLOGY

## 2023-03-20 PROCEDURE — 92020 GONIOSCOPY: CPT | Mod: S$PBB,,, | Performed by: OPHTHALMOLOGY

## 2023-03-20 RX ORDER — VALACYCLOVIR HYDROCHLORIDE 1 G/1
4000 TABLET, FILM COATED ORAL
COMMUNITY
Start: 2023-03-17

## 2023-03-20 NOTE — PROGRESS NOTES
HPI    DLS: 12/19/2022- pt here for IOP/ gonio. States has been using gtts as   prescribed.       Latanoprost OU QHS   Dorz/ kalyan OU BID     Pt states has stopped taking eye vitamins since last visit.       Taking specs off to read. Working well. New rx today.   Last edited by Christine Choudhury on 3/20/2023  8:32 AM.            Assessment /Plan     For exam results, see Encounter Report.    Primary open angle glaucoma of both eyes, moderate stage    Nuclear sclerotic cataract, bilateral      1. Primary open angle glaucoma of both eyes, moderate stage  Neg famhx  Mildly thin pachy    ONH are excavated with severe OCT NFL damage  HVF damage is moderate with slow progression  Tmax 33/30 (dilated, on treatment)    Good response to dorz/kalyan  IOP reasonable, not sure if good enough long term    Continue latan qhs OU  Dorz/timb bid OU    F/u 4 months, IOP check  SLT an option    2. Nuclear sclerotic cataract, bilateral  Mild to moderate  New glasses Rx today

## 2023-04-05 DIAGNOSIS — E11.9 TYPE 2 DIABETES MELLITUS WITHOUT COMPLICATION: ICD-10-CM

## 2023-04-11 ENCOUNTER — PATIENT MESSAGE (OUTPATIENT)
Dept: ADMINISTRATIVE | Facility: HOSPITAL | Age: 73
End: 2023-04-11
Payer: MEDICARE

## 2023-04-11 DIAGNOSIS — E11.9 TYPE 2 DIABETES MELLITUS WITHOUT COMPLICATION, UNSPECIFIED WHETHER LONG TERM INSULIN USE: Primary | ICD-10-CM

## 2023-04-18 ENCOUNTER — OFFICE VISIT (OUTPATIENT)
Dept: FAMILY MEDICINE | Facility: CLINIC | Age: 73
End: 2023-04-18
Payer: MEDICARE

## 2023-04-18 VITALS
DIASTOLIC BLOOD PRESSURE: 78 MMHG | BODY MASS INDEX: 37.93 KG/M2 | HEIGHT: 71 IN | WEIGHT: 270.94 LBS | SYSTOLIC BLOOD PRESSURE: 136 MMHG | TEMPERATURE: 98 F | OXYGEN SATURATION: 98 % | HEART RATE: 60 BPM

## 2023-04-18 DIAGNOSIS — J32.9 BACTERIAL SINUSITIS: Primary | ICD-10-CM

## 2023-04-18 DIAGNOSIS — B96.89 BACTERIAL SINUSITIS: Primary | ICD-10-CM

## 2023-04-18 PROCEDURE — 99999 PR PBB SHADOW E&M-EST. PATIENT-LVL IV: ICD-10-PCS | Mod: PBBFAC,,, | Performed by: PHYSICIAN ASSISTANT

## 2023-04-18 PROCEDURE — 99213 PR OFFICE/OUTPT VISIT, EST, LEVL III, 20-29 MIN: ICD-10-PCS | Mod: S$PBB,,, | Performed by: PHYSICIAN ASSISTANT

## 2023-04-18 PROCEDURE — 99213 OFFICE O/P EST LOW 20 MIN: CPT | Mod: S$PBB,,, | Performed by: PHYSICIAN ASSISTANT

## 2023-04-18 PROCEDURE — 99999 PR PBB SHADOW E&M-EST. PATIENT-LVL IV: CPT | Mod: PBBFAC,,, | Performed by: PHYSICIAN ASSISTANT

## 2023-04-18 PROCEDURE — 99214 OFFICE O/P EST MOD 30 MIN: CPT | Mod: PBBFAC,PN | Performed by: PHYSICIAN ASSISTANT

## 2023-04-18 RX ORDER — AMOXICILLIN AND CLAVULANATE POTASSIUM 875; 125 MG/1; MG/1
1 TABLET, FILM COATED ORAL 2 TIMES DAILY
Qty: 20 TABLET | Refills: 0 | Status: SHIPPED | OUTPATIENT
Start: 2023-04-18 | End: 2023-07-24

## 2023-04-19 ENCOUNTER — TELEPHONE (OUTPATIENT)
Dept: OPHTHALMOLOGY | Facility: CLINIC | Age: 73
End: 2023-04-19
Payer: MEDICARE

## 2023-04-19 NOTE — TELEPHONE ENCOUNTER
Lvm for pt to call back     ----- Message from Katerina Richardson sent at 4/18/2023  5:36 PM CDT -----  Pt needs glasses check with [facility] please

## 2023-04-27 ENCOUNTER — LAB VISIT (OUTPATIENT)
Dept: LAB | Facility: HOSPITAL | Age: 73
End: 2023-04-27
Attending: FAMILY MEDICINE
Payer: MEDICARE

## 2023-04-27 DIAGNOSIS — E11.9 TYPE 2 DIABETES MELLITUS WITHOUT COMPLICATION: ICD-10-CM

## 2023-04-27 DIAGNOSIS — E11.9 TYPE 2 DIABETES MELLITUS WITHOUT COMPLICATION, UNSPECIFIED WHETHER LONG TERM INSULIN USE: ICD-10-CM

## 2023-04-27 LAB
ALBUMIN/CREAT UR: NORMAL UG/MG (ref 0–30)
CHOLEST SERPL-MCNC: 152 MG/DL (ref 120–199)
CHOLEST/HDLC SERPL: 3.5 {RATIO} (ref 2–5)
CREAT UR-MCNC: 64 MG/DL (ref 23–375)
ESTIMATED AVG GLUCOSE: 128 MG/DL (ref 68–131)
HBA1C MFR BLD: 6.1 % (ref 4–5.6)
HDLC SERPL-MCNC: 44 MG/DL (ref 40–75)
HDLC SERPL: 28.9 % (ref 20–50)
LDLC SERPL CALC-MCNC: 96.2 MG/DL (ref 63–159)
MICROALBUMIN UR DL<=1MG/L-MCNC: <5 UG/ML
NONHDLC SERPL-MCNC: 108 MG/DL
TRIGL SERPL-MCNC: 59 MG/DL (ref 30–150)

## 2023-04-27 PROCEDURE — 83036 HEMOGLOBIN GLYCOSYLATED A1C: CPT | Performed by: FAMILY MEDICINE

## 2023-04-27 PROCEDURE — 80061 LIPID PANEL: CPT | Performed by: FAMILY MEDICINE

## 2023-04-27 PROCEDURE — 82570 ASSAY OF URINE CREATININE: CPT | Performed by: FAMILY MEDICINE

## 2023-04-27 PROCEDURE — 36415 COLL VENOUS BLD VENIPUNCTURE: CPT | Mod: PN | Performed by: FAMILY MEDICINE

## 2023-04-28 NOTE — PROGRESS NOTES
Subjective     Patient ID: Rajeev Newell III is a 72 y.o. male.    Chief Complaint: Follow-up (Sinus infection 2 weeks)    HPI  Facial pain  Cloudy mucus  Review of Systems   Constitutional: Negative.  Negative for activity change, appetite change, chills, diaphoresis, fatigue, fever and unexpected weight change.   HENT:  Positive for nasal congestion, postnasal drip, rhinorrhea and sinus pressure/congestion. Negative for hearing loss and trouble swallowing.    Eyes: Negative.  Negative for discharge and visual disturbance.   Respiratory: Negative.  Negative for cough, chest tightness, shortness of breath and wheezing.    Cardiovascular: Negative.  Negative for chest pain, palpitations and leg swelling.   Gastrointestinal: Negative.  Negative for blood in stool, constipation, diarrhea and vomiting.   Endocrine: Negative.  Negative for polydipsia and polyuria.   Genitourinary: Negative.  Negative for difficulty urinating, hematuria and urgency.   Musculoskeletal: Negative.  Negative for arthralgias, joint swelling and neck pain.   Integumentary:  Negative for rash. Negative.   Neurological: Negative.  Negative for weakness and headaches.   Psychiatric/Behavioral:  Negative for confusion and dysphoric mood.         Objective     Physical Exam  Vitals reviewed.   Constitutional:       General: He is not in acute distress.     Appearance: Normal appearance. He is obese. He is not ill-appearing, toxic-appearing or diaphoretic.   HENT:      Head: Normocephalic and atraumatic.      Comments: Max sinus tenderness     Right Ear: Tympanic membrane, ear canal and external ear normal. There is no impacted cerumen.      Left Ear: Tympanic membrane, ear canal and external ear normal. There is no impacted cerumen.      Nose: Congestion present.      Comments: Cloudy mucus     Mouth/Throat:      Mouth: Mucous membranes are moist.      Pharynx: Oropharynx is clear. No oropharyngeal exudate or posterior oropharyngeal erythema.    Eyes:      General: No scleral icterus.     Conjunctiva/sclera: Conjunctivae normal.   Neck:      Vascular: No carotid bruit.   Cardiovascular:      Rate and Rhythm: Normal rate and regular rhythm.      Pulses: Normal pulses.      Heart sounds: Normal heart sounds. No murmur heard.    No friction rub. No gallop.   Pulmonary:      Effort: Pulmonary effort is normal. No respiratory distress.      Breath sounds: Normal breath sounds. No stridor. No wheezing, rhonchi or rales.   Musculoskeletal:         General: No swelling.      Cervical back: Normal range of motion and neck supple. No rigidity or tenderness.      Right lower leg: No edema.      Left lower leg: No edema.   Lymphadenopathy:      Cervical: No cervical adenopathy.   Skin:     General: Skin is warm and dry.      Findings: No rash.   Neurological:      General: No focal deficit present.      Mental Status: He is alert and oriented to person, place, and time.          Assessment and Plan     Problem List Items Addressed This Visit    None  Visit Diagnoses       Bacterial sinusitis    -  Primary    Relevant Medications    amoxicillin-clavulanate 875-125mg (AUGMENTIN) 875-125 mg per tablet            Rajeev was seen today for follow-up.    Diagnoses and all orders for this visit:    Bacterial sinusitis  -     amoxicillin-clavulanate 875-125mg (AUGMENTIN) 875-125 mg per tablet; Take 1 tablet by mouth 2 (two) times daily.     Discussed otc's

## 2023-05-02 ENCOUNTER — OFFICE VISIT (OUTPATIENT)
Dept: FAMILY MEDICINE | Facility: CLINIC | Age: 73
End: 2023-05-02
Payer: MEDICARE

## 2023-05-02 ENCOUNTER — OFFICE VISIT (OUTPATIENT)
Dept: OPHTHALMOLOGY | Facility: CLINIC | Age: 73
End: 2023-05-02
Payer: MEDICARE

## 2023-05-02 VITALS
DIASTOLIC BLOOD PRESSURE: 78 MMHG | WEIGHT: 269.94 LBS | HEIGHT: 71 IN | SYSTOLIC BLOOD PRESSURE: 116 MMHG | BODY MASS INDEX: 37.79 KG/M2 | HEART RATE: 66 BPM | RESPIRATION RATE: 14 BRPM

## 2023-05-02 DIAGNOSIS — I10 PRIMARY HYPERTENSION: ICD-10-CM

## 2023-05-02 DIAGNOSIS — H52.7 REFRACTIVE ERROR: Primary | ICD-10-CM

## 2023-05-02 DIAGNOSIS — J30.2 SEASONAL ALLERGIC RHINITIS, UNSPECIFIED TRIGGER: ICD-10-CM

## 2023-05-02 DIAGNOSIS — R35.0 URINARY FREQUENCY: ICD-10-CM

## 2023-05-02 DIAGNOSIS — E11.8 CONTROLLED TYPE 2 DIABETES MELLITUS WITH COMPLICATION, WITHOUT LONG-TERM CURRENT USE OF INSULIN: Primary | ICD-10-CM

## 2023-05-02 PROCEDURE — 99499 UNLISTED E&M SERVICE: CPT | Mod: S$PBB,,, | Performed by: OPHTHALMOLOGY

## 2023-05-02 PROCEDURE — 99214 OFFICE O/P EST MOD 30 MIN: CPT | Mod: S$PBB,,, | Performed by: FAMILY MEDICINE

## 2023-05-02 PROCEDURE — 99999 PR PBB SHADOW E&M-EST. PATIENT-LVL III: ICD-10-PCS | Mod: PBBFAC,,, | Performed by: OPHTHALMOLOGY

## 2023-05-02 PROCEDURE — 99999 PR PBB SHADOW E&M-EST. PATIENT-LVL III: CPT | Mod: PBBFAC,,, | Performed by: FAMILY MEDICINE

## 2023-05-02 PROCEDURE — 99499 NO LOS: ICD-10-PCS | Mod: S$PBB,,, | Performed by: OPHTHALMOLOGY

## 2023-05-02 PROCEDURE — 99213 OFFICE O/P EST LOW 20 MIN: CPT | Mod: PBBFAC,27,PO | Performed by: OPHTHALMOLOGY

## 2023-05-02 PROCEDURE — 99214 PR OFFICE/OUTPT VISIT, EST, LEVL IV, 30-39 MIN: ICD-10-PCS | Mod: S$PBB,,, | Performed by: FAMILY MEDICINE

## 2023-05-02 PROCEDURE — 99999 PR PBB SHADOW E&M-EST. PATIENT-LVL III: ICD-10-PCS | Mod: PBBFAC,,, | Performed by: FAMILY MEDICINE

## 2023-05-02 PROCEDURE — 99999 PR PBB SHADOW E&M-EST. PATIENT-LVL III: CPT | Mod: PBBFAC,,, | Performed by: OPHTHALMOLOGY

## 2023-05-02 PROCEDURE — 99213 OFFICE O/P EST LOW 20 MIN: CPT | Mod: PBBFAC,PN | Performed by: FAMILY MEDICINE

## 2023-05-02 RX ORDER — ASPIRIN 81 MG/1
81 TABLET ORAL DAILY
COMMUNITY

## 2023-05-02 RX ORDER — MONTELUKAST SODIUM 10 MG/1
10 TABLET ORAL NIGHTLY
Qty: 30 TABLET | Refills: 11 | Status: SHIPPED | OUTPATIENT
Start: 2023-05-02 | End: 2023-06-01

## 2023-05-02 NOTE — PROGRESS NOTES
HPI    Pt complains of blurred vision with the left eye with new glasses.     States unable to see road signs     Latan QHS OU   Dorz/Krishan BID OU     Last edited by Heidi Lopez on 5/2/2023  2:17 PM.            Assessment /Plan     For exam results, see Encounter Report.    Refractive error      New glasses Rx today, to correct prior Rx    F/u as scheduled for glaucoma f/u

## 2023-05-02 NOTE — PROGRESS NOTES
Subjective:       Patient ID: Rajeev Newell III is a 72 y.o. male.    Chief Complaint: Annual Exam      Rajeev Newell III is in the office for annual exam.    HPI  Medical hx reviewed.   Past Medical History:   Diagnosis Date    Cataract     OU--early    DM (diabetes mellitus) 10/29/2012    Glaucoma     POAG-OU    Hx of colonic polyps     Hypertension     PVD (peripheral vascular disease) 3/2/2023    Sinusitis      Upcoming appt with uro/jordan re urinary urgency, slight improvement with addn of flomax.  Upcoming appt with cards/arena to [facility]. Transferring from previous.     Has noticed some issues with his balance - like on one leg.     Current Outpatient Medications:     aspirin (ECOTRIN) 81 MG EC tablet, Take 81 mg by mouth once daily., Disp: , Rfl:     atorvastatin (LIPITOR) 40 MG tablet, Take 40 mg by mouth once daily. , Disp: , Rfl: 11    dorzolamide-timolol 2-0.5% (COSOPT) 22.3-6.8 mg/mL ophthalmic solution, Place 1 drop into both eyes every 12 (twelve) hours., Disp: 10 mL, Rfl: 11    fexofenadine (ALLEGRA) 180 MG tablet, Every day PRN, Disp: , Rfl:     latanoprost 0.005 % ophthalmic solution, INSTILL 1 DROP IN BOTH EYES EVERY NIGHT, Disp: 7.5 mL, Rfl: 3    losartan (COZAAR) 50 MG tablet, TK 1 T PO  BID, Disp: , Rfl:     meloxicam (MOBIC) 15 MG tablet, Take 1 tablet (15 mg total) by mouth once daily., Disp: 90 tablet, Rfl: 0    pantoprazole (PROTONIX) 40 MG tablet, pantoprazole 40 mg tablet,delayed release  TAKE 1 TABLET BY MOUTH EVERY DAY IN THE MORNING, Disp: , Rfl:     pioglitazone (ACTOS) 15 MG tablet, TAKE 1 TABLET(15 MG) BY MOUTH EVERY DAY, Disp: 90 tablet, Rfl: 1    tamsulosin (FLOMAX) 0.4 mg Cap, Take 1 capsule (0.4 mg total) by mouth once daily., Disp: 90 capsule, Rfl: 3    valACYclovir (VALTREX) 1000 MG tablet, Take 4,000 mg by mouth as needed., Disp: , Rfl:     amoxicillin-clavulanate 875-125mg (AUGMENTIN) 875-125 mg per tablet, Take 1 tablet by mouth 2 (two) times daily., Disp: 20  tablet, Rfl: 0    montelukast (SINGULAIR) 10 mg tablet, Take 1 tablet (10 mg total) by mouth every evening., Disp: 30 tablet, Rfl: 11    The 10-year ASCVD risk score (Rangel POST, et al., 2019) is: 35.6%    Values used to calculate the score:      Age: 72 years      Sex: Male      Is Non- : No      Diabetic: Yes      Tobacco smoker: No      Systolic Blood Pressure: 116 mmHg      Is BP treated: Yes      HDL Cholesterol: 44 mg/dL      Total Cholesterol: 152 mg/dL     Lab Results   Component Value Date    HGBA1C 6.1 (H) 04/27/2023    HGBA1C 5.6 09/28/2022    HGBA1C 6.3 (H) 04/01/2022     Lab Results   Component Value Date    LDLCALC 96.2 04/27/2023    CREATININE 1.0 09/28/2022   Labs 2023 rev.     Review of Systems   Constitutional:  Negative for activity change, fatigue and unexpected weight change.   HENT:  Negative for congestion, hearing loss, rhinorrhea and trouble swallowing.    Eyes:  Negative for photophobia and visual disturbance.   Respiratory:  Negative for cough and wheezing.    Cardiovascular:  Negative for chest pain and palpitations.   Gastrointestinal:  Negative for blood in stool, constipation and diarrhea.        No gerd c/o.   Endocrine: Negative for polydipsia and polyuria.   Genitourinary:  Positive for frequency. Negative for difficulty urinating, hematuria and urgency.   Musculoskeletal:  Negative for arthralgias, joint swelling and neck pain.        Started back to regular exercise and weights.  Has tried to limit/dc meloxicam without success.   Neurological:  Negative for dizziness, weakness, light-headedness and headaches.   Psychiatric/Behavioral:  Negative for dysphoric mood and sleep disturbance.          Objective:      Physical Exam  Vitals and nursing note reviewed.   Constitutional:       Appearance: Normal appearance. He is well-developed. He is obese.   HENT:      Head: Normocephalic and atraumatic.      Right Ear: Tympanic membrane normal.      Left Ear:  Tympanic membrane normal.   Eyes:      General: No scleral icterus.     Conjunctiva/sclera: Conjunctivae normal.      Pupils: Pupils are equal, round, and reactive to light.   Neck:      Thyroid: No thyromegaly.   Cardiovascular:      Rate and Rhythm: Normal rate and regular rhythm.      Heart sounds: Normal heart sounds. No murmur heard.    No friction rub. No gallop.   Pulmonary:      Effort: Pulmonary effort is normal. No respiratory distress.      Breath sounds: Normal breath sounds. No wheezing or rales.   Abdominal:      General: Bowel sounds are normal. There is no distension.      Palpations: Abdomen is soft.      Tenderness: There is no abdominal tenderness. There is no guarding.   Musculoskeletal:         General: Normal range of motion.      Cervical back: Neck supple.      Right lower leg: No edema.      Left lower leg: No edema.   Lymphadenopathy:      Cervical: No cervical adenopathy.   Skin:     General: Skin is warm and dry.   Neurological:      General: No focal deficit present.      Mental Status: He is alert and oriented to person, place, and time.   Psychiatric:         Mood and Affect: Mood normal.         Behavior: Behavior normal.           Screening recommendations appropriate to age and health status were reviewed.    Assessment & Plan:    Controlled type 2 diabetes mellitus with complication, without long-term current use of insulin  Comments:  slight uptick in A1c, recheck 4-6mos    Urinary frequency  -     Prostate Specific Antigen, Diagnostic; Future; Expected date: 05/02/2023    Primary hypertension  Comments:  controlled, cont regimen  Orders:  -     Uric Acid; Future; Expected date: 05/02/2023    Seasonal allergic rhinitis, unspecified trigger  Comments:  nasal saline  avoids nasal steroids due to eye pressures    Other orders  -     montelukast (SINGULAIR) 10 mg tablet; Take 1 tablet (10 mg total) by mouth every evening.  Dispense: 30 tablet; Refill: 11    May be interested in PT  later to help with mild balance issues noticed over time.

## 2023-05-09 ENCOUNTER — OFFICE VISIT (OUTPATIENT)
Dept: UROLOGY | Facility: CLINIC | Age: 73
End: 2023-05-09
Payer: MEDICARE

## 2023-05-09 VITALS — BODY MASS INDEX: 37.99 KG/M2 | WEIGHT: 271.38 LBS | HEIGHT: 71 IN

## 2023-05-09 DIAGNOSIS — Z12.5 PROSTATE CANCER SCREENING: ICD-10-CM

## 2023-05-09 DIAGNOSIS — R39.15 URGENCY OF URINATION: ICD-10-CM

## 2023-05-09 DIAGNOSIS — N40.1 BENIGN PROSTATIC HYPERPLASIA WITH URINARY FREQUENCY: Primary | ICD-10-CM

## 2023-05-09 DIAGNOSIS — R35.0 BENIGN PROSTATIC HYPERPLASIA WITH URINARY FREQUENCY: Primary | ICD-10-CM

## 2023-05-09 PROCEDURE — 99999 PR PBB SHADOW E&M-EST. PATIENT-LVL III: CPT | Mod: PBBFAC,,, | Performed by: STUDENT IN AN ORGANIZED HEALTH CARE EDUCATION/TRAINING PROGRAM

## 2023-05-09 PROCEDURE — 99214 PR OFFICE/OUTPT VISIT, EST, LEVL IV, 30-39 MIN: ICD-10-PCS | Mod: S$PBB,,, | Performed by: STUDENT IN AN ORGANIZED HEALTH CARE EDUCATION/TRAINING PROGRAM

## 2023-05-09 PROCEDURE — 99214 OFFICE O/P EST MOD 30 MIN: CPT | Mod: S$PBB,,, | Performed by: STUDENT IN AN ORGANIZED HEALTH CARE EDUCATION/TRAINING PROGRAM

## 2023-05-09 PROCEDURE — 99999 PR PBB SHADOW E&M-EST. PATIENT-LVL III: ICD-10-PCS | Mod: PBBFAC,,, | Performed by: STUDENT IN AN ORGANIZED HEALTH CARE EDUCATION/TRAINING PROGRAM

## 2023-05-09 PROCEDURE — 99213 OFFICE O/P EST LOW 20 MIN: CPT | Mod: PBBFAC,PO | Performed by: STUDENT IN AN ORGANIZED HEALTH CARE EDUCATION/TRAINING PROGRAM

## 2023-05-09 NOTE — PROGRESS NOTES
"[place] - Urology   Clinic Note    Subjective:     Chief Complaint: Establish Care      History of Present Illness:  Rajeev Newell III is a 72 y.o. male who presents to clinic for evaluation and management of BPH and LUTS. He is established to our clinic previously seen by Dr. Avalos    He recently began having urinary urgency and was started on Flomax by Juan A Robertson.  He is doing much better, and his symptoms have essentially resolved.  IPSS today is 6/0.    PSA trend reviewed. Most recent PSA 9/22 was 1.5.    He has no ED.     Past medical, family, surgical and social history reviewed as documented in chart with pertinent positive medical, family, surgical and social history detailed in HPI.    A review systems was conducted with pertinent positive and negative findings documented in HPI.    Anticoagulation/Antiplatelets:  No    Objective:     Estimated body mass index is 37.85 kg/m² as calculated from the following:    Height as of this encounter: 5' 11" (1.803 m).    Weight as of this encounter: 123.1 kg (271 lb 6.2 oz).    Vital Signs (Most Recent)       Physical Exam  Vitals and nursing note reviewed.   Constitutional:       General: He is not in acute distress.     Appearance: He is not ill-appearing or toxic-appearing.   Pulmonary:      Effort: Pulmonary effort is normal. No accessory muscle usage or respiratory distress.   Neurological:      Mental Status: He is alert.       Lab Results   Component Value Date    BUN 21 09/28/2022    CREATININE 1.0 09/28/2022    WBC 5.59 04/01/2022    HGB 14.1 04/01/2022    HCT 43.2 04/01/2022     04/01/2022    AST 27 09/28/2022    ALT 33 09/28/2022    ALKPHOS 40 (L) 09/28/2022    ALBUMIN 4.0 09/28/2022    HGBA1C 6.1 (H) 04/27/2023        Lab Results   Component Value Date    PSA 1.2 02/05/2020    PSA 9.2 (H) 06/06/2014    PSA 0.91 11/01/2012    PSA 1.39 11/14/2011    PSA 5.2 (H) 08/23/2011    PSA 0.96 08/26/2009    PSA 0.86 10/09/2008    PSA 0.7 11/20/2007 "    PSA 0.7 02/07/2007    PSADIAG 1.5 09/28/2022    PSADIAG 1.8 09/28/2021    PSADIAG 1.3 02/23/2021    PSADIAG 1.2 02/21/2019    PSADIAG 1.2 11/06/2018    PSADIAG 1.1 06/07/2017    PSAFREE 0.24 06/07/2016    PSAFREE 0.24 02/25/2015    PSAFREEPCT 25.26 06/07/2016    PSAFREEPCT 25.26 02/25/2015      Urine dipstick today was negative for all components.     Assessment:     1. Benign prostatic hyperplasia with urinary frequency    2. Urgency of urination    3. Prostate cancer screening      Plan:     1. Benign prostatic hyperplasia with urinary frequency  We discussed the association of lower urinary tract symptoms (LUTS) and prostate enlargement (BPH). The management of BPH varies widely depending on the degree of bother, adverse events related to BPH, and the overall size of the prostate. We discussed behavioral modifications, medical management,  and outlet procedures, as well as the indications, risks, and benefits.    He is happy with his results with flomax, not interested in outlet procedures    - continue flomax    2. Urgency of urination  Improved with flomax    3. Prostate cancer screening  History of isolated elevated PSA which resolved on repeat testing.   We reviewed the risks and benefits of PSA screening. His PSA has been stable is relatively low.   He will continue yearly screening for now    He can continue to follow with Dr. Walton for PSA screening and BPH management. He will reach out with any changes in symptoms.     Toño Garrett MD

## 2023-05-17 DIAGNOSIS — Z96.652 STATUS POST TOTAL LEFT KNEE REPLACEMENT: Primary | ICD-10-CM

## 2023-05-17 DIAGNOSIS — Z96.651 STATUS POST RIGHT KNEE REPLACEMENT: ICD-10-CM

## 2023-05-22 ENCOUNTER — OFFICE VISIT (OUTPATIENT)
Dept: ORTHOPEDICS | Facility: CLINIC | Age: 73
End: 2023-05-22
Payer: MEDICARE

## 2023-05-22 ENCOUNTER — HOSPITAL ENCOUNTER (OUTPATIENT)
Dept: RADIOLOGY | Facility: HOSPITAL | Age: 73
Discharge: HOME | End: 2023-05-22
Attending: ORTHOPAEDIC SURGERY
Payer: MEDICARE

## 2023-05-22 VITALS — HEIGHT: 71 IN | BODY MASS INDEX: 38.55 KG/M2 | WEIGHT: 275.38 LBS

## 2023-05-22 DIAGNOSIS — Z96.651 STATUS POST RIGHT KNEE REPLACEMENT: ICD-10-CM

## 2023-05-22 DIAGNOSIS — M25.511 CHRONIC PAIN OF BOTH SHOULDERS: ICD-10-CM

## 2023-05-22 DIAGNOSIS — G89.29 CHRONIC PAIN OF BOTH SHOULDERS: ICD-10-CM

## 2023-05-22 DIAGNOSIS — M25.512 CHRONIC PAIN OF BOTH SHOULDERS: ICD-10-CM

## 2023-05-22 DIAGNOSIS — Z96.652 STATUS POST TOTAL LEFT KNEE REPLACEMENT: Primary | ICD-10-CM

## 2023-05-22 PROCEDURE — 99212 PR OFFICE/OUTPT VISIT, EST, LEVL II, 10-19 MIN: ICD-10-PCS | Mod: S$PBB,,, | Performed by: ORTHOPAEDIC SURGERY

## 2023-05-22 PROCEDURE — 73560 XR KNEE ORTHO RIGHT: ICD-10-PCS | Mod: 26,LT,, | Performed by: RADIOLOGY

## 2023-05-22 PROCEDURE — 73562 X-RAY EXAM OF KNEE 3: CPT | Mod: 26,RT,, | Performed by: RADIOLOGY

## 2023-05-22 PROCEDURE — 99213 OFFICE O/P EST LOW 20 MIN: CPT | Mod: PBBFAC | Performed by: ORTHOPAEDIC SURGERY

## 2023-05-22 PROCEDURE — 99212 OFFICE O/P EST SF 10 MIN: CPT | Mod: S$PBB,,, | Performed by: ORTHOPAEDIC SURGERY

## 2023-05-22 PROCEDURE — 99999 PR PBB SHADOW E&M-EST. PATIENT-LVL III: CPT | Mod: PBBFAC,,, | Performed by: ORTHOPAEDIC SURGERY

## 2023-05-22 PROCEDURE — 73560 X-RAY EXAM OF KNEE 1 OR 2: CPT | Mod: 26,LT,, | Performed by: RADIOLOGY

## 2023-05-22 PROCEDURE — 73560 X-RAY EXAM OF KNEE 1 OR 2: CPT | Mod: 59,TC,LT

## 2023-05-22 PROCEDURE — 73562 XR KNEE ORTHO RIGHT: ICD-10-PCS | Mod: 26,RT,, | Performed by: RADIOLOGY

## 2023-05-22 PROCEDURE — 99999 PR PBB SHADOW E&M-EST. PATIENT-LVL III: ICD-10-PCS | Mod: PBBFAC,,, | Performed by: ORTHOPAEDIC SURGERY

## 2023-05-22 NOTE — PROGRESS NOTES
Rajeev CARLIN Newell PEDRO is in for 5 follow up for a  right TKA. And 4 year for a left.  He is doing very well.  No pain in the knees.  He has resumed activities of daily living. He has been active  Exam demonstrates  A well developed female in no distress.  Alert and oriented.  Mood and affect are appropriate.    Knee incision is well healed.  ROM is 0-125.  The patella tracks well and there is no instability. The extremity is neurovascularly intact.    Xrays demonstrate a well fixed and positioned prosthesis.    PROMIS-10 Questionnaire Scores 2/25/2020 6/18/2019 3/11/2019 1/16/2019 5/30/2018 2/16/2018   Global Physical Health 18 18 - - - -   Global Mental health Score 20 19 19 16 20 19       KOOS Jr. Questionnaire Score 2/25/2020 6/18/2019 3/11/2019 5/30/2018 2/16/2018   KOOS Jr Score 3 4 13 5 11       Oxford Knee Questionnaire Score 2/23/2022 3/30/2021   Oxford Knee Score 46 45       Knee Society and Function Score 5/22/2023 2/23/2022 3/31/2021 2/26/2020 3/13/2019 1/16/2019 5/30/2018   FINDINGS - KNEE SOCIETY SCORE 100 100 99 99 47 100 94   FINDINGS - KNEE SOCIETY FUNCTION SCORE 100 100 100 100 60 100 100       No flowsheet data found.    Imp:DOing well    F/u inone year with xrays and PROMS

## 2023-06-08 ENCOUNTER — PATIENT MESSAGE (OUTPATIENT)
Dept: SURGERY | Facility: CLINIC | Age: 73
End: 2023-06-08
Payer: MEDICARE

## 2023-06-15 DIAGNOSIS — H40.1132 PRIMARY OPEN ANGLE GLAUCOMA OF BOTH EYES, MODERATE STAGE: ICD-10-CM

## 2023-06-15 RX ORDER — LATANOPROST 50 UG/ML
SOLUTION/ DROPS OPHTHALMIC
Qty: 7.5 ML | Refills: 3 | Status: SHIPPED | OUTPATIENT
Start: 2023-06-15 | End: 2023-12-04 | Stop reason: SDUPTHER

## 2023-07-11 DIAGNOSIS — M19.90 ARTHRITIS: ICD-10-CM

## 2023-07-11 RX ORDER — MELOXICAM 15 MG/1
TABLET ORAL
Qty: 90 TABLET | Refills: 0 | Status: SHIPPED | OUTPATIENT
Start: 2023-07-11 | End: 2023-09-08

## 2023-07-24 ENCOUNTER — OFFICE VISIT (OUTPATIENT)
Dept: OPHTHALMOLOGY | Facility: CLINIC | Age: 73
End: 2023-07-24
Payer: MEDICARE

## 2023-07-24 DIAGNOSIS — H40.1132 PRIMARY OPEN ANGLE GLAUCOMA OF BOTH EYES, MODERATE STAGE: Primary | ICD-10-CM

## 2023-07-24 PROCEDURE — 99999 PR PBB SHADOW E&M-EST. PATIENT-LVL III: ICD-10-PCS | Mod: PBBFAC,,, | Performed by: OPHTHALMOLOGY

## 2023-07-24 PROCEDURE — 99213 OFFICE O/P EST LOW 20 MIN: CPT | Mod: S$PBB,,, | Performed by: OPHTHALMOLOGY

## 2023-07-24 PROCEDURE — 99213 OFFICE O/P EST LOW 20 MIN: CPT | Mod: PBBFAC,PO | Performed by: OPHTHALMOLOGY

## 2023-07-24 PROCEDURE — 99999 PR PBB SHADOW E&M-EST. PATIENT-LVL III: CPT | Mod: PBBFAC,,, | Performed by: OPHTHALMOLOGY

## 2023-07-24 PROCEDURE — 99213 PR OFFICE/OUTPT VISIT, EST, LEVL III, 20-29 MIN: ICD-10-PCS | Mod: S$PBB,,, | Performed by: OPHTHALMOLOGY

## 2023-07-24 RX ORDER — DAPAGLIFLOZIN 10 MG/1
10 TABLET, FILM COATED ORAL
COMMUNITY
Start: 2023-07-13

## 2023-07-24 RX ORDER — MONTELUKAST SODIUM 10 MG/1
10 TABLET ORAL
COMMUNITY
Start: 2023-07-10

## 2023-07-24 NOTE — PROGRESS NOTES
HPI    DLS:3/20/2023 4 m IOP ck     Pt states no new complaints. Denies pain/ FOL or new floaters     States compliant with all gtts.     Using latanoprost OU QHS and dorzolamide/ kalyan OU BID as directed.   Last edited by Christine Choudhury on 7/24/2023  8:31 AM.            Assessment /Plan     For exam results, see Encounter Report.    Primary open angle glaucoma of both eyes, moderate stage      Neg famhx  Mildly thin pachy    ONH are excavated with severe OCT NFL damage  HVF damage is moderate with slow progression  Tmax 33/30 (dilated, on treatment)    IOP reasonable    Continue  latan qhs OU  Dorz/kalyan bid OU    F/u 4 months, HVF, DFE    Eyelid hygiene discussed

## 2023-08-08 ENCOUNTER — PES CALL (OUTPATIENT)
Dept: ADMINISTRATIVE | Facility: CLINIC | Age: 73
End: 2023-08-08
Payer: MEDICARE

## 2023-08-24 ENCOUNTER — TELEPHONE (OUTPATIENT)
Dept: FAMILY MEDICINE | Facility: CLINIC | Age: 73
End: 2023-08-24
Payer: MEDICARE

## 2023-08-24 NOTE — TELEPHONE ENCOUNTER
----- Message from Carole Krishna sent at 8/24/2023  1:09 PM CDT -----  Type:  Needs Medical Advice    Who Called: pt     Would the patient rather a call back or a response via [facility]? call  Best Call Back Number: [phone]  Additional Information: pt is requesting to get a Sr flu shot scheduled

## 2023-08-31 ENCOUNTER — PATIENT MESSAGE (OUTPATIENT)
Dept: ADMINISTRATIVE | Facility: HOSPITAL | Age: 73
End: 2023-08-31
Payer: MEDICARE

## 2023-09-08 DIAGNOSIS — M19.90 ARTHRITIS: ICD-10-CM

## 2023-09-08 RX ORDER — MELOXICAM 15 MG/1
TABLET ORAL
Qty: 90 TABLET | Refills: 0 | Status: SHIPPED | OUTPATIENT
Start: 2023-09-08 | End: 2024-02-17 | Stop reason: SDUPTHER

## 2023-09-11 ENCOUNTER — PATIENT MESSAGE (OUTPATIENT)
Dept: FAMILY MEDICINE | Facility: CLINIC | Age: 73
End: 2023-09-11
Payer: MEDICARE

## 2023-09-11 DIAGNOSIS — Z00.00 ENCOUNTER FOR ANNUAL WELLNESS EXAM IN MEDICARE PATIENT: ICD-10-CM

## 2023-09-11 DIAGNOSIS — E66.01 MORBID OBESITY: ICD-10-CM

## 2023-09-11 DIAGNOSIS — Z00.00 ROUTINE GENERAL MEDICAL EXAMINATION AT A HEALTH CARE FACILITY: ICD-10-CM

## 2023-09-11 DIAGNOSIS — E11.8 CONTROLLED TYPE 2 DIABETES MELLITUS WITH COMPLICATION, WITHOUT LONG-TERM CURRENT USE OF INSULIN: Primary | ICD-10-CM

## 2023-09-11 DIAGNOSIS — I10 PRIMARY HYPERTENSION: ICD-10-CM

## 2023-09-11 DIAGNOSIS — N40.0 BENIGN PROSTATIC HYPERPLASIA, UNSPECIFIED WHETHER LOWER URINARY TRACT SYMPTOMS PRESENT: ICD-10-CM

## 2023-09-13 ENCOUNTER — LAB VISIT (OUTPATIENT)
Dept: LAB | Facility: HOSPITAL | Age: 73
End: 2023-09-13
Attending: FAMILY MEDICINE
Payer: MEDICARE

## 2023-09-13 DIAGNOSIS — E11.8 CONTROLLED TYPE 2 DIABETES MELLITUS WITH COMPLICATION, WITHOUT LONG-TERM CURRENT USE OF INSULIN: ICD-10-CM

## 2023-09-13 DIAGNOSIS — R35.0 URINARY FREQUENCY: ICD-10-CM

## 2023-09-13 DIAGNOSIS — E66.01 MORBID OBESITY: ICD-10-CM

## 2023-09-13 DIAGNOSIS — I10 PRIMARY HYPERTENSION: ICD-10-CM

## 2023-09-13 DIAGNOSIS — Z00.00 ENCOUNTER FOR ANNUAL WELLNESS EXAM IN MEDICARE PATIENT: ICD-10-CM

## 2023-09-13 LAB
ALBUMIN SERPL BCP-MCNC: 4 G/DL (ref 3.5–5.2)
ALP SERPL-CCNC: 46 U/L (ref 55–135)
ALT SERPL W/O P-5'-P-CCNC: 33 U/L (ref 10–44)
ANION GAP SERPL CALC-SCNC: 10 MMOL/L (ref 8–16)
AST SERPL-CCNC: 27 U/L (ref 10–40)
BASOPHILS # BLD AUTO: 0.09 K/UL (ref 0–0.2)
BASOPHILS NFR BLD: 1.4 % (ref 0–1.9)
BILIRUB SERPL-MCNC: 0.8 MG/DL (ref 0.1–1)
BUN SERPL-MCNC: 21 MG/DL (ref 8–23)
CALCIUM SERPL-MCNC: 9.6 MG/DL (ref 8.7–10.5)
CHLORIDE SERPL-SCNC: 107 MMOL/L (ref 95–110)
CHOLEST SERPL-MCNC: 139 MG/DL (ref 120–199)
CHOLEST/HDLC SERPL: 3.6 {RATIO} (ref 2–5)
CO2 SERPL-SCNC: 22 MMOL/L (ref 23–29)
COMPLEXED PSA SERPL-MCNC: 1.6 NG/ML (ref 0–4)
CREAT SERPL-MCNC: 1.3 MG/DL (ref 0.5–1.4)
DIFFERENTIAL METHOD: ABNORMAL
EOSINOPHIL # BLD AUTO: 0.2 K/UL (ref 0–0.5)
EOSINOPHIL NFR BLD: 3.8 % (ref 0–8)
ERYTHROCYTE [DISTWIDTH] IN BLOOD BY AUTOMATED COUNT: 14.2 % (ref 11.5–14.5)
EST. GFR  (NO RACE VARIABLE): 58 ML/MIN/1.73 M^2
ESTIMATED AVG GLUCOSE: 117 MG/DL (ref 68–131)
GLUCOSE SERPL-MCNC: 104 MG/DL (ref 70–110)
HBA1C MFR BLD: 5.7 % (ref 4–5.6)
HCT VFR BLD AUTO: 44.7 % (ref 40–54)
HDLC SERPL-MCNC: 39 MG/DL (ref 40–75)
HDLC SERPL: 28.1 % (ref 20–50)
HGB BLD-MCNC: 14.7 G/DL (ref 14–18)
IMM GRANULOCYTES # BLD AUTO: 0.01 K/UL (ref 0–0.04)
IMM GRANULOCYTES NFR BLD AUTO: 0.2 % (ref 0–0.5)
LDLC SERPL CALC-MCNC: 84 MG/DL (ref 63–159)
LYMPHOCYTES # BLD AUTO: 1.4 K/UL (ref 1–4.8)
LYMPHOCYTES NFR BLD: 22 % (ref 18–48)
MCH RBC QN AUTO: 28.5 PG (ref 27–31)
MCHC RBC AUTO-ENTMCNC: 32.9 G/DL (ref 32–36)
MCV RBC AUTO: 87 FL (ref 82–98)
MONOCYTES # BLD AUTO: 0.6 K/UL (ref 0.3–1)
MONOCYTES NFR BLD: 9.7 % (ref 4–15)
NEUTROPHILS # BLD AUTO: 4 K/UL (ref 1.8–7.7)
NEUTROPHILS NFR BLD: 62.9 % (ref 38–73)
NONHDLC SERPL-MCNC: 100 MG/DL
NRBC BLD-RTO: 0 /100 WBC
PLATELET # BLD AUTO: 315 K/UL (ref 150–450)
PMV BLD AUTO: 9.1 FL (ref 9.2–12.9)
POTASSIUM SERPL-SCNC: 4.6 MMOL/L (ref 3.5–5.1)
PROT SERPL-MCNC: 7.4 G/DL (ref 6–8.4)
RBC # BLD AUTO: 5.16 M/UL (ref 4.6–6.2)
SODIUM SERPL-SCNC: 139 MMOL/L (ref 136–145)
TRIGL SERPL-MCNC: 80 MG/DL (ref 30–150)
TSH SERPL DL<=0.005 MIU/L-ACNC: 3.67 UIU/ML (ref 0.4–4)
URATE SERPL-MCNC: 4.9 MG/DL (ref 3.4–7)
WBC # BLD AUTO: 6.28 K/UL (ref 3.9–12.7)

## 2023-09-13 PROCEDURE — 84443 ASSAY THYROID STIM HORMONE: CPT | Performed by: FAMILY MEDICINE

## 2023-09-13 PROCEDURE — 80061 LIPID PANEL: CPT | Performed by: FAMILY MEDICINE

## 2023-09-13 PROCEDURE — 84153 ASSAY OF PSA TOTAL: CPT | Performed by: FAMILY MEDICINE

## 2023-09-13 PROCEDURE — 84550 ASSAY OF BLOOD/URIC ACID: CPT | Performed by: FAMILY MEDICINE

## 2023-09-13 PROCEDURE — 85025 COMPLETE CBC W/AUTO DIFF WBC: CPT | Performed by: FAMILY MEDICINE

## 2023-09-13 PROCEDURE — 36415 COLL VENOUS BLD VENIPUNCTURE: CPT | Mod: PN | Performed by: FAMILY MEDICINE

## 2023-09-13 PROCEDURE — 83036 HEMOGLOBIN GLYCOSYLATED A1C: CPT | Performed by: FAMILY MEDICINE

## 2023-09-13 PROCEDURE — 80053 COMPREHEN METABOLIC PANEL: CPT | Performed by: FAMILY MEDICINE

## 2023-09-19 ENCOUNTER — OFFICE VISIT (OUTPATIENT)
Dept: FAMILY MEDICINE | Facility: CLINIC | Age: 73
End: 2023-09-19
Payer: MEDICARE

## 2023-09-19 VITALS
BODY MASS INDEX: 37.75 KG/M2 | HEIGHT: 71 IN | OXYGEN SATURATION: 97 % | WEIGHT: 269.63 LBS | DIASTOLIC BLOOD PRESSURE: 80 MMHG | HEART RATE: 60 BPM | SYSTOLIC BLOOD PRESSURE: 124 MMHG

## 2023-09-19 DIAGNOSIS — D69.2 OTHER NONTHROMBOCYTOPENIC PURPURA: ICD-10-CM

## 2023-09-19 DIAGNOSIS — I73.9 PVD (PERIPHERAL VASCULAR DISEASE): ICD-10-CM

## 2023-09-19 DIAGNOSIS — I10 PRIMARY HYPERTENSION: ICD-10-CM

## 2023-09-19 DIAGNOSIS — E11.8 CONTROLLED TYPE 2 DIABETES MELLITUS WITH COMPLICATION, WITHOUT LONG-TERM CURRENT USE OF INSULIN: Primary | ICD-10-CM

## 2023-09-19 DIAGNOSIS — E66.01 MORBID OBESITY: ICD-10-CM

## 2023-09-19 PROCEDURE — 99214 OFFICE O/P EST MOD 30 MIN: CPT | Mod: S$PBB,,, | Performed by: FAMILY MEDICINE

## 2023-09-19 PROCEDURE — G0008 ADMIN INFLUENZA VIRUS VAC: HCPCS | Mod: PBBFAC,PN

## 2023-09-19 PROCEDURE — 99214 OFFICE O/P EST MOD 30 MIN: CPT | Mod: PBBFAC,PN | Performed by: FAMILY MEDICINE

## 2023-09-19 PROCEDURE — 99999 PR PBB SHADOW E&M-EST. PATIENT-LVL IV: ICD-10-PCS | Mod: PBBFAC,,, | Performed by: FAMILY MEDICINE

## 2023-09-19 PROCEDURE — 99214 PR OFFICE/OUTPT VISIT, EST, LEVL IV, 30-39 MIN: ICD-10-PCS | Mod: S$PBB,,, | Performed by: FAMILY MEDICINE

## 2023-09-19 PROCEDURE — 99999 PR PBB SHADOW E&M-EST. PATIENT-LVL IV: CPT | Mod: PBBFAC,,, | Performed by: FAMILY MEDICINE

## 2023-09-19 PROCEDURE — 99999PBSHW FLU VACCINE - QUADRIVALENT - ADJUVANTED: Mod: PBBFAC,,,

## 2023-09-19 PROCEDURE — 99999PBSHW FLU VACCINE - QUADRIVALENT - ADJUVANTED: ICD-10-PCS | Mod: PBBFAC,,,

## 2023-09-19 NOTE — PROGRESS NOTES
"  Subjective:       Patient ID: Rajeev Newell III is a 72 y.o. male.    Chief Complaint: Follow-up (Lab results)    Follow-up  Pertinent negatives include no arthralgias, chest pain, headaches, joint swelling, neck pain, vomiting or weakness.     Patient in clinic for f/u.   Labs 2023 rev.   Has since decreased his meloxicam to 3x/week and doing ok.   DM2 meds reviewed.   Eye exam q6mos, due in Dec. No pedal concerns.     Review of Systems:  Review of Systems   Constitutional:  Negative for activity change and unexpected weight change.   HENT:  Negative for hearing loss, rhinorrhea and trouble swallowing.    Eyes:  Negative for discharge and visual disturbance.   Respiratory:  Negative for chest tightness and wheezing.    Cardiovascular:  Negative for chest pain and palpitations.   Gastrointestinal:  Negative for blood in stool, constipation, diarrhea and vomiting.   Endocrine: Negative for polydipsia and polyuria.   Genitourinary:  Negative for difficulty urinating, hematuria and urgency.   Musculoskeletal:  Negative for arthralgias, joint swelling and neck pain.   Neurological:  Negative for weakness and headaches.   Psychiatric/Behavioral:  Negative for confusion and dysphoric mood.        Objective:     Vitals:    09/19/23 1358   BP: 124/80   Pulse: 60   SpO2: 97%   Weight: 122.3 kg (269 lb 10 oz)   Height: 5' 11" (1.803 m)          Physical Exam  Vitals and nursing note reviewed.   Constitutional:       Appearance: Normal appearance. He is well-developed. He is obese.   HENT:      Head: Normocephalic and atraumatic.   Eyes:      General: No scleral icterus.     Conjunctiva/sclera: Conjunctivae normal.      Pupils: Pupils are equal, round, and reactive to light.   Neck:      Thyroid: No thyromegaly.   Cardiovascular:      Rate and Rhythm: Normal rate and regular rhythm.      Pulses:           Dorsalis pedis pulses are 2+ on the right side and 2+ on the left side.        Posterior tibial pulses are 1+ on the " right side and 1+ on the left side.      Heart sounds: Normal heart sounds. No murmur heard.     No friction rub. No gallop.   Pulmonary:      Effort: Pulmonary effort is normal. No respiratory distress.      Breath sounds: Normal breath sounds.   Abdominal:      General: Bowel sounds are normal.      Palpations: Abdomen is soft.   Musculoskeletal:      Cervical back: Neck supple.      Right lower leg: No edema.      Left lower leg: No edema.      Right foot: Normal range of motion. No deformity.      Left foot: Normal range of motion. No deformity.   Feet:      Right foot:      Protective Sensation: 6 sites tested.  6 sites sensed.      Skin integrity: No ulcer, blister, skin breakdown, erythema, warmth, callus or dry skin.      Left foot:      Protective Sensation: 6 sites tested.  6 sites sensed.      Skin integrity: No ulcer, blister, skin breakdown, erythema, warmth, callus or dry skin.   Lymphadenopathy:      Cervical: No cervical adenopathy.   Skin:     General: Skin is warm and dry.   Neurological:      General: No focal deficit present.      Mental Status: He is alert and oriented to person, place, and time.   Psychiatric:         Mood and Affect: Mood normal.         Behavior: Behavior normal.           Assessment & Plan:  Controlled type 2 diabetes mellitus with complication, without long-term current use of insulin  Comments:  well-controlled, cont regimen  Orders:  -     Hemoglobin A1C; Future; Expected date: 09/19/2023  -     Lipid Panel; Future; Expected date: 09/19/2023    Primary hypertension  Comments:  controlled, cont regimen  Orders:  -     Comprehensive Metabolic Panel; Future; Expected date: 09/19/2023  -     TSH; Future; Expected date: 09/19/2023    Morbid obesity  Comments:  improving, cont efforts with weight mgmt thru diet/exercise  Orders:  -     TSH; Future; Expected date: 09/19/2023    PVD (peripheral vascular disease)  Comments:  muscle cramping improved with increase in  exercise    Other nonthrombocytopenic purpura  Comments:  stable, cont lab monitoring  Orders:  -     CBC Without Differential; Future; Expected date: 09/19/2023  -     Urinalysis, Reflex to Urine Culture Urine, Clean Catch; Future    Other orders  -     Influenza - Quadrivalent (Adjuvanted)

## 2023-10-09 DIAGNOSIS — E11.9 DIABETES TYPE 2, CONTROLLED: ICD-10-CM

## 2023-10-09 RX ORDER — PIOGLITAZONEHYDROCHLORIDE 15 MG/1
TABLET ORAL
Qty: 90 TABLET | Refills: 1 | OUTPATIENT
Start: 2023-10-09

## 2023-10-09 NOTE — TELEPHONE ENCOUNTER
No care due was identified.  [facility] Embedded Care Due Messages. Reference number: [id number].   10/09/2023 8:32:33 AM CDT

## 2023-10-09 NOTE — TELEPHONE ENCOUNTER
Refill Decision Note   Rajeev Newell  is requesting a refill authorization.  Brief Assessment and Rationale for Refill:  Quick Discontinue     Medication Therapy Plan:  The original prescription was discontinued on 9/19/2023 by Swati Walton MD      Comments:     Note composed:10:03 AM 10/09/2023             Appointments     Last Visit   9/19/2023 Swati Walton MD   Next Visit   Visit date not found Swati Walton MD           Appointments     Last Visit   9/19/2023 Swati Walton MD   Next Visit   Visit date not found Swati Walton MD

## 2023-10-19 ENCOUNTER — PATIENT OUTREACH (OUTPATIENT)
Dept: ADMINISTRATIVE | Facility: HOSPITAL | Age: 73
End: 2023-10-19
Payer: MEDICARE

## 2023-12-04 ENCOUNTER — CLINICAL SUPPORT (OUTPATIENT)
Dept: OPHTHALMOLOGY | Facility: CLINIC | Age: 73
End: 2023-12-04
Payer: MEDICARE

## 2023-12-04 ENCOUNTER — OFFICE VISIT (OUTPATIENT)
Dept: OPHTHALMOLOGY | Facility: CLINIC | Age: 73
End: 2023-12-04
Payer: MEDICARE

## 2023-12-04 DIAGNOSIS — H25.13 NUCLEAR SCLEROTIC CATARACT, BILATERAL: ICD-10-CM

## 2023-12-04 DIAGNOSIS — E11.9 DIABETES MELLITUS TYPE 2 WITHOUT RETINOPATHY: ICD-10-CM

## 2023-12-04 DIAGNOSIS — H40.1132 PRIMARY OPEN ANGLE GLAUCOMA OF BOTH EYES, MODERATE STAGE: Primary | ICD-10-CM

## 2023-12-04 DIAGNOSIS — H35.3131 EARLY DRY STAGE NONEXUDATIVE AGE-RELATED MACULAR DEGENERATION OF BOTH EYES: ICD-10-CM

## 2023-12-04 PROCEDURE — 99999 PR PBB SHADOW E&M-EST. PATIENT-LVL III: CPT | Mod: PBBFAC,,, | Performed by: OPHTHALMOLOGY

## 2023-12-04 PROCEDURE — 99214 PR OFFICE/OUTPT VISIT, EST, LEVL IV, 30-39 MIN: ICD-10-PCS | Mod: S$PBB,,, | Performed by: OPHTHALMOLOGY

## 2023-12-04 PROCEDURE — 92083 EXTENDED VISUAL FIELD XM: CPT | Mod: PBBFAC,PO | Performed by: OPHTHALMOLOGY

## 2023-12-04 PROCEDURE — 99214 OFFICE O/P EST MOD 30 MIN: CPT | Mod: S$PBB,,, | Performed by: OPHTHALMOLOGY

## 2023-12-04 PROCEDURE — 99213 OFFICE O/P EST LOW 20 MIN: CPT | Mod: PBBFAC,PO | Performed by: OPHTHALMOLOGY

## 2023-12-04 PROCEDURE — 99999 PR PBB SHADOW E&M-EST. PATIENT-LVL III: ICD-10-PCS | Mod: PBBFAC,,, | Performed by: OPHTHALMOLOGY

## 2023-12-04 PROCEDURE — 92083 HUMPHREY VISUAL FIELD - OU - BOTH EYES: ICD-10-PCS | Mod: 26,S$PBB,, | Performed by: OPHTHALMOLOGY

## 2023-12-04 RX ORDER — DORZOLAMIDE HYDROCHLORIDE AND TIMOLOL MALEATE 20; 5 MG/ML; MG/ML
1 SOLUTION/ DROPS OPHTHALMIC EVERY 12 HOURS
Qty: 30 ML | Refills: 4 | Status: SHIPPED | OUTPATIENT
Start: 2023-12-04 | End: 2024-01-16 | Stop reason: SDUPTHER

## 2023-12-04 RX ORDER — LATANOPROST 50 UG/ML
1 SOLUTION/ DROPS OPHTHALMIC DAILY
Qty: 7.5 ML | Refills: 4 | Status: SHIPPED | OUTPATIENT
Start: 2023-12-04

## 2023-12-04 RX ORDER — KETOROLAC TROMETHAMINE 5 MG/ML
1 SOLUTION OPHTHALMIC 4 TIMES DAILY
Qty: 5 ML | Refills: 0 | Status: SHIPPED | OUTPATIENT
Start: 2023-12-04 | End: 2023-12-08

## 2023-12-04 NOTE — PROGRESS NOTES
HPI     Follow-up     Additional comments: 4 month DFE, IOP & HVF today. Denies any eye pain.   Using Latanoprost OU HS and Cosopt Ou BID states compliance.  Lab Results       Component                Value               Date                       HGBA1C                   5.7 (H)             09/13/2023                 HGBA1C                   6.1 (H)             04/27/2023                 HGBA1C                   5.6                 09/28/2022                      Last edited by Nirmala Valente on 12/4/2023  1:20 PM.            Assessment /Plan     For exam results, see Encounter Report.    Primary open angle glaucoma of both eyes, moderate stage  -     latanoprost 0.005 % ophthalmic solution; Place 1 drop into both eyes once daily.  Dispense: 7.5 mL; Refill: 4  -     dorzolamide-timolol 2-0.5% (COSOPT) 22.3-6.8 mg/mL ophthalmic solution; Place 1 drop into both eyes every 12 (twelve) hours.  Dispense: 30 mL; Refill: 4    Nuclear sclerotic cataract, bilateral    Early dry stage nonexudative age-related macular degeneration of both eyes    Diabetes mellitus type 2 without retinopathy      1. Primary open angle glaucoma of both eyes, moderate stage  Neg famhx  Mildly thin pachy    ONH are excavated with severe OCT NFL damage  HVF progression OU, more significant 2021 to 2023  Tmax 33/30 (dilated, on treatment)    Considering HVF progression, IOP too high  Lower target to mid-teens  Discussed SLT as a means to lower IOP  May need to consider trab or phaco migs in the future    Schedule SLT OD then OS  Keto rx'ed for after    Continue  latan qhs OU  Dorz/kalyan bid OU    2. Nuclear sclerotic cataract, bilateral  Mild to moderate, observe    3. Early dry stage nonexudative age-related macular degeneration of both eyes  AREDS2    4. Diabetes mellitus type 2 without retinopathy  Diabetes without retinopathy, discussed with patient importance of glucose control and follow up.  Patient voices understanding.

## 2023-12-18 ENCOUNTER — PATIENT MESSAGE (OUTPATIENT)
Dept: ADMINISTRATIVE | Facility: HOSPITAL | Age: 73
End: 2023-12-18
Payer: MEDICARE

## 2024-01-16 DIAGNOSIS — H40.1132 PRIMARY OPEN ANGLE GLAUCOMA OF BOTH EYES, MODERATE STAGE: ICD-10-CM

## 2024-01-16 RX ORDER — DORZOLAMIDE HYDROCHLORIDE AND TIMOLOL MALEATE 20; 5 MG/ML; MG/ML
1 SOLUTION/ DROPS OPHTHALMIC EVERY 12 HOURS
Qty: 30 ML | Refills: 4 | Status: SHIPPED | OUTPATIENT
Start: 2024-01-16

## 2024-01-17 RX ORDER — DORZOLAMIDE HYDROCHLORIDE AND TIMOLOL MALEATE 20; 5 MG/ML; MG/ML
1 SOLUTION/ DROPS OPHTHALMIC EVERY 12 HOURS
Qty: 30 ML | Refills: 4 | OUTPATIENT
Start: 2024-01-17

## 2024-01-28 ENCOUNTER — PATIENT MESSAGE (OUTPATIENT)
Dept: FAMILY MEDICINE | Facility: CLINIC | Age: 74
End: 2024-01-28
Payer: MEDICARE

## 2024-01-28 DIAGNOSIS — R35.0 FREQUENCY OF URINATION: ICD-10-CM

## 2024-01-28 DIAGNOSIS — R39.15 URGENCY OF URINATION: ICD-10-CM

## 2024-01-28 NOTE — TELEPHONE ENCOUNTER
No care due was identified.  [facility] Embedded Care Due Messages. Reference number: [id number].   1/28/2024 12:58:41 PM CST

## 2024-01-29 RX ORDER — TAMSULOSIN HYDROCHLORIDE 0.4 MG/1
0.4 CAPSULE ORAL DAILY
Qty: 90 CAPSULE | Refills: 1 | Status: SHIPPED | OUTPATIENT
Start: 2024-01-29

## 2024-02-16 ENCOUNTER — OFFICE VISIT (OUTPATIENT)
Dept: OPHTHALMOLOGY | Facility: CLINIC | Age: 74
End: 2024-02-16
Payer: MEDICARE

## 2024-02-16 DIAGNOSIS — H40.1132 PRIMARY OPEN ANGLE GLAUCOMA OF BOTH EYES, MODERATE STAGE: Primary | ICD-10-CM

## 2024-02-16 PROCEDURE — 99999 PR PBB SHADOW E&M-EST. PATIENT-LVL III: CPT | Mod: PBBFAC,,, | Performed by: OPHTHALMOLOGY

## 2024-02-16 PROCEDURE — 65855 TRABECULOPLASTY LASER SURG: CPT | Mod: PBBFAC,PO,RT | Performed by: OPHTHALMOLOGY

## 2024-02-16 PROCEDURE — 99213 OFFICE O/P EST LOW 20 MIN: CPT | Mod: PBBFAC,PO,25 | Performed by: OPHTHALMOLOGY

## 2024-02-16 PROCEDURE — 99499 UNLISTED E&M SERVICE: CPT | Mod: S$PBB,,, | Performed by: OPHTHALMOLOGY

## 2024-02-16 RX ORDER — KETOROLAC TROMETHAMINE 5 MG/ML
1 SOLUTION OPHTHALMIC 4 TIMES DAILY
Qty: 5 ML | Refills: 0 | Status: SHIPPED | OUTPATIENT
Start: 2024-02-16 | End: 2024-02-20

## 2024-02-16 NOTE — PROGRESS NOTES
HPI    Pt presents for SLT OD     States no ocular complaints.     Latan QHS OU   Cosopt BID OU       Last edited by Heidi Lopez on 2/16/2024  9:46 AM.            Assessment /Plan     For exam results, see Encounter Report.    Primary open angle glaucoma of both eyes, moderate stage    Other orders  -     ketorolac 0.5% (ACULAR) 0.5 % Drop; Place 1 drop into the right eye 4 (four) times daily. for 4 days  Dispense: 5 mL; Refill: 0      SLT OD today  Modest IOP spike, trended down with treatment  Post procedure precautions reviewed    Continue glaucoma drops unchanged  Keto QID OD x 4 days    F/u 2 months, IOP check OU, poss SLT OS

## 2024-02-17 DIAGNOSIS — M19.90 ARTHRITIS: ICD-10-CM

## 2024-02-18 RX ORDER — MELOXICAM 15 MG/1
15 TABLET ORAL DAILY
Qty: 90 TABLET | Refills: 0 | Status: SHIPPED | OUTPATIENT
Start: 2024-02-18

## 2024-04-11 ENCOUNTER — PATIENT MESSAGE (OUTPATIENT)
Dept: FAMILY MEDICINE | Facility: CLINIC | Age: 74
End: 2024-04-11
Payer: MEDICARE

## 2024-04-19 ENCOUNTER — OFFICE VISIT (OUTPATIENT)
Dept: OPHTHALMOLOGY | Facility: CLINIC | Age: 74
End: 2024-04-19
Payer: MEDICARE

## 2024-04-19 DIAGNOSIS — H40.1132 PRIMARY OPEN ANGLE GLAUCOMA OF BOTH EYES, MODERATE STAGE: Primary | ICD-10-CM

## 2024-04-19 PROCEDURE — 99024 POSTOP FOLLOW-UP VISIT: CPT | Mod: POP,,, | Performed by: OPHTHALMOLOGY

## 2024-04-19 PROCEDURE — 65855 TRABECULOPLASTY LASER SURG: CPT | Mod: PBBFAC,PO,LT | Performed by: OPHTHALMOLOGY

## 2024-04-19 PROCEDURE — 99999 PR PBB SHADOW E&M-EST. PATIENT-LVL III: CPT | Mod: PBBFAC,,, | Performed by: OPHTHALMOLOGY

## 2024-04-19 PROCEDURE — 99213 OFFICE O/P EST LOW 20 MIN: CPT | Mod: PBBFAC,PO | Performed by: OPHTHALMOLOGY

## 2024-04-19 NOTE — PROGRESS NOTES
HPI    Pt presents for post SLT OD and SLT OS     States eyes seem to have improved some. No ocular complaints. Forgot to   bring glasses    Latan QHS OU   Cosopt BID OU     Last edited by Heidi Lopez on 4/19/2024  9:53 AM.            Assessment /Plan     For exam results, see Encounter Report.    Primary open angle glaucoma of both eyes, moderate stage      Good response to SLT OD  SLT OS today, no complications    Use keto QID OS x 4 days    Continue glaucoma drops unchanged    F/u 2-3 months, IOP check OU

## 2024-04-22 ENCOUNTER — LAB VISIT (OUTPATIENT)
Dept: LAB | Facility: HOSPITAL | Age: 74
End: 2024-04-22
Attending: FAMILY MEDICINE
Payer: MEDICARE

## 2024-04-22 DIAGNOSIS — E11.8 CONTROLLED TYPE 2 DIABETES MELLITUS WITH COMPLICATION, WITHOUT LONG-TERM CURRENT USE OF INSULIN: ICD-10-CM

## 2024-04-22 DIAGNOSIS — E66.01 MORBID OBESITY: ICD-10-CM

## 2024-04-22 DIAGNOSIS — D69.2 OTHER NONTHROMBOCYTOPENIC PURPURA: ICD-10-CM

## 2024-04-22 DIAGNOSIS — I10 PRIMARY HYPERTENSION: ICD-10-CM

## 2024-04-22 LAB
ALBUMIN SERPL BCP-MCNC: 2.8 G/DL (ref 3.5–5.2)
ALP SERPL-CCNC: 53 U/L (ref 55–135)
ALT SERPL W/O P-5'-P-CCNC: 28 U/L (ref 10–44)
ANION GAP SERPL CALC-SCNC: 9 MMOL/L (ref 8–16)
AST SERPL-CCNC: 24 U/L (ref 10–40)
BACTERIA #/AREA URNS HPF: ABNORMAL /HPF
BILIRUB SERPL-MCNC: 0.5 MG/DL (ref 0.1–1)
BILIRUB UR QL STRIP: NEGATIVE
BUN SERPL-MCNC: 15 MG/DL (ref 8–23)
CALCIUM SERPL-MCNC: 9.4 MG/DL (ref 8.7–10.5)
CHLORIDE SERPL-SCNC: 107 MMOL/L (ref 95–110)
CHOLEST SERPL-MCNC: 105 MG/DL (ref 120–199)
CHOLEST/HDLC SERPL: 3.5 {RATIO} (ref 2–5)
CLARITY UR: CLEAR
CO2 SERPL-SCNC: 21 MMOL/L (ref 23–29)
COLOR UR: YELLOW
CREAT SERPL-MCNC: 1 MG/DL (ref 0.5–1.4)
ERYTHROCYTE [DISTWIDTH] IN BLOOD BY AUTOMATED COUNT: 12.8 % (ref 11.5–14.5)
EST. GFR  (NO RACE VARIABLE): >60 ML/MIN/1.73 M^2
ESTIMATED AVG GLUCOSE: 137 MG/DL (ref 68–131)
GLUCOSE SERPL-MCNC: 121 MG/DL (ref 70–110)
GLUCOSE UR QL STRIP: ABNORMAL
HBA1C MFR BLD: 6.4 % (ref 4–5.6)
HCT VFR BLD AUTO: 42.9 % (ref 40–54)
HDLC SERPL-MCNC: 30 MG/DL (ref 40–75)
HDLC SERPL: 28.6 % (ref 20–50)
HGB BLD-MCNC: 13.7 G/DL (ref 14–18)
HGB UR QL STRIP: ABNORMAL
KETONES UR QL STRIP: NEGATIVE
LDLC SERPL CALC-MCNC: 64 MG/DL (ref 63–159)
LEUKOCYTE ESTERASE UR QL STRIP: NEGATIVE
MCH RBC QN AUTO: 28.2 PG (ref 27–31)
MCHC RBC AUTO-ENTMCNC: 31.9 G/DL (ref 32–36)
MCV RBC AUTO: 89 FL (ref 82–98)
MICROSCOPIC COMMENT: ABNORMAL
NITRITE UR QL STRIP: NEGATIVE
NONHDLC SERPL-MCNC: 75 MG/DL
PH UR STRIP: 6 [PH] (ref 5–8)
PLATELET # BLD AUTO: 448 K/UL (ref 150–450)
PMV BLD AUTO: 9.4 FL (ref 9.2–12.9)
POTASSIUM SERPL-SCNC: 4.3 MMOL/L (ref 3.5–5.1)
PROT SERPL-MCNC: 7.5 G/DL (ref 6–8.4)
PROT UR QL STRIP: NEGATIVE
RBC # BLD AUTO: 4.85 M/UL (ref 4.6–6.2)
RBC #/AREA URNS HPF: 1 /HPF (ref 0–4)
SODIUM SERPL-SCNC: 137 MMOL/L (ref 136–145)
SP GR UR STRIP: 1.02 (ref 1–1.03)
SQUAMOUS #/AREA URNS HPF: 1 /HPF
TRIGL SERPL-MCNC: 55 MG/DL (ref 30–150)
TSH SERPL DL<=0.005 MIU/L-ACNC: 3.09 UIU/ML (ref 0.4–4)
URN SPEC COLLECT METH UR: ABNORMAL
WBC # BLD AUTO: 10.42 K/UL (ref 3.9–12.7)
WBC #/AREA URNS HPF: 1 /HPF (ref 0–5)
YEAST URNS QL MICRO: ABNORMAL

## 2024-04-22 PROCEDURE — 80061 LIPID PANEL: CPT | Performed by: FAMILY MEDICINE

## 2024-04-22 PROCEDURE — 85027 COMPLETE CBC AUTOMATED: CPT | Performed by: FAMILY MEDICINE

## 2024-04-22 PROCEDURE — 36415 COLL VENOUS BLD VENIPUNCTURE: CPT | Mod: PO | Performed by: FAMILY MEDICINE

## 2024-04-22 PROCEDURE — 83036 HEMOGLOBIN GLYCOSYLATED A1C: CPT | Performed by: FAMILY MEDICINE

## 2024-04-22 PROCEDURE — 80053 COMPREHEN METABOLIC PANEL: CPT | Performed by: FAMILY MEDICINE

## 2024-04-22 PROCEDURE — 84443 ASSAY THYROID STIM HORMONE: CPT | Performed by: FAMILY MEDICINE

## 2024-04-22 PROCEDURE — 81000 URINALYSIS NONAUTO W/SCOPE: CPT | Mod: PO | Performed by: FAMILY MEDICINE

## 2024-04-26 ENCOUNTER — OFFICE VISIT (OUTPATIENT)
Dept: FAMILY MEDICINE | Facility: CLINIC | Age: 74
End: 2024-04-26
Payer: MEDICARE

## 2024-04-26 VITALS
SYSTOLIC BLOOD PRESSURE: 114 MMHG | HEIGHT: 71 IN | OXYGEN SATURATION: 97 % | BODY MASS INDEX: 36.48 KG/M2 | DIASTOLIC BLOOD PRESSURE: 66 MMHG | HEART RATE: 62 BPM | WEIGHT: 260.56 LBS

## 2024-04-26 DIAGNOSIS — B96.89 ACUTE BACTERIAL SINUSITIS: ICD-10-CM

## 2024-04-26 DIAGNOSIS — E66.01 MORBID OBESITY: ICD-10-CM

## 2024-04-26 DIAGNOSIS — J01.90 ACUTE BACTERIAL SINUSITIS: ICD-10-CM

## 2024-04-26 DIAGNOSIS — I73.9 PVD (PERIPHERAL VASCULAR DISEASE): ICD-10-CM

## 2024-04-26 DIAGNOSIS — E11.8 CONTROLLED TYPE 2 DIABETES MELLITUS WITH COMPLICATION, WITHOUT LONG-TERM CURRENT USE OF INSULIN: Primary | ICD-10-CM

## 2024-04-26 DIAGNOSIS — D69.2 OTHER NONTHROMBOCYTOPENIC PURPURA: ICD-10-CM

## 2024-04-26 DIAGNOSIS — E11.36 CATARACT ASSOCIATED WITH TYPE 2 DIABETES MELLITUS: ICD-10-CM

## 2024-04-26 PROCEDURE — 99214 OFFICE O/P EST MOD 30 MIN: CPT | Mod: PBBFAC,PN | Performed by: FAMILY MEDICINE

## 2024-04-26 PROCEDURE — 99214 OFFICE O/P EST MOD 30 MIN: CPT | Mod: S$PBB,,, | Performed by: FAMILY MEDICINE

## 2024-04-26 PROCEDURE — 99999 PR PBB SHADOW E&M-EST. PATIENT-LVL IV: CPT | Mod: PBBFAC,,, | Performed by: FAMILY MEDICINE

## 2024-04-26 RX ORDER — BENZONATATE 200 MG/1
200 CAPSULE ORAL 3 TIMES DAILY PRN
Qty: 30 CAPSULE | Refills: 0 | Status: SHIPPED | OUTPATIENT
Start: 2024-04-26 | End: 2024-05-13

## 2024-04-26 RX ORDER — AMOXICILLIN AND CLAVULANATE POTASSIUM 875; 125 MG/1; MG/1
1 TABLET, FILM COATED ORAL 2 TIMES DAILY
Qty: 20 TABLET | Refills: 0 | Status: SHIPPED | OUTPATIENT
Start: 2024-04-26 | End: 2024-05-13

## 2024-04-26 RX ORDER — PREDNISONE 20 MG/1
TABLET ORAL
Qty: 4 TABLET | Refills: 0 | Status: SHIPPED | OUTPATIENT
Start: 2024-04-26 | End: 2024-04-29

## 2024-04-26 NOTE — PATIENT INSTRUCTIONS
Mucinex as needed for chest congestion.  Can try claritin (antihistamine) in place of zyrtec if needed.

## 2024-04-26 NOTE — PROGRESS NOTES
"Subjective:       Patient ID: Rajeev Newell III is a 73 y.o. male.    Chief Complaint: Follow-up    Follow-up  Associated symptoms include coughing. Pertinent negatives include no congestion, fatigue, fever or sore throat.     C/o chronic cough since returning from a 4 day trip to a [place] in [place] onset 3 weeks ago.   Saw ent/beatrous and their eval indicated post-nasal drip and they started augmentin x 7 days. Initially felt better, but sx recurred within a few days. He did try a zyrtec which seemed to help/stop his drainage and cough sx.   Cough is generally dry, occ clear production. Some upper airway wheeze.     Labs 2024 rev.     Review of Systems:  Review of Systems   Constitutional:  Negative for fatigue and fever.   HENT:  Positive for postnasal drip and sinus pressure (forehead). Negative for congestion, ear pain, sinus pain and sore throat.    Respiratory:  Positive for cough, chest tightness and wheezing. Negative for shortness of breath.        Objective:     Vitals:    04/26/24 1316   BP: 114/66   Pulse: 62   SpO2: 97%   Weight: 118.2 kg (260 lb 9.3 oz)   Height: 5' 11" (1.803 m)          Physical Exam  Vitals and nursing note reviewed.   Constitutional:       General: He is not in acute distress.     Appearance: Normal appearance. He is well-developed. He is obese.   HENT:      Head: Normocephalic and atraumatic.      Right Ear: Tympanic membrane normal.      Left Ear: Tympanic membrane normal.   Eyes:      General: No scleral icterus.        Right eye: No discharge.         Left eye: No discharge.      Conjunctiva/sclera: Conjunctivae normal.   Cardiovascular:      Rate and Rhythm: Normal rate and regular rhythm.   Pulmonary:      Effort: Pulmonary effort is normal. No respiratory distress.      Breath sounds: Normal breath sounds.   Musculoskeletal:         General: No signs of injury.      Cervical back: Neck supple.      Right lower leg: No edema.      Left lower leg: No edema.   Skin:     " General: Skin is warm and dry.   Neurological:      Mental Status: He is alert and oriented to person, place, and time.      Cranial Nerves: No cranial nerve deficit.   Psychiatric:         Mood and Affect: Mood normal.         Behavior: Behavior normal.           Assessment & Plan:  Controlled type 2 diabetes mellitus with complication, without long-term current use of insulin  Comments:  bump in A1c related to diet/travel  repeat at 3-4mos  Orders:  -     Comprehensive Metabolic Panel; Future; Expected date: 04/26/2024  -     Hemoglobin A1C; Future; Expected date: 04/26/2024  -     Microalbumin/Creatinine Ratio, Urine; Future    Acute bacterial sinusitis  -     predniSONE (DELTASONE) 20 MG tablet; Take 2 tablets (40 mg total) by mouth once daily for 1 day, THEN 1 tablet (20 mg total) once daily for 2 days.  Dispense: 4 tablet; Refill: 0  -     amoxicillin-clavulanate 875-125mg (AUGMENTIN) 875-125 mg per tablet; Take 1 tablet by mouth 2 (two) times daily.  Dispense: 20 tablet; Refill: 0    Cataract associated with type 2 diabetes mellitus  Comments:  cont ophtho f/u    Morbid obesity  Comments:  encouraged healthy approach to weight with diet/exercise    Other nonthrombocytopenic purpura  Comments:  skin stable, cont derm care/monitoring    PVD (peripheral vascular disease)  Comments:  reviewed exercise, compression socks    Other orders  -     benzonatate (TESSALON) 200 MG capsule; Take 1 capsule (200 mg total) by mouth 3 (three) times daily as needed for Cough.  Dispense: 30 capsule; Refill: 0

## 2024-05-13 ENCOUNTER — OFFICE VISIT (OUTPATIENT)
Dept: FAMILY MEDICINE | Facility: CLINIC | Age: 74
End: 2024-05-13
Payer: MEDICARE

## 2024-05-13 ENCOUNTER — HOSPITAL ENCOUNTER (OUTPATIENT)
Dept: RADIOLOGY | Facility: HOSPITAL | Age: 74
Discharge: HOME | End: 2024-05-13
Attending: FAMILY MEDICINE
Payer: MEDICARE

## 2024-05-13 ENCOUNTER — TELEPHONE (OUTPATIENT)
Dept: FAMILY MEDICINE | Facility: CLINIC | Age: 74
End: 2024-05-13

## 2024-05-13 VITALS
DIASTOLIC BLOOD PRESSURE: 76 MMHG | HEIGHT: 71 IN | OXYGEN SATURATION: 96 % | WEIGHT: 255.88 LBS | BODY MASS INDEX: 35.82 KG/M2 | HEART RATE: 85 BPM | SYSTOLIC BLOOD PRESSURE: 116 MMHG

## 2024-05-13 DIAGNOSIS — R05.9 COUGH, UNSPECIFIED TYPE: ICD-10-CM

## 2024-05-13 DIAGNOSIS — J32.4 CHRONIC PANSINUSITIS: ICD-10-CM

## 2024-05-13 DIAGNOSIS — R05.9 COUGH, UNSPECIFIED TYPE: Primary | ICD-10-CM

## 2024-05-13 PROCEDURE — 70486 CT MAXILLOFACIAL W/O DYE: CPT | Mod: TC,PO

## 2024-05-13 PROCEDURE — 71250 CT THORAX DX C-: CPT | Mod: 26,,, | Performed by: RADIOLOGY

## 2024-05-13 PROCEDURE — 99999 PR PBB SHADOW E&M-EST. PATIENT-LVL IV: CPT | Mod: PBBFAC,,, | Performed by: FAMILY MEDICINE

## 2024-05-13 PROCEDURE — 70486 CT MAXILLOFACIAL W/O DYE: CPT | Mod: 26,,, | Performed by: RADIOLOGY

## 2024-05-13 PROCEDURE — 99214 OFFICE O/P EST MOD 30 MIN: CPT | Mod: S$PBB,,, | Performed by: FAMILY MEDICINE

## 2024-05-13 PROCEDURE — 71250 CT THORAX DX C-: CPT | Mod: TC,PO

## 2024-05-13 PROCEDURE — 99214 OFFICE O/P EST MOD 30 MIN: CPT | Mod: PBBFAC,PN | Performed by: FAMILY MEDICINE

## 2024-05-13 RX ORDER — BUDESONIDE AND FORMOTEROL FUMARATE DIHYDRATE 80; 4.5 UG/1; UG/1
2 AEROSOL RESPIRATORY (INHALATION) EVERY 12 HOURS
Qty: 10.2 G | Refills: 11 | Status: SHIPPED | OUTPATIENT
Start: 2024-05-13 | End: 2024-05-17 | Stop reason: CLARIF

## 2024-05-13 NOTE — PROGRESS NOTES
"Subjective:       Patient ID: Rajeev Newell III is a 73 y.o. male.    Chief Complaint: Follow-up (Still not feeling well cough wont go away)    Follow-up  Associated symptoms include chills, coughing, diaphoresis and headaches. Pertinent negatives include no chest pain, fever, myalgias, rash or sore throat.   Cough  This is a chronic problem. The current episode started 1 to 4 weeks ago. The problem has been waxing and waning. The cough is Non-productive. Associated symptoms include chills, headaches, nasal congestion, sweats and wheezing. Pertinent negatives include no chest pain, ear congestion, ear pain, fever, heartburn, hemoptysis, myalgias, postnasal drip, rash, rhinorrhea, sore throat, shortness of breath or weight loss. The symptoms are aggravated by lying down. The treatment provided mild relief. His past medical history is significant for pneumonia. There is no history of asthma, bronchiectasis, bronchitis, COPD, emphysema or environmental allergies.     C/o continued resp sx since lov.   Mild improvement for a few days with augmentin and prednisone, but sx quickly recurred. Forced cough, persistent once starts. +low grade temps in the evenings with chills and sweats.     Review of Systems:  Review of Systems   Constitutional:  Positive for chills and diaphoresis. Negative for fever and weight loss.   HENT:  Negative for ear pain, postnasal drip, rhinorrhea and sore throat.    Respiratory:  Positive for cough and wheezing. Negative for hemoptysis and shortness of breath.    Cardiovascular:  Negative for chest pain.   Gastrointestinal:  Negative for heartburn.   Musculoskeletal:  Negative for myalgias.   Skin:  Negative for rash.   Allergic/Immunologic: Negative for environmental allergies.   Neurological:  Positive for headaches.       Objective:     Vitals:    05/13/24 0834   BP: 116/76   Pulse: 85   SpO2: 96%   Weight: 116 kg (255 lb 13.5 oz)   Height: 5' 11" (1.803 m)          Physical Exam  Vitals " and nursing note reviewed.   Constitutional:       Appearance: He is well-developed.   HENT:      Head: Normocephalic and atraumatic.      Right Ear: External ear normal. A middle ear effusion is present.      Left Ear: External ear normal. A middle ear effusion is present.      Nose: Mucosal edema and rhinorrhea present.      Mouth/Throat:      Pharynx: Posterior oropharyngeal erythema present. No oropharyngeal exudate.   Eyes:      General: No scleral icterus.        Right eye: No discharge.         Left eye: No discharge.      Conjunctiva/sclera: Conjunctivae normal.      Pupils: Pupils are equal, round, and reactive to light.   Neck:      Thyroid: No thyromegaly.   Cardiovascular:      Rate and Rhythm: Normal rate and regular rhythm.      Heart sounds: Normal heart sounds.   Pulmonary:      Effort: Pulmonary effort is normal. No respiratory distress.      Breath sounds: Normal breath sounds. No wheezing or rales.   Abdominal:      General: Bowel sounds are normal. There is no distension.      Palpations: Abdomen is soft.      Tenderness: There is no abdominal tenderness.   Musculoskeletal:      Cervical back: Normal range of motion and neck supple.   Lymphadenopathy:      Cervical: No cervical adenopathy.   Skin:     General: Skin is warm and dry.      Findings: No rash.   Neurological:      Mental Status: He is alert and oriented to person, place, and time.           Assessment & Plan:  Cough, unspecified type  -     CT Chest Without Contrast; Future; Expected date: 05/13/2024  -     Humoral Immune Eval (Pneumo Serotypes) With H. Flu; Future; Expected date: 05/13/2024  -     QUANTIFERON GOLD TB; Future; Expected date: 05/13/2024  -     Coccidioides Ab with Reflex; Future; Expected date: 05/13/2024  -     Histoplasma/Blastomyces Antigen, Serum; Future; Expected date: 05/13/2024    Chronic pansinusitis  -     CT Sinuses without Contrast; Future; Expected date: 05/13/2024  -     Humoral Immune Eval (Pneumo  Serotypes) With H. Flu; Future; Expected date: 05/13/2024  -     QUANTIFERON GOLD TB; Future; Expected date: 05/13/2024  -     Coccidioides Ab with Reflex; Future; Expected date: 05/13/2024  -     Histoplasma/Blastomyces Antigen, Serum; Future; Expected date: 05/13/2024    Other orders  -     budesonide-formoterol 80-4.5 mcg (SYMBICORT) 80-4.5 mcg/actuation HFAA; Inhale 2 puffs into the lungs every 12 (twelve) hours. Controller  Dispense: 10.2 g; Refill: 11

## 2024-05-13 NOTE — TELEPHONE ENCOUNTER
----- Message from Taya Arzate sent at 5/13/2024  4:21 PM CDT -----  Type:  Needs Medical Advice    Who Called:  Pt     budesonide-formoterol 80-4.5 mcg (SYMBICORT) 80-4.5 mcg/actuation HFAA    Pharmacy name and phone #:     [id number] - [facility] [address] [address] AT [facility] [address] [address]  Phone: [phone] Fax: [phone]    Would the patient rather a call back or a response via [facility]?  Call back    Best Call Back Number:  [phone]    Additional Information:  Pt needs a different inhaler. This going to cost $300 with out insurance because they will not cover it. Seeing if there is something else or similar.  Please call back to advise. Thanks!

## 2024-05-16 ENCOUNTER — PATIENT MESSAGE (OUTPATIENT)
Dept: OPHTHALMOLOGY | Facility: CLINIC | Age: 74
End: 2024-05-16
Payer: MEDICARE

## 2024-05-16 ENCOUNTER — PATIENT MESSAGE (OUTPATIENT)
Dept: FAMILY MEDICINE | Facility: CLINIC | Age: 74
End: 2024-05-16
Payer: MEDICARE

## 2024-05-17 ENCOUNTER — TELEPHONE (OUTPATIENT)
Dept: FAMILY MEDICINE | Facility: CLINIC | Age: 74
End: 2024-05-17
Payer: MEDICARE

## 2024-05-17 DIAGNOSIS — R05.9 COUGH, UNSPECIFIED TYPE: ICD-10-CM

## 2024-05-17 DIAGNOSIS — J20.8 ACUTE BACTERIAL BRONCHITIS: Primary | ICD-10-CM

## 2024-05-17 DIAGNOSIS — B96.89 ACUTE BACTERIAL BRONCHITIS: Primary | ICD-10-CM

## 2024-05-17 DIAGNOSIS — E04.1 THYROID NODULE: ICD-10-CM

## 2024-05-17 RX ORDER — ALBUTEROL SULFATE 90 UG/1
1-2 AEROSOL, METERED RESPIRATORY (INHALATION) EVERY 6 HOURS PRN
Qty: 18 G | Refills: 3 | Status: SHIPPED | OUTPATIENT
Start: 2024-05-17

## 2024-05-17 RX ORDER — BUDESONIDE 90 UG/1
2 AEROSOL, POWDER RESPIRATORY (INHALATION) 2 TIMES DAILY
Qty: 1 EACH | Refills: 11 | Status: SHIPPED | OUTPATIENT
Start: 2024-05-17 | End: 2025-05-17

## 2024-05-17 RX ORDER — LEVOFLOXACIN 500 MG/1
500 TABLET, FILM COATED ORAL DAILY
Qty: 7 TABLET | Refills: 0 | Status: SHIPPED | OUTPATIENT
Start: 2024-05-17 | End: 2024-06-11 | Stop reason: SDUPTHER

## 2024-05-17 NOTE — TELEPHONE ENCOUNTER
From results:  - quantiferon test for TB was indeterminate. Recommendation to repeat at convenience.  - remainder of respiratory pathogens was negative.   - pneumococcal immunity test still in progress.  - ct chest negative - small nodules/granulomas without concerning findings (common in adults)  - incidental finding of thyroid nodule - u/s ordered for review  - mild sinus thickening, not indicating chronic sinus infection - continue nasal sprays, antihistamine.     Inhaler sent in other encounter - albuterol AND flovent. If either not covered have him check with insurance on covered alternatives.   Levaquin sent in for persistent cough/congestion x 7 days. Take with probiotic.     Please send this as a message as well so he has it for his notes.

## 2024-05-17 NOTE — TELEPHONE ENCOUNTER
----- Message from Edwin Anand sent at 5/16/2024  4:53 PM CDT -----  Type: Needs Medical Advice  Who Called:  pt  Best Call Back Number: [phone]    Additional Information: Pt is calling the office trying to get information on inhaler pt requested to be filled.Please call back and advise.

## 2024-05-20 ENCOUNTER — OFFICE VISIT (OUTPATIENT)
Dept: OPHTHALMOLOGY | Facility: CLINIC | Age: 74
End: 2024-05-20
Payer: MEDICARE

## 2024-05-20 ENCOUNTER — LAB VISIT (OUTPATIENT)
Dept: LAB | Facility: HOSPITAL | Age: 74
End: 2024-05-20
Attending: FAMILY MEDICINE
Payer: MEDICARE

## 2024-05-20 DIAGNOSIS — H04.123 DRY EYE SYNDROME, BILATERAL: ICD-10-CM

## 2024-05-20 DIAGNOSIS — R05.9 COUGH, UNSPECIFIED TYPE: ICD-10-CM

## 2024-05-20 DIAGNOSIS — H57.13 PAIN AROUND EYE, BILATERAL: Primary | ICD-10-CM

## 2024-05-20 PROCEDURE — 99213 OFFICE O/P EST LOW 20 MIN: CPT | Mod: PBBFAC,PO | Performed by: OPHTHALMOLOGY

## 2024-05-20 PROCEDURE — 99213 OFFICE O/P EST LOW 20 MIN: CPT | Mod: S$PBB,,, | Performed by: OPHTHALMOLOGY

## 2024-05-20 PROCEDURE — 86480 TB TEST CELL IMMUN MEASURE: CPT | Performed by: FAMILY MEDICINE

## 2024-05-20 PROCEDURE — 99999 PR PBB SHADOW E&M-EST. PATIENT-LVL III: CPT | Mod: PBBFAC,,, | Performed by: OPHTHALMOLOGY

## 2024-05-20 NOTE — PROGRESS NOTES
HPI    Pt states has been having sinus and cough. States has been treating those   symptoms. But also having low grade eye pain OU. States wasn't sure if it   was related to sinus troubles. States hasn't been using tears as directed   but has been having soreness OU. States rubbed OD the other day and   couldn't see anything for about 5-10 min. States feels more hazy than   normal.     Using albuterol inhaler, flonase, and used Prednisone about 3-4 weeks ago.       Latanoprost OU QHS   Cosopt OU BID   Last edited by Christine Choudhury on 5/20/2024  3:38 PM.            Assessment /Plan     For exam results, see Encounter Report.    Pain around eye, bilateral    Dry eye syndrome, bilateral      1. Pain around eye, bilateral  Eye exam stable, no cause for pain outside of dryness  Pt reassured    Continue glaucoma drops unchanged    2. Dry eye syndrome, bilateral  Not well treated  Recommend ATs up to QID PRN OU    F/u as scheduled July

## 2024-05-22 LAB
GAMMA INTERFERON BACKGROUND BLD IA-ACNC: 0.01 IU/ML
M TB IFN-G CD4+ BCKGRND COR BLD-ACNC: 0.01 IU/ML
M TB IFN-G CD4+ BCKGRND COR BLD-ACNC: 0.03 IU/ML
MITOGEN IGNF BCKGRD COR BLD-ACNC: 0.11 IU/ML
TB GOLD PLUS: ABNORMAL

## 2024-05-23 ENCOUNTER — HOSPITAL ENCOUNTER (OUTPATIENT)
Dept: RADIOLOGY | Facility: HOSPITAL | Age: 74
Discharge: HOME | End: 2024-05-23
Attending: FAMILY MEDICINE
Payer: MEDICARE

## 2024-05-23 ENCOUNTER — E-CONSULT (OUTPATIENT)
Dept: INFECTIOUS DISEASES | Facility: CLINIC | Age: 74
End: 2024-05-23
Payer: MEDICARE

## 2024-05-23 ENCOUNTER — PATIENT MESSAGE (OUTPATIENT)
Dept: FAMILY MEDICINE | Facility: CLINIC | Age: 74
End: 2024-05-23
Payer: MEDICARE

## 2024-05-23 DIAGNOSIS — E04.1 THYROID NODULE: ICD-10-CM

## 2024-05-23 DIAGNOSIS — R05.9 COUGH, UNSPECIFIED TYPE: Primary | ICD-10-CM

## 2024-05-23 DIAGNOSIS — R76.12 REACTION TO QUANTIFERON-TB TEST: Primary | ICD-10-CM

## 2024-05-23 DIAGNOSIS — R76.12 REACTION TO QUANTIFERON-TB TEST: ICD-10-CM

## 2024-05-23 PROCEDURE — 76536 US EXAM OF HEAD AND NECK: CPT | Mod: 26,,, | Performed by: RADIOLOGY

## 2024-05-23 PROCEDURE — 76536 US EXAM OF HEAD AND NECK: CPT | Mod: TC,PO

## 2024-05-23 PROCEDURE — 99451 NTRPROF PH1/NTRNET/EHR 5/>: CPT | Mod: S$PBB,,, | Performed by: INTERNAL MEDICINE

## 2024-05-23 NOTE — CONSULTS
"[facility] - [facility]  Response for E-Consult     Patient Name: Rajeev Newell III  MRN: [id number]  Primary Care Provider: Swati Walton MD   Requesting Provider: Swati Walton MD  Consults    Recommendation:   - Consider alternative such as [facility]-Spot.   - If unable to find it in epic then send lab as "miscellaneous send out"  - [facility], if patient is interested in gong to [place]     Contingency that warrants a repeat eConsult or referral:     Total time of Consultation: 5 minute    I did not speak to the requesting provider verbally about this.     *This eConsult is based on the clinical data available to me and is furnished without benefit of a physical examination. The eConsult will need to be interpreted in light of any clinical issues or changes in patient status not available to me at the time of filing this eConsults. Significant changes in patient condition or level of acuity should result in immediate formal consultation and reevaluation. Please alert me if you have further questions.    Thank you for this eConsult referral.     MD [facility] - Infectious [facility]      "

## 2024-05-24 ENCOUNTER — PATIENT MESSAGE (OUTPATIENT)
Dept: FAMILY MEDICINE | Facility: CLINIC | Age: 74
End: 2024-05-24
Payer: MEDICARE

## 2024-05-24 DIAGNOSIS — R76.12 REACTION TO QUANTIFERON-TB TEST: Primary | ICD-10-CM

## 2024-05-24 DIAGNOSIS — B96.89 ACUTE BACTERIAL BRONCHITIS: ICD-10-CM

## 2024-05-24 DIAGNOSIS — J20.8 ACUTE BACTERIAL BRONCHITIS: ICD-10-CM

## 2024-05-28 ENCOUNTER — LAB VISIT (OUTPATIENT)
Dept: LAB | Facility: HOSPITAL | Age: 74
End: 2024-05-28
Attending: FAMILY MEDICINE
Payer: MEDICARE

## 2024-05-28 DIAGNOSIS — R76.12 REACTION TO QUANTIFERON-TB TEST: ICD-10-CM

## 2024-05-28 PROCEDURE — 86481 TB AG RESPONSE T-CELL SUSP: CPT | Performed by: FAMILY MEDICINE

## 2024-05-28 PROCEDURE — 36415 COLL VENOUS BLD VENIPUNCTURE: CPT | Performed by: FAMILY MEDICINE

## 2024-05-31 LAB — M TB IFN-G BLD-IMP: NEGATIVE

## 2024-06-11 RX ORDER — LEVOFLOXACIN 500 MG/1
500 TABLET, FILM COATED ORAL DAILY
Qty: 7 TABLET | Refills: 0 | Status: SHIPPED | OUTPATIENT
Start: 2024-06-11 | End: 2024-06-18

## 2024-06-12 ENCOUNTER — PATIENT MESSAGE (OUTPATIENT)
Dept: FAMILY MEDICINE | Facility: CLINIC | Age: 74
End: 2024-06-12
Payer: MEDICARE

## 2024-06-13 ENCOUNTER — PATIENT MESSAGE (OUTPATIENT)
Dept: OPHTHALMOLOGY | Facility: CLINIC | Age: 74
End: 2024-06-13
Payer: MEDICARE

## 2024-06-14 ENCOUNTER — TELEPHONE (OUTPATIENT)
Dept: OPHTHALMOLOGY | Facility: CLINIC | Age: 74
End: 2024-06-14
Payer: MEDICARE

## 2024-06-14 NOTE — TELEPHONE ENCOUNTER
Spoke to pt and made aware could be dryness, patient agreed. Advised use ATS QID----- Message from Rula Gibson, Patient Care Assistant sent at 6/14/2024  7:44 AM CDT -----  Regarding: same day  Contact: pt  Type:  Same Day Appointment Request    Caller is requesting a same day appointment.  Caller declined first available appointment listed below.      Name of Caller:  pt     When is the first available appointment?  7/25/24    Symptoms:  right eye pain    Best Call Back Number:  [phone] (home)     Additional Information: please call to advise. Thanks!

## 2024-07-10 DIAGNOSIS — R39.15 URGENCY OF URINATION: ICD-10-CM

## 2024-07-10 DIAGNOSIS — R35.0 FREQUENCY OF URINATION: ICD-10-CM

## 2024-07-11 ENCOUNTER — OFFICE VISIT (OUTPATIENT)
Dept: FAMILY MEDICINE | Facility: CLINIC | Age: 74
End: 2024-07-11
Payer: MEDICARE

## 2024-07-11 VITALS
BODY MASS INDEX: 35.03 KG/M2 | WEIGHT: 250.25 LBS | HEIGHT: 71 IN | OXYGEN SATURATION: 96 % | SYSTOLIC BLOOD PRESSURE: 116 MMHG | HEART RATE: 60 BPM | DIASTOLIC BLOOD PRESSURE: 80 MMHG

## 2024-07-11 DIAGNOSIS — E78.5 HYPERLIPIDEMIA, UNSPECIFIED HYPERLIPIDEMIA TYPE: ICD-10-CM

## 2024-07-11 DIAGNOSIS — I73.9 PVD (PERIPHERAL VASCULAR DISEASE): ICD-10-CM

## 2024-07-11 DIAGNOSIS — R39.15 URINARY URGENCY: ICD-10-CM

## 2024-07-11 DIAGNOSIS — E11.8 CONTROLLED TYPE 2 DIABETES MELLITUS WITH COMPLICATION, WITHOUT LONG-TERM CURRENT USE OF INSULIN: Primary | ICD-10-CM

## 2024-07-11 DIAGNOSIS — T14.8XXA ABRASION: ICD-10-CM

## 2024-07-11 PROCEDURE — 99214 OFFICE O/P EST MOD 30 MIN: CPT | Mod: S$PBB,,, | Performed by: FAMILY MEDICINE

## 2024-07-11 PROCEDURE — 99214 OFFICE O/P EST MOD 30 MIN: CPT | Mod: PBBFAC,PN | Performed by: FAMILY MEDICINE

## 2024-07-11 PROCEDURE — 99999 PR PBB SHADOW E&M-EST. PATIENT-LVL IV: CPT | Mod: PBBFAC,,, | Performed by: FAMILY MEDICINE

## 2024-07-11 RX ORDER — TAMSULOSIN HYDROCHLORIDE 0.4 MG/1
0.4 CAPSULE ORAL DAILY
Qty: 90 CAPSULE | Refills: 3 | Status: SHIPPED | OUTPATIENT
Start: 2024-07-11

## 2024-07-11 RX ORDER — MONTELUKAST SODIUM 10 MG/1
10 TABLET ORAL DAILY
Qty: 90 TABLET | Refills: 3 | Status: SHIPPED | OUTPATIENT
Start: 2024-07-11

## 2024-07-11 NOTE — PROGRESS NOTES
"  Subjective:       Patient ID: Rajeev Newell III is a 73 y.o. male.    Chief Complaint: Follow-up (Knee issue)    Follow-up  Pertinent negatives include no arthralgias, chest pain, headaches, joint swelling, neck pain, vomiting or weakness.     Patient seen for f/u of knee issues.  While he was in [place] he had an abrasion on the R knee that was previously slow to heal until saw derm and placed on cipro. Seen in  and given cipro again in addition to mupirocin - has healed further without issue, nearly resolved.     Review of Systems:  Review of Systems   Constitutional:  Negative for activity change and unexpected weight change.   HENT:  Negative for hearing loss, rhinorrhea and trouble swallowing.    Eyes:  Negative for discharge and visual disturbance.   Respiratory:  Negative for chest tightness and wheezing.    Cardiovascular:  Negative for chest pain and palpitations.   Gastrointestinal:  Negative for blood in stool, constipation, diarrhea and vomiting.   Endocrine: Negative for polydipsia and polyuria.   Genitourinary:  Negative for difficulty urinating, hematuria and urgency.   Musculoskeletal:  Negative for arthralgias, joint swelling and neck pain.   Neurological:  Negative for weakness and headaches.   Psychiatric/Behavioral:  Negative for confusion and dysphoric mood.        Objective:     Vitals:    07/11/24 1056   BP: 116/80   Pulse: 60   SpO2: 96%   Weight: 113.5 kg (250 lb 3.6 oz)   Height: 5' 11" (1.803 m)          Physical Exam  Vitals and nursing note reviewed.   Constitutional:       General: He is not in acute distress.     Appearance: Normal appearance. He is well-developed. He is obese.   HENT:      Head: Normocephalic and atraumatic.   Eyes:      General: No scleral icterus.        Right eye: No discharge.         Left eye: No discharge.   Cardiovascular:      Rate and Rhythm: Normal rate.   Pulmonary:      Effort: No respiratory distress.   Musculoskeletal:         General: No " deformity or signs of injury.   Neurological:      General: No focal deficit present.      Mental Status: He is alert and oriented to person, place, and time.   Psychiatric:         Mood and Affect: Mood normal.         Behavior: Behavior normal.           Assessment & Plan:  Controlled type 2 diabetes mellitus with complication, without long-term current use of insulin  -     Hemoglobin A1C; Future; Expected date: 07/11/2024    PVD (peripheral vascular disease)    Hyperlipidemia, unspecified hyperlipidemia type  -     CBC Without Differential; Future; Expected date: 07/11/2024  -     Comprehensive Metabolic Panel; Future; Expected date: 07/11/2024  -     Lipid Panel; Future; Expected date: 07/11/2024  -     TSH; Future; Expected date: 07/11/2024  -     Uric Acid; Future; Expected date: 07/11/2024    Urinary urgency  -     Prostate Specific Antigen, Diagnostic; Future; Expected date: 07/11/2024    Abrasion  Comments:  resolving, cont mupirocin/keep covered    Other orders  -     montelukast (SINGULAIR) 10 mg tablet; Take 1 tablet (10 mg total) by mouth once daily.  Dispense: 90 tablet; Refill: 3

## 2024-07-11 NOTE — TELEPHONE ENCOUNTER
No care due was identified.  [facility] Embedded Care Due Messages. Reference number: [id number].   7/10/2024 11:55:42 PM CDT

## 2024-07-11 NOTE — TELEPHONE ENCOUNTER
Refill Decision Note   Rajeev Newell  is requesting a refill authorization.  Brief Assessment and Rationale for Refill:  Approve     Medication Therapy Plan:        Comments:     Note composed:7:46 AM 07/11/2024

## 2024-07-22 ENCOUNTER — OFFICE VISIT (OUTPATIENT)
Dept: OPHTHALMOLOGY | Facility: CLINIC | Age: 74
End: 2024-07-22
Payer: MEDICARE

## 2024-07-22 DIAGNOSIS — H40.1132 PRIMARY OPEN ANGLE GLAUCOMA OF BOTH EYES, MODERATE STAGE: Primary | ICD-10-CM

## 2024-07-22 DIAGNOSIS — H25.13 NUCLEAR SCLEROTIC CATARACT, BILATERAL: ICD-10-CM

## 2024-07-22 DIAGNOSIS — H02.88B MEIBOMIAN GLAND DYSFUNCTION (MGD), BILATERAL, BOTH UPPER AND LOWER LIDS: ICD-10-CM

## 2024-07-22 DIAGNOSIS — H02.88A MEIBOMIAN GLAND DYSFUNCTION (MGD), BILATERAL, BOTH UPPER AND LOWER LIDS: ICD-10-CM

## 2024-07-22 DIAGNOSIS — H04.123 DRY EYE SYNDROME, BILATERAL: ICD-10-CM

## 2024-07-22 PROCEDURE — 99999 PR PBB SHADOW E&M-EST. PATIENT-LVL III: CPT | Mod: PBBFAC,,, | Performed by: OPHTHALMOLOGY

## 2024-07-22 PROCEDURE — 99213 OFFICE O/P EST LOW 20 MIN: CPT | Mod: S$PBB,,, | Performed by: OPHTHALMOLOGY

## 2024-07-22 PROCEDURE — 99213 OFFICE O/P EST LOW 20 MIN: CPT | Mod: PBBFAC,PO | Performed by: OPHTHALMOLOGY

## 2024-07-22 PROCEDURE — G2211 COMPLEX E/M VISIT ADD ON: HCPCS | Mod: S$PBB,,, | Performed by: OPHTHALMOLOGY

## 2024-07-22 NOTE — PROGRESS NOTES
HPI    Pt here for 3 mo POAG f/u. Vision is is stable. Did not bring specs today.       +latanoprost QHS OU  +cosopt BID OU  +Ats PRN      Hemoglobin A1C       Date                     Value               Ref Range             Status                04/22/2024               6.4 (H)             4.0 - 5.6 %           Final                  09/13/2023               5.7 (H)             4.0 - 5.6 %           Final                   04/27/2023               6.1 (H)             4.0 - 5.6 %           Final            Last edited by Milli Ann on 7/22/2024  1:54 PM.            Assessment /Plan     For exam results, see Encounter Report.    Primary open angle glaucoma of both eyes, moderate stage    Dry eye syndrome, bilateral    Nuclear sclerotic cataract, bilateral    Meibomian gland dysfunction (MGD), bilateral, both upper and lower lids      1. Primary open angle glaucoma of both eyes, moderate stage  Neg famhx  Mildly thin pachy    ONH are excavated with severe OCT NFL damage  HVF progression OU, more significant 2021 to 2023  Tmax 33/30 (dilated, on treatment)  SLT OU 2024    IOP excellent, good response to SLT    Substantial eyelid/surface dz, consider switch to PF options, d/w patient  For now    Continue  latan qhs OU  Dorz/kalyan bid OU    F/u 4-6 months, HVF, IOP check    2. Dry eye syndrome, bilateral  Improved  Continue ATs  Consider gel qhs    3. Nuclear sclerotic cataract, bilateral  Moderate observe      4. Meibomian gland dysfunction (MGD), bilateral, both upper and lower lids  Warm compresses  Consider doxy course in the future    Visit today is associated with current or anticipated ongoing medical care related to this patients single serious and complex condition, glaucoma.

## 2024-07-25 ENCOUNTER — LAB VISIT (OUTPATIENT)
Dept: LAB | Facility: HOSPITAL | Age: 74
End: 2024-07-25
Attending: FAMILY MEDICINE
Payer: MEDICARE

## 2024-07-25 DIAGNOSIS — E11.8 CONTROLLED TYPE 2 DIABETES MELLITUS WITH COMPLICATION, WITHOUT LONG-TERM CURRENT USE OF INSULIN: ICD-10-CM

## 2024-07-25 DIAGNOSIS — R39.15 URINARY URGENCY: ICD-10-CM

## 2024-07-25 DIAGNOSIS — E78.5 HYPERLIPIDEMIA, UNSPECIFIED HYPERLIPIDEMIA TYPE: ICD-10-CM

## 2024-07-25 LAB
ALBUMIN SERPL BCP-MCNC: 3.8 G/DL (ref 3.5–5.2)
ALP SERPL-CCNC: 45 U/L (ref 55–135)
ALT SERPL W/O P-5'-P-CCNC: 29 U/L (ref 10–44)
ANION GAP SERPL CALC-SCNC: 9 MMOL/L (ref 8–16)
AST SERPL-CCNC: 24 U/L (ref 10–40)
BILIRUB SERPL-MCNC: 0.7 MG/DL (ref 0.1–1)
BUN SERPL-MCNC: 18 MG/DL (ref 8–23)
CALCIUM SERPL-MCNC: 9.6 MG/DL (ref 8.7–10.5)
CHLORIDE SERPL-SCNC: 106 MMOL/L (ref 95–110)
CHOLEST SERPL-MCNC: 138 MG/DL (ref 120–199)
CHOLEST/HDLC SERPL: 2.9 {RATIO} (ref 2–5)
CO2 SERPL-SCNC: 25 MMOL/L (ref 23–29)
COMPLEXED PSA SERPL-MCNC: 1.9 NG/ML (ref 0–4)
CREAT SERPL-MCNC: 1 MG/DL (ref 0.5–1.4)
ERYTHROCYTE [DISTWIDTH] IN BLOOD BY AUTOMATED COUNT: 17.4 % (ref 11.5–14.5)
EST. GFR  (NO RACE VARIABLE): >60 ML/MIN/1.73 M^2
ESTIMATED AVG GLUCOSE: 114 MG/DL (ref 68–131)
GLUCOSE SERPL-MCNC: 113 MG/DL (ref 70–110)
HBA1C MFR BLD: 5.6 % (ref 4–5.6)
HCT VFR BLD AUTO: 46.5 % (ref 40–54)
HDLC SERPL-MCNC: 47 MG/DL (ref 40–75)
HDLC SERPL: 34.1 % (ref 20–50)
HGB BLD-MCNC: 14.1 G/DL (ref 14–18)
LDLC SERPL CALC-MCNC: 74.6 MG/DL (ref 63–159)
MCH RBC QN AUTO: 27.1 PG (ref 27–31)
MCHC RBC AUTO-ENTMCNC: 30.3 G/DL (ref 32–36)
MCV RBC AUTO: 89 FL (ref 82–98)
NONHDLC SERPL-MCNC: 91 MG/DL
PLATELET # BLD AUTO: 371 K/UL (ref 150–450)
PMV BLD AUTO: 9.2 FL (ref 9.2–12.9)
POTASSIUM SERPL-SCNC: 4.3 MMOL/L (ref 3.5–5.1)
PROT SERPL-MCNC: 7.5 G/DL (ref 6–8.4)
RBC # BLD AUTO: 5.2 M/UL (ref 4.6–6.2)
SODIUM SERPL-SCNC: 140 MMOL/L (ref 136–145)
T4 FREE SERPL-MCNC: 0.83 NG/DL (ref 0.71–1.51)
TRIGL SERPL-MCNC: 82 MG/DL (ref 30–150)
TSH SERPL DL<=0.005 MIU/L-ACNC: 5.79 UIU/ML (ref 0.4–4)
URATE SERPL-MCNC: 4.7 MG/DL (ref 3.4–7)
WBC # BLD AUTO: 7.52 K/UL (ref 3.9–12.7)

## 2024-07-25 PROCEDURE — 83036 HEMOGLOBIN GLYCOSYLATED A1C: CPT | Performed by: FAMILY MEDICINE

## 2024-07-25 PROCEDURE — 84550 ASSAY OF BLOOD/URIC ACID: CPT | Performed by: FAMILY MEDICINE

## 2024-07-25 PROCEDURE — 80061 LIPID PANEL: CPT | Performed by: FAMILY MEDICINE

## 2024-07-25 PROCEDURE — 84443 ASSAY THYROID STIM HORMONE: CPT | Performed by: FAMILY MEDICINE

## 2024-07-25 PROCEDURE — 36415 COLL VENOUS BLD VENIPUNCTURE: CPT | Mod: PN | Performed by: FAMILY MEDICINE

## 2024-07-25 PROCEDURE — 84439 ASSAY OF FREE THYROXINE: CPT | Performed by: FAMILY MEDICINE

## 2024-07-25 PROCEDURE — 80053 COMPREHEN METABOLIC PANEL: CPT | Performed by: FAMILY MEDICINE

## 2024-07-25 PROCEDURE — 85027 COMPLETE CBC AUTOMATED: CPT | Performed by: FAMILY MEDICINE

## 2024-07-25 PROCEDURE — 84153 ASSAY OF PSA TOTAL: CPT | Performed by: FAMILY MEDICINE

## 2024-08-23 DIAGNOSIS — E78.5 HYPERLIPIDEMIA, UNSPECIFIED HYPERLIPIDEMIA TYPE: Primary | ICD-10-CM

## 2024-08-30 ENCOUNTER — TELEPHONE (OUTPATIENT)
Dept: FAMILY MEDICINE | Facility: CLINIC | Age: 74
End: 2024-08-30
Payer: MEDICARE

## 2024-08-30 NOTE — TELEPHONE ENCOUNTER
----- Message from Miri Chi sent at 8/30/2024  2:03 PM CDT -----  Contact: self  Type: Needs Medical Advice  Who Called:  pt  Best Call Back Number: [phone]   Additional Information: pt would like to know if your office has the flu shots.please call

## 2024-09-03 ENCOUNTER — LAB VISIT (OUTPATIENT)
Dept: LAB | Facility: HOSPITAL | Age: 74
End: 2024-09-03
Attending: FAMILY MEDICINE
Payer: MEDICARE

## 2024-09-03 ENCOUNTER — PATIENT MESSAGE (OUTPATIENT)
Dept: FAMILY MEDICINE | Facility: CLINIC | Age: 74
End: 2024-09-03
Payer: MEDICARE

## 2024-09-03 DIAGNOSIS — E78.5 HYPERLIPIDEMIA, UNSPECIFIED HYPERLIPIDEMIA TYPE: ICD-10-CM

## 2024-09-03 LAB — TSH SERPL DL<=0.005 MIU/L-ACNC: 3.75 UIU/ML (ref 0.4–4)

## 2024-09-03 PROCEDURE — 36415 COLL VENOUS BLD VENIPUNCTURE: CPT | Mod: PN | Performed by: FAMILY MEDICINE

## 2024-09-03 PROCEDURE — 84443 ASSAY THYROID STIM HORMONE: CPT | Performed by: FAMILY MEDICINE

## 2024-09-12 ENCOUNTER — PATIENT MESSAGE (OUTPATIENT)
Dept: FAMILY MEDICINE | Facility: CLINIC | Age: 74
End: 2024-09-12
Payer: MEDICARE

## 2024-09-30 ENCOUNTER — IMMUNIZATION (OUTPATIENT)
Dept: FAMILY MEDICINE | Facility: CLINIC | Age: 74
End: 2024-09-30
Payer: MEDICARE

## 2024-09-30 DIAGNOSIS — Z23 NEED FOR VACCINATION: Primary | ICD-10-CM

## 2024-09-30 PROCEDURE — 99999PBSHW FLU VACCINE - ADJUVANTED: Mod: PBBFAC,,,

## 2024-09-30 PROCEDURE — 90653 IIV ADJUVANT VACCINE IM: CPT | Mod: PBBFAC,PO

## 2024-09-30 PROCEDURE — G0008 ADMIN INFLUENZA VIRUS VAC: HCPCS | Mod: PBBFAC,PO

## 2024-10-03 ENCOUNTER — OFFICE VISIT (OUTPATIENT)
Dept: OPTOMETRY | Facility: CLINIC | Age: 74
End: 2024-10-03
Payer: MEDICARE

## 2024-10-03 DIAGNOSIS — H10.829 ROSACEA BLEPHAROCONJUNCTIVITIS: ICD-10-CM

## 2024-10-03 DIAGNOSIS — H40.1132 PRIMARY OPEN ANGLE GLAUCOMA OF BOTH EYES, MODERATE STAGE: Primary | ICD-10-CM

## 2024-10-03 PROCEDURE — 99999 PR PBB SHADOW E&M-EST. PATIENT-LVL III: CPT | Mod: PBBFAC,,, | Performed by: OPTOMETRIST

## 2024-10-03 PROCEDURE — 99213 OFFICE O/P EST LOW 20 MIN: CPT | Mod: S$PBB,,, | Performed by: OPTOMETRIST

## 2024-10-03 PROCEDURE — 99213 OFFICE O/P EST LOW 20 MIN: CPT | Mod: PBBFAC,PO | Performed by: OPTOMETRIST

## 2024-10-03 RX ORDER — MUPIROCIN 20 MG/G
OINTMENT TOPICAL 2 TIMES DAILY
COMMUNITY
Start: 2024-09-23

## 2024-10-10 RX ORDER — SULFACETAMIDE SODIUM 100 MG/ML
SOLUTION/ DROPS OPHTHALMIC
Qty: 15 ML | Refills: 0 | Status: SHIPPED | OUTPATIENT
Start: 2024-10-10 | End: 2024-10-11 | Stop reason: SDUPTHER

## 2024-10-10 NOTE — PROGRESS NOTES
HPI    Pt here for refraction and dry eyes. Nakul Guallpa 07/22    Pt sts glasses are 2 yrs old, needs an update. Pt sts he has MGD and using   AT's seems to irritate more.   Last edited by Cecilia Mcginnis on 10/3/2024  3:05 PM.            Assessment /Plan     For exam results, see Encounter Report.    Primary open angle glaucoma of both eyes, moderate stage    Rosacea blepharoconjunctivitis  -     sulfacetamide sodium 10% (BLEPH-10) 10 % ophthalmic solution; Apply I gtt externally to eyelids/ lashes OU bid x 3 weeks  Dispense: 15 mL; Refill: 0      Followed by Dr Guallpa --last 7/2024   Neg famhx  Mildly thin pachy     ONH are excavated with severe OCT NFL damage  HVF progression OU, more significant 2021 to 2023  Tmax 33/30 (dilated, on treatment)  SLT OU 2024     IOP excellent, good response to SLT     Substantial eyelid/surface dz, consider switch to PF options, d/w patient  For now     Continue  latan qhs OU  Dorz/kalyan bid OU     IOP 10/2024 mid teens     2.   Gradually progressive s/s and discomfort   Pt has had issues taking oral doxy in the past w/ skin irritation (UV exposure?)   Consider topical sulfacetamide applied to lid margins (GI issues with bactrim)   Consider low dose isoretinoin     Continue lid hygiene w/ warm compress or Tonia mask   Gentle cleansing w/ baby shampoo or ocusoft wipes qhs   Will try short course sulfa topically to lid margins as directed---message to d/c if any skin / eye irritation    F/u per Dr Guallpa's notes

## 2024-10-11 DIAGNOSIS — H10.829 ROSACEA BLEPHAROCONJUNCTIVITIS: ICD-10-CM

## 2024-10-11 RX ORDER — SULFACETAMIDE SODIUM 100 MG/ML
SOLUTION/ DROPS OPHTHALMIC
Qty: 15 ML | Refills: 0 | Status: SHIPPED | OUTPATIENT
Start: 2024-10-11 | End: 2024-11-01

## 2024-10-29 ENCOUNTER — TELEPHONE (OUTPATIENT)
Dept: OPTOMETRY | Facility: CLINIC | Age: 74
End: 2024-10-29
Payer: MEDICARE

## 2024-10-31 ENCOUNTER — OFFICE VISIT (OUTPATIENT)
Dept: OPTOMETRY | Facility: CLINIC | Age: 74
End: 2024-10-31
Payer: MEDICARE

## 2024-10-31 DIAGNOSIS — H10.829 ROSACEA BLEPHAROCONJUNCTIVITIS: Primary | ICD-10-CM

## 2024-10-31 PROCEDURE — 99999 PR PBB SHADOW E&M-EST. PATIENT-LVL III: CPT | Mod: PBBFAC,,, | Performed by: OPTOMETRIST

## 2024-10-31 PROCEDURE — 99213 OFFICE O/P EST LOW 20 MIN: CPT | Mod: PBBFAC,PO | Performed by: OPTOMETRIST

## 2024-11-02 NOTE — PROGRESS NOTES
Problem: Pain  Goal: Verbalizes/displays adequate comfort level or baseline comfort level  11/2/2024 1644 by Vicenta Hurley RN  Outcome: Progressing     Problem: Respiratory - Adult  Goal: Achieves optimal ventilation and oxygenation  11/2/2024 1644 by Vicenta Hurley RN  Outcome: Progressing     Problem: Safety - Adult  Goal: Free from fall injury  11/2/2024 1644 by Vicenta Hurley RN  Outcome: Progressing     Problem: Neurosensory - Adult  Goal: Achieves stable or improved neurological status  11/2/2024 1644 by Vicenta Hurley RN  Outcome: Progressing     Problem: Genitourinary - Adult  Goal: Absence of urinary retention  11/2/2024 1644 by Vicenta Hurley RN  Outcome: Progressing  Flowsheets (Taken 11/2/2024 0800)  Absence of urinary retention:   Assess patient’s ability to void and empty bladder   Monitor intake/output and perform bladder scan as needed      HPI     Glaucoma    Additional comments: POAG - OU // overdue IOP check -- +Azopt bid &   Latanoprost qhs // pt states no complaints           Comments   Agree above  Notes [facility] stable  Using drops as directed // per pt           Last edited by SHERMAN Smith, OD on 2/8/2018  1:53 PM. (History)        ROS     Positive for: Eyes    Negative for: Constitutional, Gastrointestinal, Neurological, Skin,   Genitourinary, Musculoskeletal, HENT, Endocrine, Cardiovascular,   Respiratory, Psychiatric, Allergic/Imm, Heme/Lymph    Last edited by SHERMAN Smith, OD on 2/8/2018  1:52 PM. (History)        Assessment /Plan     For exam results, see Encounter Report.    Primary open angle glaucoma of both eyes, moderate stage  -     latanoprost 0.005 % ophthalmic solution; Place 1 drop into both eyes nightly.  Dispense: 7.5 mL; Refill: 4        IOP stable in upper teens / below 20 OU  Angles open on gonio today SS   Avg / thinnish CCT  Prev fields 7/17 stable-mani without gross progression    Continue OU  Latanoprost qhs  azopt bid    Full exam / fields / OCT in July 2018  Will need lower target if progression from previous

## 2024-11-07 ENCOUNTER — PATIENT MESSAGE (OUTPATIENT)
Dept: OPTOMETRY | Facility: CLINIC | Age: 74
End: 2024-11-07
Payer: MEDICARE

## 2024-11-11 DIAGNOSIS — H10.829 ROSACEA BLEPHAROCONJUNCTIVITIS: Primary | ICD-10-CM

## 2024-11-11 RX ORDER — PERFLUOROHEXYLOCTANE 1 MG/MG
1 SOLUTION OPHTHALMIC 4 TIMES DAILY
Qty: 3 ML | Refills: 6 | Status: SHIPPED | OUTPATIENT
Start: 2024-11-11 | End: 2025-11-11

## 2024-12-02 ENCOUNTER — CLINICAL SUPPORT (OUTPATIENT)
Dept: OPHTHALMOLOGY | Facility: CLINIC | Age: 74
End: 2024-12-02
Payer: MEDICARE

## 2024-12-02 ENCOUNTER — PATIENT MESSAGE (OUTPATIENT)
Dept: OPHTHALMOLOGY | Facility: CLINIC | Age: 74
End: 2024-12-02

## 2024-12-02 ENCOUNTER — OFFICE VISIT (OUTPATIENT)
Dept: OPHTHALMOLOGY | Facility: CLINIC | Age: 74
End: 2024-12-02
Payer: MEDICARE

## 2024-12-02 DIAGNOSIS — H40.1132 PRIMARY OPEN ANGLE GLAUCOMA OF BOTH EYES, MODERATE STAGE: Primary | ICD-10-CM

## 2024-12-02 DIAGNOSIS — H10.13 ALLERGIC CONJUNCTIVITIS OF BOTH EYES: ICD-10-CM

## 2024-12-02 PROCEDURE — 99214 OFFICE O/P EST MOD 30 MIN: CPT | Mod: S$PBB,,, | Performed by: OPHTHALMOLOGY

## 2024-12-02 PROCEDURE — 99212 OFFICE O/P EST SF 10 MIN: CPT | Mod: PBBFAC,PO | Performed by: OPHTHALMOLOGY

## 2024-12-02 PROCEDURE — 99999 PR PBB SHADOW E&M-EST. PATIENT-LVL II: CPT | Mod: PBBFAC,,, | Performed by: OPHTHALMOLOGY

## 2024-12-02 PROCEDURE — G2211 COMPLEX E/M VISIT ADD ON: HCPCS | Mod: S$PBB,,, | Performed by: OPHTHALMOLOGY

## 2024-12-02 PROCEDURE — 92083 EXTENDED VISUAL FIELD XM: CPT | Mod: PBBFAC,PO | Performed by: OPHTHALMOLOGY

## 2024-12-02 RX ORDER — TAFLUPROST OPTHALMIC 0 MG/.3ML
1 SOLUTION/ DROPS OPHTHALMIC NIGHTLY
Qty: 30 EACH | Refills: 11 | Status: SHIPPED | OUTPATIENT
Start: 2024-12-02

## 2024-12-02 NOTE — PROGRESS NOTES
HPI     Follow-up     Additional comments: F/u 4-6 months, HVF, IOP check. Denies eye pain   today states he is having dry eyes using Systane Ou 4-6 times daily states   they seem to work.                Last edited by Nirmala Valente on 12/2/2024  1:02 PM.            Assessment /Plan     For exam results, see Encounter Report.    Primary open angle glaucoma of both eyes, moderate stage    Allergic conjunctivitis of both eyes      1. Primary open angle glaucoma of both eyes, moderate stage (Primary)  Neg famhx  Mildly thin pachy    ONH are excavated with severe OCT NFL damage  HVF progression OU, more significant 2021 to 2023  Tmax 33/30 (dilated, on treatment)  SLT OU 2024  Suspected allergy to brimonidine (as lumify)    IOP adequate    Due to allergy as below will switch to PF eye drops    For now, stop latan, stop dorz/kalyan    Start  Zioptan qhs OU    F/u 4-6 weeks, IOP check      2. Allergic conjunctivitis of both eyes  Follicles today with substantial redness  Brim allergy vs preservatives    See above for med change

## 2024-12-11 ENCOUNTER — PATIENT MESSAGE (OUTPATIENT)
Dept: OPHTHALMOLOGY | Facility: CLINIC | Age: 74
End: 2024-12-11
Payer: MEDICARE

## 2025-01-13 ENCOUNTER — OFFICE VISIT (OUTPATIENT)
Dept: OPHTHALMOLOGY | Facility: CLINIC | Age: 75
End: 2025-01-13
Payer: MEDICARE

## 2025-01-13 DIAGNOSIS — H40.1132 PRIMARY OPEN ANGLE GLAUCOMA OF BOTH EYES, MODERATE STAGE: Primary | ICD-10-CM

## 2025-01-13 DIAGNOSIS — H10.13 ALLERGIC CONJUNCTIVITIS OF BOTH EYES: ICD-10-CM

## 2025-01-13 PROCEDURE — 99214 OFFICE O/P EST MOD 30 MIN: CPT | Mod: S$PBB,,, | Performed by: OPHTHALMOLOGY

## 2025-01-13 PROCEDURE — 99213 OFFICE O/P EST LOW 20 MIN: CPT | Mod: PBBFAC,PO | Performed by: OPHTHALMOLOGY

## 2025-01-13 PROCEDURE — G2211 COMPLEX E/M VISIT ADD ON: HCPCS | Mod: S$PBB,,, | Performed by: OPHTHALMOLOGY

## 2025-01-13 PROCEDURE — 99999 PR PBB SHADOW E&M-EST. PATIENT-LVL III: CPT | Mod: PBBFAC,,, | Performed by: OPHTHALMOLOGY

## 2025-01-13 RX ORDER — TAFLUPROST OPTHALMIC 0 MG/.3ML
1 SOLUTION/ DROPS OPHTHALMIC NIGHTLY
Qty: 90 EACH | Refills: 4 | Status: SHIPPED | OUTPATIENT
Start: 2025-01-13

## 2025-01-13 RX ORDER — AZELASTINE HYDROCHLORIDE, FLUTICASONE PROPIONATE 137; 50 UG/1; UG/1
SPRAY, METERED NASAL
COMMUNITY

## 2025-01-13 RX ORDER — DORZOLAMIDE HYDROCHLORIDE AND TIMOLOL MALEATE PRESERVATIVE FREE 20; 5 MG/ML; MG/ML
1 SOLUTION/ DROPS OPHTHALMIC EVERY 12 HOURS
Qty: 180 EACH | Refills: 4 | Status: SHIPPED | OUTPATIENT
Start: 2025-01-13 | End: 2025-01-28 | Stop reason: ALTCHOICE

## 2025-01-13 RX ORDER — AMOXICILLIN AND CLAVULANATE POTASSIUM 875; 125 MG/1; MG/1
TABLET, FILM COATED ORAL
COMMUNITY

## 2025-01-13 RX ORDER — ACETAMINOPHEN AND CODEINE PHOSPHATE 120; 12 MG/5ML; MG/5ML
SOLUTION ORAL
COMMUNITY
Start: 2024-10-30

## 2025-01-13 NOTE — PROGRESS NOTES
HPI    DLS: 12/2/2024 pt present for 6wk IOP check    Pt states eyes feel 100% better since switching to Zioptan. States   discontinued latan. And dorz/kalyan since last exam.     Zioptan Qhs OU  Last edited by Zhanna Zapata on 1/13/2025  1:30 PM.            Assessment /Plan     For exam results, see Encounter Report.    Allergic conjunctivitis of both eyes    Primary open angle glaucoma of both eyes, moderate stage  -     tafluprost, PF, (ZIOPTAN, PF,) 0.0015 % Dpet; Place 1 drop into both eyes every evening.  Dispense: 90 each; Refill: 4  -     dorzolamide-timolol, PF, (COSOPT PF) 2-0.5 % Dpet ophthalmic solution; Place 1 drop into both eyes every 12 (twelve) hours.  Dispense: 180 each; Refill: 4      1. Primary open angle glaucoma of both eyes, moderate stage  Neg famhx  Mildly thin pachy    ONH are excavated with severe OCT NFL damage  HVF progression OU, more significant 2021 to 2023  Tmax 33/30 (dilated, on treatment)  SLT OU 2024  Suspected allergy to brimonidine (as lumify)  Allergy to preservatives, need to use PF versions of meds    IOP high today, but eyes feel much better    Continue  Zioptan qhs OU  Start  Dorz/kalyan PF BID OU    F/u 3 months, IOP check    Visit today is associated with current or anticipated ongoing medical care related to this patients single serious and complex condition, glaucoma.      2. Allergic conjunctivitis of both eyes (Primary)  Much improved, off brim, off preservatives

## 2025-01-18 ENCOUNTER — PATIENT MESSAGE (OUTPATIENT)
Dept: OPHTHALMOLOGY | Facility: CLINIC | Age: 75
End: 2025-01-18
Payer: MEDICARE

## 2025-01-27 ENCOUNTER — PATIENT MESSAGE (OUTPATIENT)
Dept: OPHTHALMOLOGY | Facility: CLINIC | Age: 75
End: 2025-01-27
Payer: MEDICARE

## 2025-01-28 RX ORDER — TIMOLOL 5 MG/ML
1 SOLUTION/ DROPS OPHTHALMIC EVERY MORNING
Qty: 30 EACH | Refills: 11 | Status: SHIPPED | OUTPATIENT
Start: 2025-01-28

## 2025-02-24 DIAGNOSIS — Z00.00 ENCOUNTER FOR MEDICARE ANNUAL WELLNESS EXAM: ICD-10-CM

## 2025-04-23 DIAGNOSIS — M19.90 ARTHRITIS: ICD-10-CM

## 2025-04-23 RX ORDER — MELOXICAM 15 MG/1
15 TABLET ORAL DAILY
Qty: 90 TABLET | Refills: 0 | Status: SHIPPED | OUTPATIENT
Start: 2025-04-23

## 2025-04-29 ENCOUNTER — OFFICE VISIT (OUTPATIENT)
Dept: OPHTHALMOLOGY | Facility: CLINIC | Age: 75
End: 2025-04-29
Payer: MEDICARE

## 2025-04-29 DIAGNOSIS — H40.1132 PRIMARY OPEN ANGLE GLAUCOMA OF BOTH EYES, MODERATE STAGE: Primary | ICD-10-CM

## 2025-04-29 PROCEDURE — 99213 OFFICE O/P EST LOW 20 MIN: CPT | Mod: S$PBB,,, | Performed by: OPHTHALMOLOGY

## 2025-04-29 PROCEDURE — 99999 PR PBB SHADOW E&M-EST. PATIENT-LVL III: CPT | Mod: PBBFAC,,, | Performed by: OPHTHALMOLOGY

## 2025-04-29 PROCEDURE — 99213 OFFICE O/P EST LOW 20 MIN: CPT | Mod: PBBFAC,PO | Performed by: OPHTHALMOLOGY

## 2025-04-29 PROCEDURE — G2211 COMPLEX E/M VISIT ADD ON: HCPCS | Mod: S$PBB,,, | Performed by: OPHTHALMOLOGY

## 2025-04-29 RX ORDER — LATANOPROST 50 UG/ML
1 SOLUTION/ DROPS OPHTHALMIC NIGHTLY
Qty: 2.5 ML | Refills: 12 | Status: SHIPPED | OUTPATIENT
Start: 2025-04-29 | End: 2025-05-29

## 2025-04-29 RX ORDER — NYSTATIN 100000 U/G
CREAM TOPICAL 2 TIMES DAILY
COMMUNITY
Start: 2025-03-16

## 2025-04-29 RX ORDER — VALACYCLOVIR HYDROCHLORIDE 1 G/1
TABLET, FILM COATED ORAL
COMMUNITY
Start: 2025-04-03

## 2025-04-29 RX ORDER — TIMOLOL MALEATE 5 MG/ML
1 SOLUTION/ DROPS OPHTHALMIC 2 TIMES DAILY
Qty: 10 ML | Refills: 11 | Status: SHIPPED | OUTPATIENT
Start: 2025-04-29

## 2025-04-29 NOTE — PROGRESS NOTES
HPI    DLS: 1/13/2025 pt present for 3mo IOP check    Pt states since switching to PF Dorz/Kalyan eyes do not seem as red.    Zioptan Qhs OU  PF Dorz/Kalyan BID OU  Last edited by Zhanna Zapata on 4/29/2025 10:48 AM.            Assessment /Plan     For exam results, see Encounter Report.    Primary open angle glaucoma of both eyes, moderate stage      Neg famhx  Mildly thin pachy    ONH are excavated with severe OCT NFL damage  HVF progression OU, more significant 2021 to 2023  Tmax 33/30 (dilated, on treatment)  SLT OU 2024  Suspected allergy to brimonidine (as lumify)  Intolerant to dorzolamide  ?allergy to preservatives, pt would like to try preserved versions again    IOP improved  I think okay for now    Continue  Zioptan QHS OU (will switch to latan when he runs out)  Kalyan PF BID OU (will switch to preserved kalyan when he runs Out)    F/u 4-5 months, HVF, DFE      Visit today is associated with current or anticipated ongoing medical care related to this patients single serious and complex condition, glaucoma.

## 2025-05-01 ENCOUNTER — OFFICE VISIT (OUTPATIENT)
Dept: FAMILY MEDICINE | Facility: CLINIC | Age: 75
End: 2025-05-01
Payer: MEDICARE

## 2025-05-01 VITALS
OXYGEN SATURATION: 96 % | WEIGHT: 248.69 LBS | HEIGHT: 71 IN | SYSTOLIC BLOOD PRESSURE: 120 MMHG | DIASTOLIC BLOOD PRESSURE: 74 MMHG | BODY MASS INDEX: 34.81 KG/M2 | HEART RATE: 56 BPM

## 2025-05-01 DIAGNOSIS — I73.9 PVD (PERIPHERAL VASCULAR DISEASE): ICD-10-CM

## 2025-05-01 DIAGNOSIS — Z00.00 ENCOUNTER FOR MEDICARE ANNUAL WELLNESS EXAM: Primary | ICD-10-CM

## 2025-05-01 DIAGNOSIS — D69.2 OTHER NONTHROMBOCYTOPENIC PURPURA: ICD-10-CM

## 2025-05-01 DIAGNOSIS — I83.93 ASYMPTOMATIC VARICOSE VEINS OF BOTH LOWER EXTREMITIES: ICD-10-CM

## 2025-05-01 DIAGNOSIS — E11.8 CONTROLLED TYPE 2 DIABETES MELLITUS WITH COMPLICATION, WITHOUT LONG-TERM CURRENT USE OF INSULIN: ICD-10-CM

## 2025-05-01 DIAGNOSIS — H40.1192 PRIMARY OPEN-ANGLE GLAUCOMA, MODERATE STAGE, UNSPECIFIED LATERALITY: ICD-10-CM

## 2025-05-01 DIAGNOSIS — R35.0 BENIGN PROSTATIC HYPERPLASIA WITH URINARY FREQUENCY: ICD-10-CM

## 2025-05-01 DIAGNOSIS — E66.01 MORBID OBESITY: ICD-10-CM

## 2025-05-01 DIAGNOSIS — N40.1 BENIGN PROSTATIC HYPERPLASIA WITH URINARY FREQUENCY: ICD-10-CM

## 2025-05-01 PROCEDURE — 99215 OFFICE O/P EST HI 40 MIN: CPT | Mod: PBBFAC,PN | Performed by: NURSE PRACTITIONER

## 2025-05-01 PROCEDURE — 99999 PR PBB SHADOW E&M-EST. PATIENT-LVL V: CPT | Mod: PBBFAC,,, | Performed by: NURSE PRACTITIONER

## 2025-05-01 NOTE — PATIENT INSTRUCTIONS
Counseling and Referral of Other Preventative  (Italic type indicates deductible and co-insurance are waived)    Patient Name: Rajeev Newell  Today's Date: 5/1/2025    Health Maintenance       Date Due Completion Date    COVID-19 Vaccine (5 - 2024-25 season) 09/01/2024 11/1/2022    Foot Exam 09/19/2024 9/19/2023    Override on 5/29/2014: Done    Diabetic Eye Exam 12/04/2024 12/4/2023    Hemoglobin A1c 06/06/2025 12/6/2024    Diabetes Urine Screening 07/25/2025 7/25/2024    Low Dose Statin 12/02/2025 12/2/2024    Lipid Panel 12/06/2025 12/6/2024    Colorectal Cancer Screening 03/14/2027 3/14/2022    Override on 8/13/2001: Done (flex sig - legacy documents)    TETANUS VACCINE 11/01/2032 11/1/2022        No orders of the defined types were placed in this encounter.      The following information is provided to all patients.  This information is to help you find resources for any of the problems found today that may be affecting your health:                  Living healthy guide: www.[facility].[place].gov      Understanding Diabetes: www.diabetes.org      Eating healthy: www.cdc.gov/healthyweight      CDC home safety checklist: www.cdc.gov/steadi/patient.html      Agency on Aging: www.goea.[place].[facility]      Alcoholics anonymous (AA): www.aa.org      Physical Activity: www.carole.nih.gov/uq0gouo      Tobacco use: www.quitwithusla.org

## 2025-05-08 NOTE — PROGRESS NOTES
"  Rajeev Newell presented for an initial Medicare AWV today. The following components were reviewed and updated:    Medical history  Family History  Social history  Allergies and Current Medications  Health Risk Assessment  Health Maintenance  Care Team    **See Completed Assessments for Annual Wellness visit with in the encounter summary    The following assessments were completed:  Depression Screening  Cognitive function Screening    Timed Get Up Test  Whisper Test      Opioid documentation:      Patient does not have a current opioid prescription.          Vitals:    05/01/25 1340   BP: 120/74   Patient Position: Sitting   Pulse: (!) 56   SpO2: 96%   Weight: 112.8 kg (248 lb 10.9 oz)   Height: 5' 11" (1.803 m)     Body mass index is 34.68 kg/m².       Physical Exam  Vitals reviewed.   Constitutional:       General: He is not in acute distress.  HENT:      Head: Normocephalic.   Cardiovascular:      Rate and Rhythm: Bradycardia present.   Pulmonary:      Effort: Pulmonary effort is normal. No respiratory distress.   Skin:     General: Skin is warm.   Neurological:      General: No focal deficit present.      Mental Status: He is alert.   Psychiatric:         Mood and Affect: Mood normal.           Diagnoses and health risks identified today and associated recommendations/orders:  1. Encounter for Medicare annual wellness exam  Reviewed health maintenance and provided recommendations    Recommend foot exam  - Referral to Enhanced Annual Wellness Visit (eAWV) W+1    2. Controlled type 2 diabetes mellitus with complication, without long-term current use of insulin  Continue to monitor  Followed by Swati Walton MD   Last A1c 5.6  Taking farxiga.      3. Other nonthrombocytopenic purpura  Continue to monitor  Followed by Swati Walton MD .      4. Morbid obesity  Continue to monitor  Followed by Swati Walton MD   Encourage healthy food chocies.      5. PVD (peripheral vascular disease)  Continue to " monitor  Followed by Swati Walton MD .      6. Primary open-angle glaucoma, moderate stage, unspecified laterality  Continue to monitor  Followed by Swati Walton MD .      7. Benign prostatic hyperplasia with urinary frequency  Continue to monitor  Followed by Swati Walton MD .      8. Asymptomatic varicose veins of both lower extremities  Continue to monitor  Followed by Swati Walton MD .        Provided Rajeev with a 5-10 year written screening schedule and personal prevention plan. Recommendations were developed using the USPSTF age appropriate recommendations. Education, counseling, and referrals were provided as needed.  After Visit Summary printed and given to patient which includes a list of additional screenings\tests needed.    No follow-ups on file.      Sari bAbott NP

## 2025-05-19 ENCOUNTER — PATIENT MESSAGE (OUTPATIENT)
Dept: OPHTHALMOLOGY | Facility: CLINIC | Age: 75
End: 2025-05-19
Payer: MEDICARE

## 2025-05-20 RX ORDER — TIMOLOL 5 MG/ML
1 SOLUTION/ DROPS OPHTHALMIC 2 TIMES DAILY
Qty: 60 EACH | Refills: 11 | Status: SHIPPED | OUTPATIENT
Start: 2025-05-20

## 2025-05-20 NOTE — TELEPHONE ENCOUNTER
Patient has intolerance to preserved eye drops after retrial of preserved timolol.   Will re-prescribe preservative free timolol to be used BID OU.

## 2025-05-22 ENCOUNTER — PATIENT MESSAGE (OUTPATIENT)
Dept: FAMILY MEDICINE | Facility: CLINIC | Age: 75
End: 2025-05-22
Payer: MEDICARE

## 2025-05-22 DIAGNOSIS — E11.8 CONTROLLED TYPE 2 DIABETES MELLITUS WITH COMPLICATION, WITHOUT LONG-TERM CURRENT USE OF INSULIN: ICD-10-CM

## 2025-05-22 DIAGNOSIS — R39.15 URINARY URGENCY: ICD-10-CM

## 2025-05-22 DIAGNOSIS — E78.5 HYPERLIPIDEMIA, UNSPECIFIED HYPERLIPIDEMIA TYPE: ICD-10-CM

## 2025-05-27 ENCOUNTER — LAB VISIT (OUTPATIENT)
Dept: LAB | Facility: HOSPITAL | Age: 75
End: 2025-05-27
Attending: FAMILY MEDICINE
Payer: MEDICARE

## 2025-05-27 DIAGNOSIS — R39.15 URINARY URGENCY: ICD-10-CM

## 2025-05-27 DIAGNOSIS — E78.5 HYPERLIPIDEMIA, UNSPECIFIED HYPERLIPIDEMIA TYPE: ICD-10-CM

## 2025-05-27 DIAGNOSIS — E11.8 CONTROLLED TYPE 2 DIABETES MELLITUS WITH COMPLICATION, WITHOUT LONG-TERM CURRENT USE OF INSULIN: ICD-10-CM

## 2025-05-27 LAB
ALBUMIN SERPL BCP-MCNC: 3.8 G/DL (ref 3.5–5.2)
ALP SERPL-CCNC: 43 UNIT/L (ref 40–150)
ALT SERPL W/O P-5'-P-CCNC: 40 UNIT/L (ref 10–44)
ANION GAP (OHS): 10 MMOL/L (ref 8–16)
AST SERPL-CCNC: 32 UNIT/L (ref 11–45)
BILIRUB SERPL-MCNC: 1.1 MG/DL (ref 0.1–1)
BUN SERPL-MCNC: 24 MG/DL (ref 8–23)
CALCIUM SERPL-MCNC: 9.5 MG/DL (ref 8.7–10.5)
CHLORIDE SERPL-SCNC: 108 MMOL/L (ref 95–110)
CHOLEST SERPL-MCNC: 135 MG/DL (ref 120–199)
CHOLEST/HDLC SERPL: 2.9 {RATIO} (ref 2–5)
CO2 SERPL-SCNC: 22 MMOL/L (ref 23–29)
CREAT SERPL-MCNC: 0.9 MG/DL (ref 0.5–1.4)
EAG (OHS): 126 MG/DL (ref 68–131)
ERYTHROCYTE [DISTWIDTH] IN BLOOD BY AUTOMATED COUNT: 13.7 % (ref 11.5–14.5)
GFR SERPLBLD CREATININE-BSD FMLA CKD-EPI: >60 ML/MIN/1.73/M2
GLUCOSE SERPL-MCNC: 92 MG/DL (ref 70–110)
HBA1C MFR BLD: 6 % (ref 4–5.6)
HCT VFR BLD AUTO: 46.1 % (ref 40–54)
HDLC SERPL-MCNC: 46 MG/DL (ref 40–75)
HDLC SERPL: 34.1 % (ref 20–50)
HGB BLD-MCNC: 14.2 GM/DL (ref 14–18)
LDLC SERPL CALC-MCNC: 73.6 MG/DL (ref 63–159)
MCH RBC QN AUTO: 28 PG (ref 27–31)
MCHC RBC AUTO-ENTMCNC: 30.8 G/DL (ref 32–36)
MCV RBC AUTO: 91 FL (ref 82–98)
NONHDLC SERPL-MCNC: 89 MG/DL
PLATELET # BLD AUTO: 299 K/UL (ref 150–450)
PMV BLD AUTO: 9 FL (ref 9.2–12.9)
POTASSIUM SERPL-SCNC: 4.7 MMOL/L (ref 3.5–5.1)
PROT SERPL-MCNC: 7.4 GM/DL (ref 6–8.4)
PSA SERPL-MCNC: 1.86 NG/ML
RBC # BLD AUTO: 5.07 M/UL (ref 4.6–6.2)
SODIUM SERPL-SCNC: 140 MMOL/L (ref 136–145)
T4 FREE SERPL-MCNC: 0.95 NG/DL (ref 0.71–1.51)
TRIGL SERPL-MCNC: 77 MG/DL (ref 30–150)
TSH SERPL-ACNC: 4.54 UIU/ML (ref 0.4–4)
WBC # BLD AUTO: 5.47 K/UL (ref 3.9–12.7)

## 2025-05-27 PROCEDURE — 84443 ASSAY THYROID STIM HORMONE: CPT

## 2025-05-27 PROCEDURE — 83036 HEMOGLOBIN GLYCOSYLATED A1C: CPT

## 2025-05-27 PROCEDURE — 36415 COLL VENOUS BLD VENIPUNCTURE: CPT | Mod: PN

## 2025-05-27 PROCEDURE — 84153 ASSAY OF PSA TOTAL: CPT

## 2025-05-27 PROCEDURE — 80053 COMPREHEN METABOLIC PANEL: CPT

## 2025-05-27 PROCEDURE — 85027 COMPLETE CBC AUTOMATED: CPT

## 2025-05-27 PROCEDURE — 80061 LIPID PANEL: CPT

## 2025-05-27 PROCEDURE — 84439 ASSAY OF FREE THYROXINE: CPT

## 2025-05-29 ENCOUNTER — OFFICE VISIT (OUTPATIENT)
Dept: FAMILY MEDICINE | Facility: CLINIC | Age: 75
End: 2025-05-29
Payer: MEDICARE

## 2025-05-29 VITALS
HEART RATE: 64 BPM | WEIGHT: 247.44 LBS | BODY MASS INDEX: 34.64 KG/M2 | SYSTOLIC BLOOD PRESSURE: 110 MMHG | OXYGEN SATURATION: 98 % | DIASTOLIC BLOOD PRESSURE: 74 MMHG | HEIGHT: 71 IN

## 2025-05-29 DIAGNOSIS — E78.5 HYPERLIPIDEMIA, UNSPECIFIED HYPERLIPIDEMIA TYPE: ICD-10-CM

## 2025-05-29 DIAGNOSIS — R20.2 PARESTHESIA OF LEFT LOWER EXTREMITY: ICD-10-CM

## 2025-05-29 DIAGNOSIS — E11.8 CONTROLLED TYPE 2 DIABETES MELLITUS WITH COMPLICATION, WITHOUT LONG-TERM CURRENT USE OF INSULIN: ICD-10-CM

## 2025-05-29 DIAGNOSIS — G62.9 PERIPHERAL POLYNEUROPATHY: Primary | ICD-10-CM

## 2025-05-29 DIAGNOSIS — I87.2 VENOUS INSUFFICIENCY (CHRONIC) (PERIPHERAL): ICD-10-CM

## 2025-05-29 DIAGNOSIS — E11.36 CATARACT ASSOCIATED WITH TYPE 2 DIABETES MELLITUS: ICD-10-CM

## 2025-05-29 DIAGNOSIS — I73.9 PVD (PERIPHERAL VASCULAR DISEASE): ICD-10-CM

## 2025-05-29 DIAGNOSIS — E04.1 THYROID NODULE: ICD-10-CM

## 2025-05-29 PROCEDURE — G2211 COMPLEX E/M VISIT ADD ON: HCPCS | Mod: S$PBB,,, | Performed by: FAMILY MEDICINE

## 2025-05-29 PROCEDURE — 99999 PR PBB SHADOW E&M-EST. PATIENT-LVL IV: CPT | Mod: PBBFAC,,, | Performed by: FAMILY MEDICINE

## 2025-05-29 PROCEDURE — 99214 OFFICE O/P EST MOD 30 MIN: CPT | Mod: PBBFAC,PN | Performed by: FAMILY MEDICINE

## 2025-05-29 PROCEDURE — 99214 OFFICE O/P EST MOD 30 MIN: CPT | Mod: S$PBB,,, | Performed by: FAMILY MEDICINE

## 2025-05-29 NOTE — PROGRESS NOTES
Chief Complaint:  Chief Complaint   Patient presents with    Follow-up     To go over Blood Work.         HPI:  Rajeev is a 74 y.o. year old     History of Present Illness    CHIEF COMPLAINT:  Rajeev presents today for follow up of neuropathy symptoms    NEUROLOGICAL SYMPTOMS:  He experiences neuropathy symptoms which he associates with his lower back, particularly when standing in one place for extended periods. When lying in bed in certain positions, he experiences a sensation similar to a rubber band around his foot that resolves with position changes. He denies persistent numbness, strength issues, or problems with falling while walking. He also reports occasional charley horses in calf muscles following increased activity, particularly at night, and muscle cramps in inner thighs when stretching legs which resolve with continued stretching.    MEDICAL HISTORY:  He has a history of herniated disc previously treated with 3 weeks of conservative physical therapy.    MEDICATIONS:  He takes Protonix for GI symptoms and has been taking Meloxicam for 3-4 weeks for thumb and finger pain associated with increased physical activity.    LABS:  CBC and chemistry were normal. A1C was slightly elevated at 6.0. Morning blood sugar readings are frequently under 100 with occasional readings slightly above 100. Cholesterol and PSA were normal. TSH was slightly out of range, however secondary thyroid testing was normal.               Review of Systems   Constitutional:  Negative for activity change, fatigue and unexpected weight change.   HENT:  Negative for hearing loss, rhinorrhea and trouble swallowing.    Respiratory:  Negative for chest tightness and wheezing.    Cardiovascular:  Negative for chest pain, palpitations and leg swelling.   Gastrointestinal:  Negative for blood in stool, constipation and diarrhea.   Genitourinary:  Negative for difficulty urinating, hematuria and urgency.   Musculoskeletal:  Negative for  "arthralgias and joint swelling.   Neurological:  Negative for weakness and headaches.   Psychiatric/Behavioral:  Negative for dysphoric mood.          Past Medical History:  Past Medical History:   Diagnosis Date    Cataract     OU--early    DM (diabetes mellitus) 10/29/2012    Glaucoma     POAG-OU    Hx of colonic polyps     Hypertension     PVD (peripheral vascular disease) 3/2/2023    Sinusitis          Vitals:  Vitals:    05/29/25 1102   BP: 110/74   Pulse: 64   SpO2: 98%   Weight: 112.2 kg (247 lb 7.5 oz)   Height: 5' 11" (1.803 m)   PainSc: 0-No pain       BP Readings from Last 5 Encounters:   05/29/25 110/74   05/01/25 120/74   07/11/24 116/80   05/13/24 116/76   04/26/24 114/66       The 10-year ASCVD risk score (Rangel POST, et al., 2019) is: 35.6%    Values used to calculate the score:      Age: 74 years      Sex: Male      Is Non- : No      Diabetic: Yes      Tobacco smoker: No      Systolic Blood Pressure: 110 mmHg      Is BP treated: Yes      HDL Cholesterol: 46 mg/dL      Total Cholesterol: 135 mg/dL      Physical Exam:  Physical Exam  Vitals and nursing note reviewed.   Constitutional:       General: He is not in acute distress.     Appearance: Normal appearance. He is well-developed. He is obese.   HENT:      Head: Normocephalic and atraumatic.   Eyes:      General: No scleral icterus.        Right eye: No discharge.         Left eye: No discharge.      Conjunctiva/sclera: Conjunctivae normal.   Cardiovascular:      Rate and Rhythm: Normal rate and regular rhythm.   Pulmonary:      Effort: Pulmonary effort is normal. No respiratory distress.      Breath sounds: Normal breath sounds.   Musculoskeletal:         General: No signs of injury.      Cervical back: Neck supple.      Right lower leg: No edema.      Left lower leg: No edema.   Skin:     General: Skin is warm and dry.   Neurological:      Mental Status: He is alert and oriented to person, place, and time.      Cranial " Nerves: No cranial nerve deficit.   Psychiatric:         Mood and Affect: Mood normal.         Behavior: Behavior normal.             Assessment & Plan:  Peripheral polyneuropathy  -     CV US Lower Extremity Veins Bilateral Insufficiency; Future  -     CV Ultrasound doppler arterial legs bilat; Future  -     EMG W/ ULTRASOUND AND NERVE CONDUCTION TEST 2 Extremities; Future    PVD (peripheral vascular disease)  -     CV US Lower Extremity Veins Bilateral Insufficiency; Future  -     CV Ultrasound doppler arterial legs bilat; Future  -     EMG W/ ULTRASOUND AND NERVE CONDUCTION TEST 2 Extremities; Future    Paresthesia of left lower extremity  -     CV US Lower Extremity Veins Bilateral Insufficiency; Future  -     EMG W/ ULTRASOUND AND NERVE CONDUCTION TEST 2 Extremities; Future    Venous insufficiency (chronic) (peripheral)  -     CV US Lower Extremity Veins Bilateral Insufficiency; Future    Thyroid nodule  -     US Thyroid; Future; Expected date: 05/29/2025    Hyperlipidemia, unspecified hyperlipidemia type  -     T4, Free; Future; Expected date: 09/29/2025  -     CBC Without Differential; Future; Expected date: 09/29/2025  -     Comprehensive Metabolic Panel; Future; Expected date: 09/29/2025  -     TSH; Future; Expected date: 09/29/2025    Controlled type 2 diabetes mellitus with complication, without long-term current use of insulin  -     Hemoglobin A1C; Future; Expected date: 09/29/2025    Cataract associated with type 2 diabetes mellitus         Assessment & Plan    ORDERS:   Ordered leg US to assess blood flow.   Ordered EMG of legs, to be performed a few weeks after leg US (may be cancelled if US are clear).   Ordered annual thyroid US.    IMPRESSION:  Assessed leg symptoms, suspecting potential neuropathy related to lower back issues rather than vascular problems.  Reviewed recent lab results, noting slight increase in A1C to 6.0, which is acceptable but warrants monitoring.  Evaluated thyroid function  based on recent labs, determining no immediate concern despite slight out-of-range result.  Considered need for thyroid US to monitor nodules, separate from thyroid function.  Determined need for further diagnostic testing to differentiate between vascular and neurological causes of leg symptoms.    PLAN SUMMARY:   Order annual thyroid ultrasound   Order EMG of legs (to be performed after leg ultrasound)   Order leg ultrasound to assess blood flow   Continue current exercise routine   Perform leg exercises similar to post-knee replacement exercises   Maintain stretching exercises for lower back   Follow up in 4 months for lab work   Follow up in 6 months for general check-up    PATIENT EDUCATION:   Explained the distinction between thyroid nodules and thyroid function.   Discussed the relationship between elevated glucose levels and increased risk of neuropathy.   Explained that significant nerve compression would likely present with more noticeable symptoms.    ACTION ITEMS/LIFESTYLE:   Rajeev to continue current exercise routine, including hand clap push-ups, knee bends, and leg lifts.   Rajeev to maintain stretching exercises for lower back to prevent tightness.   Rajeev to perform leg exercises similar to those done after knee replacements to keep muscles healthy.    FOLLOW UP:   Follow up in 4 months for lab work and in 6 months.   Contact the office if neuropathy symptoms become more persistent, linger longer than usual, or spread to new areas.         Visit today included increased complexity associated with the care of the episodic problem neuropathy addressed and managing the longitudinal care of the patient due to the serious and/or complex managed problem(s) dm2, pvd, lumbar radic.    This note was generated with the assistance of ambient listening technology. Verbal consent was obtained by the patient and accompanying visitor(s) for the recording of patient appointment to facilitate this note. I  attest to having reviewed and edited the generated note for accuracy, though some syntax or spelling errors may persist. Please contact the author of this note for any clarification.

## 2025-06-13 ENCOUNTER — HOSPITAL ENCOUNTER (OUTPATIENT)
Dept: RADIOLOGY | Facility: HOSPITAL | Age: 75
Discharge: HOME | End: 2025-06-13
Attending: FAMILY MEDICINE
Payer: MEDICARE

## 2025-06-13 ENCOUNTER — RESULTS FOLLOW-UP (OUTPATIENT)
Dept: FAMILY MEDICINE | Facility: CLINIC | Age: 75
End: 2025-06-13

## 2025-06-13 ENCOUNTER — HOSPITAL ENCOUNTER (OUTPATIENT)
Dept: CARDIOLOGY | Facility: HOSPITAL | Age: 75
Discharge: HOME | End: 2025-06-13
Attending: FAMILY MEDICINE
Payer: MEDICARE

## 2025-06-13 DIAGNOSIS — G62.9 PERIPHERAL POLYNEUROPATHY: ICD-10-CM

## 2025-06-13 DIAGNOSIS — E04.1 THYROID NODULE: ICD-10-CM

## 2025-06-13 DIAGNOSIS — I87.2 VENOUS INSUFFICIENCY (CHRONIC) (PERIPHERAL): ICD-10-CM

## 2025-06-13 DIAGNOSIS — I73.9 PVD (PERIPHERAL VASCULAR DISEASE): ICD-10-CM

## 2025-06-13 DIAGNOSIS — R20.2 PARESTHESIA OF LEFT LOWER EXTREMITY: ICD-10-CM

## 2025-06-13 LAB
LEFT ANT TIBIAL SYS PSV: 68 CM/S
LEFT CFA PSV: 88 CM/S
LEFT GREAT SAPHENOUS DISTAL THIGH DIA: 0.49 CM
LEFT GREAT SAPHENOUS DISTAL THIGH REFLUX: 6645 MS
LEFT GREAT SAPHENOUS JUNCTION DIA: 0.62 CM
LEFT GREAT SAPHENOUS KNEE DIA: 0.51 CM
LEFT GREAT SAPHENOUS KNEE REFLUX: 6204 MS
LEFT GREAT SAPHENOUS MIDDLE THIGH DIA: 0.38 CM
LEFT GREAT SAPHENOUS PROXIMAL CALF DIA: 0.32 CM
LEFT PERONEAL SYS PSV: 39 CM/S
LEFT POPLITEAL PSV: 68 CM/S
LEFT POST TIBIAL SYS PSV: 55 CM/S
LEFT PROFUNDA SYS PSV: 70 CM/S
LEFT SMALL SAPHENOUS KNEE DIA: 0.29 CM
LEFT SMALL SAPHENOUS SPJ DIA: 0.31 CM
LEFT SUPER FEMORAL DIST SYS PSV: 69 CM/S
LEFT SUPER FEMORAL MID SYS PSV: 82 CM/S
LEFT SUPER FEMORAL OSTIAL SYS PSV: 82 CM/S
LEFT SUPER FEMORAL PROX SYS PSV: 85 CM/S
LEFT TIB/PER TRUNK SYS PSV: 53 CM/S
OHS CV LEFT LOWER EXTREMITY ABI (NO CALC): 1
OHS CV RIGHT ABI LOWER EXTREMITY (NO CALC): 1.1
RIGHT ANT TIBIAL SYS PSV: 66 CM/S
RIGHT CFA PSV: 122 CM/S
RIGHT GIAC DIA: 0.34 MM
RIGHT GREAT SAPHENOUS DISTAL THIGH DIA: 0.35 CM
RIGHT GREAT SAPHENOUS JUNCTION DIA: 0.78 CM
RIGHT GREAT SAPHENOUS KNEE DIA: 0.35 CM
RIGHT GREAT SAPHENOUS MIDDLE THIGH DIA: 0.41 CM
RIGHT GREAT SAPHENOUS PROXIMAL CALF DIA: 0.31 CM
RIGHT PERONEAL SYS PSV: 57 CM/S
RIGHT POPLITEAL PSV: 61 CM/S
RIGHT POST TIBIAL SYS PSV: 63 CM/S
RIGHT PROFUNDA SYS PSV: 184 CM/S
RIGHT SMALL SAPHENOUS KNEE DIA: 0.28 CM
RIGHT SMALL SAPHENOUS SPJ DIA: 0.29 CM
RIGHT SUPER FEMORAL DIST SYS PSV: 67 CM/S
RIGHT SUPER FEMORAL MID SYS PSV: 100 CM/S
RIGHT SUPER FEMORAL OSTIAL SYS PSV: 67 CM/S
RIGHT SUPER FEMORAL PROX SYS PSV: 99 CM/S
RIGHT TIB/PER TRUNK SYS PSV: 82 CM/S

## 2025-06-13 PROCEDURE — 93970 EXTREMITY STUDY: CPT | Mod: 26,,, | Performed by: INTERNAL MEDICINE

## 2025-06-13 PROCEDURE — 76536 US EXAM OF HEAD AND NECK: CPT | Mod: 26,,, | Performed by: RADIOLOGY

## 2025-06-13 PROCEDURE — 93925 LOWER EXTREMITY STUDY: CPT | Mod: 26,,, | Performed by: INTERNAL MEDICINE

## 2025-06-13 PROCEDURE — 76536 US EXAM OF HEAD AND NECK: CPT | Mod: TC,PO

## 2025-06-13 PROCEDURE — 93925 LOWER EXTREMITY STUDY: CPT | Mod: PO

## 2025-06-13 PROCEDURE — 93970 EXTREMITY STUDY: CPT | Mod: TC,PO

## 2025-06-16 LAB
LEFT ANT TIBIAL SYS PSV: 68 CM/S
LEFT CFA PSV: 88 CM/S
LEFT PERONEAL SYS PSV: 39 CM/S
LEFT POPLITEAL PSV: 68 CM/S
LEFT POST TIBIAL SYS PSV: 55 CM/S
LEFT PROFUNDA SYS PSV: 70 CM/S
LEFT SUPER FEMORAL DIST SYS PSV: 69 CM/S
LEFT SUPER FEMORAL MID SYS PSV: 82 CM/S
LEFT SUPER FEMORAL OSTIAL SYS PSV: 82 CM/S
LEFT SUPER FEMORAL PROX SYS PSV: 85 CM/S
LEFT TIB/PER TRUNK SYS PSV: 53 CM/S
OHS CV LEFT LOWER EXTREMITY ABI (NO CALC): 1
OHS CV RIGHT ABI LOWER EXTREMITY (NO CALC): 1.1
RIGHT ANT TIBIAL SYS PSV: 66 CM/S
RIGHT CFA PSV: 122 CM/S
RIGHT PERONEAL SYS PSV: 57 CM/S
RIGHT POPLITEAL PSV: 61 CM/S
RIGHT POST TIBIAL SYS PSV: 63 CM/S
RIGHT PROFUNDA SYS PSV: 184 CM/S
RIGHT SUPER FEMORAL DIST SYS PSV: 67 CM/S
RIGHT SUPER FEMORAL MID SYS PSV: 100 CM/S
RIGHT SUPER FEMORAL OSTIAL SYS PSV: 67 CM/S
RIGHT SUPER FEMORAL PROX SYS PSV: 99 CM/S
RIGHT TIB/PER TRUNK SYS PSV: 82 CM/S

## 2025-06-19 DIAGNOSIS — R35.0 FREQUENCY OF URINATION: ICD-10-CM

## 2025-06-19 DIAGNOSIS — R39.15 URGENCY OF URINATION: ICD-10-CM

## 2025-06-19 RX ORDER — TAMSULOSIN HYDROCHLORIDE 0.4 MG/1
0.4 CAPSULE ORAL DAILY
Qty: 90 CAPSULE | Refills: 3 | Status: SHIPPED | OUTPATIENT
Start: 2025-06-19

## 2025-06-19 NOTE — TELEPHONE ENCOUNTER
Rajeev Newell  is requesting a refill authorization.  Brief Assessment and Rationale for Refill:  Approve     Medication Therapy Plan:         Comments:     Note composed:9:16 AM 06/19/2025

## 2025-06-19 NOTE — TELEPHONE ENCOUNTER
No care due was identified.  [facility] Embedded Care Due Messages. Reference number: [id number].   6/19/2025 3:56:57 AM CDT

## 2025-06-24 ENCOUNTER — OFFICE VISIT (OUTPATIENT)
Dept: FAMILY MEDICINE | Facility: CLINIC | Age: 75
End: 2025-06-24
Payer: MEDICARE

## 2025-06-24 VITALS
TEMPERATURE: 98 F | DIASTOLIC BLOOD PRESSURE: 84 MMHG | HEIGHT: 71 IN | HEART RATE: 62 BPM | SYSTOLIC BLOOD PRESSURE: 134 MMHG | OXYGEN SATURATION: 98 % | RESPIRATION RATE: 18 BRPM | BODY MASS INDEX: 35 KG/M2 | WEIGHT: 250 LBS

## 2025-06-24 DIAGNOSIS — R42 DIZZINESS AND GIDDINESS: ICD-10-CM

## 2025-06-24 DIAGNOSIS — E11.8 CONTROLLED TYPE 2 DIABETES MELLITUS WITH COMPLICATION, WITHOUT LONG-TERM CURRENT USE OF INSULIN: Primary | ICD-10-CM

## 2025-06-24 DIAGNOSIS — I73.9 PVD (PERIPHERAL VASCULAR DISEASE): ICD-10-CM

## 2025-06-24 DIAGNOSIS — I10 PRIMARY HYPERTENSION: ICD-10-CM

## 2025-06-24 PROBLEM — K21.9 GASTRO-ESOPHAGEAL REFLUX DISEASE WITHOUT ESOPHAGITIS: Status: ACTIVE | Noted: 2025-06-24

## 2025-06-24 PROCEDURE — G2211 COMPLEX E/M VISIT ADD ON: HCPCS | Mod: ,,, | Performed by: FAMILY MEDICINE

## 2025-06-24 PROCEDURE — 99214 OFFICE O/P EST MOD 30 MIN: CPT | Mod: S$PBB,,, | Performed by: FAMILY MEDICINE

## 2025-06-24 PROCEDURE — 99214 OFFICE O/P EST MOD 30 MIN: CPT | Mod: PBBFAC,PN | Performed by: FAMILY MEDICINE

## 2025-06-24 PROCEDURE — 99999 PR PBB SHADOW E&M-EST. PATIENT-LVL IV: CPT | Mod: PBBFAC,,, | Performed by: FAMILY MEDICINE

## 2025-06-24 RX ORDER — FLUTICASONE PROPIONATE 50 MCG
2 SPRAY, SUSPENSION (ML) NASAL NIGHTLY
Qty: 48 G | Refills: 3 | Status: SHIPPED | OUTPATIENT
Start: 2025-06-24

## 2025-06-24 RX ORDER — MECLIZINE HYDROCHLORIDE 25 MG/1
25 TABLET ORAL 3 TIMES DAILY PRN
Qty: 20 TABLET | Refills: 1 | Status: SHIPPED | OUTPATIENT
Start: 2025-06-24

## 2025-06-24 NOTE — PROGRESS NOTES
Chief Complaint:  Chief Complaint   Patient presents with    Dizziness        HPI:  Rajeev is a 74 y.o. year old     History of Present Illness    CHIEF COMPLAINT:  Rajeev presents today for vertigo symptoms that started in early June.    VERTIGO:  He reports vertigo symptoms that began in [place] during hot weather. Symptoms primarily occur when waking up in the morning or with sudden movements, characterized by a mild spinning sensation lasting 20-30 seconds with slight balance issues. He does not fall during these episodes. Symptoms have improved approximately 70-80% since onset. When rolling in bed, opening eyes helps alleviate the spinning sensation. He denies feeling like he will pass out during vertigo episodes. Symptoms are more pronounced in the morning and when lying down, with less intensity later in the day.    ENT:  He reports increased ear issues with mild sinus congestion and an imbalance feeling. He denies severe pain, headaches, sore throat, or post-nasal drip. He notes ringing in the ears without associated pressure or pain and describes mild sinus congestion. He experiences ear pressure that can be felt when attempting to equalize ear pressure, similar to techniques used during airplane altitude changes.    HYPERTENSION:  He reports low blood pressure noted by Dr. Wren during visit on June 18th. He was taking Forsega, which was identified as affecting blood pressure. He reduced blood pressure medication to daily, taking it at night, and experienced dizziness upon waking the morning after medication adjustment. He has been self-monitoring blood pressure at home, with recent readings ranging from 130 to 160 systolic. He denies feeling more run down than usual.    LABS / TEST RESULTS:  Recent labs from a month ago revealed slightly elevated A1C and TSH. Thyroid scan performed approximately one week ago was stable with recommendation to recheck in one year. Leg ultrasounds showed venous reflux  "without evidence of blood clots. He reports no significant concerns regarding these findings, noting the venous changes as normal for his age.    CURRENT MEDICATIONS:  He is currently taking Losartan 1 tablet daily and Singulair, recently restarted. Flonase was prescribed to be administered two sprays in each nostril at bedtime. Meclizine was prescribed as needed for dizziness.              Review of Systems   Constitutional:  Negative for fatigue.   HENT:  Positive for congestion, postnasal drip and tinnitus. Negative for rhinorrhea, sinus pressure and sore throat.    Eyes:  Negative for visual disturbance.   Respiratory:  Negative for cough and shortness of breath.    Neurological:  Positive for dizziness. Negative for headaches.         Past Medical History:  Past Medical History:   Diagnosis Date    Cataract     OU--early    DM (diabetes mellitus) 10/29/2012    Glaucoma     POAG-OU    Hx of colonic polyps     Hypertension     PVD (peripheral vascular disease) 3/2/2023    Sinusitis          Vitals:  Vitals:    06/24/25 0923   BP: 134/84   Pulse: 62   Resp: 18   Temp: 97.9 °F (36.6 °C)   TempSrc: Oral   SpO2: 98%   Weight: 113.4 kg (250 lb)   Height: 5' 11" (1.803 m)   PainSc: 0-No pain       BP Readings from Last 5 Encounters:   06/24/25 134/84   05/29/25 110/74   05/01/25 120/74   07/11/24 116/80   05/13/24 116/76       The 10-year ASCVD risk score (Rangel POST, et al., 2019) is: 46.6%    Values used to calculate the score:      Age: 74 years      Sex: Male      Is Non- : No      Diabetic: Yes      Tobacco smoker: No      Systolic Blood Pressure: 134 mmHg      Is BP treated: Yes      HDL Cholesterol: 46 mg/dL      Total Cholesterol: 135 mg/dL      Physical Exam:  Physical Exam  Vitals and nursing note reviewed.   Constitutional:       General: He is not in acute distress.     Appearance: Normal appearance. He is well-developed. He is obese.      Comments: Appears tired   HENT:      Head: " Normocephalic and atraumatic.      Right Ear: Tympanic membrane normal.      Left Ear: Tympanic membrane normal.   Eyes:      General: No scleral icterus.        Right eye: No discharge.         Left eye: No discharge.      Conjunctiva/sclera: Conjunctivae normal.   Cardiovascular:      Rate and Rhythm: Normal rate and regular rhythm.   Pulmonary:      Effort: Pulmonary effort is normal. No respiratory distress.      Breath sounds: Normal breath sounds.   Musculoskeletal:         General: No signs of injury.      Cervical back: Neck supple.      Right lower leg: No edema.      Left lower leg: No edema.   Lymphadenopathy:      Cervical: No cervical adenopathy.   Skin:     General: Skin is warm and dry.   Neurological:      Mental Status: He is alert and oriented to person, place, and time.      Cranial Nerves: No cranial nerve deficit.   Psychiatric:         Mood and Affect: Mood normal.         Behavior: Behavior normal.             Assessment & Plan:  Controlled type 2 diabetes mellitus with complication, without long-term current use of insulin  Comments:  slight uptick on prev lab, update on schedule later this summer for monitoring  otherwise well-controlled and without hypoglycemia    Primary hypertension  Comments:  continue losartan once daily for now with BP checks    Dizziness and giddiness  Comments:  reviewed sinus/congestion mgmt - restart flonase and singulair  reviewed epley maneuver for sx relief at home  Orders:  -     meclizine (ANTIVERT) 25 mg tablet; Take 1 tablet (25 mg total) by mouth 3 (three) times daily as needed for Dizziness (1/2-1 tablet).  Dispense: 20 tablet; Refill: 1  -     fluticasone propionate (FLONASE) 50 mcg/actuation nasal spray; 2 sprays (100 mcg total) by Each Nostril route every evening.  Dispense: 48 g; Refill: 3  -     CT Head Without Contrast; Future; Expected date: 06/24/2025    PVD (peripheral vascular disease)  Comments:  reviewed venous insuff studies from u/s          Assessment & Plan    ORDERS:   Ordered CT head to rule out serious underlying causes if dizziness persists.    IMPRESSION:  Assessed reported dizziness, considering potential causes including BP fluctuations, sinus problems, and vestibular dysfunction.  Evaluated recent lab results, noting slight increases in A1C and TSH.  Reviewed recent thyroid and leg US results, which showed stability and venous reflux without clots, respectively.  Considered possibility of benign paroxysmal positional vertigo (BPPV).  Ordered CT head to rule out serious underlying causes if dizziness persists.    PLAN SUMMARY:   Restarted Singulair   Changed losartan to morning dosage   Restarted Flonase nasal spray, 2 sprays each nostril at bedtime   Started meclizine (Antivert) as needed for dizziness   Ordered CT head if dizziness persists   Rajeev to perform Epley maneuver at home using online video instructions    PATIENT EDUCATION:   Explained Epley maneuver for repositioning ear crystals to alleviate vertigo symptoms.   Discussed stroke symptoms, emphasizing sudden onset of facial asymmetry, weakness, and significant headache as red flags.    ACTION ITEMS/LIFESTYLE:   Rajeev to perform Epley maneuver at home using online video instructions.   Rajeev to monitor BP at home.    MEDICATIONS:   Started meclizine (Antivert) as needed for dizziness.   Restarted Flonase nasal spray, 2 sprays in each nostril at bedtime.   Restarted Singulair.   Changed losartan to daily in the morning instead of at night.         Visit today included increased complexity associated with the care of the episodic problem vertigo addressed and managing the longitudinal care of the patient due to the serious and/or complex managed problem(s) dm2, htn, pvd.    This note was generated with the assistance of ambient listening technology. Verbal consent was obtained by the patient and accompanying visitor(s) for the recording of patient appointment to facilitate  this note. I attest to having reviewed and edited the generated note for accuracy, though some syntax or spelling errors may persist. Please contact the author of this note for any clarification.

## 2025-06-26 ENCOUNTER — HOSPITAL ENCOUNTER (OUTPATIENT)
Dept: RADIOLOGY | Facility: HOSPITAL | Age: 75
Discharge: HOME | End: 2025-06-26
Attending: FAMILY MEDICINE
Payer: MEDICARE

## 2025-06-26 DIAGNOSIS — R42 DIZZINESS AND GIDDINESS: ICD-10-CM

## 2025-06-26 PROCEDURE — 70450 CT HEAD/BRAIN W/O DYE: CPT | Mod: TC,PO

## 2025-06-26 PROCEDURE — 70450 CT HEAD/BRAIN W/O DYE: CPT | Mod: 26,,, | Performed by: RADIOLOGY

## 2025-06-27 ENCOUNTER — RESULTS FOLLOW-UP (OUTPATIENT)
Dept: FAMILY MEDICINE | Facility: CLINIC | Age: 75
End: 2025-06-27

## 2025-07-21 ENCOUNTER — PATIENT MESSAGE (OUTPATIENT)
Dept: FAMILY MEDICINE | Facility: CLINIC | Age: 75
End: 2025-07-21
Payer: MEDICARE

## 2025-08-13 DIAGNOSIS — E11.9 TYPE 2 DIABETES MELLITUS WITHOUT COMPLICATION: ICD-10-CM

## (undated) DEVICE — HOOD T-5 TEAR AWAY STERILE

## (undated) DEVICE — PUMP COLD THERAPY

## (undated) DEVICE — SOL IRR NACL .9% 3000ML

## (undated) DEVICE — SUT VICRYL+ 1 CT1 18IN

## (undated) DEVICE — KIT TOTAL KNEE TKOFG

## (undated) DEVICE — SEE MEDLINE ITEM 146298

## (undated) DEVICE — NDL 18GA X1 1/2 REG BEVEL

## (undated) DEVICE — SUT VICRYL PLUS 0 CT1 18IN

## (undated) DEVICE — TAPE SURG DURAPORE 2 X10YD

## (undated) DEVICE — BLADE SAGITTAL 18 X 1.27 X 90M

## (undated) DEVICE — SET CEMENT (SCULP)

## (undated) DEVICE — DRAPE INCISE IOBAN 2 23X33IN

## (undated) DEVICE — PAD CAST SPECIALIST STRL 6

## (undated) DEVICE — SEE MEDLINE ITEM 154981

## (undated) DEVICE — DRESSING AQUACEL AG ADV 3.5X12

## (undated) DEVICE — ADHESIVE DERMABOND ADVANCED

## (undated) DEVICE — MASK FLYTE HOOD PEEL AWAY

## (undated) DEVICE — TOURNIQUET SB QC DP 34X4IN

## (undated) DEVICE — PAD COLD THERAPY KNEE WRAP ON

## (undated) DEVICE — DRESSING COVER AQUACEL AG SURG

## (undated) DEVICE — SUT MONOCRYL 3-0 PS-2 UND

## (undated) DEVICE — ELECTRODE REM PLYHSV RETURN 9

## (undated) DEVICE — KIT IRR SUCTION HND PIECE

## (undated) DEVICE — SYR ONLY LUER LOCK 20CC

## (undated) DEVICE — DRAPE STERI INSTRUMENT 1018

## (undated) DEVICE — SUT VICRYL PLUS 2-0 CT1 18

## (undated) DEVICE — SEE MEDLINE ITEM 146271

## (undated) DEVICE — BOWL CEMENT

## (undated) DEVICE — SEE MEDLINE ITEM 157117

## (undated) DEVICE — Device

## (undated) DEVICE — SEE MEDLINE ITEM 152487

## (undated) DEVICE — PAD KNEE POLAR XL

## (undated) DEVICE — SUT MONOCRYL 3-0 PS-1

## (undated) DEVICE — SYR 50CC LL

## (undated) DEVICE — SEE MEDLINE ITEM 146270

## (undated) DEVICE — SEE MEDLINE ITEM 152515